# Patient Record
Sex: FEMALE | Race: WHITE | Employment: OTHER | URBAN - METROPOLITAN AREA
[De-identification: names, ages, dates, MRNs, and addresses within clinical notes are randomized per-mention and may not be internally consistent; named-entity substitution may affect disease eponyms.]

---

## 2019-12-31 ENCOUNTER — OFFICE VISIT (OUTPATIENT)
Dept: PRIMARY CARE CLINIC | Age: 64
End: 2019-12-31
Payer: MEDICARE

## 2019-12-31 VITALS
RESPIRATION RATE: 18 BRPM | WEIGHT: 124 LBS | SYSTOLIC BLOOD PRESSURE: 110 MMHG | HEIGHT: 60 IN | OXYGEN SATURATION: 93 % | BODY MASS INDEX: 24.35 KG/M2 | HEART RATE: 106 BPM | DIASTOLIC BLOOD PRESSURE: 72 MMHG

## 2019-12-31 DIAGNOSIS — G89.29 CHRONIC PAIN OF BOTH KNEES: ICD-10-CM

## 2019-12-31 DIAGNOSIS — M25.552 CHRONIC HIP PAIN, BILATERAL: Primary | ICD-10-CM

## 2019-12-31 DIAGNOSIS — M25.561 CHRONIC PAIN OF BOTH KNEES: ICD-10-CM

## 2019-12-31 DIAGNOSIS — G89.29 CHRONIC HIP PAIN, BILATERAL: Primary | ICD-10-CM

## 2019-12-31 DIAGNOSIS — M25.551 CHRONIC HIP PAIN, BILATERAL: Primary | ICD-10-CM

## 2019-12-31 DIAGNOSIS — Z13.29 SCREENING FOR THYROID DISORDER: ICD-10-CM

## 2019-12-31 DIAGNOSIS — M25.562 CHRONIC PAIN OF BOTH KNEES: ICD-10-CM

## 2019-12-31 DIAGNOSIS — R26.2 DIFFICULTY WALKING: ICD-10-CM

## 2019-12-31 DIAGNOSIS — G89.29 CHRONIC PAIN IN RIGHT SHOULDER: ICD-10-CM

## 2019-12-31 DIAGNOSIS — R29.898 WEAKNESS OF RIGHT LEG: ICD-10-CM

## 2019-12-31 DIAGNOSIS — Z13.1 SCREENING FOR DIABETES MELLITUS: ICD-10-CM

## 2019-12-31 DIAGNOSIS — Z13.0 SCREENING FOR IRON DEFICIENCY ANEMIA: ICD-10-CM

## 2019-12-31 DIAGNOSIS — Z13.6 SCREENING FOR CARDIOVASCULAR CONDITION: ICD-10-CM

## 2019-12-31 DIAGNOSIS — M25.511 CHRONIC PAIN IN RIGHT SHOULDER: ICD-10-CM

## 2019-12-31 PROCEDURE — G0444 DEPRESSION SCREEN ANNUAL: HCPCS | Performed by: NURSE PRACTITIONER

## 2019-12-31 PROCEDURE — 4040F PNEUMOC VAC/ADMIN/RCVD: CPT | Performed by: NURSE PRACTITIONER

## 2019-12-31 PROCEDURE — 4004F PT TOBACCO SCREEN RCVD TLK: CPT | Performed by: NURSE PRACTITIONER

## 2019-12-31 PROCEDURE — G8420 CALC BMI NORM PARAMETERS: HCPCS | Performed by: NURSE PRACTITIONER

## 2019-12-31 PROCEDURE — G8400 PT W/DXA NO RESULTS DOC: HCPCS | Performed by: NURSE PRACTITIONER

## 2019-12-31 PROCEDURE — 3017F COLORECTAL CA SCREEN DOC REV: CPT | Performed by: NURSE PRACTITIONER

## 2019-12-31 PROCEDURE — G8484 FLU IMMUNIZE NO ADMIN: HCPCS | Performed by: NURSE PRACTITIONER

## 2019-12-31 PROCEDURE — G8427 DOCREV CUR MEDS BY ELIG CLIN: HCPCS | Performed by: NURSE PRACTITIONER

## 2019-12-31 PROCEDURE — 99204 OFFICE O/P NEW MOD 45 MIN: CPT | Performed by: NURSE PRACTITIONER

## 2019-12-31 PROCEDURE — 96372 THER/PROPH/DIAG INJ SC/IM: CPT | Performed by: NURSE PRACTITIONER

## 2019-12-31 PROCEDURE — 1090F PRES/ABSN URINE INCON ASSESS: CPT | Performed by: NURSE PRACTITIONER

## 2019-12-31 PROCEDURE — 1123F ACP DISCUSS/DSCN MKR DOCD: CPT | Performed by: NURSE PRACTITIONER

## 2019-12-31 RX ORDER — PHENOL 1.4 %
1 AEROSOL, SPRAY (ML) MUCOUS MEMBRANE
COMMUNITY
Start: 2019-05-23 | End: 2020-08-03 | Stop reason: ALTCHOICE

## 2019-12-31 RX ORDER — BUSPIRONE HYDROCHLORIDE 5 MG/1
1 TABLET ORAL
COMMUNITY
Start: 2019-12-05 | End: 2021-10-18 | Stop reason: ALTCHOICE

## 2019-12-31 RX ORDER — MELOXICAM 15 MG/1
15 TABLET ORAL DAILY PRN
Qty: 30 TABLET | Refills: 5 | Status: SHIPPED | OUTPATIENT
Start: 2019-12-31 | End: 2020-01-21 | Stop reason: SINTOL

## 2019-12-31 RX ORDER — PREDNISONE 50 MG/1
50 TABLET ORAL DAILY
Qty: 7 TABLET | Refills: 0 | Status: SHIPPED | OUTPATIENT
Start: 2019-12-31 | End: 2020-01-28 | Stop reason: ALTCHOICE

## 2019-12-31 RX ORDER — METHYLPREDNISOLONE ACETATE 80 MG/ML
80 INJECTION, SUSPENSION INTRA-ARTICULAR; INTRALESIONAL; INTRAMUSCULAR; SOFT TISSUE ONCE
Status: COMPLETED | OUTPATIENT
Start: 2019-12-31 | End: 2019-12-31

## 2019-12-31 RX ADMIN — METHYLPREDNISOLONE ACETATE 80 MG: 80 INJECTION, SUSPENSION INTRA-ARTICULAR; INTRALESIONAL; INTRAMUSCULAR; SOFT TISSUE at 10:45

## 2019-12-31 ASSESSMENT — PATIENT HEALTH QUESTIONNAIRE - PHQ9
4. FEELING TIRED OR HAVING LITTLE ENERGY: 3
2. FEELING DOWN, DEPRESSED OR HOPELESS: 3
7. TROUBLE CONCENTRATING ON THINGS, SUCH AS READING THE NEWSPAPER OR WATCHING TELEVISION: 0
SUM OF ALL RESPONSES TO PHQ QUESTIONS 1-9: 15
SUM OF ALL RESPONSES TO PHQ QUESTIONS 1-9: 15
6. FEELING BAD ABOUT YOURSELF - OR THAT YOU ARE A FAILURE OR HAVE LET YOURSELF OR YOUR FAMILY DOWN: 0
SUM OF ALL RESPONSES TO PHQ9 QUESTIONS 1 & 2: 6
5. POOR APPETITE OR OVEREATING: 3
8. MOVING OR SPEAKING SO SLOWLY THAT OTHER PEOPLE COULD HAVE NOTICED. OR THE OPPOSITE, BEING SO FIGETY OR RESTLESS THAT YOU HAVE BEEN MOVING AROUND A LOT MORE THAN USUAL: 0
1. LITTLE INTEREST OR PLEASURE IN DOING THINGS: 3
10. IF YOU CHECKED OFF ANY PROBLEMS, HOW DIFFICULT HAVE THESE PROBLEMS MADE IT FOR YOU TO DO YOUR WORK, TAKE CARE OF THINGS AT HOME, OR GET ALONG WITH OTHER PEOPLE: 3
9. THOUGHTS THAT YOU WOULD BE BETTER OFF DEAD, OR OF HURTING YOURSELF: 0
3. TROUBLE FALLING OR STAYING ASLEEP: 3

## 2020-01-04 ENCOUNTER — HOSPITAL ENCOUNTER (OUTPATIENT)
Dept: GENERAL RADIOLOGY | Age: 65
Discharge: HOME | End: 2020-01-06
Payer: MEDICARE

## 2020-01-04 ENCOUNTER — HOSPITAL ENCOUNTER (OUTPATIENT)
Age: 65
Discharge: HOME | End: 2020-01-04
Payer: MEDICARE

## 2020-01-04 ENCOUNTER — HOSPITAL ENCOUNTER (OUTPATIENT)
Age: 65
Discharge: HOME | End: 2020-01-06
Payer: MEDICARE

## 2020-01-04 LAB
ABSOLUTE EOS #: 0 K/UL (ref 0–0.4)
ABSOLUTE IMMATURE GRANULOCYTE: 0 K/UL (ref 0–0.3)
ABSOLUTE LYMPH #: 1.66 K/UL (ref 1–4.8)
ABSOLUTE MONO #: 0.19 K/UL (ref 0.1–0.8)
ALBUMIN SERPL-MCNC: 3.8 G/DL (ref 3.5–5.2)
ALBUMIN/GLOBULIN RATIO: 1.2 (ref 1–2.5)
ALP BLD-CCNC: 95 U/L (ref 35–104)
ALT SERPL-CCNC: 15 U/L (ref 5–33)
ANION GAP SERPL CALCULATED.3IONS-SCNC: 15 MMOL/L (ref 9–17)
AST SERPL-CCNC: 12 U/L
BASOPHILS # BLD: 0 % (ref 0–2)
BASOPHILS ABSOLUTE: 0 K/UL (ref 0–0.2)
BILIRUB SERPL-MCNC: 0.41 MG/DL (ref 0.3–1.2)
BUN BLDV-MCNC: 23 MG/DL (ref 8–23)
BUN/CREAT BLD: ABNORMAL (ref 9–20)
CALCIUM SERPL-MCNC: 9.2 MG/DL (ref 8.6–10.4)
CHLORIDE BLD-SCNC: 104 MMOL/L (ref 98–107)
CHOLESTEROL/HDL RATIO: 5.4
CHOLESTEROL: 184 MG/DL
CO2: 20 MMOL/L (ref 20–31)
CREAT SERPL-MCNC: 0.67 MG/DL (ref 0.5–0.9)
DIFFERENTIAL TYPE: ABNORMAL
EOSINOPHILS RELATIVE PERCENT: 0 % (ref 1–4)
GFR AFRICAN AMERICAN: >60 ML/MIN
GFR NON-AFRICAN AMERICAN: >60 ML/MIN
GFR SERPL CREATININE-BSD FRML MDRD: ABNORMAL ML/MIN/{1.73_M2}
GFR SERPL CREATININE-BSD FRML MDRD: ABNORMAL ML/MIN/{1.73_M2}
GLUCOSE BLD-MCNC: 206 MG/DL (ref 70–99)
HCT VFR BLD CALC: 44.6 % (ref 36.3–47.1)
HDLC SERPL-MCNC: 34 MG/DL
HEMOGLOBIN: 15.2 G/DL (ref 11.9–15.1)
IMMATURE GRANULOCYTES: 0 %
LDL CHOLESTEROL: 113 MG/DL (ref 0–130)
LYMPHOCYTES # BLD: 26 % (ref 24–44)
MCH RBC QN AUTO: 31.9 PG (ref 25.2–33.5)
MCHC RBC AUTO-ENTMCNC: 34.1 G/DL (ref 28.4–34.8)
MCV RBC AUTO: 93.5 FL (ref 82.6–102.9)
MONOCYTES # BLD: 3 % (ref 1–7)
MORPHOLOGY: NORMAL
NRBC AUTOMATED: 0 PER 100 WBC
PDW BLD-RTO: 12.9 % (ref 11.8–14.4)
PLATELET # BLD: 199 K/UL (ref 138–453)
PLATELET ESTIMATE: ABNORMAL
PMV BLD AUTO: 11.8 FL (ref 8.1–13.5)
POTASSIUM SERPL-SCNC: 4.3 MMOL/L (ref 3.7–5.3)
RBC # BLD: 4.77 M/UL (ref 3.95–5.11)
RBC # BLD: ABNORMAL 10*6/UL
SEG NEUTROPHILS: 71 % (ref 36–66)
SEGMENTED NEUTROPHILS ABSOLUTE COUNT: 4.55 K/UL (ref 1.8–7.7)
SODIUM BLD-SCNC: 139 MMOL/L (ref 135–144)
TOTAL PROTEIN: 7 G/DL (ref 6.4–8.3)
TRIGL SERPL-MCNC: 183 MG/DL
TSH SERPL DL<=0.05 MIU/L-ACNC: 0.09 MIU/L (ref 0.3–5)
VLDLC SERPL CALC-MCNC: ABNORMAL MG/DL (ref 1–30)
WBC # BLD: 6.4 K/UL (ref 3.5–11.3)
WBC # BLD: ABNORMAL 10*3/UL

## 2020-01-04 PROCEDURE — 84443 ASSAY THYROID STIM HORMONE: CPT

## 2020-01-04 PROCEDURE — 73562 X-RAY EXAM OF KNEE 3: CPT

## 2020-01-04 PROCEDURE — 80061 LIPID PANEL: CPT

## 2020-01-04 PROCEDURE — 85025 COMPLETE CBC W/AUTO DIFF WBC: CPT

## 2020-01-04 PROCEDURE — 36415 COLL VENOUS BLD VENIPUNCTURE: CPT

## 2020-01-04 PROCEDURE — 73030 X-RAY EXAM OF SHOULDER: CPT

## 2020-01-04 PROCEDURE — 73521 X-RAY EXAM HIPS BI 2 VIEWS: CPT

## 2020-01-04 PROCEDURE — 80053 COMPREHEN METABOLIC PANEL: CPT

## 2020-01-09 ENCOUNTER — HOSPITAL ENCOUNTER (OUTPATIENT)
Age: 65
Discharge: HOME | End: 2020-01-09
Payer: MEDICARE

## 2020-01-09 LAB
ESTIMATED AVERAGE GLUCOSE: 166 MG/DL
GLUCOSE FASTING: 105 MG/DL (ref 70–99)
HBA1C MFR BLD: 7.4 % (ref 4–6)
T3 FREE: 2.55 PG/ML (ref 2.02–4.43)
THYROXINE, FREE: 1.3 NG/DL (ref 0.93–1.7)
TSH SERPL DL<=0.05 MIU/L-ACNC: 0.41 MIU/L (ref 0.3–5)

## 2020-01-09 PROCEDURE — 84439 ASSAY OF FREE THYROXINE: CPT

## 2020-01-09 PROCEDURE — 83036 HEMOGLOBIN GLYCOSYLATED A1C: CPT

## 2020-01-09 PROCEDURE — 84443 ASSAY THYROID STIM HORMONE: CPT

## 2020-01-09 PROCEDURE — 82947 ASSAY GLUCOSE BLOOD QUANT: CPT

## 2020-01-09 PROCEDURE — 36415 COLL VENOUS BLD VENIPUNCTURE: CPT

## 2020-01-09 PROCEDURE — 84481 FREE ASSAY (FT-3): CPT

## 2020-01-20 ENCOUNTER — HOSPITAL ENCOUNTER (OUTPATIENT)
Dept: PHYSICAL THERAPY | Facility: CLINIC | Age: 65
Setting detail: THERAPIES SERIES
Discharge: HOME | End: 2020-01-20
Payer: MEDICARE

## 2020-01-21 ENCOUNTER — PATIENT MESSAGE (OUTPATIENT)
Dept: PRIMARY CARE CLINIC | Age: 65
End: 2020-01-21

## 2020-01-21 RX ORDER — ETODOLAC 400 MG/1
400 TABLET, FILM COATED ORAL 2 TIMES DAILY
Qty: 60 TABLET | Refills: 3 | Status: SHIPPED | OUTPATIENT
Start: 2020-01-21 | End: 2020-01-28 | Stop reason: ALTCHOICE

## 2020-01-21 NOTE — TELEPHONE ENCOUNTER
From: Mellisa Negron  To: Ty Rocha APRN - CNP  Sent: 1/21/2020 12:56 PM EST  Subject: Prescription Question    Called last week about taking meoxicam the pill was working but I have to many side effects headache (constantly) upset stomach and diarrhea was wondering if you can perscribe something for the pain.     Thank you,     Jed Harper

## 2020-01-28 ENCOUNTER — OFFICE VISIT (OUTPATIENT)
Dept: PRIMARY CARE CLINIC | Age: 65
End: 2020-01-28
Payer: MEDICARE

## 2020-01-28 VITALS
HEART RATE: 98 BPM | BODY MASS INDEX: 24.2 KG/M2 | OXYGEN SATURATION: 97 % | HEIGHT: 60 IN | WEIGHT: 123.25 LBS | DIASTOLIC BLOOD PRESSURE: 78 MMHG | SYSTOLIC BLOOD PRESSURE: 122 MMHG

## 2020-01-28 PROCEDURE — G8420 CALC BMI NORM PARAMETERS: HCPCS | Performed by: NURSE PRACTITIONER

## 2020-01-28 PROCEDURE — 99214 OFFICE O/P EST MOD 30 MIN: CPT | Performed by: NURSE PRACTITIONER

## 2020-01-28 PROCEDURE — 4040F PNEUMOC VAC/ADMIN/RCVD: CPT | Performed by: NURSE PRACTITIONER

## 2020-01-28 PROCEDURE — 4004F PT TOBACCO SCREEN RCVD TLK: CPT | Performed by: NURSE PRACTITIONER

## 2020-01-28 PROCEDURE — 1123F ACP DISCUSS/DSCN MKR DOCD: CPT | Performed by: NURSE PRACTITIONER

## 2020-01-28 PROCEDURE — 3017F COLORECTAL CA SCREEN DOC REV: CPT | Performed by: NURSE PRACTITIONER

## 2020-01-28 PROCEDURE — 1090F PRES/ABSN URINE INCON ASSESS: CPT | Performed by: NURSE PRACTITIONER

## 2020-01-28 PROCEDURE — G8400 PT W/DXA NO RESULTS DOC: HCPCS | Performed by: NURSE PRACTITIONER

## 2020-01-28 PROCEDURE — G8427 DOCREV CUR MEDS BY ELIG CLIN: HCPCS | Performed by: NURSE PRACTITIONER

## 2020-01-28 PROCEDURE — G8484 FLU IMMUNIZE NO ADMIN: HCPCS | Performed by: NURSE PRACTITIONER

## 2020-01-28 RX ORDER — PREDNISONE 50 MG/1
50 TABLET ORAL DAILY
Qty: 7 TABLET | Refills: 0 | Status: SHIPPED | OUTPATIENT
Start: 2020-01-28 | End: 2020-08-03

## 2020-01-28 RX ORDER — LATANOPROST 50 UG/ML
SOLUTION/ DROPS OPHTHALMIC
Status: ON HOLD | COMMUNITY
Start: 2020-01-27 | End: 2020-09-15 | Stop reason: ALTCHOICE

## 2020-01-28 RX ORDER — CELECOXIB 100 MG/1
100 CAPSULE ORAL DAILY
Qty: 60 CAPSULE | Refills: 3 | Status: SHIPPED | OUTPATIENT
Start: 2020-01-28 | End: 2020-08-03 | Stop reason: ALTCHOICE

## 2020-01-28 ASSESSMENT — PATIENT HEALTH QUESTIONNAIRE - PHQ9
1. LITTLE INTEREST OR PLEASURE IN DOING THINGS: 2
3. TROUBLE FALLING OR STAYING ASLEEP: 2
2. FEELING DOWN, DEPRESSED OR HOPELESS: 2
SUM OF ALL RESPONSES TO PHQ QUESTIONS 1-9: 12
7. TROUBLE CONCENTRATING ON THINGS, SUCH AS READING THE NEWSPAPER OR WATCHING TELEVISION: 2
SUM OF ALL RESPONSES TO PHQ QUESTIONS 1-9: 12
9. THOUGHTS THAT YOU WOULD BE BETTER OFF DEAD, OR OF HURTING YOURSELF: 0
4. FEELING TIRED OR HAVING LITTLE ENERGY: 2
8. MOVING OR SPEAKING SO SLOWLY THAT OTHER PEOPLE COULD HAVE NOTICED. OR THE OPPOSITE, BEING SO FIGETY OR RESTLESS THAT YOU HAVE BEEN MOVING AROUND A LOT MORE THAN USUAL: 0
6. FEELING BAD ABOUT YOURSELF - OR THAT YOU ARE A FAILURE OR HAVE LET YOURSELF OR YOUR FAMILY DOWN: 0
10. IF YOU CHECKED OFF ANY PROBLEMS, HOW DIFFICULT HAVE THESE PROBLEMS MADE IT FOR YOU TO DO YOUR WORK, TAKE CARE OF THINGS AT HOME, OR GET ALONG WITH OTHER PEOPLE: 1
SUM OF ALL RESPONSES TO PHQ9 QUESTIONS 1 & 2: 4
5. POOR APPETITE OR OVEREATING: 2

## 2020-01-28 ASSESSMENT — ENCOUNTER SYMPTOMS
SHORTNESS OF BREATH: 0
WHEEZING: 0
COUGH: 0
DIARRHEA: 0
NAUSEA: 0
SORE THROAT: 0
TROUBLE SWALLOWING: 0
BLOOD IN STOOL: 0
ABDOMINAL PAIN: 0
CONSTIPATION: 0
SINUS PRESSURE: 0
VOMITING: 0

## 2020-01-28 NOTE — PROGRESS NOTES
[facility] PRIMARY CARE  [facility] [address]  [address] [address]  Dept: [phone]  Dept Fax: [phone]    Zoltan Estrada is a 71 y.o. female who presentstoday for her medical conditions/complaints as noted below. Zoltan Estrada is c/o of  Chief Complaint   Patient presents with    Hip Pain     One month follow-up bilateral hip/leg pain        HPI:     Here today for follow up  Reacted adversely to meloxicam with stomach pain, headache, nausea and diarrhea  Since stopping the medication these symptoms have resolved but her pain is uncontrolled  Tried etodolac but not helping  Her main area of pain is in the right knee posteriorly and her right ankle  The pain is constant and it is worse at night  Reports while on prednisone her pain was improved  Her sister passed away yesterday and she is concerned because she will need to be on her feet a lot over the next week with  planning and activities    Has been using her metformin, states that she tolerates this medication without adverse effect as of this time    Ankle Pain    There was no injury mechanism. The pain is present in the right ankle. The quality of the pain is described as aching. The pain is at a severity of 7/10. The pain is moderate. The pain has been worsening since onset. Pertinent negatives include no inability to bear weight, loss of motion, numbness or tingling. The symptoms are aggravated by movement and weight bearing. She has tried NSAIDs and non-weight bearing for the symptoms. The treatment provided mild relief.        Hemoglobin A1C (%)   Date Value   2020 7.4 (H)             ( goal A1C is < 7)   No results found for: LABMICR  LDL Cholesterol (mg/dL)   Date Value   2020 113       (goal LDL is <100)   AST (U/L)   Date Value   2020 12     ALT (U/L)   Date Value   2020 15     BUN (mg/dL)   Date Value   2020 23     BP Readings from Last 3 Encounters: General: Skin is warm and dry. Neurological:      Mental Status: She is alert and oriented to person, place, and time. Psychiatric:         Behavior: Behavior normal.         Thought Content: Thought content normal.         Judgment: Judgment normal.       /78 (Site: Right Upper Arm, Position: Sitting, Cuff Size: Medium Adult)   Pulse 98   Ht 5' (1.524 m)   Wt 123 lb 4 oz (55.9 kg)   LMP  (Exact Date)   SpO2 97%   BMI 24.07 kg/m²     Assessment:       Diagnosis Orders   1. Chronic pain of both knees  celecoxib (CELEBREX) 100 MG capsule    predniSONE (DELTASONE) 50 MG tablet   2. Chronic pain of right ankle  celecoxib (CELEBREX) 100 MG capsule    Deonna Hernandez MD, Orthopedic Surgery, [facility]   3. Chronic hip pain, bilateral  celecoxib (CELEBREX) 100 MG capsule    predniSONE (DELTASONE) 50 MG tablet   4. Chronic pain in right shoulder  predniSONE (DELTASONE) 50 MG tablet             Plan:      Return in about 2 months (around 3/28/2020) for diabetes check, OA. Knee pain, ankle pain, hip pain- reviewed and discussed x-rays which showed minimal arthritic changes, referral to Ortho for evaluation of her ankle as this may likely be contributing to the pain in her right knee and right hip. May consider steroid injections if appropriate for hip bursitis. Stop etodolac, try Celebrex. Repeat course of steroids due to death of sister, would like to provide some improved pain relief during this time for her.   Considered tramadol but she declines as reacted adversely to this in the past  Orders Placed This Encounter   Procedures   Darryl Bernstein MD, Orthopedic Surgery, [facility]     Referral Priority:   Routine     Referral Type:   Eval and Treat     Referral Reason:   Specialty Services Required     Referred to Provider:   Charli Glass MD     Requested Specialty:   Orthopedic Surgery     Number of Visits Requested:   1        Orders Placed This Encounter   Medications

## 2020-01-28 NOTE — PROGRESS NOTES
Visit Information    Have you changed or started any medications since your last visit including any over-the-counter medicines, vitamins, or herbal medicines? yes - see med list    Have you stopped taking any of your medications? Is so, why? -  yes - Stopped meloxicam, started etodalac  Are you having any side effects from any of your medications? - yes - Meloxicam     Have you seen any other physician or provider since your last visit? yes - [facility] Consultant    Have you had any other diagnostic tests since your last visit?  no   Have you been seen in the emergency room and/or had an admission in a hospital since we last saw you?  no   Have you had your routine dental cleaning in the past 6 months?  no     Do you have an active MyChart account? If no, what is the barrier?   Yes    Patient Care Team:  BAKARI Andujar CNP as PCP - General (Family Nurse Practitioner)  BAKARI Andujar CNP as PCP - [facility]    Medical History Review  Past Medical, Family, and Social History reviewed and does contribute to the patient presenting condition    Health Maintenance   Topic Date Due    Hepatitis C screen  1955    Diabetic foot exam  12/24/1964    Diabetic retinal exam  12/24/1964    HIV screen  12/24/1969    Diabetic microalbuminuria test  12/24/1972    Cervical cancer screen  12/24/1975    Breast cancer screen  12/24/2004    Shingles Vaccine (1 of 2) 12/24/2004    Colon cancer screen colonoscopy  12/24/2004    Low dose CT lung screening  12/24/2009    Flu vaccine (1) 09/01/2019    Pneumococcal 65+ years Vaccine (1 of 1 - PPSV23) 12/24/2019    Annual Wellness Visit (AWV)  01/19/2020    DEXA (modify frequency per FRAX score)  12/24/2019    Lipid screen  01/04/2021    A1C test (Diabetic or Prediabetic)  01/09/2021

## 2020-02-17 ENCOUNTER — OFFICE VISIT (OUTPATIENT)
Dept: ORTHOPEDIC SURGERY | Age: 65
End: 2020-02-17
Payer: MEDICARE

## 2020-02-17 ENCOUNTER — TELEPHONE (OUTPATIENT)
Dept: PRIMARY CARE CLINIC | Age: 65
End: 2020-02-17

## 2020-02-17 VITALS — BODY MASS INDEX: 24.19 KG/M2 | WEIGHT: 123.24 LBS | HEIGHT: 60 IN

## 2020-02-17 PROCEDURE — 99203 OFFICE O/P NEW LOW 30 MIN: CPT | Performed by: ORTHOPAEDIC SURGERY

## 2020-02-17 PROCEDURE — G8484 FLU IMMUNIZE NO ADMIN: HCPCS | Performed by: ORTHOPAEDIC SURGERY

## 2020-02-17 PROCEDURE — 4040F PNEUMOC VAC/ADMIN/RCVD: CPT | Performed by: ORTHOPAEDIC SURGERY

## 2020-02-17 PROCEDURE — 1123F ACP DISCUSS/DSCN MKR DOCD: CPT | Performed by: ORTHOPAEDIC SURGERY

## 2020-02-17 PROCEDURE — G8400 PT W/DXA NO RESULTS DOC: HCPCS | Performed by: ORTHOPAEDIC SURGERY

## 2020-02-17 PROCEDURE — 3017F COLORECTAL CA SCREEN DOC REV: CPT | Performed by: ORTHOPAEDIC SURGERY

## 2020-02-17 PROCEDURE — G8420 CALC BMI NORM PARAMETERS: HCPCS | Performed by: ORTHOPAEDIC SURGERY

## 2020-02-17 PROCEDURE — 1090F PRES/ABSN URINE INCON ASSESS: CPT | Performed by: ORTHOPAEDIC SURGERY

## 2020-02-17 PROCEDURE — 4004F PT TOBACCO SCREEN RCVD TLK: CPT | Performed by: ORTHOPAEDIC SURGERY

## 2020-02-17 PROCEDURE — G8427 DOCREV CUR MEDS BY ELIG CLIN: HCPCS | Performed by: ORTHOPAEDIC SURGERY

## 2020-02-17 NOTE — PROGRESS NOTES
Tonsillectomy; and Breast surgery. Current Medications:     Current Outpatient Medications:     latanoprost (XALATAN) 0.005 % ophthalmic solution, , Disp: , Rfl:     celecoxib (CELEBREX) 100 MG capsule, Take 1 capsule by mouth daily, Disp: 60 capsule, Rfl: 3    predniSONE (DELTASONE) 50 MG tablet, Take 1 tablet by mouth daily, Disp: 7 tablet, Rfl: 0    metFORMIN (GLUCOPHAGE) 500 MG tablet, Take 1 tablet by mouth 2 times daily (with meals), Disp: 60 tablet, Rfl: 5    Melatonin 10 MG TABS, Take 1 tablet by mouth, Disp: , Rfl:     VORTIoxetine (TRINTELLIX) 10 MG TABS tablet, Take 1 tablet by mouth, Disp: , Rfl:     busPIRone (BUSPAR) 5 MG tablet, Take 1 tablet by mouth, Disp: , Rfl:      Allergies:    Sulfamethoxazole; Sulfamethoxazole-trimethoprim; Tetanus toxoid; and Trimethoprim    Family History:  family history includes Cancer in her father and sister. Social History:   Social History     Occupational History    Not on file   Tobacco Use    Smoking status: Current Every Day Smoker     Packs/day: 1.50     Years: 35.00     Pack years: 52.50    Smokeless tobacco: Never Used   Substance and Sexual Activity    Alcohol use: Not Currently    Drug use: Never    Sexual activity: Not on file     Occupation: She had previously worked at [employer], but quit secondary to pain    OBJECTIVE:  Ht 5' (1.524 m)   Wt 123 lb 3.8 oz (55.9 kg)   LMP  (Exact Date)   BMI 24.07 kg/m²    Psych: alert and oriented to person, time, and place  Cardio:  well perfused extremities  Resp:  normal respiratory effort  Skin:  no cyanosis  Hem/lymph:  no lymphedema  Neuro:  sensation to light touch grossly intact throughout all nerve distributions in the foot   Musculoskeletal:    RLE:  Vascular: The foot is warm and well-perfused, good capillary refill. Skin: Intact, no erythema/ulcers. No significant swelling of foot.   Musculoskeletal: Able to fire FHL/EHL/TA/GCS  Range of Motion: No pain with ankle or subtalar motion  Stable ankle exam  Tenderness: Sinus Tarsi  -No pain with subtalar/ankle range of motion, no tenderness over the anterior ankle joint      LLE:  Vascular: The foot is warm and well-perfused, good capillary refill. Skin: Intact, no erythema/ulcers. No significant swelling of foot. Musculoskeletal: Able to fire FHL/EHL/TA/GCS  Range of Motion: No pain with ankle or subtalar motion  Stable ankle exam  No tenderness over any bony prominences      RADIOLOGY:   2/17/2020 FINDINGS:  Three weightbearing views (AP, Mortise, and Lateral) of the right ankle and three weightbearing views (AP, Oblique, Lateral) of the right foot were obtained in the office today and reviewed, revealing no acute fracture, dislocation, or radioopaque foreign body/tumor. The ankle mortise is maintained with no widening of the clear spaces. Overall alignment is satisfactory. There is some degenerative changes at the tibiotalar joint, mainly medially, as well as the subtalar joint, with joint narrowing, and sclerosis. IMPRESSION:  No acute fracture/dislocation. Degenerative changes of Tibiotalar and subtalar joint. 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 A diagnosis of Radiculopathy, lumbosacral region was also pertinent to this visit. Body mass index is 24.07 kg/m². She has right lower extremity pain, I believe secondary to a lumbosacral radiculopathy; however she does have some underlying subtalar and tibiotalar degenerative changes. Notably, she has a complex past medical history including major recurrent depressive disorder, prediabetes (on metformin), gout, history of a right lower extremity blood clot around 2015, and reports that she smokes 1.5 packs of cigarettes per day.     I had a long discussion today with the patient about the likely diagnosis and its natural history, physical exam and imaging findings, as well as treatment options in detail. We discussed rest/activity modification, swelling control, NSAIDs/Acetaminophen/topical anesthetics, orthotics/shoewear modification, bracing/immobilization, injections, and physical therapy. The patient wishes to proceed with the recommendations as above. Orders/referrals were placed as below at today's visit. I referred the patient to physical therapy for my radiculopathy protocol. At today's visit, I did provide a referral for Physical Medicine and Rehabilitation to evaluate and treat. Given her clinical history, I believe she may benefit from supervised care provided by PM&R. She will continue to use NSAIDs and topical anesthetics as needed for pain control. All questions were answered and the patient agrees with the above plan. The patient will return to clinic in 3 months PRN without x-rays. At his next visit, depending on how she is doing, I will consider offering her a diagnostic/therapeutic subtalar joint injection. Return in about 3 months (around 5/17/2020), or if symptoms worsen or fail to improve. No orders of the defined types were placed in this encounter.     Orders Placed This Encounter   Procedures    Ambulatory referral to Physical Therapy     Referral Priority:   Routine     Referral Type:   Eval and Treat     Referral Reason:   Specialty Services Required     Requested Specialty:   Physical Therapy     Number of Visits Requested:   Viktor Kumar MD, Physical Medicine and Rehabilitation, [place]     Referral Priority:   Routine     Referral Type:   Eval and Treat     Referral Reason:   Specialty Services Required     Referred to Provider:   Driss Mcguire MD     Requested Specialty:   Physical Medicine and Rehab     Number of Visits Requested:   1         Wendy Marrero MD  Orthopedic Surgery, Foot and Ankle        Please excuse any typos/errors, as this note was created with the assistance of voice recognition software. While intending to generate a document that actually reflects the content of the visit, the document can still have some errors including those of syntax and sound-a-like substitutions which may escape proof reading. In such instances, actual meaning can be extrapolated by context.

## 2020-02-17 NOTE — TELEPHONE ENCOUNTER
As all the nsaids we have tried have not worked there is nothing else to recommend other than extra strength tylenol, 2 tablets jabier 6 hours.

## 2020-02-17 NOTE — TELEPHONE ENCOUNTER
Patient called and said she saw the doctor for her ankle and foot. He told her he thinks it is a pinched nerve and is referring her to someone else. She was wondering if she should continue taking the medication because it is not helping her and it is not doing any good.

## 2020-02-17 NOTE — LETTER
diagnostic/therapeutic subtalar joint injection. Thank you for allowing me to participate in the care of this patient. I will keep you updated on this patient's follow up and I look forward to serving you and your patients again in the future. Please don't hesitate to contact me at my mobile number [phone].         Sammy Graves MD  Orthopedic Surgery, Foot and Ankle

## 2020-02-26 ENCOUNTER — TELEPHONE (OUTPATIENT)
Dept: PRIMARY CARE CLINIC | Age: 65
End: 2020-02-26

## 2020-02-27 ENCOUNTER — TELEPHONE (OUTPATIENT)
Dept: PHYSICAL MEDICINE AND REHAB | Age: 65
End: 2020-02-27

## 2020-02-27 ENCOUNTER — INITIAL CONSULT (OUTPATIENT)
Dept: PHYSICAL MEDICINE AND REHAB | Age: 65
End: 2020-02-27
Payer: MEDICARE

## 2020-02-27 VITALS
BODY MASS INDEX: 24.46 KG/M2 | WEIGHT: 124.6 LBS | TEMPERATURE: 98.4 F | HEIGHT: 60 IN | SYSTOLIC BLOOD PRESSURE: 167 MMHG | HEART RATE: 97 BPM | DIASTOLIC BLOOD PRESSURE: 108 MMHG

## 2020-02-27 PROCEDURE — 4004F PT TOBACCO SCREEN RCVD TLK: CPT | Performed by: PHYSICAL MEDICINE & REHABILITATION

## 2020-02-27 PROCEDURE — G8484 FLU IMMUNIZE NO ADMIN: HCPCS | Performed by: PHYSICAL MEDICINE & REHABILITATION

## 2020-02-27 PROCEDURE — G8427 DOCREV CUR MEDS BY ELIG CLIN: HCPCS | Performed by: PHYSICAL MEDICINE & REHABILITATION

## 2020-02-27 PROCEDURE — 99204 OFFICE O/P NEW MOD 45 MIN: CPT | Performed by: PHYSICAL MEDICINE & REHABILITATION

## 2020-02-27 PROCEDURE — 1123F ACP DISCUSS/DSCN MKR DOCD: CPT | Performed by: PHYSICAL MEDICINE & REHABILITATION

## 2020-02-27 PROCEDURE — G8400 PT W/DXA NO RESULTS DOC: HCPCS | Performed by: PHYSICAL MEDICINE & REHABILITATION

## 2020-02-27 PROCEDURE — 4040F PNEUMOC VAC/ADMIN/RCVD: CPT | Performed by: PHYSICAL MEDICINE & REHABILITATION

## 2020-02-27 PROCEDURE — 3017F COLORECTAL CA SCREEN DOC REV: CPT | Performed by: PHYSICAL MEDICINE & REHABILITATION

## 2020-02-27 PROCEDURE — 1090F PRES/ABSN URINE INCON ASSESS: CPT | Performed by: PHYSICAL MEDICINE & REHABILITATION

## 2020-02-27 PROCEDURE — G8420 CALC BMI NORM PARAMETERS: HCPCS | Performed by: PHYSICAL MEDICINE & REHABILITATION

## 2020-02-27 RX ORDER — DIAZEPAM 2 MG/1
2 TABLET ORAL EVERY 6 HOURS PRN
Qty: 1 TABLET | Refills: 0 | Status: SHIPPED | OUTPATIENT
Start: 2020-02-27 | End: 2020-02-28

## 2020-02-27 RX ORDER — GABAPENTIN 100 MG/1
100 CAPSULE ORAL 3 TIMES DAILY
Qty: 90 CAPSULE | Refills: 5 | Status: SHIPPED | OUTPATIENT
Start: 2020-02-27 | End: 2020-08-03 | Stop reason: ALTCHOICE

## 2020-02-27 NOTE — PROGRESS NOTES
2015    CHOLECYSTECTOMY      TONSILLECTOMY       Family History   Problem Relation Age of Onset    Cancer Father     Cancer Sister      Social History     Socioeconomic History    Marital status:      Spouse name: None    Number of children: None    Years of education: None    Highest education level: None   Occupational History    None   Social Needs    Financial resource strain: None    Food insecurity:     Worry: None     Inability: None    Transportation needs:     Medical: None     Non-medical: None   Tobacco Use    Smoking status: Current Every Day Smoker     Packs/day: 1.50     Years: 35.00     Pack years: 52.50    Smokeless tobacco: Never Used   Substance and Sexual Activity    Alcohol use: Not Currently    Drug use: Never    Sexual activity: None   Lifestyle    Physical activity:     Days per week: None     Minutes per session: None    Stress: None   Relationships    Social connections:     Talks on phone: None     Gets together: None     Attends Christianity service: None     Active member of club or organization: None     Attends meetings of clubs or organizations: None     Relationship status: None    Intimate partner violence:     Fear of current or ex partner: None     Emotionally abused: None     Physically abused: None     Forced sexual activity: None   Other Topics Concern    None   Social History Narrative    None          Current Outpatient Medications   Medication Sig Dispense Refill    gabapentin (NEURONTIN) 100 MG capsule Take 1 capsule by mouth 3 times daily for 180 days. Intended supply: 30 days 90 capsule 5    diazePAM (VALIUM) 2 MG tablet Take 1 tablet by mouth every 6 hours as needed for Anxiety for up to 1 day.  1 tablet 0    latanoprost (XALATAN) 0.005 % ophthalmic solution       celecoxib (CELEBREX) 100 MG capsule Take 1 capsule by mouth daily 60 capsule 3    predniSONE (DELTASONE) 50 MG tablet Take 1 tablet by mouth daily 7 tablet 0    metFORMIN to 1 day. Dispense:  1 tablet     Refill:  0     Once, just prior to MRI     Return in about 3 weeks (around 3/19/2020). Electronically signed by Arie Hemphill MD on 2/27/2020 at 2:15 PM.     Please note that this chart was generated using voicerecognition Dragon dictation software. Although every effort was made to ensurethe accuracy of this automated transcription, some errors in transcription may haveoccurred.

## 2020-02-27 NOTE — TELEPHONE ENCOUNTER
Pharmacy called for clarification. Because the order read for 1 tab  But directions were take every 6 hours for anxiety. I told pharmacy the one tablet was ordered for her to take an hour before her MRI.

## 2020-03-01 ASSESSMENT — ENCOUNTER SYMPTOMS
BACK PAIN: 1
BOWEL INCONTINENCE: 1

## 2020-03-10 ENCOUNTER — HOSPITAL ENCOUNTER (OUTPATIENT)
Dept: MRI IMAGING | Age: 65
Discharge: HOME | End: 2020-03-12
Payer: MEDICARE

## 2020-03-10 PROCEDURE — 72148 MRI LUMBAR SPINE W/O DYE: CPT

## 2020-03-10 PROCEDURE — 72195 MRI PELVIS W/O DYE: CPT

## 2020-03-16 ENCOUNTER — TELEPHONE (OUTPATIENT)
Dept: PHYSICAL MEDICINE AND REHAB | Age: 65
End: 2020-03-16

## 2020-03-16 ENCOUNTER — PATIENT MESSAGE (OUTPATIENT)
Dept: PRIMARY CARE CLINIC | Age: 65
End: 2020-03-16

## 2020-03-16 RX ORDER — ACETAMINOPHEN AND CODEINE PHOSPHATE 300; 30 MG/1; MG/1
1 TABLET ORAL EVERY 6 HOURS PRN
Qty: 28 TABLET | Refills: 0 | Status: SHIPPED | OUTPATIENT
Start: 2020-03-16 | End: 2020-03-24 | Stop reason: SDUPTHER

## 2020-03-16 NOTE — TELEPHONE ENCOUNTER
From: Delvin Galarza  To: BAKARI Smith - CNP  Sent: 3/16/2020 2:42 PM EDT  Subject: Prescription Question    Would like to know if you could prescribe a pain medicine for my shoulder, neck, arm and head. Dr Samy Moreno put me on Gabapentin I am up to 3 times a day taking it and it is working with no side effects but it doesn't help with my other pain. So I was thinking maybe some Tylenol 3 because I have tried pain medicine off the shelves and nothing gives me any relief.     Thank Gavin Staples

## 2020-03-18 ENCOUNTER — TELEPHONE (OUTPATIENT)
Dept: PHYSICAL MEDICINE AND REHAB | Age: 65
End: 2020-03-18

## 2020-03-19 ENCOUNTER — TELEPHONE (OUTPATIENT)
Dept: PHYSICAL MEDICINE AND REHAB | Age: 65
End: 2020-03-19

## 2020-03-19 NOTE — TELEPHONE ENCOUNTER
Pt was returning your call to discuss her MRI. She is asking that you give her a call back. She will keep her phone near her.

## 2020-03-24 RX ORDER — ACETAMINOPHEN AND CODEINE PHOSPHATE 300; 30 MG/1; MG/1
1 TABLET ORAL EVERY 6 HOURS PRN
Qty: 50 TABLET | Refills: 0 | Status: SHIPPED | OUTPATIENT
Start: 2020-03-24 | End: 2020-04-13 | Stop reason: SDUPTHER

## 2020-03-24 NOTE — TELEPHONE ENCOUNTER
PT said she is not feeling good today to come in, and doesn't have access to do video visit, so PT schedule appt for April 2.

## 2020-04-08 ENCOUNTER — TELEMEDICINE (OUTPATIENT)
Dept: PHYSICAL MEDICINE AND REHAB | Age: 65
End: 2020-04-08
Payer: MEDICARE

## 2020-04-08 PROCEDURE — G8400 PT W/DXA NO RESULTS DOC: HCPCS | Performed by: PHYSICAL MEDICINE & REHABILITATION

## 2020-04-08 PROCEDURE — 1123F ACP DISCUSS/DSCN MKR DOCD: CPT | Performed by: PHYSICAL MEDICINE & REHABILITATION

## 2020-04-08 PROCEDURE — 1090F PRES/ABSN URINE INCON ASSESS: CPT | Performed by: PHYSICAL MEDICINE & REHABILITATION

## 2020-04-08 PROCEDURE — 3017F COLORECTAL CA SCREEN DOC REV: CPT | Performed by: PHYSICAL MEDICINE & REHABILITATION

## 2020-04-08 PROCEDURE — 4040F PNEUMOC VAC/ADMIN/RCVD: CPT | Performed by: PHYSICAL MEDICINE & REHABILITATION

## 2020-04-08 PROCEDURE — 99214 OFFICE O/P EST MOD 30 MIN: CPT | Performed by: PHYSICAL MEDICINE & REHABILITATION

## 2020-04-08 PROCEDURE — G8428 CUR MEDS NOT DOCUMENT: HCPCS | Performed by: PHYSICAL MEDICINE & REHABILITATION

## 2020-04-12 ENCOUNTER — PATIENT MESSAGE (OUTPATIENT)
Dept: PRIMARY CARE CLINIC | Age: 65
End: 2020-04-12

## 2020-04-13 RX ORDER — ACETAMINOPHEN AND CODEINE PHOSPHATE 300; 30 MG/1; MG/1
1 TABLET ORAL EVERY 6 HOURS PRN
Qty: 50 TABLET | Refills: 0 | Status: SHIPPED | OUTPATIENT
Start: 2020-04-13 | End: 2020-05-26 | Stop reason: SDUPTHER

## 2020-04-13 NOTE — TELEPHONE ENCOUNTER
LOV 1/28/2020    Health Maintenance   Topic Date Due    Hepatitis C screen  1955    Diabetic foot exam  12/24/1964    Diabetic retinal exam  12/24/1964    HIV screen  12/24/1969    Diabetic microalbuminuria test  12/24/1972    Cervical cancer screen  12/24/1975    Breast cancer screen  12/24/2004    Shingles Vaccine (1 of 2) 12/24/2004    Colon cancer screen colonoscopy  12/24/2004    DEXA (modify frequency per FRAX score)  12/24/2009    Low dose CT lung screening  12/24/2009    Pneumococcal 65+ years Vaccine (1 of 1 - PPSV23) 12/24/2019    Annual Wellness Visit (AWV)  01/19/2020    Flu vaccine (Season Ended) 09/01/2020    Lipid screen  01/04/2021    A1C test (Diabetic or Prediabetic)  01/09/2021    Hepatitis A vaccine  Aged Out    Hepatitis B vaccine  Aged Out    Hib vaccine  Aged Out    Meningococcal (ACWY) vaccine  Aged Out             (applicable per patient's age: Cancer Screenings, Depression Screening, Fall Risk Screening, Immunizations)    Hemoglobin A1C (%)   Date Value   01/09/2020 7.4 (H)     LDL Cholesterol (mg/dL)   Date Value   01/04/2020 113     AST (U/L)   Date Value   01/04/2020 12     ALT (U/L)   Date Value   01/04/2020 15     BUN (mg/dL)   Date Value   01/04/2020 23      (goal A1C is < 7)   (goal LDL is <100) need 30-50% reduction from baseline     BP Readings from Last 3 Encounters:   02/27/20 (!) 167/108   01/28/20 122/78   12/31/19 110/72    (goal /80)      All Future Testing planned in [facility]:      Next Visit Date:  Future Appointments   Date Time Provider Tennille Duran   5/18/2020 10:15 AM Mario Garcia MD [id number]            There is no problem list on file for this patient.

## 2020-04-13 NOTE — TELEPHONE ENCOUNTER
From: Earline Smith  To: BAKARI Arevalo CNP  Sent: 4/12/2020 3:16 AM EDT  Subject: Prescription Question    Could you please send in another prescription for Tylenol #3    Thanks,    Meredith Presley

## 2020-04-29 ENCOUNTER — TELEPHONE (OUTPATIENT)
Dept: PRIMARY CARE CLINIC | Age: 65
End: 2020-04-29

## 2020-05-18 ENCOUNTER — TELEPHONE (OUTPATIENT)
Dept: PHYSICAL MEDICINE AND REHAB | Age: 65
End: 2020-05-18

## 2020-05-18 RX ORDER — TIZANIDINE 4 MG/1
2 TABLET ORAL 3 TIMES DAILY PRN
Qty: 30 TABLET | Refills: 0 | Status: SHIPPED | OUTPATIENT
Start: 2020-05-18 | End: 2020-08-03

## 2020-05-18 NOTE — TELEPHONE ENCOUNTER
I contacted Juan Schneider to schedule f/u with you since Video Visit on 4/8. Scheduled her for next Thursday. She is requesting something for her back as she fell 2x in a day a week ago. She says it feels like \"contractons\". It hurts bad. She is taking the gabapentin 100 mg TID. Would you be willing to give her something until her appointment next week? Or other advice?

## 2020-05-22 ENCOUNTER — OFFICE VISIT (OUTPATIENT)
Dept: PRIMARY CARE CLINIC | Age: 65
End: 2020-05-22
Payer: MEDICARE

## 2020-05-22 VITALS — SYSTOLIC BLOOD PRESSURE: 156 MMHG | BODY MASS INDEX: 22.93 KG/M2 | DIASTOLIC BLOOD PRESSURE: 88 MMHG | WEIGHT: 117.4 LBS

## 2020-05-22 LAB — HBA1C MFR BLD: 6.3 %

## 2020-05-22 PROCEDURE — 4004F PT TOBACCO SCREEN RCVD TLK: CPT | Performed by: NURSE PRACTITIONER

## 2020-05-22 PROCEDURE — G8420 CALC BMI NORM PARAMETERS: HCPCS | Performed by: NURSE PRACTITIONER

## 2020-05-22 PROCEDURE — G8427 DOCREV CUR MEDS BY ELIG CLIN: HCPCS | Performed by: NURSE PRACTITIONER

## 2020-05-22 PROCEDURE — 83036 HEMOGLOBIN GLYCOSYLATED A1C: CPT | Performed by: NURSE PRACTITIONER

## 2020-05-22 PROCEDURE — 1090F PRES/ABSN URINE INCON ASSESS: CPT | Performed by: NURSE PRACTITIONER

## 2020-05-22 PROCEDURE — 4040F PNEUMOC VAC/ADMIN/RCVD: CPT | Performed by: NURSE PRACTITIONER

## 2020-05-22 PROCEDURE — 3044F HG A1C LEVEL LT 7.0%: CPT | Performed by: NURSE PRACTITIONER

## 2020-05-22 PROCEDURE — 1123F ACP DISCUSS/DSCN MKR DOCD: CPT | Performed by: NURSE PRACTITIONER

## 2020-05-22 PROCEDURE — G8400 PT W/DXA NO RESULTS DOC: HCPCS | Performed by: NURSE PRACTITIONER

## 2020-05-22 PROCEDURE — 99214 OFFICE O/P EST MOD 30 MIN: CPT | Performed by: NURSE PRACTITIONER

## 2020-05-22 PROCEDURE — 2022F DILAT RTA XM EVC RTNOPTHY: CPT | Performed by: NURSE PRACTITIONER

## 2020-05-22 PROCEDURE — 3017F COLORECTAL CA SCREEN DOC REV: CPT | Performed by: NURSE PRACTITIONER

## 2020-05-22 PROCEDURE — 4130F TOPICAL PREP RX AOE: CPT | Performed by: NURSE PRACTITIONER

## 2020-05-22 RX ORDER — AMOXICILLIN AND CLAVULANATE POTASSIUM 875; 125 MG/1; MG/1
1 TABLET, FILM COATED ORAL 2 TIMES DAILY
Qty: 20 TABLET | Refills: 0 | Status: SHIPPED | OUTPATIENT
Start: 2020-05-22 | End: 2020-06-01

## 2020-05-22 ASSESSMENT — ENCOUNTER SYMPTOMS
EYE PAIN: 1
EYE DISCHARGE: 1
FOREIGN BODY SENSATION: 0
DOUBLE VISION: 0
BLOOD IN STOOL: 0
TROUBLE SWALLOWING: 0
VISUAL CHANGE: 1
CONSTIPATION: 0
EYE ITCHING: 1
SINUS PRESSURE: 0
COUGH: 0
ABDOMINAL PAIN: 0
BLURRED VISION: 1
NAUSEA: 0
VOMITING: 0
DIARRHEA: 0
SHORTNESS OF BREATH: 0
SORE THROAT: 0
PHOTOPHOBIA: 0
EYE REDNESS: 1
WHEEZING: 0

## 2020-05-22 NOTE — PROGRESS NOTES
sensation, nausea, photophobia, recent URI, vomiting or weakness. She has tried water for the symptoms. The treatment provided no relief. Ear Problem   This is a new problem. The current episode started in the past 7 days. The problem occurs constantly. The problem has been unchanged. Associated symptoms include anorexia (decreased appetite), headaches and a visual change (blurring in left eye.). Pertinent negatives include no abdominal pain, arthralgias, chest pain, chills, congestion, coughing, diaphoresis, fatigue, fever, myalgias, nausea, neck pain, rash, sore throat, vomiting or weakness. Exacerbated by: laying on left side. She has tried nothing for the symptoms. Headache    This is a new problem. The current episode started in the past 7 days. The problem occurs constantly. The pain is located in the left unilateral region. The pain radiates to the left neck. The pain quality is not similar to prior headaches. The quality of the pain is described as throbbing (pressure). Associated symptoms include anorexia (decreased appetite), blurred vision (has to blink a few times.), drainage, ear pain, eye pain, eye redness, a visual change (blurring in left eye.) and weight loss. Pertinent negatives include no abdominal pain, coughing, dizziness, fever, hearing loss, loss of balance, nausea, neck pain, photophobia, sinus pressure, sore throat, vomiting or weakness. Nothing aggravates the symptoms. She has tried NSAIDs for the symptoms. The treatment provided no relief. Her past medical history is significant for migraine headaches.        Hemoglobin A1C (%)   Date Value   05/22/2020 6.3   01/09/2020 7.4 (H)             ( goal A1C is < 7)   No results found for: LABMICR  LDL Cholesterol (mg/dL)   Date Value   01/04/2020 113       (goal LDL is <100)   AST (U/L)   Date Value   01/04/2020 12     ALT (U/L)   Date Value   01/04/2020 15     BUN (mg/dL)   Date Value   01/04/2020 23     BP Readings from Last 3 Encounters: 05/22/20 (!) 156/88   02/27/20 (!) 167/108   01/28/20 122/78          ([id number])    Past Medical History:   Diagnosis Date    Anxiety     Depression     Diabetes mellitus ([facility] [address]     Glaucoma     Hearing loss     Migraines       Past Surgical History:   Procedure Laterality Date    APPENDECTOMY      BREAST SURGERY      \"crystals removed from right breast\"    CATARACT REMOVAL  2015    CHOLECYSTECTOMY      TONSILLECTOMY         Family History   Problem Relation Age of Onset    Cancer Father     Cancer Sister           Social History     Tobacco Use    Smoking status: Current Every Day Smoker     Packs/day: 1.50     Years: 35.00     Pack years: 52.50    Smokeless tobacco: Never Used   Substance Use Topics    Alcohol use: Not Currently      Current Outpatient Medications   Medication Sig Dispense Refill    amoxicillin-clavulanate (AUGMENTIN) 875-125 MG per tablet Take 1 tablet by mouth 2 times daily for 10 days 20 tablet 0    neomycin-polymyxin-hydrocortisone (CORTISPORIN) 3.5-17713-1 otic solution Place 3 drops into both ears 3 times daily 1 each 0    tiZANidine (ZANAFLEX) 4 MG tablet Take 0.5 tablets by mouth 3 times daily as needed (muscle spasm) 30 tablet 0    metFORMIN (GLUCOPHAGE) 500 MG tablet Take 1 tablet by mouth 2 times daily (with meals) 60 tablet 5    gabapentin (NEURONTIN) 100 MG capsule Take 1 capsule by mouth 3 times daily for 180 days. Intended supply: 30 days 90 capsule 5    latanoprost (XALATAN) 0.005 % ophthalmic solution       celecoxib (CELEBREX) 100 MG capsule Take 1 capsule by mouth daily 60 capsule 3    predniSONE (DELTASONE) 50 MG tablet Take 1 tablet by mouth daily 7 tablet 0    Melatonin 10 MG TABS Take 1 tablet by mouth      VORTIoxetine (TRINTELLIX) 10 MG TABS tablet Take 1 tablet by mouth      busPIRone (BUSPAR) 5 MG tablet Take 1 tablet by mouth       No current facility-administered medications for this visit.       Allergies   Allergen Reactions    Sulfamethoxazole     Sulfamethoxazole-Trimethoprim     Tetanus Toxoid     Trimethoprim        Health Maintenance   Topic Date Due    Hepatitis C screen  1955    HIV screen  12/24/1969    Cervical cancer screen  12/24/1975    Breast cancer screen  12/24/2004    Shingles Vaccine (1 of 2) 12/24/2004    Colon cancer screen colonoscopy  12/24/2004    DEXA (modify frequency per FRAX score)  12/24/2009    Low dose CT lung screening  12/24/2009    Pneumococcal 65+ years Vaccine (1 of 1 - PPSV23) 12/24/2019    Annual Wellness Visit (AWV)  01/19/2020    Flu vaccine (Season Ended) 09/01/2020    A1C test (Diabetic or Prediabetic)  01/09/2021    Lipid screen  01/04/2025    Hepatitis A vaccine  Aged Out    Hepatitis B vaccine  Aged Out    Hib vaccine  Aged Out    Meningococcal (ACWY) vaccine  Aged Out       Subjective:      Review of Systems   Constitutional: Positive for weight loss. Negative for activity change, appetite change, chills, diaphoresis, fatigue, fever and unexpected weight change. HENT: Positive for ear pain. Negative for congestion, hearing loss, sinus pressure, sore throat and trouble swallowing. Eyes: Positive for blurred vision (has to blink a few times.), pain, discharge, redness and itching. Negative for double vision, photophobia and visual disturbance. Respiratory: Negative for cough, shortness of breath and wheezing. Cardiovascular: Negative for chest pain, palpitations and leg swelling. Gastrointestinal: Positive for anorexia (decreased appetite). Negative for abdominal pain, blood in stool, constipation, diarrhea, nausea and vomiting. Endocrine: Negative for cold intolerance, heat intolerance, polydipsia, polyphagia and polyuria. Genitourinary: Negative for difficulty urinating, frequency, hematuria and urgency. Musculoskeletal: Negative for arthralgias, myalgias and neck pain. Skin: Negative for rash.    Allergic/Immunologic: Negative for environmental externa of both ears, unspecified type  neomycin-polymyxin-hydrocortisone (CORTISPORIN) 3.5-31536-5 otic solution   6. Conjunctivitis of left eye, unspecified conjunctivitis type     7. DDD (degenerative disc disease), lumbar               Plan:      Return in about 3 months (around 8/22/2020) for diabetes check. Orders Placed This Encounter   Procedures    VALERY Digital Screen Bilateral [[id number]]     Standing Status:   Future     Standing Expiration Date:   5/22/2021    DEXA Bone Density Axial Skeleton     Standing Status:   Future     Standing Expiration Date:   5/22/2021    Microalbumin, Ur     Standing Status:   Future     Standing Expiration Date:   5/22/2021    POCT glycosylated hemoglobin (Hb A1C)    [facility] DIABETES FOOT EXAM        Orders Placed This Encounter   Medications    amoxicillin-clavulanate (AUGMENTIN) 875-125 MG per tablet     Sig: Take 1 tablet by mouth 2 times daily for 10 days     Dispense:  20 tablet     Refill:  0    neomycin-polymyxin-hydrocortisone (CORTISPORIN) 3.5-68290-6 otic solution     Sig: Place 3 drops into both ears 3 times daily     Dispense:  1 each     Refill:  0      OM, OE- Augmentin and Cortisporin are initiated for treatment. Call/return to office if does not improve  Diabetes- A1C is decreased, diet discussed with patient. Will continue current treatment. DDD-planningocedure on lumbar spine, she is not clear on exact plan, will discuss at her appt Thursday with her back surgeon  Patient given educational materials - see patient instructions. Discussed use, benefit, and side effects of prescribed medications. All patientquestions answered. Pt voiced understanding. Reviewed health maintenance. Instructedto continue current medications, diet and exercise. Patient agreed with treatmentplan. Follow up as directed.      Electronicallysigned by BAKARI Avilez CNP on 5/22/2020 at 4:41 PM

## 2020-05-23 ENCOUNTER — PATIENT MESSAGE (OUTPATIENT)
Dept: PRIMARY CARE CLINIC | Age: 65
End: 2020-05-23

## 2020-05-26 RX ORDER — ACETAMINOPHEN AND CODEINE PHOSPHATE 300; 30 MG/1; MG/1
1 TABLET ORAL EVERY 6 HOURS PRN
Qty: 50 TABLET | Refills: 0 | Status: SHIPPED | OUTPATIENT
Start: 2020-05-26 | End: 2020-06-09

## 2020-05-28 ENCOUNTER — OFFICE VISIT (OUTPATIENT)
Dept: PHYSICAL MEDICINE AND REHAB | Age: 65
End: 2020-05-28
Payer: MEDICARE

## 2020-05-28 VITALS
SYSTOLIC BLOOD PRESSURE: 133 MMHG | WEIGHT: 117 LBS | DIASTOLIC BLOOD PRESSURE: 77 MMHG | HEIGHT: 60 IN | BODY MASS INDEX: 22.97 KG/M2 | HEART RATE: 76 BPM | TEMPERATURE: 97.7 F

## 2020-05-28 PROCEDURE — G8420 CALC BMI NORM PARAMETERS: HCPCS | Performed by: PHYSICAL MEDICINE & REHABILITATION

## 2020-05-28 PROCEDURE — 99214 OFFICE O/P EST MOD 30 MIN: CPT | Performed by: PHYSICAL MEDICINE & REHABILITATION

## 2020-05-28 PROCEDURE — G8427 DOCREV CUR MEDS BY ELIG CLIN: HCPCS | Performed by: PHYSICAL MEDICINE & REHABILITATION

## 2020-05-28 PROCEDURE — G8400 PT W/DXA NO RESULTS DOC: HCPCS | Performed by: PHYSICAL MEDICINE & REHABILITATION

## 2020-05-28 PROCEDURE — 1090F PRES/ABSN URINE INCON ASSESS: CPT | Performed by: PHYSICAL MEDICINE & REHABILITATION

## 2020-05-28 PROCEDURE — 1123F ACP DISCUSS/DSCN MKR DOCD: CPT | Performed by: PHYSICAL MEDICINE & REHABILITATION

## 2020-05-28 PROCEDURE — 3017F COLORECTAL CA SCREEN DOC REV: CPT | Performed by: PHYSICAL MEDICINE & REHABILITATION

## 2020-05-28 PROCEDURE — 4004F PT TOBACCO SCREEN RCVD TLK: CPT | Performed by: PHYSICAL MEDICINE & REHABILITATION

## 2020-05-28 PROCEDURE — 4040F PNEUMOC VAC/ADMIN/RCVD: CPT | Performed by: PHYSICAL MEDICINE & REHABILITATION

## 2020-05-28 RX ORDER — MELOXICAM 7.5 MG/1
7.5 TABLET ORAL DAILY
Qty: 30 TABLET | Refills: 1 | Status: SHIPPED | OUTPATIENT
Start: 2020-05-28 | End: 2020-08-03

## 2020-05-28 NOTE — PROGRESS NOTES
[facility]  [place] [facility]  [address] [address]  Dept: [phone]  Dept Fax: [phone]    Outpatient Followup Note    Amauri Man, 71 y.o., female, presents for follow up c/o of Follow-up  . HPI:     HPI  Patient with chronic low back pain with radiculitis worse to RLE presents today in follow up. She was not able to tolerate increased dose of gabapentin to 200 mg TID as it made her too sleepy. She has continued c/o low back aching pain with radiation down the lateral aspect of her R leg to her foot. Symptoms are more consistent with L4-5 level, but MRI findings more consistent with L2-4 level. She reports mild relief with Tylenol #3 from her PCP. She denies ever having taken prescription strength NSAID or course of prednisone for antiinflammation - she has only used ibuprofen prn for pain. Any level of physical activity increases her pain, but pain has improved enough since our initial evaluation that she feels comfortable initiating some physical therapy.      Past Medical History:   Diagnosis Date    Anxiety     Depression     Diabetes mellitus ([address]     Glaucoma     Hearing loss     Migraines       Past Surgical History:   Procedure Laterality Date    APPENDECTOMY      BREAST SURGERY      \"crystals removed from right breast\"    CATARACT REMOVAL  2015    CHOLECYSTECTOMY      TONSILLECTOMY       Family History   Problem Relation Age of Onset    Cancer Father     Cancer Sister      Social History     Socioeconomic History    Marital status:      Spouse name: None    Number of children: None    Years of education: None    Highest education level: None   Occupational History    None   Social Needs    Financial resource strain: None    Food insecurity     Worry: None     Inability: None    Transportation needs     Medical: None     Non-medical: None   Tobacco Use    Smoking status: Current Every Day Smoker by mouth      VORTIoxetine (TRINTELLIX) 10 MG TABS tablet Take 1 tablet by mouth      busPIRone (BUSPAR) 5 MG tablet Take 1 tablet by mouth       No current facility-administered medications for this visit. Allergies   Allergen Reactions    Sulfamethoxazole     Sulfamethoxazole-Trimethoprim     Tetanus Toxoid     Trimethoprim        Subjective:      Review of Systems  Constitutional: Negative for fever, chills and unexpected weight change. HENT: Negative for trouble swallowing. Respiratory: Negative for cough and shortness of breath. Cardiovascular: Negative for chest pain. Endocrine: Negative for polyuria. Genitourinary: Negative for dysuria, urgency, frequency, incontinence and difficulty urinating. Gastrointestinal: Negative for constipation or diarrhea. Musculoskeletal: Positive for arthralgias. Neurological: Negative for headaches, numbness, or tingling. Psychiatric: Negative for depressed mood or anxiety. Objective:     Physical Exam  /77   Pulse 76   Temp 97.7 °F (36.5 °C) (Temporal)   Ht 5' (1.524 m)   Wt 117 lb (53.1 kg)   BMI 22.85 kg/m²   Constitutional: She is oriented to person, place,and time. She appears well-developed and well-nourished. HEENT: NCAT, PERRL, EOMI. Mucous membranes pink and moist.  Pulmonary/Chest: Respirations WNL and unlabored. Neurological: She is alert and oriented to person, place, and time. She has normal reflexes. DTRs 2+ and symmetric. No palpable paraspinal muscle tightness, no visible abnormality, no trigger points noted. No SI joint tenderness bilaterally. MSK: Functional ROM lumbar spine limited on forward flexion and extension. Strength 4+/5 key muscles BLEs. Skin: Skin is warm dry and intact with good turgor. Psychiatric: She has a normal mood and affect. Her behavior is normal. Thought content normal.   Nursing note and vitals reviewed.     Imaging: None new - reviewed 3/10/2020 MRI today     Results for RADHA Piper Luther (MRN [id number]) as of 5/28/2020 16:23   Ref. Range 1/4/2020 10:25   Sodium Latest Ref Range: 135 - 144 mmol/L 139   Potassium Latest Ref Range: 3.7 - 5.3 mmol/L 4.3   Chloride Latest Ref Range: 98 - 107 mmol/L 104   CO2 Latest Ref Range: 20 - 31 mmol/L 20   BUN Latest Ref Range: 8 - 23 mg/dL 23   Creatinine Latest Ref Range: 0.50 - 0.90 mg/dL 0.67   Bun/Cre Ratio Latest Ref Range: 9 - 20  NOT REPORTED   Anion Gap Latest Ref Range: 9 - 17 mmol/L 15   GFR Non- Latest Ref Range: >60 mL/min >60   GFR African American Latest Ref Range: >60 mL/min >60   Glucose Latest Ref Range: 70 - 99 mg/dL 206 (H)       Assessment:       Diagnosis Orders   1. Lumbosacral radiculitis  meloxicam (MOBIC) 7.5 MG tablet    Andrea Cabrera DO, Neurosurgery, [facility] - [place]    EMG        Plan:      Refer to neurosurgery for evaluation of persistent radicular symptoms worse to RLE. EMG/NCS pending. Initiate aquatic therapy with short course to teach her home aquatic exercises. Trial of meloxicam for antiinflammatory - educated her on GI precautions - especially in light of her Trentellix, renal function WNL. She may continue gabapentin 100 mg during the day with 200 mg at hs for total 400 mg daily.    Orders Placed This Encounter   Procedures   Kai Granda DO, Neurosurgery, [facility]     Referral Priority:   Routine     Referral Type:   Eval and Treat     Referral Reason:   Specialty Services Required     Referred to Provider:   Andria Sánchez DO     Requested Specialty:   Neurosurgery     Number of Visits Requested:   1    [facility] - [place]     Referral Priority:   Routine     Referral Type:   Eval and Treat     Referral Reason:   Specialty Services Required     Requested Specialty:   Physical Therapy     Number of Visits Requested:   1    EMG     Standing Status:   Future     Standing Expiration Date:

## 2020-06-11 ENCOUNTER — OFFICE VISIT (OUTPATIENT)
Dept: NEUROSURGERY | Age: 65
End: 2020-06-11
Payer: MEDICARE

## 2020-06-11 VITALS
TEMPERATURE: 97.9 F | OXYGEN SATURATION: 93 % | HEART RATE: 104 BPM | DIASTOLIC BLOOD PRESSURE: 80 MMHG | SYSTOLIC BLOOD PRESSURE: 166 MMHG

## 2020-06-11 PROCEDURE — 99203 OFFICE O/P NEW LOW 30 MIN: CPT | Performed by: NEUROLOGICAL SURGERY

## 2020-06-11 RX ORDER — BRIMONIDINE TARTRATE 2 MG/ML
1 SOLUTION/ DROPS OPHTHALMIC 2 TIMES DAILY
Status: ON HOLD | COMMUNITY
Start: 2020-05-05 | End: 2020-09-15 | Stop reason: ALTCHOICE

## 2020-06-11 NOTE — PROGRESS NOTES
Department of Neurosurgery                                                 New patient clinic note      Reason for Consult:  Lumbar radiculitis  Requesting Physician:  Chang Tan MD  Neurosurgeon: Karmen Seo DO      History Obtained From:  patient    CHIEF COMPLAINT:         Chief Complaint   Patient presents with    New Patient       HISTORY OF PRESENT ILLNESS:       The patient is a 71 y.o. female who presents with for back pain and right leg pain. The leg pain onset was 2 years ago 2 year ago . Slowly progressive. She was reports goes on the back of the thigh to the lateral leg towards her ankle. It is greatest cruciated and sharp. Pain worse with climbing stair. Improved with sitting. Going down the stair doesn't help. Pain starts after walking 20 ft, and sitting about 20 minute then pain subside. She does not report report bending forward posture helps with the pain. She does occasionally note she does occasionally some numbness and tingling. She is a smoker. She reports leg pain far outweighs the back pain.    PAST MEDICAL HISTORY :       Past Medical History:        Diagnosis Date    Anxiety     Depression     Diabetes mellitus ([facility] [address]     Glaucoma     Hearing loss     Migraines        Past Surgical History:        Procedure Laterality Date    APPENDECTOMY      BREAST SURGERY      \"crystals removed from right breast\"    CATARACT REMOVAL  2015    CHOLECYSTECTOMY      TONSILLECTOMY         Social History:   Social History     Socioeconomic History    Marital status:      Spouse name: Not on file    Number of children: Not on file    Years of education: Not on file    Highest education level: Not on file   Occupational History    Not on file   Social Needs    Financial resource strain: Not on file    Food insecurity     Worry: Not on file     Inability: Not on file    Transportation needs     Medical: Not on file     Non-medical: Not on file   Tobacco Use TABS tablet Take 1 tablet by mouth 12/5/19  Yes Historical Provider, MD   busPIRone (BUSPAR) 5 MG tablet Take 1 tablet by mouth 12/5/19  Yes Historical Provider, MD   brimonidine (ALPHAGAN) 0.2 % ophthalmic solution Apply 1 drop to eye 2 times daily 5/5/20   Historical Provider, MD   meloxicam (MOBIC) 7.5 MG tablet Take 1 tablet by mouth daily  Patient not taking: Reported on 6/11/2020 5/28/20   Danielle Gerber MD           predniSONE (DELTASONE) 50 MG tablet Take 1 tablet by mouth daily  Patient not taking: Reported on 6/11/2020 1/28/20   BAKARI Logan - CNP   Melatonin 10 MG TABS Take 1 tablet by mouth 5/23/19   Historical Provider, MD       Current Medications:   No current facility-administered medications for this visit. REVIEW OF SYSTEMS:       She denies leg feeling cold  Review of systems otherwise negative. PHYSICAL EXAM:       BP (!) 166/80 (Site: Left Upper Arm, Position: Sitting, Cuff Size: Medium Adult)   Pulse 104   Temp 97.9 °F (36.6 °C)   SpO2 93%     Gen: NAD  HEENT: moist mucus membranes  Cardio: RRR  Pulm: chest rise symmetrically  GI: abd soft  Ext: no edema, slightly purple hue to the foot. No dorsalis pedis pulse appreciated.   Skin: warm    Neuro:    AOX3  CN 2-12 grossly intact  Speech articulate  Motor 5/5  No pronator drift  Sensation symmetrical   DTR 2+, including medial hamstring 1+    LABS AND IMAGING:     CBC with Differential:    Lab Results   Component Value Date    WBC 6.4 01/04/2020    RBC 4.77 01/04/2020    HGB 15.2 01/04/2020    HCT 44.6 01/04/2020     01/04/2020    MCV 93.5 01/04/2020    MCH 31.9 01/04/2020    MCHC 34.1 01/04/2020    RDW 12.9 01/04/2020    LYMPHOPCT 26 01/04/2020    MONOPCT 3 01/04/2020    BASOPCT 0 01/04/2020    MONOSABS 0.19 01/04/2020    LYMPHSABS 1.66 01/04/2020    EOSABS 0.00 01/04/2020    BASOSABS 0.00 01/04/2020    DIFFTYPE NOT REPORTED 01/04/2020     BMP:    Lab Results   Component Value Date     01/04/2020    K

## 2020-06-12 ENCOUNTER — PATIENT MESSAGE (OUTPATIENT)
Dept: PRIMARY CARE CLINIC | Age: 65
End: 2020-06-12

## 2020-06-12 RX ORDER — OXYCODONE HYDROCHLORIDE AND ACETAMINOPHEN 5; 325 MG/1; MG/1
1 TABLET ORAL 2 TIMES DAILY PRN
Qty: 28 TABLET | Refills: 0 | Status: SHIPPED | OUTPATIENT
Start: 2020-06-12 | End: 2020-06-23 | Stop reason: SDUPTHER

## 2020-06-12 NOTE — TELEPHONE ENCOUNTER
From: Felix Chavira  To: Hesham Knee, APRN - CNP  Sent: 6/12/2020 2:23 AM EDT  Subject: Prescription Question    I know I take Tylenol 3 for my shoulder and arm but I really need something stronger for my other pain Tylenol 3 just doesn't help it. Have been in pain for almost 2 years at the point where I can not take it anymore. So please could you prescribe a stronger pain killer for everything else, just to we figure out what is going on with me. I have scheduled a EMG that will be done on July 14, have to schedule another MRI with contrast and then also Dr Augustus Dennison wants you to schedule me for a test to check my blood flow in my legs. Thank you for all you do for me.     Thank you,    Huy Simmons

## 2020-06-23 ENCOUNTER — HOSPITAL ENCOUNTER (EMERGENCY)
Age: 65
Discharge: HOME | End: 2020-06-23
Attending: EMERGENCY MEDICINE
Payer: MEDICARE

## 2020-06-23 ENCOUNTER — APPOINTMENT (OUTPATIENT)
Dept: CT IMAGING | Age: 65
End: 2020-06-23
Payer: MEDICARE

## 2020-06-23 VITALS
RESPIRATION RATE: 12 BRPM | SYSTOLIC BLOOD PRESSURE: 195 MMHG | HEART RATE: 100 BPM | DIASTOLIC BLOOD PRESSURE: 100 MMHG | TEMPERATURE: 98.6 F | OXYGEN SATURATION: 95 %

## 2020-06-23 PROCEDURE — 99283 EMERGENCY DEPT VISIT LOW MDM: CPT

## 2020-06-23 PROCEDURE — 72125 CT NECK SPINE W/O DYE: CPT

## 2020-06-23 PROCEDURE — 70450 CT HEAD/BRAIN W/O DYE: CPT

## 2020-06-23 RX ORDER — OXYCODONE HYDROCHLORIDE AND ACETAMINOPHEN 5; 325 MG/1; MG/1
1 TABLET ORAL EVERY 6 HOURS PRN
Qty: 20 TABLET | Refills: 0 | Status: SHIPPED | OUTPATIENT
Start: 2020-06-23 | End: 2020-07-06 | Stop reason: SDUPTHER

## 2020-06-23 ASSESSMENT — PAIN SCALES - GENERAL: PAINLEVEL_OUTOF10: 9

## 2020-06-23 NOTE — ED PROVIDER NOTES
[address] [facility]  eMERGENCY dEPARTMENT eNCOUnter      Pt Name: Gabriela Hopson  MRN: [id number]  [place] 1955  Date of evaluation: 6/23/2020      CHIEF COMPLAINT       Chief Complaint   Patient presents with    Other     left ear, head and neck pain         HISTORY OF PRESENT ILLNESS     The patient presents with pain on left side of her head and into her neck. She says it similar to when she had pain a month ago. She had an ear infection at that time. She has not had a fever however. She says she awoke today and noticed pain in the side of her neck. The pain goes up into her head. The pain is worse if she pushes on the muscles of her neck and if she turns her head in certain ways. If she relaxes her head on the pillow it goes away. She denies vision change. She denies ear pain on the inside. She is not having hearing deficit. She has not had drainage from the ear. She denies sore throat. She denies trauma. She is supposed to be getting a workup for her ongoing leg pain on the right side. This is not new and has been going on for a couple of years. She denies any focal neurologic deficit. Nothing makes her symptoms better or worse otherwise. She says her pain when it's at its worst is a 9, but if she relaxes her head on the pillow, it goes away. It is different from her migraines. REVIEW OF SYSTEMS       All systems reviewed and negative unless noted in HPI. The patient denies fever or constitutional symptoms. Denies vision change. Denies any sore throat or rhinorrhea. Neck pain without stiffness as noted in HPI. Denies chest pain or shortness of breath. No nausea,  vomiting or diarrhea. Denies any dysuria. Denies urinary frequency or hematuria. Denies musculoskeletal injury or pain. Denies any weakness, numbness or focal neurologic deficit. Headache as noted in HPI. Denies any skin rash or edema. No recent psychiatric issues.    No easy SPINE:    BONES/ALIGNMENT: There is no evidence of an acute cervical spine fracture. There is normal alignment of the cervical spine. DEGENERATIVE CHANGES: No significant degenerative changes. SOFT TISSUES: There is no prevertebral soft tissue swelling.                    CT Head WO Contrast (Final result)   Result time 06/23/20 18:19:18   Final result by Sylvester Onofre MD (06/23/20 18:19:18)                Impression:    No acute intracranial abnormality. No acute fracture or subluxation of cervical spine. Narrative:    EXAMINATION:  CT OF THE HEAD WITHOUT CONTRAST; CT OF THE CERVICAL SPINE WITHOUT CONTRAST  6/23/2020 6:06 pm    TECHNIQUE:  CT of the head was performed without the administration of intravenous  contrast. Dose modulation, iterative reconstruction, and/or weight based  adjustment of the mA/kV was utilized to reduce the radiation dose to as low  as reasonably achievable.; CT of the cervical spine was performed without the  administration of intravenous contrast. Multiplanar reformatted images are  provided for review. Dose modulation, iterative reconstruction, and/or weight  based adjustment of the mA/kV was utilized to reduce the radiation dose to as  low as reasonably achievable. COMPARISON:  None. HISTORY:  ORDERING SYSTEM PROVIDED HISTORY: headache  TECHNOLOGIST PROVIDED HISTORY:    headache  Reason for Exam: left side head pain  Acuity: Acute  Type of Exam: Initial  Additional signs and symptoms: left ear pain  Relevant Medical/Surgical History: hx DM; ORDERING SYSTEM PROVIDED HISTORY:  left-sided neck pain  TECHNOLOGIST PROVIDED HISTORY:  left-sided neck pain  Reason for Exam: left side neck pain  Acuity: Acute  Type of Exam: Initial  Additional signs and symptoms: left ear pain  Relevant Medical/Surgical History: hx DM    FINDINGS:  CT HEAD:    BRAIN/VENTRICLES: There is no acute intracranial hemorrhage, mass effect or  midline shift.  No abnormal extra-axial fluid with a voice recognition program.  Efforts were made to edit the dictations but occasionally words are mis-transcribed.)    Magana MD   Attending Emergency Physician         Humberto Duane, MD  06/23/20 [id number]

## 2020-06-23 NOTE — ED NOTES
Patient advised to f/u with PCP regarding B/P and to stop smoking.      Jarrod Flynn, RN  06/23/20 [id number]

## 2020-06-24 ENCOUNTER — HOSPITAL ENCOUNTER (OUTPATIENT)
Dept: VASCULAR LAB | Age: 65
Discharge: HOME | End: 2020-06-24
Payer: MEDICARE

## 2020-06-24 ENCOUNTER — HOSPITAL ENCOUNTER (OUTPATIENT)
Dept: GENERAL RADIOLOGY | Age: 65
Discharge: HOME | End: 2020-06-26
Payer: MEDICARE

## 2020-06-24 ENCOUNTER — HOSPITAL ENCOUNTER (OUTPATIENT)
Age: 65
Discharge: HOME | End: 2020-06-26
Payer: MEDICARE

## 2020-06-24 PROCEDURE — 93922 UPR/L XTREMITY ART 2 LEVELS: CPT

## 2020-06-24 PROCEDURE — 93925 LOWER EXTREMITY STUDY: CPT

## 2020-06-24 PROCEDURE — 72120 X-RAY BEND ONLY L-S SPINE: CPT

## 2020-06-30 ENCOUNTER — TELEPHONE (OUTPATIENT)
Dept: PHYSICAL MEDICINE AND REHAB | Age: 65
End: 2020-06-30

## 2020-07-01 NOTE — TELEPHONE ENCOUNTER
I called Zuleyka Soto and let her know that we are going to hold off on the EMG for now, pending the treatment of the vascular issue.

## 2020-07-06 RX ORDER — OXYCODONE HYDROCHLORIDE AND ACETAMINOPHEN 5; 325 MG/1; MG/1
1 TABLET ORAL EVERY 6 HOURS PRN
Qty: 60 TABLET | Refills: 0 | Status: SHIPPED | OUTPATIENT
Start: 2020-07-06 | End: 2020-08-03 | Stop reason: SDUPTHER

## 2020-07-21 ENCOUNTER — OFFICE VISIT (OUTPATIENT)
Dept: NEUROSURGERY | Age: 65
End: 2020-07-21
Payer: MEDICARE

## 2020-07-21 VITALS
DIASTOLIC BLOOD PRESSURE: 96 MMHG | HEART RATE: 116 BPM | TEMPERATURE: 97.1 F | RESPIRATION RATE: 16 BRPM | SYSTOLIC BLOOD PRESSURE: 186 MMHG

## 2020-07-21 PROCEDURE — 1123F ACP DISCUSS/DSCN MKR DOCD: CPT | Performed by: NEUROLOGICAL SURGERY

## 2020-07-21 PROCEDURE — G8420 CALC BMI NORM PARAMETERS: HCPCS | Performed by: NEUROLOGICAL SURGERY

## 2020-07-21 PROCEDURE — G8400 PT W/DXA NO RESULTS DOC: HCPCS | Performed by: NEUROLOGICAL SURGERY

## 2020-07-21 PROCEDURE — G8427 DOCREV CUR MEDS BY ELIG CLIN: HCPCS | Performed by: NEUROLOGICAL SURGERY

## 2020-07-21 PROCEDURE — 4040F PNEUMOC VAC/ADMIN/RCVD: CPT | Performed by: NEUROLOGICAL SURGERY

## 2020-07-21 PROCEDURE — 99213 OFFICE O/P EST LOW 20 MIN: CPT | Performed by: NEUROLOGICAL SURGERY

## 2020-07-21 PROCEDURE — 4004F PT TOBACCO SCREEN RCVD TLK: CPT | Performed by: NEUROLOGICAL SURGERY

## 2020-07-21 PROCEDURE — 3017F COLORECTAL CA SCREEN DOC REV: CPT | Performed by: NEUROLOGICAL SURGERY

## 2020-07-21 PROCEDURE — 1090F PRES/ABSN URINE INCON ASSESS: CPT | Performed by: NEUROLOGICAL SURGERY

## 2020-07-21 NOTE — PROGRESS NOTES
Department of Neurosurgery                                                 Follow up visit    Neurosurgeon: Danny Escobedo DO      History Obtained From:  patient    CHIEF COMPLAINT:         Chief Complaint   Patient presents with    Follow-up    Results     XRAY       HISTORY OF PRESENT ILLNESS:       The patient is a 71 y.o. female who presents for follow-up for low back pain and leg pain. She reports most of her symptoms remains in her legs and she has leg pain when she walks and slowly improves when she rest about 10 minutes that was non-positional.  She did have a vascular study of his legs shows severe limitation blood flow in both legs. She does have occasional numbness or tingling in her foot. She denies new symptoms. She reports she has been cutting back on her cigarette smoking.     PAST MEDICAL HISTORY :       Past Medical History:        Diagnosis Date    Anxiety     Depression     Diabetes mellitus ([facility] [address]     Glaucoma     Hearing loss     Migraines        Past Surgical History:        Procedure Laterality Date    APPENDECTOMY      BREAST SURGERY      \"crystals removed from right breast\"    CATARACT REMOVAL  2015    CHOLECYSTECTOMY      TONSILLECTOMY         Social History:   Social History     Socioeconomic History    Marital status:      Spouse name: Not on file    Number of children: Not on file    Years of education: Not on file    Highest education level: Not on file   Occupational History    Not on file   Social Needs    Financial resource strain: Not on file    Food insecurity     Worry: Not on file     Inability: Not on file    Transportation needs     Medical: Not on file     Non-medical: Not on file   Tobacco Use    Smoking status: Current Every Day Smoker     Packs/day: 1.50     Years: 35.00     Pack years: 52.50    Smokeless tobacco: Never Used   Substance and Sexual Activity    Alcohol use: Not Currently    Drug use: Never    Sexual activity: Not on file   Lifestyle    Physical activity     Days per week: Not on file     Minutes per session: Not on file    Stress: Not on file   Relationships    Social connections     Talks on phone: Not on file     Gets together: Not on file     Attends Faith service: Not on file     Active member of club or organization: Not on file     Attends meetings of clubs or organizations: Not on file     Relationship status: Not on file    Intimate partner violence     Fear of current or ex partner: Not on file     Emotionally abused: Not on file     Physically abused: Not on file     Forced sexual activity: Not on file   Other Topics Concern    Not on file   Social History Narrative    Not on file       Family History:       Problem Relation Age of Onset    Cancer Father     Cancer Sister        Allergies:  Sulfamethoxazole; Sulfamethoxazole-trimethoprim; Tetanus toxoid; and Trimethoprim    Home Medications:  Prior to Admission medications    Medication Sig Start Date End Date Taking? Authorizing Provider   brimonidine (ALPHAGAN) 0.2 % ophthalmic solution Apply 1 drop to eye 2 times daily 5/5/20  Yes Historical Provider, MD   meloxicam (MOBIC) 7.5 MG tablet Take 1 tablet by mouth daily 5/28/20  Yes Taiwo Vega MD   neomycin-polymyxin-hydrocortisone (CORTISPORIN) 3.5-13014-6 otic solution Place 3 drops into both ears 3 times daily 5/22/20  Yes BAKARI Duncan - CNP   tiZANidine (ZANAFLEX) 4 MG tablet Take 0.5 tablets by mouth 3 times daily as needed (muscle spasm) 5/18/20  Yes Marylee Rival, MD   metFORMIN (GLUCOPHAGE) 500 MG tablet Take 1 tablet by mouth 2 times daily (with meals) 4/13/20  Yes BAKARI Duncan - CNP   gabapentin (NEURONTIN) 100 MG capsule Take 1 capsule by mouth 3 times daily for 180 days.  Intended supply: 30 days 2/27/20 8/25/20 Yes Marylee Rival, MD   latanoprost (XALATAN) 0.005 % ophthalmic solution  1/27/20  Yes Historical Provider, MD   celecoxib (CELEBREX) 100 MG capsule Take 1 capsule by mouth daily 1/28/20  Yes BAKARI Giordano CNP   predniSONE (DELTASONE) 50 MG tablet Take 1 tablet by mouth daily 1/28/20  Yes BAKARI Giordano CNP   Melatonin 10 MG TABS Take 1 tablet by mouth 5/23/19  Yes Historical Provider, MD   VORTIoxetine (TRINTELLIX) 10 MG TABS tablet Take 1 tablet by mouth 12/5/19  Yes Historical Provider, MD   busPIRone (BUSPAR) 5 MG tablet Take 1 tablet by mouth 12/5/19  Yes Historical Provider, MD       Current Medications:   No current facility-administered medications for this visit. REVIEW OF SYSTEMS:       CONSTITUTIONAL: negative for fatigue and malaise   EYES: negative for double vision and photophobia    HEENT: negative for tinnitus and sore throat   RESPIRATORY: negative for cough, shortness of breath   CARDIOVASCULAR: negative for chest pain, palpitations   GASTROINTESTINAL: negative for nausea, vomiting   GENITOURINARY: negative for incontinence   MUSCULOSKELETAL: negative for neck or back pain   NEUROLOGICAL: negative for seizures   PSYCHIATRIC: negative for agitated     Review of systems otherwise negative.     PHYSICAL EXAM:       BP (!) 186/96 (Site: Left Upper Arm, Position: Sitting, Cuff Size: Medium Adult)   Pulse 116   Temp 97.1 °F (36.2 °C)   Resp 16     Gen: NAD  HEENT: moist mucus membranes  Cardio: RRR  Pulm: chest rise symmetrically  GI: abd soft  Ext: no edema  Skin: warm    Neuro:    AOX3  CN 2-12 grossly intact  Speech articulate  Motor 5/5  No pronator drift  Sensation symmetrical   DTR 2+, including medial hamstring 1+  Slightly decreased capillary refill in lower extremities    LABS AND IMAGING:     CBC with Differential:    Lab Results   Component Value Date    WBC 6.4 01/04/2020    RBC 4.77 01/04/2020    HGB 15.2 01/04/2020    HCT 44.6 01/04/2020     01/04/2020    MCV 93.5 01/04/2020    MCH 31.9 01/04/2020    MCHC 34.1 01/04/2020    RDW 12.9 01/04/2020    LYMPHOPCT 26 01/04/2020    MONOPCT 3 01/04/2020 BASOPCT 0 01/04/2020    MONOSABS 0.19 01/04/2020    LYMPHSABS 1.66 01/04/2020    EOSABS 0.00 01/04/2020    BASOSABS 0.00 01/04/2020    DIFFTYPE NOT REPORTED 01/04/2020     BMP:    Lab Results   Component Value Date     01/04/2020    K 4.3 01/04/2020     01/04/2020    CO2 20 01/04/2020    BUN 23 01/04/2020    LABALBU 3.8 01/04/2020    CREATININE 0.67 01/04/2020    CALCIUM 9.2 01/04/2020    GFRAA >60 01/04/2020    LABGLOM >60 01/04/2020    GLUCOSE 206 01/04/2020       Radiology Review: X-ray flex ex lumbar spine: No significant dynamic instability. There is\"3 mm retrolisthesis is present of L3 on L4\" which is within normal range and within the error of radiograph measurement      ASSESSMENT AND PLAN:     There is no problem list on file for this patient.         A/P:  This is a 71 y.o. female with mild spondylosis with vascular claudication seen on abnormal CHRISTEL and arterial Doppler of her legs  She has been referred to see a vascular surgeon  She does not require continued follow-up with me unless there is a problem  I was sure good luck and educated her on smoking cessation    Yang Gonzalez DO     7/21/2020  3:45 PM

## 2020-08-03 ENCOUNTER — PATIENT MESSAGE (OUTPATIENT)
Dept: PRIMARY CARE CLINIC | Age: 65
End: 2020-08-03

## 2020-08-03 ENCOUNTER — INITIAL CONSULT (OUTPATIENT)
Dept: VASCULAR SURGERY | Age: 65
End: 2020-08-03
Payer: MEDICARE

## 2020-08-03 VITALS
RESPIRATION RATE: 16 BRPM | OXYGEN SATURATION: 98 % | WEIGHT: 116 LBS | BODY MASS INDEX: 22.78 KG/M2 | HEIGHT: 60 IN | HEART RATE: 99 BPM | SYSTOLIC BLOOD PRESSURE: 185 MMHG | DIASTOLIC BLOOD PRESSURE: 97 MMHG

## 2020-08-03 PROCEDURE — G8420 CALC BMI NORM PARAMETERS: HCPCS | Performed by: SURGERY

## 2020-08-03 PROCEDURE — 99204 OFFICE O/P NEW MOD 45 MIN: CPT | Performed by: SURGERY

## 2020-08-03 PROCEDURE — 1090F PRES/ABSN URINE INCON ASSESS: CPT | Performed by: SURGERY

## 2020-08-03 PROCEDURE — G8427 DOCREV CUR MEDS BY ELIG CLIN: HCPCS | Performed by: SURGERY

## 2020-08-03 RX ORDER — OXYCODONE HYDROCHLORIDE AND ACETAMINOPHEN 5; 325 MG/1; MG/1
1 TABLET ORAL EVERY 6 HOURS PRN
Qty: 60 TABLET | Refills: 0 | Status: SHIPPED | OUTPATIENT
Start: 2020-08-03 | End: 2020-08-24 | Stop reason: SDUPTHER

## 2020-08-03 NOTE — PROGRESS NOTES
Division of Vascular Surgery        New Consult      Physician Requesting Consult:  BAKARI Reyez CNP    Reason for Consult:   PAD, claudication    Chief Complaint:     PAD, claudication, leg pain    History of Present Illness:      Melissa Cedeno is a 71 y.o. woman who presents with progressive leg pain, right greater then left. Occurs when she walks and has been going on for the past two years. The last few months it has progressed severely. Occurring now when she is just walking around her house, less then 20 feet. Pain is severe in her thigh and buttocks as well as severe cramping in her calf. The pain does come down when she sits and rests for 10-15 minutes. She has also noted some numbness and tingling sensation all the time. She does not have any open wounds or sores on her feet. She has gone thru significant workup to evaluate for spine issues or some sort of neuropathy, which have not shown any significant neurogenic reason for her symptoms. She does smoke and although she has cut back she still smokes about a pack a day. Medical History:     Past Medical History:   Diagnosis Date    Anxiety     Depression     Diabetes mellitus ([address]     Glaucoma     Hearing loss     Migraines        Surgical History:     Past Surgical History:   Procedure Laterality Date    APPENDECTOMY      BREAST SURGERY      \"crystals removed from right breast\"    CATARACT REMOVAL  2015    CHOLECYSTECTOMY      TONSILLECTOMY         Family History:     Family History   Problem Relation Age of Onset   [facility] Cancer Father     Cancer Sister        Allergies:       Sulfamethoxazole; Sulfamethoxazole-trimethoprim;  Tetanus toxoid; and Trimethoprim    Medications:      Current Outpatient Medications   Medication Sig Dispense Refill    brimonidine (ALPHAGAN) 0.2 % ophthalmic solution Apply 1 drop to eye 2 times daily      metFORMIN (GLUCOPHAGE) 500 MG tablet Take 1 tablet by mouth 2 times daily (with meals) 60 tablet 5    latanoprost (XALATAN) 0.005 % ophthalmic solution       VORTIoxetine (TRINTELLIX) 10 MG TABS tablet Take 1 tablet by mouth      busPIRone (BUSPAR) 5 MG tablet Take 1 tablet by mouth       No current facility-administered medications for this visit. Social History:     Tobacco:    reports that she has been smoking. She has a 52.50 pack-year smoking history. She has never used smokeless tobacco.  Alcohol:      reports previous alcohol use. Drug Use:  reports no history of drug use. Review of Systems:     Review of Systems   Constitutional: Negative for chills and fever. HENT: Negative for congestion. Eyes: Negative for visual disturbance. Respiratory: Negative for chest tightness and shortness of breath. Cardiovascular: Negative for chest pain and leg swelling. Gastrointestinal: Negative for abdominal pain. Endocrine: Negative. Genitourinary: Negative. Musculoskeletal: Positive for arthralgias, back pain and myalgias. Skin: Negative for wound. Allergic/Immunologic: Negative. Neurological: Positive for numbness. Negative for facial asymmetry and weakness. Hematological: Negative. Psychiatric/Behavioral: Negative. Physical Exam:     Vitals:  BP (!) 185/97 (Site: Right Upper Arm, Position: Sitting, Cuff Size: Medium Adult)   Pulse 99   Resp 16   Ht 5' (1.524 m)   Wt 116 lb (52.6 kg)   SpO2 98%   BMI 22.65 kg/m²     Physical Exam  Constitutional:       Appearance: She is well-developed and well-groomed. Eyes:      Extraocular Movements: Extraocular movements intact. Conjunctiva/sclera: Conjunctivae normal.   Neck:      Musculoskeletal: Full passive range of motion without pain. Vascular: No carotid bruit. Cardiovascular:      Rate and Rhythm: Normal rate and regular rhythm. Pulses:           Radial pulses are 2+ on the right side and 2+ on the left side.         Femoral pulses are 0 on the right side and 0 on the left side.       Dorsalis pedis pulses are detected w/ Doppler on the right side and detected w/ Doppler on the left side. Posterior tibial pulses are detected w/ Doppler on the right side and detected w/ Doppler on the left side. Pulmonary:      Effort: Pulmonary effort is normal. No respiratory distress. Abdominal:      Palpations: Abdomen is soft. Tenderness: There is no abdominal tenderness. Musculoskeletal:      Right lower leg: No edema. Left lower leg: No edema. Right foot: Decreased capillary refill. Normal range of motion. No tenderness or swelling. Left foot: Decreased capillary refill. Normal range of motion. No tenderness or swelling. Feet:      Right foot:      Skin integrity: No ulcer or skin breakdown. Left foot:      Skin integrity: No ulcer or skin breakdown. Skin:     General: Skin is warm. Neurological:      Mental Status: She is alert and oriented to person, place, and time. GCS: GCS eye subscore is 4. GCS verbal subscore is 5. GCS motor subscore is 6. Sensory: Sensation is intact. Motor: Motor function is intact.    Psychiatric:         Mood and Affect: Mood normal.         Speech: Speech normal.         Behavior: Behavior normal.       Imaging/Labs:     Severely abnormal PVRs, severe vascular insufficiency at rest  Arterial duplex reveals patent vessels with monophasic waveforms        Assessment and Plan:     Severe PAD, lifestyle limiting claudication with early signs of ischemic rest pain  · Suspect aortoiliac level of disease based on non-invasive studies and clinical exam  · Will obtain CTA of aorta with runoff for evaluation  · Based on imaging will decide if she is a candidate for endovascular treatment versus open revascularization  · Will have her follow up once imaging gets done, will try to get CT done this week given extensiveness and worsening symptoms    Electronically signed by Nikko Mckee MD on 8/3/20 at 10:09 AM

## 2020-08-03 NOTE — TELEPHONE ENCOUNTER
From: Arvind Edgar  To: BAKARI Santana - CNP  Sent: 8/3/2020 7:54 AM EDT  Subject: Prescription Question    Would like to know if you could call in another order for pain medicine took the last one, last night.     Thanks,    Nick Donohue

## 2020-08-06 ENCOUNTER — HOSPITAL ENCOUNTER (OUTPATIENT)
Dept: CT IMAGING | Age: 65
Discharge: HOME | End: 2020-08-08
Payer: MEDICARE

## 2020-08-06 ENCOUNTER — HOSPITAL ENCOUNTER (OUTPATIENT)
Age: 65
Discharge: HOME | End: 2020-08-06
Payer: MEDICARE

## 2020-08-06 LAB
CREAT SERPL-MCNC: 0.73 MG/DL (ref 0.5–0.9)
GFR AFRICAN AMERICAN: >60 ML/MIN
GFR NON-AFRICAN AMERICAN: >60 ML/MIN
GFR SERPL CREATININE-BSD FRML MDRD: NORMAL ML/MIN/{1.73_M2}
GFR SERPL CREATININE-BSD FRML MDRD: NORMAL ML/MIN/{1.73_M2}

## 2020-08-06 PROCEDURE — 6360000004 HC RX CONTRAST MEDICATION: Performed by: SURGERY

## 2020-08-06 PROCEDURE — 36415 COLL VENOUS BLD VENIPUNCTURE: CPT

## 2020-08-06 PROCEDURE — 75635 CT ANGIO ABDOMINAL ARTERIES: CPT

## 2020-08-06 PROCEDURE — 82565 ASSAY OF CREATININE: CPT

## 2020-08-06 RX ADMIN — IOHEXOL 125 ML: 350 INJECTION, SOLUTION INTRAVENOUS at 11:41

## 2020-08-06 ASSESSMENT — ENCOUNTER SYMPTOMS
SHORTNESS OF BREATH: 0
BACK PAIN: 1
ALLERGIC/IMMUNOLOGIC NEGATIVE: 1
ABDOMINAL PAIN: 0
CHEST TIGHTNESS: 0

## 2020-08-07 ENCOUNTER — OFFICE VISIT (OUTPATIENT)
Dept: VASCULAR SURGERY | Age: 65
End: 2020-08-07
Payer: MEDICARE

## 2020-08-07 VITALS
TEMPERATURE: 98.6 F | DIASTOLIC BLOOD PRESSURE: 72 MMHG | HEART RATE: 92 BPM | RESPIRATION RATE: 18 BRPM | WEIGHT: 117 LBS | OXYGEN SATURATION: 99 % | HEIGHT: 60 IN | SYSTOLIC BLOOD PRESSURE: 161 MMHG | BODY MASS INDEX: 22.97 KG/M2

## 2020-08-07 PROCEDURE — G8427 DOCREV CUR MEDS BY ELIG CLIN: HCPCS | Performed by: SURGERY

## 2020-08-07 PROCEDURE — 4004F PT TOBACCO SCREEN RCVD TLK: CPT | Performed by: SURGERY

## 2020-08-07 PROCEDURE — 99213 OFFICE O/P EST LOW 20 MIN: CPT | Performed by: SURGERY

## 2020-08-07 PROCEDURE — 4040F PNEUMOC VAC/ADMIN/RCVD: CPT | Performed by: SURGERY

## 2020-08-07 PROCEDURE — G8400 PT W/DXA NO RESULTS DOC: HCPCS | Performed by: SURGERY

## 2020-08-07 PROCEDURE — 1090F PRES/ABSN URINE INCON ASSESS: CPT | Performed by: SURGERY

## 2020-08-07 PROCEDURE — 3017F COLORECTAL CA SCREEN DOC REV: CPT | Performed by: SURGERY

## 2020-08-07 PROCEDURE — G8420 CALC BMI NORM PARAMETERS: HCPCS | Performed by: SURGERY

## 2020-08-07 PROCEDURE — 1123F ACP DISCUSS/DSCN MKR DOCD: CPT | Performed by: SURGERY

## 2020-08-10 NOTE — PROGRESS NOTES
Mrs. Terri Landry comes in to discuss the results of her CTA. She has severe vascular insufficiency with lifestyle limiting claudication and early signs of ischemic rest pain. CT reveals aorto iliac occlusion with reconstitution of femoral bifurcation and 3 vessel runoff. 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 She also was found to have an incidental right segmental PE. She denies any chest pain or shortness of breath. I discussed with her open surgical revascularization options with aortobifemoral bypass. Will have her get seen by cardiology for risk stratification. Risks and benefits of aortobifem have been discussed and she would like to proceed as soon as possible given worsening of her symptoms. Prescription for eliquis given and she will need to start taking this given finding of PE. Will arrange timing of surgery based on cardiac testing.     Electronically signed by Justina Orr MD on 8/10/2020 at 1:26 AM

## 2020-08-11 ENCOUNTER — TELEPHONE (OUTPATIENT)
Dept: VASCULAR SURGERY | Age: 65
End: 2020-08-11

## 2020-08-11 NOTE — TELEPHONE ENCOUNTER
Eliquis 360 Support form completed to see where the patient is in her benefits and to see if she qualifies for any assistance. Patient stated it would cost her $617 a month without the assistance. Spoke to Shokan pass at the pharmacy, who said that with the Rx assistance card we sent with the patient, she was able to just get the initial Rx for free. The Rx card only works on the initial fill since she has Medicare.

## 2020-08-13 NOTE — TELEPHONE ENCOUNTER
Received Eliquis Benefits Summary. Called [employer] and spoke to McComb, who stated the the patient has only met $263 of the out of pocket max. She stated a 1 month supply should be between $115 to $120. A 90 day supply should be around $300. I called the [facility] and spoke to Jignesh paz who stated they would need a form completed by us and the patient that they will fax to us. They also need proof of income and household out of pocket expenses for medications. If the patient is still filing taxes, they also want the federal tax return.      Patient Assistance [employer]: [phone]  [email or web address]

## 2020-08-13 NOTE — TELEPHONE ENCOUNTER
Updated patient and advised that once I receive the form from [facility], I will mail it to her with instructions on what else I need. She verbalized understanding.

## 2020-08-24 ENCOUNTER — OFFICE VISIT (OUTPATIENT)
Dept: PRIMARY CARE CLINIC | Age: 65
End: 2020-08-24
Payer: MEDICARE

## 2020-08-24 VITALS
WEIGHT: 118.2 LBS | HEART RATE: 103 BPM | OXYGEN SATURATION: 96 % | SYSTOLIC BLOOD PRESSURE: 134 MMHG | TEMPERATURE: 97.7 F | DIASTOLIC BLOOD PRESSURE: 86 MMHG | BODY MASS INDEX: 23.08 KG/M2

## 2020-08-24 PROBLEM — I73.9 PAD (PERIPHERAL ARTERY DISEASE) (HCC): Status: ACTIVE | Noted: 2020-08-24

## 2020-08-24 PROBLEM — E11.9 NEW ONSET TYPE 2 DIABETES MELLITUS (HCC): Status: ACTIVE | Noted: 2020-08-24

## 2020-08-24 PROBLEM — I73.9 CLAUDICATION (HCC): Status: ACTIVE | Noted: 2020-08-24

## 2020-08-24 PROBLEM — I70.0 OCCLUSION OF AORTA (HCC): Status: ACTIVE | Noted: 2020-08-24

## 2020-08-24 LAB — HBA1C MFR BLD: 6.3 %

## 2020-08-24 PROCEDURE — 83036 HEMOGLOBIN GLYCOSYLATED A1C: CPT | Performed by: NURSE PRACTITIONER

## 2020-08-24 PROCEDURE — 1090F PRES/ABSN URINE INCON ASSESS: CPT | Performed by: NURSE PRACTITIONER

## 2020-08-24 PROCEDURE — 2022F DILAT RTA XM EVC RTNOPTHY: CPT | Performed by: NURSE PRACTITIONER

## 2020-08-24 PROCEDURE — 3044F HG A1C LEVEL LT 7.0%: CPT | Performed by: NURSE PRACTITIONER

## 2020-08-24 PROCEDURE — 99214 OFFICE O/P EST MOD 30 MIN: CPT | Performed by: NURSE PRACTITIONER

## 2020-08-24 PROCEDURE — 3017F COLORECTAL CA SCREEN DOC REV: CPT | Performed by: NURSE PRACTITIONER

## 2020-08-24 PROCEDURE — 4004F PT TOBACCO SCREEN RCVD TLK: CPT | Performed by: NURSE PRACTITIONER

## 2020-08-24 PROCEDURE — 4040F PNEUMOC VAC/ADMIN/RCVD: CPT | Performed by: NURSE PRACTITIONER

## 2020-08-24 PROCEDURE — G8400 PT W/DXA NO RESULTS DOC: HCPCS | Performed by: NURSE PRACTITIONER

## 2020-08-24 PROCEDURE — G8420 CALC BMI NORM PARAMETERS: HCPCS | Performed by: NURSE PRACTITIONER

## 2020-08-24 PROCEDURE — G8427 DOCREV CUR MEDS BY ELIG CLIN: HCPCS | Performed by: NURSE PRACTITIONER

## 2020-08-24 PROCEDURE — 1123F ACP DISCUSS/DSCN MKR DOCD: CPT | Performed by: NURSE PRACTITIONER

## 2020-08-24 RX ORDER — OXYCODONE HYDROCHLORIDE AND ACETAMINOPHEN 5; 325 MG/1; MG/1
1 TABLET ORAL EVERY 6 HOURS PRN
Qty: 60 TABLET | Refills: 0 | Status: SHIPPED | OUTPATIENT
Start: 2020-08-24 | End: 2020-09-08 | Stop reason: SDUPTHER

## 2020-08-24 ASSESSMENT — ENCOUNTER SYMPTOMS
COUGH: 0
NAUSEA: 0
VOMITING: 0
ABDOMINAL PAIN: 0
CONSTIPATION: 0
SINUS PRESSURE: 0
SHORTNESS OF BREATH: 0
SORE THROAT: 0
DIARRHEA: 0
BLOOD IN STOOL: 0
WHEEZING: 0
TROUBLE SWALLOWING: 0

## 2020-08-24 NOTE — PROGRESS NOTES
[facility] PRIMARY CARE  [facility] [address] [facility] [address]  [address] [address]  Dept: [phone]  Dept Fax: [phone]    Charles Murphy is a 71 y.o. female who presentstoday for her medical conditions/complaints as noted below. Charles Murphy is c/o of  Chief Complaint   Patient presents with    3 Month Follow-Up     no new concerns     Diabetes         HPI:     Here today for follow up  Reports is still waiting for surgery date for her aortic and bilateral peripheral arterial blockages  Underwent cardiac clearance, also had dopplers on carotids and she states these tests all \"looked good\"  She is hopeful after the surgery her leg pain will be resolved  Has been using percocet to help with pain control, she states she about 4 tablets left from her last rx    Otherwise has been feeling well  Diabetes   She presents for her follow-up diabetic visit. She has type 2 diabetes mellitus. Her disease course has been improving. There are no hypoglycemic associated symptoms. Pertinent negatives for hypoglycemia include no confusion, dizziness, headaches or nervousness/anxiousness. Pertinent negatives for diabetes include no chest pain, no fatigue, no polydipsia, no polyphagia, no polyuria and no weakness. Current diabetic treatment includes diet and oral agent (monotherapy). She is compliant with treatment most of the time. Her weight is stable. She is following a generally healthy diet. She rarely participates in exercise. There is no change in her home blood glucose trend. An ACE inhibitor/angiotensin II receptor blocker is not being taken.        Hemoglobin A1C (%)   Date Value   2020 6.3   2020 6.3   2020 7.4 (H)             ( goal A1C is < 7)   No results found for: LABMICR  LDL Cholesterol (mg/dL)   Date Value   2020 113       (goal LDL is <100)   AST (U/L)   Date Value   2020 12     ALT (U/L)   Date Value   2020 15     BUN (mg/dL)   Date Value   01/04/2020 23     BP Readings from Last 3 Encounters:   08/24/20 134/86   08/07/20 (!) 161/72   08/03/20 (!) 185/97          ([id number])    Past Medical History:   Diagnosis Date    Anxiety     Depression     Diabetes mellitus ([facility] [address]     Glaucoma     Hearing loss     Migraines       Past Surgical History:   Procedure Laterality Date    APPENDECTOMY      BREAST SURGERY      \"crystals removed from right breast\"    CATARACT REMOVAL  2015    CHOLECYSTECTOMY      TONSILLECTOMY         Family History   Problem Relation Age of Onset    Cancer Father     Cancer Sister           Social History     Tobacco Use    Smoking status: Current Every Day Smoker     Packs/day: 1.50     Years: 35.00     Pack years: 52.50    Smokeless tobacco: Never Used   Substance Use Topics    Alcohol use: Not Currently      Current Outpatient Medications   Medication Sig Dispense Refill    oxyCODONE-acetaminophen (PERCOCET) 5-325 MG per tablet Take 1 tablet by mouth every 6 hours as needed for Pain for up to 14 days. Intended supply: 5 days. Take lowest dose possible to manage pain 60 tablet 0    ASPIRIN LOW DOSE ADULT PO Take 81 mg by mouth      apixaban (ELIQUIS DVT/PE STARTER PACK) 5 MG TABS tablet Take 10 mg (2 tablets) orally twice daily for 7 days, then take 5 mg (1 tablet) orally twice daily thereafter. 74 tablet 0    brimonidine (ALPHAGAN) 0.2 % ophthalmic solution Apply 1 drop to eye 2 times daily      metFORMIN (GLUCOPHAGE) 500 MG tablet Take 1 tablet by mouth 2 times daily (with meals) 60 tablet 5    latanoprost (XALATAN) 0.005 % ophthalmic solution       VORTIoxetine (TRINTELLIX) 10 MG TABS tablet Take 1 tablet by mouth      busPIRone (BUSPAR) 5 MG tablet Take 1 tablet by mouth       No current facility-administered medications for this visit.       Allergies   Allergen Reactions    Sulfamethoxazole     Sulfamethoxazole-Trimethoprim     Tetanus Toxoid     Trimethoprim        Health Maintenance   Topic Date Due    Hepatitis C screen  1955    HIV screen  12/24/1969    Cervical cancer screen  12/24/1975    Breast cancer screen  12/24/2004    Shingles Vaccine (1 of 2) 12/24/2004    Colon cancer screen colonoscopy  12/24/2004    DEXA (modify frequency per FRAX score)  12/24/2009    Low dose CT lung screening  12/24/2009    Pneumococcal 65+ years Vaccine (1 of 1 - PPSV23) 12/24/2019    Annual Wellness Visit (AWV)  01/19/2020    Flu vaccine (1) 09/01/2020    A1C test (Diabetic or Prediabetic)  05/22/2021    Lipid screen  01/04/2025    Hepatitis A vaccine  Aged Out    Hepatitis B vaccine  Aged Out    Hib vaccine  Aged Out    Meningococcal (ACWY) vaccine  Aged Out       Subjective:      Review of Systems   Constitutional: Negative for activity change, appetite change, chills, fatigue, fever and unexpected weight change. HENT: Negative for congestion, ear pain, hearing loss, sinus pressure, sore throat and trouble swallowing. Eyes: Negative for visual disturbance. Respiratory: Negative for cough, shortness of breath and wheezing. Cardiovascular: Negative for chest pain, palpitations and leg swelling. Gastrointestinal: Negative for abdominal pain, blood in stool, constipation, diarrhea, nausea and vomiting. Endocrine: Negative for cold intolerance, heat intolerance, polydipsia, polyphagia and polyuria. Genitourinary: Negative for difficulty urinating, frequency, hematuria and urgency. Musculoskeletal: Positive for gait problem (bilateral leg pain). Negative for arthralgias and myalgias. Skin: Negative for rash. Allergic/Immunologic: Negative for environmental allergies. Neurological: Negative for dizziness, weakness, light-headedness and headaches. Psychiatric/Behavioral: Negative for confusion. The patient is not nervous/anxious. Objective:     Physical Exam  Constitutional:       Appearance: She is well-developed. HENT:      Head: Normocephalic.    Eyes: Conjunctiva/sclera: Conjunctivae normal.      Pupils: Pupils are equal, round, and reactive to light. Neck:      Musculoskeletal: Normal range of motion. Cardiovascular:      Rate and Rhythm: Normal rate and regular rhythm. Heart sounds: Normal heart sounds. No murmur. Pulmonary:      Effort: Pulmonary effort is normal.      Breath sounds: Normal breath sounds. No wheezing. Abdominal:      General: Bowel sounds are normal. There is no distension. Palpations: Abdomen is soft. Musculoskeletal: Normal range of motion. Skin:     General: Skin is warm and dry. Neurological:      Mental Status: She is alert and oriented to person, place, and time. Psychiatric:         Behavior: Behavior normal.         Thought Content: Thought content normal.         Judgment: Judgment normal.       /86   Pulse 103   Temp 97.7 °F (36.5 °C)   Wt 118 lb 3.2 oz (53.6 kg)   SpO2 96%   BMI 23.08 kg/m²     Assessment:       Diagnosis Orders   1. New onset type 2 diabetes mellitus (HCC)  POCT glycosylated hemoglobin (Hb A1C)   2. Cervical pain (neck)  oxyCODONE-acetaminophen (PERCOCET) 5-325 MG per tablet   3. PAD (peripheral artery disease) ([address]     4. Occlusion of aorta (HCC)     5. Claudication ([address]  oxyCODONE-acetaminophen (PERCOCET) 5-325 MG per tablet             Plan:      Return in about 4 months (around 12/24/2020). Orders Placed This Encounter   Procedures    POCT glycosylated hemoglobin (Hb A1C)        Orders Placed This Encounter   Medications    oxyCODONE-acetaminophen (PERCOCET) 5-325 MG per tablet     Sig: Take 1 tablet by mouth every 6 hours as needed for Pain for up to 14 days. Intended supply: 5 days.  Take lowest dose possible to manage pain     Dispense:  60 tablet     Refill:  0     Reduce doses taken as pain becomes manageable      Diabetes-a1c stable on metformin, reviewed diet  Neck pain, claudication-exhibits appropriate use of percocet for pain relief, discussed tapering off medication once vascular surgery is complete  Aortic occlusion, PAD-reviewed recent testing and note per vascular surgeon, will be having surgery in upcoming months     Patient given educational materials - see patient instructions. Discussed use, benefit, and side effects of prescribed medications. All patientquestions answered. Pt voiced understanding. Reviewed health maintenance. Instructedto continue current medications, diet and exercise. Patient agreed with treatmentplan. Follow up as directed.      Electronicallysigned by BAKARI Mak CNP on 8/24/2020 at 2:34 PM

## 2020-08-28 ENCOUNTER — TELEPHONE (OUTPATIENT)
Dept: VASCULAR SURGERY | Age: 65
End: 2020-08-28

## 2020-08-28 NOTE — TELEPHONE ENCOUNTER
Called patient in regards to surgery scheduled for 9/15 at 730 am.     Informed patient of arrival time of 530 and of COVID test time, & location as well. Pt instructed to stop eliquis 2 days prior to surgery. Patient verbalized understanding.

## 2020-08-28 NOTE — TELEPHONE ENCOUNTER
----- Message from Gonzalez Pelaez MD sent at 8/26/2020  4:44 PM EDT -----  Regarding: RE: cardiac testing  Lets do sept 15 at 730 am  See if we can move berto to Thursday the 17th  ----- Message -----  From: Melo Yeager MA  Sent: 8/26/2020   3:51 PM EDT  To: Gonzalez Pelaez MD, #  Subject: RE: cardiac testing                              Did you already give us a time and date for this patient?  ----- Message -----  From: Melo Yeager MA  Sent: 8/25/2020   1:01 PM EDT  To: Gonzalez Pelaez MD, #  Subject: RE: cardiac testing                              Spoke to patient in regards to this. She states she would just like to get it scheduled. ----- Message -----  From: Gonzalez Pelaez MD  Sent: 8/24/2020  12:14 PM EDT  To: BAKARI Henriquez NP, #  Subject: RE: cardiac testing                              Looks like her stress test was negative for any issues with her heart perfusion  Please let her know this and ask if she would like to come in on Friday to discuss details of the surgery or if she would just like me to find a date and get her scheduled in the next couple of weeks  ----- Message -----  From: Alvarez Bautista MA  Sent: 8/24/2020  11:29 AM EDT  To: Gonzalez Pelaez MD, #  Subject: cardiac testing                                  The patient called and would like to know the results of her cardiac testing. Please give her a call back.  Thanks

## 2020-09-08 ENCOUNTER — PATIENT MESSAGE (OUTPATIENT)
Dept: PRIMARY CARE CLINIC | Age: 65
End: 2020-09-08

## 2020-09-08 RX ORDER — OXYCODONE HYDROCHLORIDE AND ACETAMINOPHEN 5; 325 MG/1; MG/1
1 TABLET ORAL EVERY 6 HOURS PRN
Qty: 60 TABLET | Refills: 0 | Status: ON HOLD | OUTPATIENT
Start: 2020-09-08 | End: 2020-09-21 | Stop reason: HOSPADM

## 2020-09-11 ENCOUNTER — HOSPITAL ENCOUNTER (OUTPATIENT)
Dept: PREADMISSION TESTING | Age: 65
Discharge: HOME | End: 2020-09-15
Payer: MEDICARE

## 2020-09-11 PROCEDURE — U0003 INFECTIOUS AGENT DETECTION BY NUCLEIC ACID (DNA OR RNA); SEVERE ACUTE RESPIRATORY SYNDROME CORONAVIRUS 2 (SARS-COV-2) (CORONAVIRUS DISEASE [COVID-19]), AMPLIFIED PROBE TECHNIQUE, MAKING USE OF HIGH THROUGHPUT TECHNOLOGIES AS DESCRIBED BY CMS-2020-01-R: HCPCS

## 2020-09-14 ENCOUNTER — TELEPHONE (OUTPATIENT)
Dept: VASCULAR SURGERY | Age: 65
End: 2020-09-14

## 2020-09-14 ENCOUNTER — ANESTHESIA EVENT (OUTPATIENT)
Dept: OPERATING ROOM | Age: 65
DRG: 272 | End: 2020-09-14
Payer: MEDICARE

## 2020-09-14 LAB — SARS-COV-2, NAA: NOT DETECTED

## 2020-09-14 NOTE — TELEPHONE ENCOUNTER
Patient has surgery tomorrow with Dr. Alexa Monique. She did her COVID screening on Fri and wanted to know the result. I spoke with Braulio Rajput CNP who looked at the results and advised me to tell her that the result is \"not detected\" or negative. I told the patient, who verbalized understanding.

## 2020-09-15 ENCOUNTER — ANESTHESIA (OUTPATIENT)
Dept: OPERATING ROOM | Age: 65
DRG: 272 | End: 2020-09-15
Payer: MEDICARE

## 2020-09-15 ENCOUNTER — APPOINTMENT (OUTPATIENT)
Dept: GENERAL RADIOLOGY | Age: 65
DRG: 272 | End: 2020-09-15
Attending: SURGERY
Payer: MEDICARE

## 2020-09-15 ENCOUNTER — HOSPITAL ENCOUNTER (INPATIENT)
Age: 65
LOS: 6 days | Discharge: HOME | DRG: 272 | End: 2020-09-21
Attending: SURGERY | Admitting: SURGERY
Payer: MEDICARE

## 2020-09-15 VITALS — DIASTOLIC BLOOD PRESSURE: 73 MMHG | SYSTOLIC BLOOD PRESSURE: 132 MMHG | OXYGEN SATURATION: 99 % | TEMPERATURE: 98 F

## 2020-09-15 PROBLEM — I74.09 AORTOILIAC OCCLUSIVE DISEASE (HCC): Status: ACTIVE | Noted: 2020-09-15

## 2020-09-15 LAB
ALLEN TEST: ABNORMAL
ANION GAP: 9 MMOL/L (ref 7–16)
EKG ATRIAL RATE: 89 BPM
EKG P AXIS: 60 DEGREES
EKG P-R INTERVAL: 128 MS
EKG Q-T INTERVAL: 390 MS
EKG QRS DURATION: 84 MS
EKG QTC CALCULATION (BAZETT): 474 MS
EKG R AXIS: 26 DEGREES
EKG T AXIS: 6 DEGREES
EKG VENTRICULAR RATE: 89 BPM
FIO2: ABNORMAL
GFR NON-AFRICAN AMERICAN: >60 ML/MIN
GFR NON-AFRICAN AMERICAN: >60 ML/MIN
GFR SERPL CREATININE-BSD FRML MDRD: >60 ML/MIN
GFR SERPL CREATININE-BSD FRML MDRD: >60 ML/MIN
GFR SERPL CREATININE-BSD FRML MDRD: NORMAL ML/MIN/{1.73_M2}
GFR SERPL CREATININE-BSD FRML MDRD: NORMAL ML/MIN/{1.73_M2}
GLUCOSE BLD-MCNC: 145 MG/DL (ref 74–100)
GLUCOSE BLD-MCNC: 206 MG/DL (ref 74–100)
GLUCOSE BLD-MCNC: 249 MG/DL (ref 65–105)
GLUCOSE BLD-MCNC: 253 MG/DL (ref 65–105)
MODE: ABNORMAL
NEGATIVE BASE EXCESS, ART: 4 (ref 0–2)
O2 DEVICE/FLOW/%: ABNORMAL
PATIENT TEMP: ABNORMAL
POC CHLORIDE: 105 MMOL/L (ref 98–107)
POC CHLORIDE: 109 MMOL/L (ref 98–107)
POC CREATININE: 0.65 MG/DL (ref 0.51–1.19)
POC CREATININE: 0.67 MG/DL (ref 0.51–1.19)
POC HCO3: 22.2 MMOL/L (ref 21–28)
POC HEMATOCRIT: 30 % (ref 36–46)
POC HEMATOCRIT: 42 % (ref 36–46)
POC HEMOGLOBIN: 10.1 G/DL (ref 12–16)
POC HEMOGLOBIN: 14.2 G/DL (ref 12–16)
POC IONIZED CALCIUM: 1.13 MMOL/L (ref 1.15–1.33)
POC LACTIC ACID: 0.99 MMOL/L (ref 0.56–1.39)
POC O2 SATURATION: 100 % (ref 94–98)
POC PCO2 TEMP: ABNORMAL MM HG
POC PCO2: 45 MM HG (ref 35–48)
POC PH TEMP: ABNORMAL
POC PH: 7.3 (ref 7.35–7.45)
POC PO2 TEMP: ABNORMAL MM HG
POC PO2: 221.6 MM HG (ref 83–108)
POC POTASSIUM: 3.5 MMOL/L (ref 3.5–4.5)
POC POTASSIUM: 3.8 MMOL/L (ref 3.5–4.5)
POC SODIUM: 138 MMOL/L (ref 138–146)
POC SODIUM: 140 MMOL/L (ref 138–146)
POSITIVE BASE EXCESS, ART: ABNORMAL (ref 0–3)
SAMPLE SITE: ABNORMAL
TCO2 (CALC), ART: 24 MMOL/L (ref 22–29)

## 2020-09-15 PROCEDURE — 6360000002 HC RX W HCPCS

## 2020-09-15 PROCEDURE — 3700000001 HC ADD 15 MINUTES (ANESTHESIA): Performed by: SURGERY

## 2020-09-15 PROCEDURE — 2709999900 HC NON-CHARGEABLE SUPPLY: Performed by: SURGERY

## 2020-09-15 PROCEDURE — 86901 BLOOD TYPING SEROLOGIC RH(D): CPT

## 2020-09-15 PROCEDURE — 04100JK BYPASS ABDOMINAL AORTA TO BILATERAL FEMORAL ARTERIES WITH SYNTHETIC SUBSTITUTE, OPEN APPROACH: ICD-10-PCS | Performed by: SURGERY

## 2020-09-15 PROCEDURE — C1768 GRAFT, VASCULAR: HCPCS | Performed by: SURGERY

## 2020-09-15 PROCEDURE — 2000000000 HC ICU R&B

## 2020-09-15 PROCEDURE — 86900 BLOOD TYPING SEROLOGIC ABO: CPT

## 2020-09-15 PROCEDURE — 94770 HC ETCO2 MONITOR DAILY: CPT

## 2020-09-15 PROCEDURE — 6360000002 HC RX W HCPCS: Performed by: STUDENT IN AN ORGANIZED HEALTH CARE EDUCATION/TRAINING PROGRAM

## 2020-09-15 PROCEDURE — 3600000005 HC SURGERY LEVEL 5 BASE: Performed by: SURGERY

## 2020-09-15 PROCEDURE — 82330 ASSAY OF CALCIUM: CPT

## 2020-09-15 PROCEDURE — 6360000002 HC RX W HCPCS: Performed by: SURGERY

## 2020-09-15 PROCEDURE — 82565 ASSAY OF CREATININE: CPT

## 2020-09-15 PROCEDURE — 84295 ASSAY OF SERUM SODIUM: CPT

## 2020-09-15 PROCEDURE — 84132 ASSAY OF SERUM POTASSIUM: CPT

## 2020-09-15 PROCEDURE — 6370000000 HC RX 637 (ALT 250 FOR IP): Performed by: STUDENT IN AN ORGANIZED HEALTH CARE EDUCATION/TRAINING PROGRAM

## 2020-09-15 PROCEDURE — 2700000000 HC OXYGEN THERAPY PER DAY

## 2020-09-15 PROCEDURE — 3600000015 HC SURGERY LEVEL 5 ADDTL 15MIN: Performed by: SURGERY

## 2020-09-15 PROCEDURE — 86880 COOMBS TEST DIRECT: CPT

## 2020-09-15 PROCEDURE — 71045 X-RAY EXAM CHEST 1 VIEW: CPT

## 2020-09-15 PROCEDURE — 2580000003 HC RX 258

## 2020-09-15 PROCEDURE — 82947 ASSAY GLUCOSE BLOOD QUANT: CPT

## 2020-09-15 PROCEDURE — P9045 ALBUMIN (HUMAN), 5%, 250 ML: HCPCS

## 2020-09-15 PROCEDURE — 93005 ELECTROCARDIOGRAM TRACING: CPT | Performed by: SURGERY

## 2020-09-15 PROCEDURE — 93010 ELECTROCARDIOGRAM REPORT: CPT | Performed by: INTERNAL MEDICINE

## 2020-09-15 PROCEDURE — 85014 HEMATOCRIT: CPT

## 2020-09-15 PROCEDURE — 7100000000 HC PACU RECOVERY - FIRST 15 MIN

## 2020-09-15 PROCEDURE — 2500000003 HC RX 250 WO HCPCS

## 2020-09-15 PROCEDURE — 82435 ASSAY OF BLOOD CHLORIDE: CPT

## 2020-09-15 PROCEDURE — 6370000000 HC RX 637 (ALT 250 FOR IP)

## 2020-09-15 PROCEDURE — 86870 RBC ANTIBODY IDENTIFICATION: CPT

## 2020-09-15 PROCEDURE — 87086 URINE CULTURE/COLONY COUNT: CPT

## 2020-09-15 PROCEDURE — 82803 BLOOD GASES ANY COMBINATION: CPT

## 2020-09-15 PROCEDURE — 2580000003 HC RX 258: Performed by: STUDENT IN AN ORGANIZED HEALTH CARE EDUCATION/TRAINING PROGRAM

## 2020-09-15 PROCEDURE — 83605 ASSAY OF LACTIC ACID: CPT

## 2020-09-15 PROCEDURE — 35646 BPG AORTOBIFEMORAL: CPT | Performed by: SURGERY

## 2020-09-15 PROCEDURE — 3700000000 HC ANESTHESIA ATTENDED CARE: Performed by: SURGERY

## 2020-09-15 PROCEDURE — 86850 RBC ANTIBODY SCREEN: CPT

## 2020-09-15 PROCEDURE — 2580000003 HC RX 258: Performed by: SURGERY

## 2020-09-15 PROCEDURE — 86920 COMPATIBILITY TEST SPIN: CPT

## 2020-09-15 PROCEDURE — 94761 N-INVAS EAR/PLS OXIMETRY MLT: CPT

## 2020-09-15 PROCEDURE — 6370000000 HC RX 637 (ALT 250 FOR IP): Performed by: SURGERY

## 2020-09-15 PROCEDURE — 7100000001 HC PACU RECOVERY - ADDTL 15 MIN

## 2020-09-15 DEVICE — IMPLANTABLE DEVICE: Type: IMPLANTABLE DEVICE | Site: AORTA | Status: FUNCTIONAL

## 2020-09-15 RX ORDER — LABETALOL HYDROCHLORIDE 5 MG/ML
INJECTION, SOLUTION INTRAVENOUS PRN
Status: DISCONTINUED | OUTPATIENT
Start: 2020-09-15 | End: 2020-09-15 | Stop reason: SDUPTHER

## 2020-09-15 RX ORDER — GLYCOPYRROLATE 1 MG/5 ML
SYRINGE (ML) INTRAVENOUS PRN
Status: DISCONTINUED | OUTPATIENT
Start: 2020-09-15 | End: 2020-09-15 | Stop reason: SDUPTHER

## 2020-09-15 RX ORDER — ROCURONIUM BROMIDE 10 MG/ML
INJECTION, SOLUTION INTRAVENOUS PRN
Status: DISCONTINUED | OUTPATIENT
Start: 2020-09-15 | End: 2020-09-15 | Stop reason: SDUPTHER

## 2020-09-15 RX ORDER — SODIUM CHLORIDE 0.9 % (FLUSH) 0.9 %
10 SYRINGE (ML) INJECTION EVERY 12 HOURS SCHEDULED
Status: CANCELLED | OUTPATIENT
Start: 2020-09-15

## 2020-09-15 RX ORDER — MAGNESIUM HYDROXIDE 1200 MG/15ML
LIQUID ORAL CONTINUOUS PRN
Status: COMPLETED | OUTPATIENT
Start: 2020-09-15 | End: 2020-09-15

## 2020-09-15 RX ORDER — KETOROLAC TROMETHAMINE 30 MG/ML
30 INJECTION, SOLUTION INTRAMUSCULAR; INTRAVENOUS EVERY 6 HOURS
Status: DISCONTINUED | OUTPATIENT
Start: 2020-09-15 | End: 2020-09-15

## 2020-09-15 RX ORDER — HYDRALAZINE HYDROCHLORIDE 20 MG/ML
10 INJECTION INTRAMUSCULAR; INTRAVENOUS
Status: DISCONTINUED | OUTPATIENT
Start: 2020-09-15 | End: 2020-09-15

## 2020-09-15 RX ORDER — PROMETHAZINE HYDROCHLORIDE 25 MG/ML
12.5 INJECTION, SOLUTION INTRAMUSCULAR; INTRAVENOUS EVERY 6 HOURS PRN
Status: DISCONTINUED | OUTPATIENT
Start: 2020-09-15 | End: 2020-09-21 | Stop reason: HOSPADM

## 2020-09-15 RX ORDER — NALOXONE HYDROCHLORIDE 0.4 MG/ML
0.4 INJECTION, SOLUTION INTRAMUSCULAR; INTRAVENOUS; SUBCUTANEOUS PRN
Status: DISCONTINUED | OUTPATIENT
Start: 2020-09-15 | End: 2020-09-16

## 2020-09-15 RX ORDER — KETOROLAC TROMETHAMINE 30 MG/ML
INJECTION, SOLUTION INTRAMUSCULAR; INTRAVENOUS
Status: COMPLETED
Start: 2020-09-15 | End: 2020-09-15

## 2020-09-15 RX ORDER — SODIUM CHLORIDE 0.9 % (FLUSH) 0.9 %
10 SYRINGE (ML) INJECTION PRN
Status: DISCONTINUED | OUTPATIENT
Start: 2020-09-15 | End: 2020-09-21 | Stop reason: HOSPADM

## 2020-09-15 RX ORDER — ACETAMINOPHEN 325 MG/1
650 TABLET ORAL EVERY 4 HOURS PRN
Status: DISCONTINUED | OUTPATIENT
Start: 2020-09-15 | End: 2020-09-15

## 2020-09-15 RX ORDER — SODIUM CHLORIDE 0.9 % (FLUSH) 0.9 %
10 SYRINGE (ML) INJECTION EVERY 12 HOURS SCHEDULED
Status: DISCONTINUED | OUTPATIENT
Start: 2020-09-15 | End: 2020-09-21 | Stop reason: HOSPADM

## 2020-09-15 RX ORDER — LIDOCAINE HYDROCHLORIDE 10 MG/ML
1 INJECTION, SOLUTION EPIDURAL; INFILTRATION; INTRACAUDAL; PERINEURAL
Status: CANCELLED | OUTPATIENT
Start: 2020-09-15 | End: 2020-09-15

## 2020-09-15 RX ORDER — PROPOFOL 10 MG/ML
INJECTION, EMULSION INTRAVENOUS PRN
Status: DISCONTINUED | OUTPATIENT
Start: 2020-09-15 | End: 2020-09-15 | Stop reason: SDUPTHER

## 2020-09-15 RX ORDER — ASPIRIN 81 MG/1
TABLET, CHEWABLE ORAL PRN
Status: DISCONTINUED | OUTPATIENT
Start: 2020-09-15 | End: 2020-09-15 | Stop reason: SDUPTHER

## 2020-09-15 RX ORDER — KETOROLAC TROMETHAMINE 15 MG/ML
15 INJECTION, SOLUTION INTRAMUSCULAR; INTRAVENOUS EVERY 6 HOURS
Status: COMPLETED | OUTPATIENT
Start: 2020-09-15 | End: 2020-09-17

## 2020-09-15 RX ORDER — MIDAZOLAM HYDROCHLORIDE 1 MG/ML
1 INJECTION INTRAMUSCULAR; INTRAVENOUS EVERY 10 MIN PRN
Status: CANCELLED | OUTPATIENT
Start: 2020-09-15

## 2020-09-15 RX ORDER — SODIUM CHLORIDE 9 MG/ML
INJECTION, SOLUTION INTRAVENOUS CONTINUOUS
Status: DISCONTINUED | OUTPATIENT
Start: 2020-09-15 | End: 2020-09-15

## 2020-09-15 RX ORDER — ONDANSETRON 2 MG/ML
4 INJECTION INTRAMUSCULAR; INTRAVENOUS EVERY 6 HOURS PRN
Status: DISCONTINUED | OUTPATIENT
Start: 2020-09-15 | End: 2020-09-21 | Stop reason: HOSPADM

## 2020-09-15 RX ORDER — ASPIRIN 81 MG/1
81 TABLET, CHEWABLE ORAL DAILY
Status: DISCONTINUED | OUTPATIENT
Start: 2020-09-15 | End: 2020-09-21 | Stop reason: HOSPADM

## 2020-09-15 RX ORDER — HEPARIN SODIUM 1000 [USP'U]/ML
INJECTION, SOLUTION INTRAVENOUS; SUBCUTANEOUS PRN
Status: DISCONTINUED | OUTPATIENT
Start: 2020-09-15 | End: 2020-09-15 | Stop reason: SDUPTHER

## 2020-09-15 RX ORDER — LIDOCAINE HYDROCHLORIDE 10 MG/ML
INJECTION, SOLUTION EPIDURAL; INFILTRATION; INTRACAUDAL; PERINEURAL PRN
Status: DISCONTINUED | OUTPATIENT
Start: 2020-09-15 | End: 2020-09-15 | Stop reason: SDUPTHER

## 2020-09-15 RX ORDER — SODIUM CHLORIDE 0.9 % (FLUSH) 0.9 %
10 SYRINGE (ML) INJECTION PRN
Status: CANCELLED | OUTPATIENT
Start: 2020-09-15

## 2020-09-15 RX ORDER — MIDAZOLAM HYDROCHLORIDE 1 MG/ML
INJECTION INTRAMUSCULAR; INTRAVENOUS PRN
Status: DISCONTINUED | OUTPATIENT
Start: 2020-09-15 | End: 2020-09-15 | Stop reason: SDUPTHER

## 2020-09-15 RX ORDER — PROMETHAZINE HYDROCHLORIDE 12.5 MG/1
12.5 TABLET ORAL EVERY 6 HOURS PRN
Status: DISCONTINUED | OUTPATIENT
Start: 2020-09-15 | End: 2020-09-15

## 2020-09-15 RX ORDER — ONDANSETRON 2 MG/ML
INJECTION INTRAMUSCULAR; INTRAVENOUS PRN
Status: DISCONTINUED | OUTPATIENT
Start: 2020-09-15 | End: 2020-09-15 | Stop reason: SDUPTHER

## 2020-09-15 RX ORDER — FENTANYL CITRATE 50 UG/ML
INJECTION, SOLUTION INTRAMUSCULAR; INTRAVENOUS PRN
Status: DISCONTINUED | OUTPATIENT
Start: 2020-09-15 | End: 2020-09-15 | Stop reason: SDUPTHER

## 2020-09-15 RX ORDER — FENTANYL CITRATE 50 UG/ML
25 INJECTION, SOLUTION INTRAMUSCULAR; INTRAVENOUS EVERY 5 MIN PRN
Status: CANCELLED | OUTPATIENT
Start: 2020-09-15

## 2020-09-15 RX ORDER — HEPARIN SODIUM 1000 [USP'U]/ML
INJECTION, SOLUTION INTRAVENOUS; SUBCUTANEOUS PRN
Status: DISCONTINUED | OUTPATIENT
Start: 2020-09-15 | End: 2020-09-15 | Stop reason: ALTCHOICE

## 2020-09-15 RX ORDER — SODIUM CHLORIDE, SODIUM LACTATE, POTASSIUM CHLORIDE, CALCIUM CHLORIDE 600; 310; 30; 20 MG/100ML; MG/100ML; MG/100ML; MG/100ML
INJECTION, SOLUTION INTRAVENOUS CONTINUOUS PRN
Status: DISCONTINUED | OUTPATIENT
Start: 2020-09-15 | End: 2020-09-15 | Stop reason: SDUPTHER

## 2020-09-15 RX ORDER — ALBUMIN, HUMAN INJ 5% 5 %
SOLUTION INTRAVENOUS PRN
Status: DISCONTINUED | OUTPATIENT
Start: 2020-09-15 | End: 2020-09-15 | Stop reason: SDUPTHER

## 2020-09-15 RX ORDER — NEOSTIGMINE METHYLSULFATE 5 MG/5 ML
SYRINGE (ML) INTRAVENOUS PRN
Status: DISCONTINUED | OUTPATIENT
Start: 2020-09-15 | End: 2020-09-15 | Stop reason: SDUPTHER

## 2020-09-15 RX ORDER — PHENYLEPHRINE HYDROCHLORIDE 10 MG/ML
INJECTION INTRAVENOUS PRN
Status: DISCONTINUED | OUTPATIENT
Start: 2020-09-15 | End: 2020-09-15 | Stop reason: SDUPTHER

## 2020-09-15 RX ORDER — SODIUM CHLORIDE, SODIUM LACTATE, POTASSIUM CHLORIDE, CALCIUM CHLORIDE 600; 310; 30; 20 MG/100ML; MG/100ML; MG/100ML; MG/100ML
INJECTION, SOLUTION INTRAVENOUS CONTINUOUS
Status: DISCONTINUED | OUTPATIENT
Start: 2020-09-15 | End: 2020-09-16

## 2020-09-15 RX ORDER — SODIUM CHLORIDE, SODIUM LACTATE, POTASSIUM CHLORIDE, CALCIUM CHLORIDE 600; 310; 30; 20 MG/100ML; MG/100ML; MG/100ML; MG/100ML
INJECTION, SOLUTION INTRAVENOUS CONTINUOUS
Status: CANCELLED | OUTPATIENT
Start: 2020-09-15

## 2020-09-15 RX ADMIN — KETOROLAC TROMETHAMINE 30 MG: 30 INJECTION, SOLUTION INTRAMUSCULAR; INTRAVENOUS at 14:42

## 2020-09-15 RX ADMIN — PHENYLEPHRINE HYDROCHLORIDE 25 MCG: 10 INJECTION INTRAVENOUS at 08:50

## 2020-09-15 RX ADMIN — Medication 2.5 MG: at 10:10

## 2020-09-15 RX ADMIN — PHENYLEPHRINE HYDROCHLORIDE 25 MCG: 10 INJECTION INTRAVENOUS at 12:03

## 2020-09-15 RX ADMIN — SODIUM CHLORIDE, POTASSIUM CHLORIDE, SODIUM LACTATE AND CALCIUM CHLORIDE: 600; 310; 30; 20 INJECTION, SOLUTION INTRAVENOUS at 10:38

## 2020-09-15 RX ADMIN — PHENYLEPHRINE HYDROCHLORIDE 50 MCG: 10 INJECTION INTRAVENOUS at 09:33

## 2020-09-15 RX ADMIN — PHENYLEPHRINE HYDROCHLORIDE 25 MCG: 10 INJECTION INTRAVENOUS at 08:46

## 2020-09-15 RX ADMIN — Medication 5 MG: at 13:17

## 2020-09-15 RX ADMIN — SODIUM CHLORIDE: 9 INJECTION, SOLUTION INTRAVENOUS at 08:05

## 2020-09-15 RX ADMIN — FENTANYL CITRATE 25 MCG: 50 INJECTION, SOLUTION INTRAMUSCULAR; INTRAVENOUS at 13:31

## 2020-09-15 RX ADMIN — ROCURONIUM BROMIDE 20 MG: 10 INJECTION INTRAVENOUS at 09:48

## 2020-09-15 RX ADMIN — SODIUM CHLORIDE, POTASSIUM CHLORIDE, SODIUM LACTATE AND CALCIUM CHLORIDE: 600; 310; 30; 20 INJECTION, SOLUTION INTRAVENOUS at 08:05

## 2020-09-15 RX ADMIN — PHENYLEPHRINE HYDROCHLORIDE 15 MCG/MIN: 10 INJECTION INTRAVENOUS at 08:44

## 2020-09-15 RX ADMIN — PROPOFOL 50 MG: 10 INJECTION, EMULSION INTRAVENOUS at 08:18

## 2020-09-15 RX ADMIN — ROCURONIUM BROMIDE 20 MG: 10 INJECTION INTRAVENOUS at 11:00

## 2020-09-15 RX ADMIN — FENTANYL CITRATE 50 MCG: 50 INJECTION, SOLUTION INTRAMUSCULAR; INTRAVENOUS at 08:20

## 2020-09-15 RX ADMIN — HEPARIN SODIUM 1000 UNITS: 1000 INJECTION INTRAVENOUS; SUBCUTANEOUS at 10:36

## 2020-09-15 RX ADMIN — LIDOCAINE HYDROCHLORIDE 50 MG: 10 INJECTION, SOLUTION EPIDURAL; INFILTRATION; INTRACAUDAL; PERINEURAL at 08:12

## 2020-09-15 RX ADMIN — FENTANYL CITRATE 50 MCG: 50 INJECTION, SOLUTION INTRAMUSCULAR; INTRAVENOUS at 09:17

## 2020-09-15 RX ADMIN — Medication 2 MG: at 13:13

## 2020-09-15 RX ADMIN — PHENYLEPHRINE HYDROCHLORIDE 200 MCG: 10 INJECTION INTRAVENOUS at 08:29

## 2020-09-15 RX ADMIN — CEFAZOLIN 2 G: 10 INJECTION, POWDER, FOR SOLUTION INTRAVENOUS at 12:21

## 2020-09-15 RX ADMIN — KETOROLAC TROMETHAMINE 30 MG: 30 INJECTION, SOLUTION INTRAMUSCULAR at 14:42

## 2020-09-15 RX ADMIN — HEPARIN SODIUM 7000 UNITS: 1000 INJECTION INTRAVENOUS; SUBCUTANEOUS at 10:00

## 2020-09-15 RX ADMIN — INSULIN LISPRO 2 UNITS: 100 INJECTION, SOLUTION INTRAVENOUS; SUBCUTANEOUS at 22:54

## 2020-09-15 RX ADMIN — FENTANYL CITRATE 50 MCG: 50 INJECTION, SOLUTION INTRAMUSCULAR; INTRAVENOUS at 12:44

## 2020-09-15 RX ADMIN — MIDAZOLAM HYDROCHLORIDE 2 MG: 1 INJECTION, SOLUTION INTRAMUSCULAR; INTRAVENOUS at 08:10

## 2020-09-15 RX ADMIN — SODIUM CHLORIDE: 9 INJECTION, SOLUTION INTRAVENOUS at 07:33

## 2020-09-15 RX ADMIN — ALBUMIN (HUMAN) 250 ML: 12.5 INJECTION, SOLUTION INTRAVENOUS at 11:45

## 2020-09-15 RX ADMIN — ONDANSETRON 4 MG: 2 INJECTION, SOLUTION INTRAMUSCULAR; INTRAVENOUS at 13:13

## 2020-09-15 RX ADMIN — PHENYLEPHRINE HYDROCHLORIDE 50 MCG: 10 INJECTION INTRAVENOUS at 12:11

## 2020-09-15 RX ADMIN — Medication 0.2 MG: at 09:22

## 2020-09-15 RX ADMIN — INSULIN LISPRO 3 UNITS: 100 INJECTION, SOLUTION INTRAVENOUS; SUBCUTANEOUS at 17:15

## 2020-09-15 RX ADMIN — HEPARIN SODIUM 1000 UNITS: 1000 INJECTION INTRAVENOUS; SUBCUTANEOUS at 11:21

## 2020-09-15 RX ADMIN — FENTANYL CITRATE 25 MCG: 50 INJECTION, SOLUTION INTRAMUSCULAR; INTRAVENOUS at 09:48

## 2020-09-15 RX ADMIN — PHENYLEPHRINE HYDROCHLORIDE 200 MCG: 10 INJECTION INTRAVENOUS at 11:08

## 2020-09-15 RX ADMIN — Medication 5 MG: at 13:26

## 2020-09-15 RX ADMIN — ROCURONIUM BROMIDE 10 MG: 10 INJECTION INTRAVENOUS at 12:06

## 2020-09-15 RX ADMIN — PHENYLEPHRINE HYDROCHLORIDE 50 MCG: 10 INJECTION INTRAVENOUS at 08:52

## 2020-09-15 RX ADMIN — PHENYLEPHRINE HYDROCHLORIDE 25 MCG: 10 INJECTION INTRAVENOUS at 10:34

## 2020-09-15 RX ADMIN — FENTANYL CITRATE 25 MCG: 50 INJECTION, SOLUTION INTRAMUSCULAR; INTRAVENOUS at 10:09

## 2020-09-15 RX ADMIN — ENOXAPARIN SODIUM 40 MG: 40 INJECTION SUBCUTANEOUS at 15:00

## 2020-09-15 RX ADMIN — PHENYLEPHRINE HYDROCHLORIDE 50 MCG: 10 INJECTION INTRAVENOUS at 09:39

## 2020-09-15 RX ADMIN — CEFAZOLIN 2 G: 10 INJECTION, POWDER, FOR SOLUTION INTRAVENOUS at 08:34

## 2020-09-15 RX ADMIN — PHENYLEPHRINE HYDROCHLORIDE 25 MCG: 10 INJECTION INTRAVENOUS at 11:31

## 2020-09-15 RX ADMIN — FENTANYL CITRATE 25 MCG: 50 INJECTION, SOLUTION INTRAMUSCULAR; INTRAVENOUS at 13:00

## 2020-09-15 RX ADMIN — FENTANYL CITRATE 50 MCG: 50 INJECTION, SOLUTION INTRAMUSCULAR; INTRAVENOUS at 08:44

## 2020-09-15 RX ADMIN — PROPOFOL 150 MG: 10 INJECTION, EMULSION INTRAVENOUS at 08:12

## 2020-09-15 RX ADMIN — ROCURONIUM BROMIDE 50 MG: 10 INJECTION INTRAVENOUS at 08:13

## 2020-09-15 RX ADMIN — PHENYLEPHRINE HYDROCHLORIDE 50 MCG: 10 INJECTION INTRAVENOUS at 08:34

## 2020-09-15 RX ADMIN — Medication: at 14:47

## 2020-09-15 RX ADMIN — PHENYLEPHRINE HYDROCHLORIDE 25 MCG: 10 INJECTION INTRAVENOUS at 11:37

## 2020-09-15 RX ADMIN — SODIUM CHLORIDE, POTASSIUM CHLORIDE, SODIUM LACTATE AND CALCIUM CHLORIDE: 600; 310; 30; 20 INJECTION, SOLUTION INTRAVENOUS at 14:02

## 2020-09-15 RX ADMIN — FENTANYL CITRATE 100 MCG: 50 INJECTION, SOLUTION INTRAMUSCULAR; INTRAVENOUS at 08:12

## 2020-09-15 RX ADMIN — KETOROLAC TROMETHAMINE 15 MG: 15 INJECTION, SOLUTION INTRAMUSCULAR; INTRAVENOUS at 21:47

## 2020-09-15 RX ADMIN — ASPIRIN 81 MG: 81 TABLET, CHEWABLE ORAL at 12:36

## 2020-09-15 RX ADMIN — Medication 5 MG: at 09:17

## 2020-09-15 RX ADMIN — Medication 0.4 MG: at 13:13

## 2020-09-15 RX ADMIN — PHENYLEPHRINE HYDROCHLORIDE 50 MCG: 10 INJECTION INTRAVENOUS at 08:40

## 2020-09-15 ASSESSMENT — PULMONARY FUNCTION TESTS
PIF_VALUE: 18
PIF_VALUE: 17
PIF_VALUE: 16
PIF_VALUE: 14
PIF_VALUE: 15
PIF_VALUE: 17
PIF_VALUE: 14
PIF_VALUE: 19
PIF_VALUE: 19
PIF_VALUE: 15
PIF_VALUE: 19
PIF_VALUE: 17
PIF_VALUE: 15
PIF_VALUE: 17
PIF_VALUE: 18
PIF_VALUE: 17
PIF_VALUE: 17
PIF_VALUE: 18
PIF_VALUE: 17
PIF_VALUE: 19
PIF_VALUE: 17
PIF_VALUE: 16
PIF_VALUE: 20
PIF_VALUE: 16
PIF_VALUE: 17
PIF_VALUE: 18
PIF_VALUE: 19
PIF_VALUE: 14
PIF_VALUE: 17
PIF_VALUE: 19
PIF_VALUE: 18
PIF_VALUE: 18
PIF_VALUE: 16
PIF_VALUE: 17
PIF_VALUE: 1
PIF_VALUE: 15
PIF_VALUE: 14
PIF_VALUE: 1
PIF_VALUE: 17
PIF_VALUE: 18
PIF_VALUE: 18
PIF_VALUE: 14
PIF_VALUE: 2
PIF_VALUE: 17
PIF_VALUE: 15
PIF_VALUE: 17
PIF_VALUE: 15
PIF_VALUE: 17
PIF_VALUE: 21
PIF_VALUE: 18
PIF_VALUE: 16
PIF_VALUE: 19
PIF_VALUE: 16
PIF_VALUE: 18
PIF_VALUE: 18
PIF_VALUE: 19
PIF_VALUE: 14
PIF_VALUE: 17
PIF_VALUE: 18
PIF_VALUE: 14
PIF_VALUE: 15
PIF_VALUE: 15
PIF_VALUE: 16
PIF_VALUE: 18
PIF_VALUE: 19
PIF_VALUE: 18
PIF_VALUE: 3
PIF_VALUE: 17
PIF_VALUE: 14
PIF_VALUE: 14
PIF_VALUE: 19
PIF_VALUE: 16
PIF_VALUE: 19
PIF_VALUE: 15
PIF_VALUE: 17
PIF_VALUE: 19
PIF_VALUE: 15
PIF_VALUE: 17
PIF_VALUE: 14
PIF_VALUE: 18
PIF_VALUE: 14
PIF_VALUE: 15
PIF_VALUE: 15
PIF_VALUE: 19
PIF_VALUE: 18
PIF_VALUE: 0
PIF_VALUE: 16
PIF_VALUE: 16
PIF_VALUE: 18
PIF_VALUE: 19
PIF_VALUE: 18
PIF_VALUE: 19
PIF_VALUE: 15
PIF_VALUE: 16
PIF_VALUE: 15
PIF_VALUE: 16
PIF_VALUE: 19
PIF_VALUE: 17
PIF_VALUE: 14
PIF_VALUE: 16
PIF_VALUE: 18
PIF_VALUE: 18
PIF_VALUE: 14
PIF_VALUE: 17
PIF_VALUE: 16
PIF_VALUE: 19
PIF_VALUE: 19
PIF_VALUE: 17
PIF_VALUE: 17
PIF_VALUE: 14
PIF_VALUE: 15
PIF_VALUE: 18
PIF_VALUE: 14
PIF_VALUE: 17
PIF_VALUE: 19
PIF_VALUE: 17
PIF_VALUE: 17
PIF_VALUE: 18
PIF_VALUE: 17
PIF_VALUE: 14
PIF_VALUE: 18
PIF_VALUE: 15
PIF_VALUE: 19
PIF_VALUE: 19
PIF_VALUE: 17
PIF_VALUE: 16
PIF_VALUE: 14
PIF_VALUE: 17
PIF_VALUE: 19
PIF_VALUE: 17
PIF_VALUE: 17
PIF_VALUE: 16
PIF_VALUE: 17
PIF_VALUE: 19
PIF_VALUE: 18
PIF_VALUE: 14
PIF_VALUE: 16
PIF_VALUE: 18
PIF_VALUE: 6
PIF_VALUE: 14
PIF_VALUE: 17
PIF_VALUE: 16
PIF_VALUE: 2
PIF_VALUE: 17
PIF_VALUE: 17
PIF_VALUE: 19
PIF_VALUE: 17
PIF_VALUE: 18
PIF_VALUE: 17
PIF_VALUE: 16
PIF_VALUE: 14
PIF_VALUE: 17
PIF_VALUE: 14
PIF_VALUE: 14
PIF_VALUE: 17
PIF_VALUE: 18
PIF_VALUE: 16
PIF_VALUE: 19
PIF_VALUE: 19
PIF_VALUE: 14
PIF_VALUE: 17
PIF_VALUE: 19
PIF_VALUE: 17
PIF_VALUE: 14
PIF_VALUE: 14
PIF_VALUE: 18
PIF_VALUE: 17
PIF_VALUE: 26
PIF_VALUE: 18
PIF_VALUE: 18
PIF_VALUE: 19
PIF_VALUE: 17
PIF_VALUE: 17
PIF_VALUE: 16
PIF_VALUE: 17
PIF_VALUE: 16
PIF_VALUE: 17
PIF_VALUE: 18
PIF_VALUE: 16
PIF_VALUE: 14
PIF_VALUE: 19
PIF_VALUE: 15
PIF_VALUE: 16
PIF_VALUE: 1
PIF_VALUE: 16
PIF_VALUE: 14
PIF_VALUE: 15
PIF_VALUE: 8
PIF_VALUE: 17
PIF_VALUE: 17
PIF_VALUE: 2
PIF_VALUE: 16
PIF_VALUE: 13
PIF_VALUE: 19
PIF_VALUE: 17
PIF_VALUE: 16
PIF_VALUE: 14
PIF_VALUE: 19
PIF_VALUE: 19
PIF_VALUE: 17
PIF_VALUE: 0
PIF_VALUE: 19
PIF_VALUE: 16
PIF_VALUE: 18
PIF_VALUE: 15
PIF_VALUE: 18
PIF_VALUE: 17
PIF_VALUE: 19
PIF_VALUE: 6
PIF_VALUE: 16
PIF_VALUE: 18
PIF_VALUE: 22
PIF_VALUE: 18
PIF_VALUE: 16
PIF_VALUE: 17
PIF_VALUE: 17
PIF_VALUE: 3
PIF_VALUE: 15
PIF_VALUE: 14
PIF_VALUE: 16
PIF_VALUE: 14
PIF_VALUE: 18
PIF_VALUE: 19
PIF_VALUE: 19
PIF_VALUE: 18
PIF_VALUE: 17
PIF_VALUE: 19
PIF_VALUE: 16
PIF_VALUE: 16
PIF_VALUE: 0
PIF_VALUE: 9
PIF_VALUE: 18
PIF_VALUE: 17
PIF_VALUE: 17
PIF_VALUE: 16
PIF_VALUE: 14
PIF_VALUE: 18
PIF_VALUE: 19
PIF_VALUE: 14
PIF_VALUE: 18
PIF_VALUE: 15
PIF_VALUE: 17
PIF_VALUE: 17
PIF_VALUE: 16
PIF_VALUE: 18
PIF_VALUE: 18
PIF_VALUE: 19
PIF_VALUE: 18
PIF_VALUE: 17
PIF_VALUE: 18
PIF_VALUE: 19
PIF_VALUE: 17
PIF_VALUE: 18
PIF_VALUE: 19
PIF_VALUE: 14
PIF_VALUE: 16
PIF_VALUE: 16
PIF_VALUE: 17
PIF_VALUE: 16
PIF_VALUE: 14
PIF_VALUE: 16
PIF_VALUE: 19
PIF_VALUE: 19
PIF_VALUE: 0
PIF_VALUE: 17
PIF_VALUE: 17
PIF_VALUE: 18
PIF_VALUE: 18
PIF_VALUE: 17
PIF_VALUE: 19
PIF_VALUE: 14
PIF_VALUE: 19
PIF_VALUE: 14
PIF_VALUE: 17
PIF_VALUE: 15
PIF_VALUE: 19
PIF_VALUE: 19
PIF_VALUE: 14
PIF_VALUE: 18
PIF_VALUE: 15
PIF_VALUE: 14
PIF_VALUE: 17
PIF_VALUE: 18
PIF_VALUE: 15
PIF_VALUE: 16
PIF_VALUE: 15
PIF_VALUE: 1
PIF_VALUE: 16
PIF_VALUE: 17
PIF_VALUE: 14
PIF_VALUE: 17
PIF_VALUE: 14
PIF_VALUE: 14
PIF_VALUE: 18
PIF_VALUE: 19
PIF_VALUE: 14
PIF_VALUE: 17
PIF_VALUE: 16
PIF_VALUE: 19
PIF_VALUE: 16
PIF_VALUE: 17
PIF_VALUE: 15
PIF_VALUE: 15
PIF_VALUE: 19
PIF_VALUE: 17
PIF_VALUE: 18
PIF_VALUE: 17
PIF_VALUE: 17
PIF_VALUE: 15
PIF_VALUE: 16

## 2020-09-15 ASSESSMENT — PAIN DESCRIPTION - PAIN TYPE
TYPE: SURGICAL PAIN
TYPE: SURGICAL PAIN

## 2020-09-15 ASSESSMENT — ENCOUNTER SYMPTOMS
EYE DISCHARGE: 0
COLOR CHANGE: 0
CHEST TIGHTNESS: 0
ABDOMINAL DISTENTION: 0
SHORTNESS OF BREATH: 0

## 2020-09-15 ASSESSMENT — PAIN SCALES - GENERAL
PAINLEVEL_OUTOF10: 10
PAINLEVEL_OUTOF10: 7
PAINLEVEL_OUTOF10: 6

## 2020-09-15 ASSESSMENT — PAIN DESCRIPTION - PROGRESSION: CLINICAL_PROGRESSION: GRADUALLY IMPROVING

## 2020-09-15 ASSESSMENT — PAIN DESCRIPTION - LOCATION
LOCATION: ABDOMEN
LOCATION: ABDOMEN

## 2020-09-15 NOTE — PROGRESS NOTES
Patient admitted, consent signed and questions answered. Patient ready for procedure. Call light to reach with side rails up 2 of 2. Bilateral groin areaas clipped.  at bedside with patient. History and physical needs complete.

## 2020-09-15 NOTE — PROGRESS NOTES
Dr. Nahed Contreras at bedside. Updated on pts status including pain and nausea. Dr. Essence Ellis then notified by Dr. Nahed Contreras and dilauded PCA increased.  Will continue to monitor

## 2020-09-15 NOTE — ANESTHESIA POSTPROCEDURE EVALUATION
Department of Anesthesiology  Postprocedure Note    Patient: Lianne Rasmussen  MRN: [id number]  YOB: 1955  Date of evaluation: 9/15/2020  Time:  1:50 PM     Procedure Summary     Date:  09/15/20 Room / Location:  [address] [place] [address]    Anesthesia Start:  0805 Anesthesia Stop:  0221    Procedure:  AORTAL BIFEMORAL BYPASS  **CELLSAVER** (N/A ) Diagnosis:  (AORTAL ILIAC OCCLUSION)    Surgeon:  Marin Mcduffie MD Responsible Provider:  Vimal Bolden MD    Anesthesia Type:  general ASA Status:  3          Anesthesia Type: general    Evelyn Phase I: Evelyn Score: 10    Evelyn Phase II:      Last vitals: Reviewed and per EMR flowsheets.        Anesthesia Post Evaluation    Patient location during evaluation: ICU  Patient participation: complete - patient participated  Level of consciousness: awake and alert  Pain score: 3  Airway patency: patent  Nausea & Vomiting: no vomiting and no nausea  Complications: no  Cardiovascular status: hemodynamically stable  Respiratory status: acceptable  Hydration status: stable

## 2020-09-15 NOTE — PROGRESS NOTES
Pt to room from OR. Transported by OR team. VSS. Report received at bedside. Groins soft and pulses palpable.  Will continue to monitor

## 2020-09-15 NOTE — PROGRESS NOTES
Dr. Cullen Hutchison notified of pt crying out in pain even though on dilaudid PCA. Orders received to give tylenol and that she will be to bedside soon. Will continue to monitor.

## 2020-09-15 NOTE — PROGRESS NOTES
Paged Dr. Vira Chavez regarding goal SBP. Orders received to keep SBP below 160.  Will continue to monitor

## 2020-09-15 NOTE — ANESTHESIA PROCEDURE NOTES
Central Venous Line:    A central venous line was placed using surface landmarks, in the OR for the following indication(s): central venous access and CVP monitoring. Sterility preparation included the following: hand hygiene performed prior to procedure, maximum sterile barriers used and sterile technique used to drape from head to toe. The patient was placed in Trendelenburg position. The right internal jugular vein was prepped. The site was prepped with Chloraprep.double lumen was placed. During the procedure, the following specific steps were taken: target vein identified, needle advanced into vein and blood aspirated and guidewire advanced into vein. Intravenous verification was obtained by venous blood return. Post insertion care included: all ports aspirated, all ports flushed easily, guidewire removed intact, Biopatch applied, line sutured in place and dressing applied. During the procedure the patient experienced: patient tolerated procedure well with no complications.       Anesthesia type: general    Additional notes:     Staffing  Anesthesiologist: Daniella Gomez MD  Preanesthetic Checklist  Completed: patient identified, timeout performed and patient being monitored

## 2020-09-15 NOTE — OP NOTE
Operative Note      Patient: Klaus Castañeda  YOB: 1955  MRN: [id number]    Date of Procedure: 9/15/2020    Pre-Op Diagnosis: AORTAL ILIAC OCCLUSION, lifestyle limiting claudication with early signs of ischemic rest pain bilateral lower extremities    Post-Op Diagnosis: Same       Procedure: Aorto-Bifemoral bypass    Surgeon(s): Keara Albrecht MD    Assistant: Dr. Wanda Reyes    Anesthesia: General    Estimated Blood Loss (mL): 400 ml    Cell saver 250 ml    Urine output 685 ml    Complications: None    Specimens: Aortic and left femoral plaque          Implants:  Implant Name Type Inv. Item Serial No.  Lot No. LRB No. Used Action   GRAFT VASC AORT ABD BIF [id number] - [id number] Vascular/Graft/Patch/Filter GRAFT VASC AORT ABD BIF [id number] [facility] MEDICAL NU [id number] N/A 1 Implanted         Drains: none    Findings: Palpable DP bilaterally at completion of bypass. Detailed Description of Procedure:     Mrs. Melgar was brought in to the OR today for aortobifemoral bypass for aorto-iliac occlusive disease with early signs of ischemic rest pain. After appropriate general anesthesia was delivered and appropriate lines placed by the anesthesia team, the arms were padded and tucked by her sides. The abdomen and the bilateral lower extremities were prepped and draped in the standard sterile fashion. Timeout performed and agreed upon, antibiotics given during this period. I began by making a transverse incision just below the right inguinal ligament and then used electrocautery to get thru the subcutaneous tissue. The lymphatic fat pad was then mobilized lateral to medial, identifying the inguinal ligament and the femoral sheath. The femoral sheath was opened and the common femoral artery dissected sharply proximally and distally.   Dissection underneath the inguinal ligament was performed bluntly to begin the retroperitoneal tunnel over the external iliac artery. Vessel loops were placed over the common femoral artery to assist with control. Next a similar transverse incision was created just below the left inguinal ligament. Electrocautery used to get thru the subcutaneous tissue and the lymphatic fat pad mobilized lateral to medial.  The femoral sheath identified and opened and the common femoral artery dissected out proximally and distally. Working under the inguinal ligament the retroperitoneal tunnel was started over the external iliac artery. A midline laparotomy incision created and the fascia opened without any issues. Omentum was brought up cephalad and the small bowel wrapped in towel and mobilized medially. Kip Tyrese was set up to assist with retraction down to the retroperitoneum. The aorta was quite calcific and diseased. The duodenum was kocherized and moved out of the way. The retroperitoneum was entered and the infrarenal aorta was dissected out to the level of the left renal artery and vein. Soft clampable area was confirmed with manual palpation. The distal aorta and the iliac bifurcation was was also dissected out. Then using blunt dissection the right and left retroperitoneal tunnel was extended. The retroperitoneal access from each groin incision was then tunneled accordingly and umbilical tape passed into the abdomen. Patient was given 7000 units of heparin and allowed to circulate, she was bolused every 30-45 minutes during the bypass. Soft area in the mid aorta was found and clamp applied, followed by the infrarenal proximal aortic clamp. Aortotomy was then performed with an 11-blade and then extended with nath scissors. Aortic soft and calcific aortic plaque was endarterectomized, loose debris was also removed completely. A 14 x 7 mm bifurcated dacron graft was then cut to length and spatulated. An end to side anastomosis from the graft to the aorta was performed with 3-0 proline suture.   After completion of the anastomosis it was tested with heparinized saline and one repair stitch was performed. The graft was flushed out and the anastomosis was hemostatic. The area was packed with Nunet hemostatic agent. Each limb was then tunneled in the retroperitoneal space according to its side. The right common femoral artery was clamped and an arteriotomy created and extended with nath. The right limb was then cut to length and spatulated. An end to side anastomosis was then performed from the limb to the common femoral artery using 5-0 proline, tacking down the heal and toe of the anastomosis individually. Prior to completion usual flushing maneuvers performed and the anastomosis was hemostatic. There was good multiphasic doppler signals distal to the anastomosis that diminished with compression of the limb. Next the left common femoral artery was clamped and arteriotomy created and extended with nath scissors. AN endarterectomy was performed to remove large plaque, good proximal and distal ends were visualized, there was good back bleeding as well. The left limb of the graft was then cut to length and spatulated. An end to side anastomosis was then performed from the limb to the common femoral artery using 5-0 proline, tacking down the heal and toe of the anastomosis individually. Prior to completion usual flushing maneuvers performed and the anastomosis was hemostatic. There was good multiphasic doppler signals distal to the anastomosis that diminished with compression of the limb. There were also palpable pulses in the feet at this time. The proximal anastomosis was re-evaluated and was hemostatic, old Nunet was removed and a new piece was draped over the anastomosis. The retroperitoneum was re-approximated with 2-0 vicryl. The small bowel and omentum was placed back into the abdomen.   The fascia was closed with running looped PDS suture,  The abdominal incision was closed with interrupted vicryl and running monocryl. Each groin incision was then irrigated and dried, they were both hemostatic. Each was then closed with multiple layers of interrupted vicryl closing the femoral sheath, ty's and deep dermal.  The skin was re-approximated with running monocryl. Steristrips and sterile dressings applied along with an abdominal binder. Patient anesthesia was reversed and she was brought to her room for recovery, hemodynamically stable.         Electronically signed by Sal Palacios MD on 9/15/2020 at 2:17 PM

## 2020-09-15 NOTE — ANESTHESIA PRE PROCEDURE
Department of Anesthesiology  Preprocedure Note       Name:  Lillie Mari   Age:  71 y.o.  :  1955                                          MRN:  [id number]         Date:  9/15/2020      Surgeon: Javed Clements): Radha Lopez MD    Procedure: Procedure(s):  AORTAL BIFEMORAL BYPASS  **CELLSAVER**    Medications prior to admission:   Prior to Admission medications    Medication Sig Start Date End Date Taking? Authorizing Provider   oxyCODONE-acetaminophen (PERCOCET) 5-325 MG per tablet Take 1 tablet by mouth every 6 hours as needed for Pain for up to 14 days. Intended supply: 5 days. Take lowest dose possible to manage pain 20  BAKARI Antonio CNP   ASPIRIN LOW DOSE ADULT PO Take 81 mg by mouth    Historical Provider, MD   apixaban (ELIQUIS DVT/PE STARTER PACK) 5 MG TABS tablet Take 10 mg (2 tablets) orally twice daily for 7 days, then take 5 mg (1 tablet) orally twice daily thereafter. 20   Radha Lopez MD   brimonidine (ALPHAGAN) 0.2 % ophthalmic solution Apply 1 drop to eye 2 times daily 20   Historical Provider, MD   metFORMIN (GLUCOPHAGE) 500 MG tablet Take 1 tablet by mouth 2 times daily (with meals) 20   BAKARI Dalton CNP   latanoprost (XALATAN) 0.005 % ophthalmic solution  20   Historical Provider, MD   VORTIoxetine (TRINTELLIX) 10 MG TABS tablet Take 1 tablet by mouth 19   Historical Provider, MD   busPIRone (BUSPAR) 5 MG tablet Take 1 tablet by mouth 19   Historical Provider, MD       Current medications:    No current facility-administered medications for this encounter. Current Outpatient Medications   Medication Sig Dispense Refill    oxyCODONE-acetaminophen (PERCOCET) 5-325 MG per tablet Take 1 tablet by mouth every 6 hours as needed for Pain for up to 14 days. Intended supply: 5 days.  Take lowest dose possible to manage pain 60 tablet 0    ASPIRIN LOW DOSE ADULT PO Take 81 mg by mouth      apixaban (ELIQUIS DVT/PE STARTER PACK) 5 MG TABS tablet Take 10 mg (2 tablets) orally twice daily for 7 days, then take 5 mg (1 tablet) orally twice daily thereafter. 74 tablet 0    brimonidine (ALPHAGAN) 0.2 % ophthalmic solution Apply 1 drop to eye 2 times daily      metFORMIN (GLUCOPHAGE) 500 MG tablet Take 1 tablet by mouth 2 times daily (with meals) 60 tablet 5    latanoprost (XALATAN) 0.005 % ophthalmic solution       VORTIoxetine (TRINTELLIX) 10 MG TABS tablet Take 1 tablet by mouth      busPIRone (BUSPAR) 5 MG tablet Take 1 tablet by mouth         Allergies: Allergies   Allergen Reactions    Sulfamethoxazole     Sulfamethoxazole-Trimethoprim     Tetanus Toxoid     Trimethoprim        Problem List:    Patient Active Problem List   Diagnosis Code    Occlusion of aorta ([facility]) I74.10    [facility] E11.9    PAD (peripheral artery disease) ([facility]) I73.9    Claudication [facility] I73.9       Past Medical History:        Diagnosis Date    Anxiety     Depression     Diabetes mellitus [facility]     Glaucoma     Hearing loss     Migraines        Past Surgical History:        Procedure Laterality Date    APPENDECTOMY      BREAST SURGERY      \"crystals removed from right breast\"    CATARACT REMOVAL  2015    CHOLECYSTECTOMY      TONSILLECTOMY         Social History:    Social History     Tobacco Use    Smoking status: Current Every Day Smoker     Packs/day: 1.50     Years: 35.00     Pack years: 52.50    Smokeless tobacco: Never Used   Substance Use Topics    Alcohol use: Not Currently                                Ready to quit: Not Answered  Counseling given: Not Answered      Vital Signs (Current): There were no vitals filed for this visit.                                            BP Readings from Last 3 Encounters:   08/24/20 134/86   08/07/20 (!) 161/72   08/03/20 (!) 185/97       NPO Status: Time of last liquid consumption: 2200                        Time of last solid consumption: 2100                                                      BMI:   Wt Readings from Last 3 Encounters:   08/24/20 118 lb 3.2 oz (53.6 kg)   08/07/20 117 lb (53.1 kg)   08/03/20 116 lb (52.6 kg)     There is no height or weight on file to calculate BMI.    CBC:   Lab Results   Component Value Date    WBC 6.4 01/04/2020    RBC 4.77 01/04/2020    HGB 15.2 01/04/2020    HCT 44.6 01/04/2020    MCV 93.5 01/04/2020    RDW 12.9 01/04/2020     01/04/2020       CMP:   Lab Results   Component Value Date     01/04/2020    K 4.3 01/04/2020     01/04/2020    CO2 20 01/04/2020    BUN 23 01/04/2020    CREATININE 0.73 08/06/2020    GFRAA >60 08/06/2020    LABGLOM >60 08/06/2020    GLUCOSE 206 01/04/2020    PROT 7.0 01/04/2020    CALCIUM 9.2 01/04/2020    BILITOT 0.41 01/04/2020    ALKPHOS 95 01/04/2020    AST 12 01/04/2020    ALT 15 01/04/2020       POC Tests: No results for input(s): POCGLU, POCNA, POCK, POCCL, POCBUN, POCHEMO, POCHCT in the last 72 hours.     Coags: No results found for: PROTIME, INR, APTT    HCG (If Applicable): No results found for: PREGTESTUR, PREGSERUM, HCG, HCGQUANT     ABGs: No results found for: PHART, PO2ART, VQF0XJF, WUN8AMG, BEART, B0JTTQMU     Type & Screen (If Applicable):  No results found for: LABABO, LABRH    Drug/Infectious Status (If Applicable):  No results found for: HIV, HEPCAB    COVID-19 Screening (If Applicable):   Lab Results   Component Value Date    COVID19 Not Detected 09/11/2020         Anesthesia Evaluation  Patient summary reviewed no history of anesthetic complications:   Airway: Mallampati: II  TM distance: >3 FB   Neck ROM: full  Mouth opening: > = 3 FB Dental:    (+) edentulous      Pulmonary:Negative Pulmonary ROS and normal exam                               Cardiovascular:Negative CV ROS            Rhythm: regular  Rate: normal                    Neuro/Psych:   (+) headaches: migraine headaches, psychiatric history:depression/anxiety GI/Hepatic/Renal:             Endo/Other:    (+) DiabetesType II DM, no interval change, , .                 Abdominal:           Vascular: negative vascular ROS. Anesthesia Plan      general     ASA 3       Induction: intravenous. arterial line and central line  MIPS: Postoperative opioids intended and Postoperative trial extubation. Anesthetic plan and risks discussed with patient. Use of blood products discussed with patient whom consented to blood products. Plan discussed with CRNA.                   Melissa Krueger MD   9/15/2020

## 2020-09-15 NOTE — H&P
Division of Vascular Surgery        H&P     Severe PAD      Chief Complaint:      none    History of Present Illness:      Roger Martinez is a 71 y.o. woman who presents to the hospital for  Aortobifem bypass surgery. Has a history of smoking and PAD and  was evaluated  in the office for progressive leg pain, right greater then left. Occurs when she walks and has been going on for the past two years. The last few months it has progressed severely. CTA of abdominal aorta was obtained and  showed severe aortoiliac atherosclerotic disease. Denies numbness/tinglng, pain  to her feet at this time , peripheral pulse per doppler present but weak    Medical History:     Past Medical History:   Diagnosis Date    Anxiety     Depression     [facility]     Glaucoma     Hearing loss     Migraines     PVD (peripheral vascular disease) ([facility]        Surgical History:     Past Surgical History:   Procedure Laterality Date    AORTO-FEMORAL BYPASS GRAFT  09/15/2020    APPENDECTOMY      BREAST SURGERY      \"crystals removed from right breast\"    CATARACT REMOVAL  2015    CHOLECYSTECTOMY      TONSILLECTOMY         Family History:     Family History   Problem Relation Age of Onset   Luisitoetta Ni Cancer Father     Cancer Sister        Allergies:       Sulfamethoxazole; Sulfamethoxazole-trimethoprim; Tetanus toxoid; and Trimethoprim    Medications:      Current Facility-Administered Medications   Medication Dose Route Frequency Provider Last Rate Last Dose    0.9 % sodium chloride infusion   Intravenous Continuous Nnamdi Garcia MD           Social History:     Tobacco:    reports that she has been smoking. She has a 52.50 pack-year smoking history. She has never used smokeless tobacco.  Alcohol:      reports previous alcohol use. Drug Use:  reports no history of drug use. Review of Systems:     Review of Systems   Constitutional: Negative for chills, fatigue and fever.    HENT: Negative for postop      Electronically signed by BAKARI Miller NP on 9/15/20 at 7:17 AM EDT      [address] [facility]  Office: [phone]  Cell: [phone]  Email: [email or web address]

## 2020-09-15 NOTE — CARE COORDINATION
Case Management Initial Discharge Plan  Angelita Bonilla,             Met with:spouse/SO to discuss discharge plans. Information verified: address, contacts, phone number, , insurance Yes    Emergency Contact/Next of Kin name & number: Ruba Forte () [phone]    PCP: Johnna Fournier, APRN - CNP  Date of last visit:     Insurance Provider: Medicare, [facility]    Discharge Planning    Living Arrangements:  Spouse/Significant Other   Support Systems:  Spouse/Significant Other    Home has 2 stories  5 stairs to climb to get into front door, 1 flight stairs to climb to reach second floor  Location of bedroom/bathroom in home main    Patient able to perform ADL's:Independent    Current Services (outpatient & in home) none  DME equipment: rollator  DME provider:     Receiving oral anticoagulation therapy? Yes Eliquis    If indicated:   Physician managing anticoagulation treatment:   Where does patient obtain lab work for ATC treatment? Potential Assistance Needed:  N/A    Patient agreeable to home care: No  [place] of choice provided:  no    Prior SNF/Rehab Placement and Facility: no  Agreeable to SNF/Rehab: No  [place] of choice provided: no     Evaluation: no    Expected Discharge date:       Patient expects to be discharged to:  home  Follow Up Appointment: Best Day/ Time:      Transportation provider: family  Transportation arrangements needed for discharge: No    Readmission Risk              Risk of Unplanned Readmission:        6             Does patient have a readmission risk score greater than 14?: No  If yes, follow-up appointment must be made within 7 days of discharge.      Goals of Care: increased comfort level      Discharge Plan: home with spouse          Electronically signed by Fatuma North RN on 9/15/20 at 3:33 PM EDT

## 2020-09-16 LAB
ANION GAP SERPL CALCULATED.3IONS-SCNC: 11 MMOL/L (ref 9–17)
BUN BLDV-MCNC: 16 MG/DL (ref 8–23)
BUN/CREAT BLD: ABNORMAL (ref 9–20)
CALCIUM SERPL-MCNC: 8.6 MG/DL (ref 8.6–10.4)
CHLORIDE BLD-SCNC: 108 MMOL/L (ref 98–107)
CO2: 20 MMOL/L (ref 20–31)
CREAT SERPL-MCNC: 1.05 MG/DL (ref 0.5–0.9)
CULTURE: NO GROWTH
GFR AFRICAN AMERICAN: >60 ML/MIN
GFR NON-AFRICAN AMERICAN: 53 ML/MIN
GFR SERPL CREATININE-BSD FRML MDRD: ABNORMAL ML/MIN/{1.73_M2}
GFR SERPL CREATININE-BSD FRML MDRD: ABNORMAL ML/MIN/{1.73_M2}
GLUCOSE BLD-MCNC: 128 MG/DL (ref 65–105)
GLUCOSE BLD-MCNC: 156 MG/DL (ref 65–105)
GLUCOSE BLD-MCNC: 161 MG/DL (ref 70–99)
HCT VFR BLD CALC: 38 % (ref 36.3–47.1)
HEMOGLOBIN: 12.1 G/DL (ref 11.9–15.1)
Lab: NORMAL
MCH RBC QN AUTO: 32.7 PG (ref 25.2–33.5)
MCHC RBC AUTO-ENTMCNC: 31.8 G/DL (ref 28.4–34.8)
MCV RBC AUTO: 102.7 FL (ref 82.6–102.9)
NRBC AUTOMATED: 0 PER 100 WBC
PDW BLD-RTO: 13.3 % (ref 11.8–14.4)
PLATELET # BLD: 218 K/UL (ref 138–453)
PMV BLD AUTO: 10.2 FL (ref 8.1–13.5)
POTASSIUM SERPL-SCNC: 4 MMOL/L (ref 3.7–5.3)
RBC # BLD: 3.7 M/UL (ref 3.95–5.11)
SODIUM BLD-SCNC: 139 MMOL/L (ref 135–144)
SPECIMEN DESCRIPTION: NORMAL
WBC # BLD: 13.7 K/UL (ref 3.5–11.3)

## 2020-09-16 PROCEDURE — 6360000002 HC RX W HCPCS: Performed by: SURGERY

## 2020-09-16 PROCEDURE — 2060000000 HC ICU INTERMEDIATE R&B

## 2020-09-16 PROCEDURE — 82947 ASSAY GLUCOSE BLOOD QUANT: CPT

## 2020-09-16 PROCEDURE — 80048 BASIC METABOLIC PNL TOTAL CA: CPT

## 2020-09-16 PROCEDURE — 2580000003 HC RX 258: Performed by: STUDENT IN AN ORGANIZED HEALTH CARE EDUCATION/TRAINING PROGRAM

## 2020-09-16 PROCEDURE — 2500000003 HC RX 250 WO HCPCS: Performed by: STUDENT IN AN ORGANIZED HEALTH CARE EDUCATION/TRAINING PROGRAM

## 2020-09-16 PROCEDURE — 85027 COMPLETE CBC AUTOMATED: CPT

## 2020-09-16 PROCEDURE — 2580000003 HC RX 258: Performed by: SURGERY

## 2020-09-16 PROCEDURE — 94770 HC ETCO2 MONITOR DAILY: CPT

## 2020-09-16 PROCEDURE — 6370000000 HC RX 637 (ALT 250 FOR IP): Performed by: STUDENT IN AN ORGANIZED HEALTH CARE EDUCATION/TRAINING PROGRAM

## 2020-09-16 PROCEDURE — 36415 COLL VENOUS BLD VENIPUNCTURE: CPT

## 2020-09-16 PROCEDURE — 6360000002 HC RX W HCPCS: Performed by: STUDENT IN AN ORGANIZED HEALTH CARE EDUCATION/TRAINING PROGRAM

## 2020-09-16 PROCEDURE — 94761 N-INVAS EAR/PLS OXIMETRY MLT: CPT

## 2020-09-16 RX ORDER — OXYCODONE HYDROCHLORIDE 5 MG/1
5 TABLET ORAL EVERY 6 HOURS PRN
Status: DISCONTINUED | OUTPATIENT
Start: 2020-09-16 | End: 2020-09-16

## 2020-09-16 RX ORDER — NALOXONE HYDROCHLORIDE 0.4 MG/ML
0.4 INJECTION, SOLUTION INTRAMUSCULAR; INTRAVENOUS; SUBCUTANEOUS PRN
Status: DISCONTINUED | OUTPATIENT
Start: 2020-09-16 | End: 2020-09-17

## 2020-09-16 RX ORDER — METOPROLOL TARTRATE 5 MG/5ML
5 INJECTION INTRAVENOUS EVERY 6 HOURS
Status: DISCONTINUED | OUTPATIENT
Start: 2020-09-16 | End: 2020-09-16

## 2020-09-16 RX ORDER — METOPROLOL TARTRATE 5 MG/5ML
5 INJECTION INTRAVENOUS ONCE
Status: DISCONTINUED | OUTPATIENT
Start: 2020-09-16 | End: 2020-09-21 | Stop reason: HOSPADM

## 2020-09-16 RX ORDER — GABAPENTIN 100 MG/1
100 CAPSULE ORAL 3 TIMES DAILY
Status: DISCONTINUED | OUTPATIENT
Start: 2020-09-16 | End: 2020-09-16

## 2020-09-16 RX ORDER — METOPROLOL TARTRATE 5 MG/5ML
5 INJECTION INTRAVENOUS EVERY 6 HOURS PRN
Status: DISCONTINUED | OUTPATIENT
Start: 2020-09-16 | End: 2020-09-21 | Stop reason: HOSPADM

## 2020-09-16 RX ORDER — ACETAMINOPHEN 500 MG
1000 TABLET ORAL EVERY 8 HOURS SCHEDULED
Status: DISCONTINUED | OUTPATIENT
Start: 2020-09-16 | End: 2020-09-21 | Stop reason: HOSPADM

## 2020-09-16 RX ORDER — SODIUM CHLORIDE 9 MG/ML
INJECTION, SOLUTION INTRAVENOUS CONTINUOUS
Status: DISCONTINUED | OUTPATIENT
Start: 2020-09-16 | End: 2020-09-18

## 2020-09-16 RX ADMIN — SODIUM CHLORIDE, PRESERVATIVE FREE 10 ML: 5 INJECTION INTRAVENOUS at 19:42

## 2020-09-16 RX ADMIN — Medication 200 MCG: at 12:01

## 2020-09-16 RX ADMIN — KETOROLAC TROMETHAMINE 15 MG: 15 INJECTION, SOLUTION INTRAMUSCULAR; INTRAVENOUS at 21:42

## 2020-09-16 RX ADMIN — KETOROLAC TROMETHAMINE 15 MG: 15 INJECTION, SOLUTION INTRAMUSCULAR; INTRAVENOUS at 17:48

## 2020-09-16 RX ADMIN — CEFAZOLIN 2 G: 10 INJECTION, POWDER, FOR SOLUTION INTRAVENOUS at 08:02

## 2020-09-16 RX ADMIN — KETOROLAC TROMETHAMINE 15 MG: 15 INJECTION, SOLUTION INTRAMUSCULAR; INTRAVENOUS at 08:02

## 2020-09-16 RX ADMIN — ENOXAPARIN SODIUM 40 MG: 40 INJECTION SUBCUTANEOUS at 08:02

## 2020-09-16 RX ADMIN — Medication: at 17:48

## 2020-09-16 RX ADMIN — KETOROLAC TROMETHAMINE 15 MG: 15 INJECTION, SOLUTION INTRAMUSCULAR; INTRAVENOUS at 03:15

## 2020-09-16 RX ADMIN — SODIUM CHLORIDE: 9 INJECTION, SOLUTION INTRAVENOUS at 19:34

## 2020-09-16 RX ADMIN — CEFAZOLIN 2 G: 10 INJECTION, POWDER, FOR SOLUTION INTRAVENOUS at 01:13

## 2020-09-16 RX ADMIN — ACETAMINOPHEN 1000 MG: 500 TABLET ORAL at 14:55

## 2020-09-16 RX ADMIN — SODIUM CHLORIDE: 9 INJECTION, SOLUTION INTRAVENOUS at 17:05

## 2020-09-16 RX ADMIN — ACETAMINOPHEN 1000 MG: 500 TABLET ORAL at 21:47

## 2020-09-16 RX ADMIN — ASPIRIN 81 MG: 81 TABLET, CHEWABLE ORAL at 08:02

## 2020-09-16 RX ADMIN — METOPROLOL TARTRATE 5 MG: 1 INJECTION, SOLUTION INTRAVENOUS at 14:55

## 2020-09-16 ASSESSMENT — PAIN DESCRIPTION - ONSET: ONSET: ON-GOING

## 2020-09-16 ASSESSMENT — PAIN SCALES - GENERAL
PAINLEVEL_OUTOF10: 6
PAINLEVEL_OUTOF10: 4
PAINLEVEL_OUTOF10: 5
PAINLEVEL_OUTOF10: 10
PAINLEVEL_OUTOF10: 10
PAINLEVEL_OUTOF10: 7
PAINLEVEL_OUTOF10: 4
PAINLEVEL_OUTOF10: 6

## 2020-09-16 ASSESSMENT — PAIN DESCRIPTION - DESCRIPTORS: DESCRIPTORS: BURNING

## 2020-09-16 ASSESSMENT — PAIN DESCRIPTION - FREQUENCY: FREQUENCY: CONTINUOUS

## 2020-09-16 ASSESSMENT — PAIN DESCRIPTION - PROGRESSION: CLINICAL_PROGRESSION: NOT CHANGED

## 2020-09-16 ASSESSMENT — PAIN - FUNCTIONAL ASSESSMENT: PAIN_FUNCTIONAL_ASSESSMENT: ACTIVITIES ARE NOT PREVENTED

## 2020-09-16 ASSESSMENT — PAIN DESCRIPTION - LOCATION: LOCATION: ABDOMEN;INCISION

## 2020-09-16 ASSESSMENT — PAIN DESCRIPTION - PAIN TYPE: TYPE: SURGICAL PAIN

## 2020-09-16 NOTE — PROGRESS NOTES
Dr. Ivette No at bedside rounded on patient orders ok for patient to get up into chair. Ok to take out a-line if patient doesn't need it. Keep central line another day.  Will continue to monitor    Johnnie Adams RN

## 2020-09-16 NOTE — PROGRESS NOTES
Post Op Note    SUBJECTIVE  Pt s/p aorto-bifemoral bypass. Pain better controlled with increase in dilaudid PCA. Nausea improving. OBJECTIVE  VITALS:  BP (!) 153/74   Pulse 81   Temp 97.5 °F (36.4 °C) (Axillary)   Resp 23   Ht 5' (1.524 m)   Wt 116 lb (52.6 kg)   SpO2 95%   BMI 22.65 kg/m²         GENERAL:  awake and alert. No acute distress  CARDIOVASCULAR:  regular rate and rhythm   LUNGS:  Equal bilateral chest rise, good air flow  ABDOMEN:   Abdomen soft, appropriately tender, non-distended  INCISION: Dressings clean/dry/intact  Extremities: Palpable DP bilateral, PT doppler signals bilateral    ASSESSMENT  1. POD# 0 s/p aorto-bifemoral bypass    PLAN  1. Toradol, Dilaudid PCA for pain control  2. Clevidipine drip for BP control, SBP <160  3. NPO  4. LR @125  5.  ASA 81mg daily

## 2020-09-16 NOTE — PROGRESS NOTES
Division of Vascular Surgery         Progress Note      Name: Waldo Gallardo  MRN: [id number]         Overnight Events:      No acute events    Hospital Summary      9/15: Admitted to hospital s/p aortobifemoral bypass. Subjective:     Patient seen and chart reviewed.  No acute events overnight.  Patient is n.p.o. in preparation for surgery, and he understands plans for today.  Afebrile overnight, remains in A. fib, rate controlled, normotensive urine output.  Adequate.  Hemoglobin 10.2 this morning.  Remains on heparin drip. Physical Exam:     Vitals:  BP (!) 141/58   Pulse 92   Temp 97.5 °F (36.4 °C) (Axillary)   Resp 23   Ht 5' (1.524 m)   Wt 116 lb (52.6 kg)   SpO2 98%   BMI 22.65 kg/m²     General: No acute distress. A&Ox3. HEENT:  NC/AT. Conjunctiva moist without icterus. Ears are symmetric. Nares are patent. Oral mucus membranes are moist.  Neck:  Supple. No LAD. Cardiovascular:  Regular rate and rhythm. Warm, well perfused. Respiratory:  Equal chest rise bilaterally. No wheezes, stridor, or increased work of breathing. Abdomen:  Soft, mildly tender to palpation near incision. Dressings c/d/i. Abdominal binder in place. Neuro: Motor and sensory grossly intact. Extremities:  Warm, dry, and well perfused. Palpable upper and lower extremities bilaterally. Bilateral palpable pedal pulses. Skin:  No rashes or lesions.     Imaging/Labs:     Lab Results       Component                Value               Date                       WBC                      13.7                09/16/2020                 HGB                      12.1                09/16/2020                 HCT                      38.0                09/16/2020                 PLT                      218                 09/16/2020                 NA                       139                 09/16/2020                 K                        4.0                 09/16/2020                 CL                       108 09/16/2020                 CO2                      20                  09/16/2020                 BUN                      16                  09/16/2020                 LABALBU                  3.8                 01/04/2020                 CREATININE               1.05                09/16/2020                 CALCIUM                  8.6                 09/16/2020                 GFRAA                    >60                 09/16/2020                 LABGLOM                  53                  09/16/2020                Assessment/Plan: Aorto-iliac occlusive disease, s/p aortobifemoral bypass    -Pain and nausea control PRN. -D/C PCA. Tyl, joel, toradol, oxy, and dilaudid pushes available. -Continue ASA  -Start CLD, TKO if tolerating  -Monitor pulse and abdominal exam  -D/C Bolden and arterial line  -Encourage PT/OT, IS, OOB  -Low SSI      [address] [facility],[address]: [phone]  C: [phone]  Email: [email or web address]

## 2020-09-16 NOTE — PROGRESS NOTES
Confirmed with Dr. Nilton Key does he want dialudid pca d/c he said to hold off for now and keep it running.      Mounika Lopes RN

## 2020-09-17 LAB
GLUCOSE BLD-MCNC: 132 MG/DL (ref 65–105)
GLUCOSE BLD-MCNC: 98 MG/DL (ref 65–105)

## 2020-09-17 PROCEDURE — 6360000002 HC RX W HCPCS: Performed by: STUDENT IN AN ORGANIZED HEALTH CARE EDUCATION/TRAINING PROGRAM

## 2020-09-17 PROCEDURE — 2580000003 HC RX 258: Performed by: SURGERY

## 2020-09-17 PROCEDURE — 2060000000 HC ICU INTERMEDIATE R&B

## 2020-09-17 PROCEDURE — 94761 N-INVAS EAR/PLS OXIMETRY MLT: CPT

## 2020-09-17 PROCEDURE — 2700000000 HC OXYGEN THERAPY PER DAY

## 2020-09-17 PROCEDURE — 6360000002 HC RX W HCPCS: Performed by: SURGERY

## 2020-09-17 PROCEDURE — 94770 HC ETCO2 MONITOR DAILY: CPT

## 2020-09-17 PROCEDURE — 2580000003 HC RX 258: Performed by: STUDENT IN AN ORGANIZED HEALTH CARE EDUCATION/TRAINING PROGRAM

## 2020-09-17 PROCEDURE — 2500000003 HC RX 250 WO HCPCS: Performed by: SURGERY

## 2020-09-17 PROCEDURE — 82947 ASSAY GLUCOSE BLOOD QUANT: CPT

## 2020-09-17 PROCEDURE — 6370000000 HC RX 637 (ALT 250 FOR IP): Performed by: STUDENT IN AN ORGANIZED HEALTH CARE EDUCATION/TRAINING PROGRAM

## 2020-09-17 RX ORDER — DEXTROSE MONOHYDRATE 50 MG/ML
100 INJECTION, SOLUTION INTRAVENOUS PRN
Status: DISCONTINUED | OUTPATIENT
Start: 2020-09-17 | End: 2020-09-21 | Stop reason: HOSPADM

## 2020-09-17 RX ORDER — NICOTINE POLACRILEX 4 MG
15 LOZENGE BUCCAL PRN
Status: DISCONTINUED | OUTPATIENT
Start: 2020-09-17 | End: 2020-09-21 | Stop reason: HOSPADM

## 2020-09-17 RX ORDER — DEXTROSE MONOHYDRATE 25 G/50ML
12.5 INJECTION, SOLUTION INTRAVENOUS PRN
Status: DISCONTINUED | OUTPATIENT
Start: 2020-09-17 | End: 2020-09-21 | Stop reason: HOSPADM

## 2020-09-17 RX ADMIN — KETOROLAC TROMETHAMINE 15 MG: 15 INJECTION, SOLUTION INTRAMUSCULAR; INTRAVENOUS at 09:02

## 2020-09-17 RX ADMIN — ACETAMINOPHEN 1000 MG: 500 TABLET ORAL at 22:56

## 2020-09-17 RX ADMIN — ACETAMINOPHEN 1000 MG: 500 TABLET ORAL at 05:33

## 2020-09-17 RX ADMIN — METOPROLOL TARTRATE 5 MG: 1 INJECTION, SOLUTION INTRAVENOUS at 20:35

## 2020-09-17 RX ADMIN — SODIUM CHLORIDE, PRESERVATIVE FREE 10 ML: 5 INJECTION INTRAVENOUS at 22:57

## 2020-09-17 RX ADMIN — SODIUM CHLORIDE: 9 INJECTION, SOLUTION INTRAVENOUS at 05:31

## 2020-09-17 RX ADMIN — SODIUM CHLORIDE, PRESERVATIVE FREE 10 ML: 5 INJECTION INTRAVENOUS at 09:02

## 2020-09-17 RX ADMIN — ASPIRIN 81 MG: 81 TABLET, CHEWABLE ORAL at 09:02

## 2020-09-17 RX ADMIN — HYDROMORPHONE HYDROCHLORIDE 0.5 MG: 1 INJECTION, SOLUTION INTRAMUSCULAR; INTRAVENOUS; SUBCUTANEOUS at 18:40

## 2020-09-17 RX ADMIN — HYDROMORPHONE HYDROCHLORIDE 0.5 MG: 1 INJECTION, SOLUTION INTRAMUSCULAR; INTRAVENOUS; SUBCUTANEOUS at 23:05

## 2020-09-17 RX ADMIN — ACETAMINOPHEN 1000 MG: 500 TABLET ORAL at 14:29

## 2020-09-17 RX ADMIN — KETOROLAC TROMETHAMINE 15 MG: 15 INJECTION, SOLUTION INTRAMUSCULAR; INTRAVENOUS at 04:23

## 2020-09-17 RX ADMIN — METOPROLOL TARTRATE 5 MG: 1 INJECTION, SOLUTION INTRAVENOUS at 04:10

## 2020-09-17 RX ADMIN — ENOXAPARIN SODIUM 40 MG: 40 INJECTION SUBCUTANEOUS at 09:00

## 2020-09-17 ASSESSMENT — PAIN DESCRIPTION - PAIN TYPE
TYPE: SURGICAL PAIN
TYPE: SURGICAL PAIN

## 2020-09-17 ASSESSMENT — PAIN DESCRIPTION - LOCATION
LOCATION: ABDOMEN;INCISION
LOCATION: ABDOMEN;INCISION

## 2020-09-17 ASSESSMENT — PAIN SCALES - GENERAL
PAINLEVEL_OUTOF10: 8
PAINLEVEL_OUTOF10: 6
PAINLEVEL_OUTOF10: 8
PAINLEVEL_OUTOF10: 8
PAINLEVEL_OUTOF10: 9
PAINLEVEL_OUTOF10: 9
PAINLEVEL_OUTOF10: 8
PAINLEVEL_OUTOF10: 8

## 2020-09-17 NOTE — PROGRESS NOTES
Urinary yin catheter removed at this time. Patient up to chair with assist and notified due to void 6 hours from now. Call light within reach.

## 2020-09-17 NOTE — PROGRESS NOTES
Division of Vascular Surgery         Progress Note      Name: Becca Billy  MRN: [id number]         Overnight Events:      No acute events    Hospital Summary      9/15: Admitted to hospital s/p aortobifemoral bypass. Subjective:     Patient seen and chart reviewed.  No acute events overnight. Tolerating ice chips. Bolden and A line out. Denies leg pain. Complains of some abdominal pain but denies nausea or vomiting. Physical Exam:     Vitals:  BP (!) 155/70   Pulse 79   Temp 99 °F (37.2 °C) (Oral)   Resp 14   Ht 5' (1.524 m)   Wt 125 lb (56.7 kg)   SpO2 97%   BMI 24.41 kg/m²     General: No acute distress. A&Ox3. HEENT:  NC/AT. Conjunctiva moist without icterus. Ears are symmetric. Nares are patent. Oral mucus membranes are moist.  Neck:  Supple. No LAD. Cardiovascular:  Regular rate and rhythm. Warm, well perfused. Respiratory:  Equal chest rise bilaterally. No wheezes, stridor, or increased work of breathing. Abdomen:  Soft, mildly tender to palpation near incision. Dressings c/d/i. Abdominal binder in place. Neuro: Motor and sensory grossly intact. Extremities:  Warm, dry, and well perfused. Palpable upper and lower extremities bilaterally. Bilateral palpable pedal pulses. Skin:  No rashes or lesions.     Imaging/Labs:     Lab Results       Component                Value               Date                       WBC                      13.7                09/16/2020                 HGB                      12.1                09/16/2020                 HCT                      38.0                09/16/2020                 PLT                      218                 09/16/2020                 NA                       139                 09/16/2020                 K                        4.0                 09/16/2020                 CL                       108                 09/16/2020                 CO2                      20                  09/16/2020                 BUN 16                  09/16/2020                 LABALBU                  3.8                 01/04/2020                 CREATININE               1.05                09/16/2020                 CALCIUM                  8.6                 09/16/2020                 GFRAA                    >60                 09/16/2020                 LABGLOM                  53                  09/16/2020                Assessment/Plan: Aorto-iliac occlusive disease, s/p aortobifemoral bypass    -Pain and nausea control PRN. -D/C PCA and transition to oral and IV push meds. -Continue ASA  -Start CLD, TKO if tolerating  -Monitor pulse and abdominal exam  -Encourage PT/OT, IS, OOB  -Low SSI      [address] [place] [address],[address] [facility]: [phone]  C: [phone]  Email: [email or web address]

## 2020-09-17 NOTE — PLAN OF CARE
Problem: Falls - Risk of:  Goal: Will remain free from falls  Description: Will remain free from falls  Outcome: Ongoing  Goal: Absence of physical injury  Description: Absence of physical injury  Outcome: Ongoing  Note: Bed lock and in lowest position, side rails up x 2, room free from clutter, call light within reach  and bed alarm on. Patient remains free from falls and physical injury. Problem: Skin Integrity:  Goal: Will show no infection signs and symptoms  Description: Will show no infection signs and symptoms  Outcome: Ongoing  Note: Patient afebrile. Goal: Absence of new skin breakdown  Description: Absence of new skin breakdown  Outcome: Ongoing  Note: Patient able to turn per self, skin assessment complete and no new signs of skin breakdown. Problem: Pain:  Goal: Pain level will decrease  Description: Pain level will decrease  Outcome: Ongoing  Note: Patient does complain of surgical site pain and on schedule pain medication and dilaudid PCA. Writer review PCA and dose ordered. Call light within reach. Goal: Control of acute pain  Description: Control of acute pain  Outcome: Ongoing  Note: Patient resting with eye close and no signs of distress.

## 2020-09-17 NOTE — PLAN OF CARE
Problem: Falls - Risk of:  Goal: Will remain free from falls  Description: Will remain free from falls  9/17/2020 1719 by Ana Laura Salas RN  Outcome: Ongoing  9/17/2020 0442 by Stephany Dixon RN  Outcome: Ongoing  Goal: Absence of physical injury  Description: Absence of physical injury  9/17/2020 1719 by Ana Laura Salas RN  Outcome: Ongoing  9/17/2020 0442 by Stephany Dixon RN  Outcome: Ongoing  Note: Bed lock and in lowest position, side rails up x 2, room free from clutter, call light within reach  and bed alarm on. Patient remains free from falls and physical injury. Problem: Skin Integrity:  Goal: Will show no infection signs and symptoms  Description: Will show no infection signs and symptoms  9/17/2020 1719 by Ana Laura Salas RN  Outcome: Ongoing  9/17/2020 0442 by Stephany Dixon RN  Outcome: Ongoing  Note: Patient afebrile. Goal: Absence of new skin breakdown  Description: Absence of new skin breakdown  9/17/2020 1719 by Ana Laura Salas RN  Outcome: Ongoing  9/17/2020 0442 by Stephany Dixon RN  Outcome: Ongoing  Note: Patient able to turn per self, skin assessment complete and no new signs of skin breakdown. Problem: Pain:  Goal: Pain level will decrease  Description: Pain level will decrease  9/17/2020 1719 by Ana Laura Salas RN  Outcome: Ongoing  9/17/2020 0442 by Stephany Dixon RN  Outcome: Ongoing  Note: Patient does complain of surgical site pain and on schedule pain medication and dilaudid PCA. Writer review PCA and dose ordered. Call light within reach. Goal: Control of acute pain  Description: Control of acute pain  9/17/2020 1719 by Ana Laura Salas RN  Outcome: Ongoing  9/17/2020 0442 by Stephany Dixon RN  Outcome: Ongoing  Note: Patient resting with eye close and no signs of distress.    Goal: Control of chronic pain  Description: Control of chronic pain  Outcome: Ongoing

## 2020-09-18 LAB — GLUCOSE BLD-MCNC: 110 MG/DL (ref 65–105)

## 2020-09-18 PROCEDURE — 6370000000 HC RX 637 (ALT 250 FOR IP): Performed by: STUDENT IN AN ORGANIZED HEALTH CARE EDUCATION/TRAINING PROGRAM

## 2020-09-18 PROCEDURE — 6370000000 HC RX 637 (ALT 250 FOR IP): Performed by: SURGERY

## 2020-09-18 PROCEDURE — 2580000003 HC RX 258: Performed by: STUDENT IN AN ORGANIZED HEALTH CARE EDUCATION/TRAINING PROGRAM

## 2020-09-18 PROCEDURE — 1200000000 HC SEMI PRIVATE

## 2020-09-18 PROCEDURE — 6360000002 HC RX W HCPCS: Performed by: SURGERY

## 2020-09-18 PROCEDURE — 82947 ASSAY GLUCOSE BLOOD QUANT: CPT

## 2020-09-18 PROCEDURE — 6360000002 HC RX W HCPCS: Performed by: STUDENT IN AN ORGANIZED HEALTH CARE EDUCATION/TRAINING PROGRAM

## 2020-09-18 PROCEDURE — 2580000003 HC RX 258: Performed by: SURGERY

## 2020-09-18 PROCEDURE — 2500000003 HC RX 250 WO HCPCS: Performed by: SURGERY

## 2020-09-18 PROCEDURE — 97530 THERAPEUTIC ACTIVITIES: CPT

## 2020-09-18 PROCEDURE — 97162 PT EVAL MOD COMPLEX 30 MIN: CPT

## 2020-09-18 RX ORDER — LISINOPRIL 2.5 MG/1
2.5 TABLET ORAL DAILY
Status: DISCONTINUED | OUTPATIENT
Start: 2020-09-18 | End: 2020-09-21 | Stop reason: HOSPADM

## 2020-09-18 RX ORDER — GABAPENTIN 300 MG/1
300 CAPSULE ORAL 3 TIMES DAILY
Status: DISCONTINUED | OUTPATIENT
Start: 2020-09-18 | End: 2020-09-21 | Stop reason: HOSPADM

## 2020-09-18 RX ORDER — GABAPENTIN 300 MG/1
300 CAPSULE ORAL 3 TIMES DAILY
Status: DISCONTINUED | OUTPATIENT
Start: 2020-09-18 | End: 2020-09-18

## 2020-09-18 RX ADMIN — HYDROMORPHONE HYDROCHLORIDE 0.5 MG: 1 INJECTION, SOLUTION INTRAMUSCULAR; INTRAVENOUS; SUBCUTANEOUS at 02:11

## 2020-09-18 RX ADMIN — GABAPENTIN 300 MG: 300 CAPSULE ORAL at 14:08

## 2020-09-18 RX ADMIN — HYDROMORPHONE HYDROCHLORIDE 0.5 MG: 1 INJECTION, SOLUTION INTRAMUSCULAR; INTRAVENOUS; SUBCUTANEOUS at 05:39

## 2020-09-18 RX ADMIN — SODIUM CHLORIDE, PRESERVATIVE FREE 10 ML: 5 INJECTION INTRAVENOUS at 22:51

## 2020-09-18 RX ADMIN — ASPIRIN 81 MG: 81 TABLET, CHEWABLE ORAL at 08:57

## 2020-09-18 RX ADMIN — ENOXAPARIN SODIUM 40 MG: 40 INJECTION SUBCUTANEOUS at 08:57

## 2020-09-18 RX ADMIN — LISINOPRIL 2.5 MG: 2.5 TABLET ORAL at 12:38

## 2020-09-18 RX ADMIN — GABAPENTIN 300 MG: 300 CAPSULE ORAL at 06:02

## 2020-09-18 RX ADMIN — HYDROMORPHONE HYDROCHLORIDE 0.5 MG: 1 INJECTION, SOLUTION INTRAMUSCULAR; INTRAVENOUS; SUBCUTANEOUS at 09:01

## 2020-09-18 RX ADMIN — ACETAMINOPHEN 1000 MG: 500 TABLET ORAL at 05:38

## 2020-09-18 RX ADMIN — GABAPENTIN 300 MG: 300 CAPSULE ORAL at 08:57

## 2020-09-18 RX ADMIN — METOPROLOL TARTRATE 5 MG: 1 INJECTION, SOLUTION INTRAVENOUS at 06:57

## 2020-09-18 RX ADMIN — GABAPENTIN 300 MG: 300 CAPSULE ORAL at 20:16

## 2020-09-18 RX ADMIN — SODIUM CHLORIDE, PRESERVATIVE FREE 10 ML: 5 INJECTION INTRAVENOUS at 08:57

## 2020-09-18 RX ADMIN — SODIUM CHLORIDE: 9 INJECTION, SOLUTION INTRAVENOUS at 02:28

## 2020-09-18 RX ADMIN — ACETAMINOPHEN 1000 MG: 500 TABLET ORAL at 14:08

## 2020-09-18 ASSESSMENT — PAIN - FUNCTIONAL ASSESSMENT
PAIN_FUNCTIONAL_ASSESSMENT: ACTIVITIES ARE NOT PREVENTED
PAIN_FUNCTIONAL_ASSESSMENT: ACTIVITIES ARE NOT PREVENTED

## 2020-09-18 ASSESSMENT — PAIN DESCRIPTION - PROGRESSION
CLINICAL_PROGRESSION: NOT CHANGED
CLINICAL_PROGRESSION: GRADUALLY IMPROVING

## 2020-09-18 ASSESSMENT — PAIN DESCRIPTION - DESCRIPTORS
DESCRIPTORS: ACHING
DESCRIPTORS: ACHING;BURNING

## 2020-09-18 ASSESSMENT — PAIN DESCRIPTION - ORIENTATION
ORIENTATION: RIGHT;LOWER
ORIENTATION: RIGHT;LOWER

## 2020-09-18 ASSESSMENT — PAIN DESCRIPTION - FREQUENCY
FREQUENCY: CONTINUOUS
FREQUENCY: CONTINUOUS

## 2020-09-18 ASSESSMENT — PAIN DESCRIPTION - LOCATION
LOCATION: ABDOMEN;INCISION
LOCATION: ABDOMEN

## 2020-09-18 ASSESSMENT — PAIN DESCRIPTION - PAIN TYPE
TYPE: SURGICAL PAIN
TYPE: ACUTE PAIN;SURGICAL PAIN

## 2020-09-18 ASSESSMENT — PAIN SCALES - GENERAL
PAINLEVEL_OUTOF10: 4
PAINLEVEL_OUTOF10: 10
PAINLEVEL_OUTOF10: 3
PAINLEVEL_OUTOF10: 5
PAINLEVEL_OUTOF10: 10

## 2020-09-18 ASSESSMENT — PAIN DESCRIPTION - ONSET
ONSET: ON-GOING
ONSET: ON-GOING

## 2020-09-18 NOTE — CARE COORDINATION
TRANSITIONAL CARE PLANNING/ 2 [facility] Day:  3    Reason for Admission: Aortoiliac occlusive disease ([facility] [address] [I74.09]     Treatment Plan of Care:     Tests/Procedures still needed:     Barriers to Discharge: still with pain issues,  Iv dilaudid prn    Readmission Risk              Risk of Unplanned Readmission:        10            Patient goals/Treatment Preferences/Transitional Plan:     Referrals Made:     Follow Up needed:  Goal is home with .   Await PT/OT eval

## 2020-09-18 NOTE — PLAN OF CARE
Problem: Falls - Risk of:  Goal: Will remain free from falls  Description: Will remain free from falls  Outcome: Ongoing  Goal: Absence of physical injury  Description: Absence of physical injury  Outcome: Ongoing     Problem: Skin Integrity:  Goal: Will show no infection signs and symptoms  Description: Will show no infection signs and symptoms  Outcome: Ongoing  Goal: Absence of new skin breakdown  Description: Absence of new skin breakdown  Outcome: Ongoing     Problem: Pain:  Goal: Pain level will decrease  Description: Pain level will decrease  Outcome: Ongoing  Goal: Control of acute pain  Description: Control of acute pain  Outcome: Ongoing  Goal: Control of chronic pain  Description: Control of chronic pain  Outcome: Ongoing     Problem: Musculor/Skeletal Functional Status  Goal: Highest potential functional level  Outcome: Ongoing  Goal: Absence of falls  Outcome: Ongoing

## 2020-09-18 NOTE — PROGRESS NOTES
Division of Vascular Surgery         Progress Note      Name: Alondra Santos  MRN: [id number]         Overnight Events:      No acute events    Hospital Summary      9/15: Admitted to hospital s/p aortobifemoral bypass. Subjective:     Patient seen and chart reviewed. Hypertensive overnight. Added gabapentin due to uncontrolled pain with Dilaudid pushes. Tolerating liquids yesterday, denies nausea or vomiting. Hypertensive this evening. Physical Exam:     Vitals:  BP (!) 179/77   Pulse 88   Temp 98.8 °F (37.1 °C) (Oral)   Resp 19   Ht 5' (1.524 m)   Wt 125 lb (56.7 kg)   SpO2 94%   BMI 24.41 kg/m²     General: No acute distress. A&Ox3. HEENT:  NC/AT. Conjunctiva moist without icterus. Ears are symmetric. Nares are patent. Oral mucus membranes are moist.  Neck:  Supple. No LAD. Cardiovascular:  Regular rate and rhythm. Warm, well perfused. Respiratory:  Equal chest rise bilaterally. No wheezes, stridor, or increased work of breathing. Abdomen:  Soft, mildly tender to palpation near incision. Dressings c/d/i. Abdominal binder in place. Neuro: Motor and sensory grossly intact. Extremities:  Warm, dry, and well perfused. Palpable upper and lower extremities bilaterally. Bilateral palpable pedal pulses. Skin:  No rashes or lesions.     Imaging/Labs:     Lab Results       Component                Value               Date                       WBC                      13.7                09/16/2020                 HGB                      12.1                09/16/2020                 HCT                      38.0                09/16/2020                 PLT                      218                 09/16/2020                 NA                       139                 09/16/2020                 K                        4.0                 09/16/2020                 CL                       108                 09/16/2020                 CO2                      20 09/16/2020                 BUN                      16                  09/16/2020                 LABALBU                  3.8                 01/04/2020                 CREATININE               1.05                09/16/2020                 CALCIUM                  8.6                 09/16/2020                 GFRAA                    >60                 09/16/2020                 LABGLOM                  53                  09/16/2020                Assessment/Plan: Aorto-iliac occlusive disease, s/p aortobifemoral bypass    -Pain and nausea control PRN. -Continue ASA  -Start CLD, TKO if tolerating  -Monitor pulse and abdominal exam  -Encourage PT/OT, IS, OOB  -Low SSI      [address]: [phone]  C: [phone]  Email: [email or web address]

## 2020-09-18 NOTE — PROGRESS NOTES
Physical Therapy    Facility/Department: [facility]  Initial Assessment    NAME: Cornell Mckeon  : 1955  MRN: [id number]    Date of Service: 2020  Pt admitted for aorto-bifemoral bypass, done 9/15/20  Discharge Recommendations:  Home with assist PRN   PT Equipment Recommendations  Equipment Needed: No    Assessment    Pt cooperative, motivated  Prognosis: Good  Decision Making: Medium Complexity  PT Education: PT Role;Plan of Care  Barriers to Learning: none  No Skilled PT: Independent with functional mobility   REQUIRES PT FOLLOW UP: No  Activity Tolerance  Activity Tolerance: Patient Tolerated treatment well       Patient Diagnosis(es): There were no encounter diagnoses. has a past medical history of Anxiety, Depression, Diabetes mellitus ([facility] [address] Glaucoma, Hearing loss, Migraines, and PVD (peripheral vascular disease) ([facility] [address] has a past surgical history that includes Appendectomy; Cholecystectomy; Tonsillectomy; Breast surgery; Cataract removal (); Aorto-femoral Bypass Graft (09/15/2020); and Abdominal aortic aneurysm repair (N/A, 9/15/2020). Restrictions  Restrictions/Precautions  Restrictions/Precautions: General Precautions, Surgical Protocols, Fall Risk, Up as Tolerated(hypertensive over night)  Required Braces or Orthoses?: Yes  Required Braces or Orthoses  Other: Abdominal Binder  Vision/Hearing  Vision: Within Functional Limits  Hearing: Within functional limits     Subjective  General  Patient assessed for rehabilitation services?: Yes  Response To Previous Treatment: Not applicable  Family / Caregiver Present: No  Follows Commands: Within Functional Limits  Pain Screening  Patient Currently in Pain: Yes  Pain Assessment  Pain Assessment: 0-10  Pain Level: (3 at rest; 8 with mobility)  Patient's Stated Pain Goal: No pain  Pain Type: Surgical pain  Pain Location: Abdomen; Incision  Pain Orientation: Right; Lower  Pain Descriptors: Aching;Burning  Pain Frequency: Continuous  Pain Onset: On-going  Clinical Progression: Not changed  Functional Pain Assessment: Activities are not prevented  Vital Signs  BP upon PT arrival, pt reclined in chair: 157/83; upon sitting up: 174/91; after chair ex: 162/80; after ambulating in hallway and doing stairs: 168/78; RN notified  Pre Treatment Pain Screening  Intervention List: Patient able to continue with treatment    Orientation  Orientation  Overall Orientation Status: Within Normal Limits  Social/Functional History  Social/Functional History  Lives With: Spouse  Type of Home: House  Home Layout: Two level, Able to Live on Main level with bedroom/bathroom(full bath is on the second floor but she doesn't go up there; sponge bathes on the first floor)  Home Access: Stairs to enter without rails  Entrance Stairs - Number of Steps: 5  Home Equipment: 4 wheeled walker(uses mostly when outside, occasionally in the house)  Receives Help From: Family  ADL Assistance: Independent  Homemaking Assistance: Independent  Homemaking Responsibilities: Yes  Ambulation Assistance: Independent(uses rollator as needed)  Transfer Assistance: Independent  Active : Yes  Mode of Transportation: Family       Objective     Observation/Palpation  Posture: Good    AROM RLE (degrees)  RLE AROM: WFL  AROM LLE (degrees)  LLE AROM : WFL  AROM RUE (degrees)  RUE AROM : WFL  AROM LUE (degrees)  LUE AROM : WFL  Strength RLE  Strength RLE: WFL  Strength LLE  Strength LLE: WFL  Strength RUE  Strength RUE: WFL  Strength LUE  Strength LUE: WFL  Tone RLE  RLE Tone: Normotonic  Tone LLE  LLE Tone: Normotonic  Motor Control  Gross Motor?: WFL  Sensation  Overall Sensation Status: WFL  Bed mobility  Comment: pt up in chair upon PT arrival and retired to the chair after PT session  Transfers  Sit to Stand: Independent  Stand to sit:  Independent  Stand Pivot Transfers: Independent  Ambulation  Ambulation?: Yes  Ambulation 1  Surface: level tile  Device: No Device  Assistance: Independent  Gait Deviations: None  Distance: 200'x1  Stairs/Curb  Stairs?: Yes  Stairs  # Steps : 9  Rails: Left ascending  Device: No Device  Assistance: Supervision     Balance  Posture: Good  Sitting - Static: Good  Sitting - Dynamic: Good  Standing - Static: Good  Standing - Dynamic: Good      Exercises:  AROM x 4, 10 reps all planes    Plan   Plan  Times per week: DC PT--pt is independent  Safety Devices  Type of devices: Call light within reach, Gait belt, Patient at risk for falls, Left in chair, Nurse notified  Restraints  Initially in place: No           AM-PAC Score  AM-PAC Inpatient Mobility Raw Score : 21 (09/18/20 [facility])  AM-PAC Inpatient T-Scale Score : 50.25 (09/18/20 [facility])  Mobility Inpatient CMS 0-100% Score: 28.97 (09/18/20 [facility])  Mobility Inpatient CMS G-Code Modifier : Srinivasa Edwar (09/18/20 [facility])          Goals  Short term goals  Time Frame for Short term goals: 1 visit  Short term goal 1: evaluate pt and give recommendations: pt is independent; continued PT not necessary.   Patient Goals   Patient goals : return home with spouse       Therapy Time   Individual Concurrent Group Co-treatment   Time In 1115         Time Out 1145         Minutes 30                 Rukhsana Chu, [place]

## 2020-09-18 NOTE — FLOWSHEET NOTE
Assessment:  Patient was very sleepy not interested in Spiritual care services at this time. Intervention:  nurtured hope and informed the patient that Chaplains are available 24/7 if needed.  also left a card with spiritual care information on her table.      Outcome: Patient fell back asleep.        09/18/20 1134   Encounter Summary   Services provided to: Patient   Referral/Consult From: Rounding   Continue Visiting   (09/18/20)   Complexity of Encounter Low   Length of Encounter 15 minutes   Routine   Type Initial   Assessment Sleeping   Intervention Sustaining presence/ Ministry of presence   Outcome Did not respond

## 2020-09-19 LAB
A1 LECTIN: POSITIVE
ABO/RH: NORMAL
ANTIBODY IDENTIFICATION: NORMAL
ANTIBODY SCREEN: POSITIVE
ARM BAND NUMBER: NORMAL
BLD PROD TYP BPU: NORMAL
CROSSMATCH RESULT: NORMAL
DAT IGG: NEGATIVE
DISPENSE STATUS BLOOD BANK: NORMAL
EXPIRATION DATE: NORMAL
GLUCOSE BLD-MCNC: 114 MG/DL (ref 65–105)
GLUCOSE BLD-MCNC: 122 MG/DL (ref 65–105)
GLUCOSE BLD-MCNC: 145 MG/DL (ref 65–105)
GLUCOSE BLD-MCNC: 217 MG/DL (ref 65–105)
GLUCOSE BLD-MCNC: 88 MG/DL (ref 65–105)
GLUCOSE BLD-MCNC: 97 MG/DL (ref 65–105)
TRANSFUSION STATUS: NORMAL
UNIT DIVISION: 0
UNIT NUMBER: NORMAL

## 2020-09-19 PROCEDURE — 6370000000 HC RX 637 (ALT 250 FOR IP): Performed by: STUDENT IN AN ORGANIZED HEALTH CARE EDUCATION/TRAINING PROGRAM

## 2020-09-19 PROCEDURE — 1200000000 HC SEMI PRIVATE

## 2020-09-19 PROCEDURE — 6370000000 HC RX 637 (ALT 250 FOR IP): Performed by: SURGERY

## 2020-09-19 PROCEDURE — 2580000003 HC RX 258: Performed by: STUDENT IN AN ORGANIZED HEALTH CARE EDUCATION/TRAINING PROGRAM

## 2020-09-19 PROCEDURE — 6360000002 HC RX W HCPCS: Performed by: STUDENT IN AN ORGANIZED HEALTH CARE EDUCATION/TRAINING PROGRAM

## 2020-09-19 PROCEDURE — 6360000002 HC RX W HCPCS: Performed by: SURGERY

## 2020-09-19 RX ADMIN — GABAPENTIN 300 MG: 300 CAPSULE ORAL at 20:04

## 2020-09-19 RX ADMIN — INSULIN LISPRO 2 UNITS: 100 INJECTION, SOLUTION INTRAVENOUS; SUBCUTANEOUS at 18:00

## 2020-09-19 RX ADMIN — HYDROMORPHONE HYDROCHLORIDE 0.5 MG: 1 INJECTION, SOLUTION INTRAMUSCULAR; INTRAVENOUS; SUBCUTANEOUS at 22:19

## 2020-09-19 RX ADMIN — HYDROMORPHONE HYDROCHLORIDE 0.5 MG: 1 INJECTION, SOLUTION INTRAMUSCULAR; INTRAVENOUS; SUBCUTANEOUS at 18:06

## 2020-09-19 RX ADMIN — SODIUM CHLORIDE, PRESERVATIVE FREE 10 ML: 5 INJECTION INTRAVENOUS at 08:51

## 2020-09-19 RX ADMIN — ACETAMINOPHEN 1000 MG: 500 TABLET ORAL at 15:44

## 2020-09-19 RX ADMIN — LISINOPRIL 2.5 MG: 2.5 TABLET ORAL at 08:50

## 2020-09-19 RX ADMIN — SODIUM CHLORIDE, PRESERVATIVE FREE 10 ML: 5 INJECTION INTRAVENOUS at 21:05

## 2020-09-19 RX ADMIN — GABAPENTIN 300 MG: 300 CAPSULE ORAL at 08:50

## 2020-09-19 RX ADMIN — HYDROMORPHONE HYDROCHLORIDE 0.5 MG: 1 INJECTION, SOLUTION INTRAMUSCULAR; INTRAVENOUS; SUBCUTANEOUS at 08:50

## 2020-09-19 RX ADMIN — GABAPENTIN 300 MG: 300 CAPSULE ORAL at 15:44

## 2020-09-19 RX ADMIN — ACETAMINOPHEN 1000 MG: 500 TABLET ORAL at 22:19

## 2020-09-19 RX ADMIN — APIXABAN 5 MG: 5 TABLET, FILM COATED ORAL at 20:04

## 2020-09-19 RX ADMIN — ACETAMINOPHEN 1000 MG: 500 TABLET ORAL at 06:02

## 2020-09-19 RX ADMIN — ASPIRIN 81 MG: 81 TABLET, CHEWABLE ORAL at 08:50

## 2020-09-19 RX ADMIN — ENOXAPARIN SODIUM 40 MG: 40 INJECTION SUBCUTANEOUS at 08:50

## 2020-09-19 RX ADMIN — APIXABAN 5 MG: 5 TABLET, FILM COATED ORAL at 11:47

## 2020-09-19 ASSESSMENT — PAIN DESCRIPTION - PROGRESSION
CLINICAL_PROGRESSION: GRADUALLY IMPROVING

## 2020-09-19 ASSESSMENT — PAIN SCALES - GENERAL
PAINLEVEL_OUTOF10: 3
PAINLEVEL_OUTOF10: 9
PAINLEVEL_OUTOF10: 8
PAINLEVEL_OUTOF10: 4

## 2020-09-19 NOTE — PROGRESS NOTES
Advancing pt diet to solids. . Stop taking ASA. Possible discharge tomorrow. Pt will need a Hibiclens bath and a new IV today. Neuro checks and VS Q4h. Receiving Dilaudid for Abd pain      Pt ate 25% of lunch and zero dinner. She said she did not have an appetite. No nausea or vomiting during day shift.

## 2020-09-20 LAB
GLUCOSE BLD-MCNC: 109 MG/DL (ref 65–105)
GLUCOSE BLD-MCNC: 113 MG/DL (ref 65–105)
GLUCOSE BLD-MCNC: 116 MG/DL (ref 65–105)
GLUCOSE BLD-MCNC: 147 MG/DL (ref 65–105)

## 2020-09-20 PROCEDURE — 1200000000 HC SEMI PRIVATE

## 2020-09-20 PROCEDURE — 6370000000 HC RX 637 (ALT 250 FOR IP): Performed by: STUDENT IN AN ORGANIZED HEALTH CARE EDUCATION/TRAINING PROGRAM

## 2020-09-20 PROCEDURE — 6360000002 HC RX W HCPCS: Performed by: STUDENT IN AN ORGANIZED HEALTH CARE EDUCATION/TRAINING PROGRAM

## 2020-09-20 PROCEDURE — 2580000003 HC RX 258: Performed by: STUDENT IN AN ORGANIZED HEALTH CARE EDUCATION/TRAINING PROGRAM

## 2020-09-20 PROCEDURE — 6360000002 HC RX W HCPCS: Performed by: SURGERY

## 2020-09-20 PROCEDURE — 6370000000 HC RX 637 (ALT 250 FOR IP): Performed by: SURGERY

## 2020-09-20 PROCEDURE — 82947 ASSAY GLUCOSE BLOOD QUANT: CPT

## 2020-09-20 RX ORDER — DOCUSATE SODIUM 100 MG/1
100 CAPSULE, LIQUID FILLED ORAL DAILY
Status: DISCONTINUED | OUTPATIENT
Start: 2020-09-20 | End: 2020-09-21 | Stop reason: HOSPADM

## 2020-09-20 RX ADMIN — APIXABAN 5 MG: 5 TABLET, FILM COATED ORAL at 08:29

## 2020-09-20 RX ADMIN — ACETAMINOPHEN 1000 MG: 500 TABLET ORAL at 04:37

## 2020-09-20 RX ADMIN — HYDROMORPHONE HYDROCHLORIDE 0.5 MG: 1 INJECTION, SOLUTION INTRAMUSCULAR; INTRAVENOUS; SUBCUTANEOUS at 23:45

## 2020-09-20 RX ADMIN — GABAPENTIN 300 MG: 300 CAPSULE ORAL at 13:36

## 2020-09-20 RX ADMIN — GABAPENTIN 300 MG: 300 CAPSULE ORAL at 19:39

## 2020-09-20 RX ADMIN — SODIUM CHLORIDE, PRESERVATIVE FREE 10 ML: 5 INJECTION INTRAVENOUS at 08:22

## 2020-09-20 RX ADMIN — ACETAMINOPHEN 1000 MG: 500 TABLET ORAL at 22:38

## 2020-09-20 RX ADMIN — ENOXAPARIN SODIUM 40 MG: 40 INJECTION SUBCUTANEOUS at 08:29

## 2020-09-20 RX ADMIN — DOCUSATE SODIUM 100 MG: 100 CAPSULE, LIQUID FILLED ORAL at 11:09

## 2020-09-20 RX ADMIN — SODIUM CHLORIDE, PRESERVATIVE FREE 10 ML: 5 INJECTION INTRAVENOUS at 22:38

## 2020-09-20 RX ADMIN — LISINOPRIL 2.5 MG: 2.5 TABLET ORAL at 08:29

## 2020-09-20 RX ADMIN — GABAPENTIN 300 MG: 300 CAPSULE ORAL at 08:29

## 2020-09-20 RX ADMIN — ACETAMINOPHEN 1000 MG: 500 TABLET ORAL at 13:36

## 2020-09-20 RX ADMIN — INSULIN LISPRO 1 UNITS: 100 INJECTION, SOLUTION INTRAVENOUS; SUBCUTANEOUS at 08:29

## 2020-09-20 RX ADMIN — ONDANSETRON 4 MG: 2 INJECTION INTRAMUSCULAR; INTRAVENOUS at 22:50

## 2020-09-20 RX ADMIN — HYDROMORPHONE HYDROCHLORIDE 0.5 MG: 1 INJECTION, SOLUTION INTRAMUSCULAR; INTRAVENOUS; SUBCUTANEOUS at 04:37

## 2020-09-20 RX ADMIN — APIXABAN 5 MG: 5 TABLET, FILM COATED ORAL at 19:39

## 2020-09-20 ASSESSMENT — PAIN SCALES - GENERAL
PAINLEVEL_OUTOF10: 4
PAINLEVEL_OUTOF10: 9
PAINLEVEL_OUTOF10: 8
PAINLEVEL_OUTOF10: 7
PAINLEVEL_OUTOF10: 8

## 2020-09-20 NOTE — PROGRESS NOTES
Upon assesment pt complaining of bloating and pain in abdominal area, inspections observation abdomen soft non distended, upon palpation of abd in right lower quadrant large hard mass felt. Paged vascular surgery resident 4x with no call back at this time, will continue to try and get ahold of vascular resident.

## 2020-09-21 VITALS
BODY MASS INDEX: 24.5 KG/M2 | WEIGHT: 124.8 LBS | OXYGEN SATURATION: 95 % | RESPIRATION RATE: 16 BRPM | SYSTOLIC BLOOD PRESSURE: 122 MMHG | HEART RATE: 74 BPM | TEMPERATURE: 98.2 F | HEIGHT: 60 IN | DIASTOLIC BLOOD PRESSURE: 68 MMHG

## 2020-09-21 LAB
GLUCOSE BLD-MCNC: 117 MG/DL (ref 65–105)
GLUCOSE BLD-MCNC: 150 MG/DL (ref 65–105)

## 2020-09-21 PROCEDURE — 6370000000 HC RX 637 (ALT 250 FOR IP): Performed by: STUDENT IN AN ORGANIZED HEALTH CARE EDUCATION/TRAINING PROGRAM

## 2020-09-21 PROCEDURE — 6360000002 HC RX W HCPCS: Performed by: STUDENT IN AN ORGANIZED HEALTH CARE EDUCATION/TRAINING PROGRAM

## 2020-09-21 PROCEDURE — 6370000000 HC RX 637 (ALT 250 FOR IP): Performed by: SURGERY

## 2020-09-21 PROCEDURE — 2580000003 HC RX 258: Performed by: STUDENT IN AN ORGANIZED HEALTH CARE EDUCATION/TRAINING PROGRAM

## 2020-09-21 RX ORDER — ASPIRIN 81 MG/1
81 TABLET, CHEWABLE ORAL DAILY
Qty: 30 TABLET | Refills: 3 | Status: SHIPPED | OUTPATIENT
Start: 2020-09-22

## 2020-09-21 RX ORDER — LISINOPRIL 2.5 MG/1
2.5 TABLET ORAL DAILY
Qty: 30 TABLET | Refills: 0 | Status: SHIPPED | OUTPATIENT
Start: 2020-09-22 | End: 2020-12-14 | Stop reason: SDUPTHER

## 2020-09-21 RX ORDER — POLYETHYLENE GLYCOL 3350 17 G/17G
17 POWDER, FOR SOLUTION ORAL DAILY PRN
Qty: 1530 G | Refills: 1 | Status: SHIPPED | OUTPATIENT
Start: 2020-09-21 | End: 2020-10-21

## 2020-09-21 RX ORDER — OXYCODONE HYDROCHLORIDE AND ACETAMINOPHEN 5; 325 MG/1; MG/1
1 TABLET ORAL EVERY 6 HOURS PRN
Qty: 28 TABLET | Refills: 0 | Status: SHIPPED | OUTPATIENT
Start: 2020-09-21 | End: 2020-09-28

## 2020-09-21 RX ORDER — PSEUDOEPHEDRINE HCL 30 MG
100 TABLET ORAL 2 TIMES DAILY
Qty: 14 CAPSULE | Refills: 0 | Status: SHIPPED | OUTPATIENT
Start: 2020-09-21 | End: 2021-01-29

## 2020-09-21 RX ADMIN — GABAPENTIN 300 MG: 300 CAPSULE ORAL at 09:00

## 2020-09-21 RX ADMIN — LISINOPRIL 2.5 MG: 2.5 TABLET ORAL at 11:41

## 2020-09-21 RX ADMIN — SODIUM CHLORIDE, PRESERVATIVE FREE 10 ML: 5 INJECTION INTRAVENOUS at 11:51

## 2020-09-21 RX ADMIN — ACETAMINOPHEN 1000 MG: 500 TABLET ORAL at 06:17

## 2020-09-21 RX ADMIN — ASPIRIN 81 MG: 81 TABLET, CHEWABLE ORAL at 11:42

## 2020-09-21 RX ADMIN — ENOXAPARIN SODIUM 40 MG: 40 INJECTION SUBCUTANEOUS at 11:41

## 2020-09-21 RX ADMIN — APIXABAN 5 MG: 5 TABLET, FILM COATED ORAL at 11:42

## 2020-09-21 ASSESSMENT — PAIN SCALES - GENERAL
PAINLEVEL_OUTOF10: 7
PAINLEVEL_OUTOF10: 0

## 2020-09-21 ASSESSMENT — PAIN DESCRIPTION - PROGRESSION
CLINICAL_PROGRESSION: GRADUALLY IMPROVING
CLINICAL_PROGRESSION: GRADUALLY IMPROVING

## 2020-09-21 NOTE — PROGRESS NOTES
Division of Vascular Surgery         Progress Note      Name: Wyatt Amador  MRN: [id number]         Overnight Events:      No acute events    Hospital Summary      9/15: Admitted to hospital s/p aortobifemoral bypass. Subjective:     Patient seen and chart reviewed. Complaining of some loose stools and nausea overnight. Otherwise vitally stable. Ambulating. Voiding with good uop. Marginal PO intake. Complaining of nausea with meals. States that legs feel good and complains of mild pain in bilateral lower quadrants. Physical Exam:     Vitals:  /73   Pulse 73   Temp 98.1 °F (36.7 °C) (Oral)   Resp 18   Ht 5' (1.524 m)   Wt 124 lb 12.8 oz (56.6 kg)   SpO2 95%   BMI 24.37 kg/m²     General: No acute distress. A&Ox3. HEENT:  NC/AT. Conjunctiva moist without icterus. Ears are symmetric. Nares are patent. Oral mucus membranes are moist.  Neck:  Supple. No LAD. Cardiovascular:  Regular rate and rhythm. Warm, well perfused. Respiratory:  Equal chest rise bilaterally. No wheezes, stridor, or increased work of breathing. Abdomen:  Soft, mildly tender to palpation near incision. Dressings c/d/i. Abdominal binder in place. Neuro: Motor and sensory grossly intact. Extremities:  Warm, dry, and well perfused. Palpable upper and lower extremities bilaterally. Bilateral palpable pedal pulses. Skin:  No rashes or lesions. Imaging/Labs:     No results found. Assessment/Plan: Aorto-iliac occlusive disease    s/p aortobifemoral bypass (9/15)    -Pain and nausea control PRN. -Continue ASA  -Continue home eliquis  -Gen diet  -Monitor pulse and abdominal exam  -Encourage PT/OT, IS, OOB  -SSI  -Continue lisinopril    Dispo: Will follow up on progress later today and discharge if meeting criteria. Otherwise plan to keep another night.     Brooklyn Lei MD  General Surgery PGY3  Available via [employer]  Attending: Miguelina Queen MD      [address] [place] [address] [facility]  O: [phone]  C: [phone]  Email: [email or web address]

## 2020-09-21 NOTE — PROGRESS NOTES
CLINICAL PHARMACY NOTE: MEDS TO [address] Select Patient?: No  Total # of Prescriptions Filled: 2   The following medications were delivered to the patient:  · Lisinopril  · percocet  Total # of Interventions Completed: 0  Time Spent (min): 0    Additional Documentation: meds delivered to the pt room on 09.21.20 at 4:20pm

## 2020-09-21 NOTE — DISCHARGE SUMMARY
VASCULAR SURGERY DISCHARGE SUMMARY:    Disposition: DISCHARGE TO home    PATIENT NAME: Viky Rose    YOB: 1955  MEDICAL RECORD NO. [id number]  DATE: 9/21/2020  ADMITTING PHYSICIAN: Dr. Garner Mac[place]wa: BAKARI Pierce - CNP  DISCHARGE DATE:  9/21/2020  DISPOSITION: to home  ADMITTING DIAGNOSIS: AORTAL ILIAC OCCLUSION  1. Pain at surgical site      DISCHARGE DIAGNOSIS:   Patient Active Problem List   Diagnosis Code    Occlusion of aorta ([facility]) I74.10    New onset type 2 diabetes mellitus ([facility] E11.9    PAD (peripheral artery disease) ([facility]) I73.9    Claudication ([facility] I73.9    [facility]clusive [facility] ([facility] I74.09     CONSULTANTS:  none    PROCEDURES: Aorto-Bifemoral bypass      HOSPITAL COURSE:   Viky Rose is a 71 y.o. female who was admitted on 9/15/2020 with AORTAL ILIAC OCCLUSION, lifestyle limiting claudication with early signs of ischemic rest pain bilateral lower extremities . Procedure completed by Dr. Berny Maguire. Postop recovery went as planned . Patient discharge home in stable condition. Follow up in the office in one month , sooner if any issues. Labs and imaging were followed daily. At time of discharge, Viky Rose was tolerating a regular diet, having bowel movements, ambulating on her own accord, had adequate analgesia on oral pain medications, and had no signs of symptoms of complications. She was deemed medically stable and discharged to home on 9/15/2020 with instructions on wound care and follow up. Pt expressed understanding of and agreement with DC plans. LABS:     No results for input(s): WBC, HGB, HCT, PLT, NA, K, CL, CO2, BUN, CREATININE in the last 72 hours. DIAGNOSTIC TESTS:     No results found.     DISCHARGE INSTRUCTIONS:      Discharge Medications:        Medication List      START taking these medications    docusate 100 MG Caps  Commonly known as:  COLACE, DULCOLAX  Take 100 mg by mouth 2 times daily lisinopril 2.5 MG tablet  Commonly known as:  PRINIVIL;ZESTRIL  Take 1 tablet by mouth daily  Start taking on:  September 22, 2020     polyethylene glycol 17 GM/SCOOP powder  Commonly known as:  GLYCOLAX  Take 17 g by mouth daily as needed (constipation)        CHANGE how you take these medications    * aspirin 81 MG chewable tablet  Commonly known as:  Aspirin Low Dose Adult  Take 1 tablet by mouth daily  Start taking on:  September 22, 2020  What changed: You were already taking a medication with the same name, and this prescription was added. Make sure you understand how and when to take each. * ASPIRIN LOW DOSE ADULT PO  What changed:  Another medication with the same name was added. Make sure you understand how and when to take each. oxyCODONE-acetaminophen 5-325 MG per tablet  Commonly known as:  Percocet  Take 1 tablet by mouth every 6 hours as needed for Pain for up to 7 days. Intended supply: 7 days. Take lowest dose possible to manage pain  What changed:  additional instructions         * This list has 2 medication(s) that are the same as other medications prescribed for you. Read the directions carefully, and ask your doctor or other care provider to review them with you. CONTINUE taking these medications    apixaban 5 MG Tabs tablet  Commonly known as:  Eliquis DVT/PE Starter Pack  Take 10 mg (2 tablets) orally twice daily for 7 days, then take 5 mg (1 tablet) orally twice daily thereafter.      busPIRone 5 MG tablet  Commonly known as:  BUSPAR     metFORMIN 500 MG tablet  Commonly known as:  GLUCOPHAGE  Take 1 tablet by mouth 2 times daily (with meals)     Trintellix 10 MG Tabs tablet  Generic drug:  VORTIoxetine        STOP taking these medications    brimonidine 0.2 % ophthalmic solution  Commonly known as:  ALPHAGAN     latanoprost 0.005 % ophthalmic solution  Commonly known as:  XALATAN           Where to Get Your Medications      These medications were sent to [facility] [address] [facility], [address] [place] [address]  [address], [address] [address]    Phone:  [phone]   · aspirin 81 MG chewable tablet  · docusate 100 MG Caps  · lisinopril 2.5 MG tablet  · polyethylene glycol 17 GM/SCOOP powder     You can get these medications from any pharmacy    Bring a paper prescription for each of these medications  · oxyCODONE-acetaminophen 5-325 MG per tablet       Diet: DIET GENERAL; diet as tolerated  Activity: - Avoid strenuous activity or exercise until cleared during follow-up appointment  - No driving or operating heavy machinery while taking narcotics   Wound Care: Daily and as needed  Follow-up:   1. Call [facility] at [phone] for follow up appointment with Dr. Yumiko Anderson in:  1month  2.  Follow up in the next few weeks with PCP: BAKARI Holloway CNP  9/21/2020, 1:47 PM

## 2020-09-21 NOTE — CARE COORDINATION
Discharge [facility] Case Management Department  Written by: Bryn Cesar RN    Patient Name: Wyatt Amador  Attending Provider: No att. providers found  Admit Date: 9/15/2020  6:36 AM  MRN: [id number]  Account: [de-identified]                     : 1955  Discharge Date: 2020      Disposition: home    Bryn Cesar RN

## 2020-09-21 NOTE — PROGRESS NOTES
Occupational Therapy Not Seen Note    DATE: 2020  Name: Erica Kate  : 1955  MRN: [id number]    Patient not available for Occupational Therapy due to:    Pt reports being independent with functional mobility and functional tasks.  Pt reports having no OT acute care needs at this time, will defer OT eval.    Next Scheduled Treatment: n/a      Electronically signed by Meenakshi Rinaldi S/OT on 2020 at 10:20 AM

## 2020-09-23 NOTE — TELEPHONE ENCOUNTER
Patient is concerned about cost of Eliquis. She stated it will cost her $600 a month. I called and spoke to a pharm tech at the patient's pharmacy, who stated that is the cash pay cost of the starter pack, not the ongoing Rx. For the ongoing Rx, it would be closer to $115 to $125 a month. I called Karina Amador, our office's Eliquis Rep and was able to coordinate samples being delivered to the patient's PCP office by Tuesday. Allie Brennan CNP signed off on the samples as well. Since she is out of medication, could we write a 5 or 6 day supply?  I have samples being delivered to her PCP that should arrive by Eli Tran also wanted to know if we could write a 30 or 90 day supply with refills that I can send to the [employer] to try to get it mailed to her for free for a year.     I also need you to sign the form I'm sending to the foundation. Please advise on the prescriptions. Thank you!

## 2020-09-23 NOTE — TELEPHONE ENCOUNTER
Spoke with Dr. Jaden Owens who stated that it is okay for the patient to go a few days without taking Eliquis. She can go until Tues without it.

## 2020-09-24 ENCOUNTER — TELEPHONE (OUTPATIENT)
Dept: VASCULAR SURGERY | Age: 65
End: 2020-09-24

## 2020-09-24 NOTE — TELEPHONE ENCOUNTER
Patient's daughter, Martínez Goode, called concerned about her mom. She says they cannot motivate her to do anything. She is barely eating, won't get up and move around and only wants to lay in her chair or sleep. She feels like her mom is declining instead of improving since surgery. Please advise. Martínez Goode: [phone]  _________________________________  I spoke to Dr. Berny Maguire who said if they are that concerned, to bring in the patient tomorrow to the office.   _______________________________  Dorothea Tanos back and scheduled an appt for her mom for tomorrow.

## 2020-09-25 ENCOUNTER — OFFICE VISIT (OUTPATIENT)
Dept: VASCULAR SURGERY | Age: 65
End: 2020-09-25

## 2020-09-25 VITALS
HEART RATE: 111 BPM | DIASTOLIC BLOOD PRESSURE: 89 MMHG | OXYGEN SATURATION: 97 % | BODY MASS INDEX: 22.97 KG/M2 | SYSTOLIC BLOOD PRESSURE: 139 MMHG | TEMPERATURE: 97.5 F | HEIGHT: 60 IN | RESPIRATION RATE: 16 BRPM | WEIGHT: 117 LBS

## 2020-09-25 PROCEDURE — 99024 POSTOP FOLLOW-UP VISIT: CPT | Performed by: SURGERY

## 2020-09-25 NOTE — TELEPHONE ENCOUNTER
Chief Complaint     Physical           History of present illness:  Patient is a 53 year old male here today for   Chief Complaint   Patient presents with   • Physical     wellness visit, will have lab work done at [facility]   He continues exercise and regular basis. He's had no recent upper respiratory infections no fevers chills shortness of breath or chest pain  Past Medical History:   Diagnosis Date   • Palpitations        I have reviewed the patient's medications and allergies, past medical, surgical, social and family history, updating these as appropriate.  See Histories section of the EMR for a display of this information.    ROS:  All other ROS negative except as documented in the HPI    Visit Vitals  /70 (BP Location: [facility], Patient Position: Sitting, Cuff Size: Regular)   Pulse 68   Ht 6' 1\" (1.854 m)   Wt 77.7 kg   BMI 22.61 kg/m²       Physical Exam:  The patient is alert oriented carries on normal conversation gives a good history. Extraocular movements are intact. Lungs remain clear, there are no rhonchi rales or wheeze. Respiratory effort is normal. Heart has a regular rhythm normal S1 and S2 heart rate is controlled.      Assessment and Plan:  1.  Wellness. Patient is up-to-date regards ongoing health maintenance  2.  He will continue with his current exercise regimen      No notes on file  No orders of the defined types were placed in this encounter.     Patient brought in proof of income and receipts from Jan 2020 but not the rest of the year. I called the pharmacy and spoke to Elvin Thomas, the manager and pharmacist and she said she will fax over the patient's and 's YTD OOP expenses. Patient mentioned that her  now uses [facility] on [address]. I told her I would try to see if I could get the information but may need him to get it himself and bring it to us. She verbalized understanding. [employer] and spoke to Gayla Lora who stated that they cannot send it, and the patient's  would have to go pick it up and bring it to us. Called patient and explained that and she said he will get it this weekend and they will fax it to us next week.

## 2020-09-27 NOTE — PROGRESS NOTES
Mrs. Edda Nice comes in due to concerns of some lower abdominal discomfort and generalized weakness. Her incisions are healing well and she continues to have palpable pulses in her feet, her abdomen is soft and not distended. She is tolerating liquids, difficult keeping solids down due to discomfort with swallowing. Continues to have bowel function, denies nausea or emesis. Her family has been on her regarding her smoking, she is back to smoking about 1 pack a day, previously 2 packs a day. I advised her to also work on cutting even more back, encouraged her to get some nutritional shakes so she can get some calories in and get her energy back. Ok to walk as much as she can tolerate, hold off on heavy lifting for at least another 6-8 weeks. She will follow up at her scheduled postop appointment in a few weeks.     Electronically signed by Josie Carter MD on 9/27/2020 at 2:04 PM

## 2020-09-29 ENCOUNTER — TELEPHONE (OUTPATIENT)
Dept: PRIMARY CARE CLINIC | Age: 65
End: 2020-09-29

## 2020-09-29 NOTE — TELEPHONE ENCOUNTER
Carline from [address] [facility] called to ask if we could give samples for Eliquis 5 MG to Pt in the meantime while they wait for pt assistance to get back with them to have the med given to her for free for a year. We jsut received samples so I informed pt she can come  a month supply. Rodrigo Yang approved.

## 2020-09-29 NOTE — TELEPHONE ENCOUNTER
Received everything for the patient and faxed the completed paperwork and documentation to Garrett Garcia.

## 2020-10-02 NOTE — TELEPHONE ENCOUNTER
We received an approval from [id number] for the patient to get Eliquis for free from 10/2/20 to 12/31/20. LM for patient requesting she call back and requested she ask for me.

## 2020-10-02 NOTE — TELEPHONE ENCOUNTER
Spoke to Kaylah at [address] who stated that the patient's application is being processed now and that they will fax me a determination but I can also call back later today to see if they made the decision.

## 2020-10-09 RX ORDER — OXYCODONE HYDROCHLORIDE AND ACETAMINOPHEN 5; 325 MG/1; MG/1
1 TABLET ORAL EVERY 6 HOURS PRN
Qty: 28 TABLET | Refills: 0 | Status: SHIPPED | OUTPATIENT
Start: 2020-10-09 | End: 2020-10-16

## 2020-10-13 ENCOUNTER — OFFICE VISIT (OUTPATIENT)
Dept: PRIMARY CARE CLINIC | Age: 65
End: 2020-10-13
Payer: MEDICARE

## 2020-10-13 VITALS
OXYGEN SATURATION: 98 % | SYSTOLIC BLOOD PRESSURE: 118 MMHG | DIASTOLIC BLOOD PRESSURE: 74 MMHG | WEIGHT: 111 LBS | BODY MASS INDEX: 21.68 KG/M2 | HEART RATE: 95 BPM

## 2020-10-13 PROCEDURE — 4040F PNEUMOC VAC/ADMIN/RCVD: CPT | Performed by: NURSE PRACTITIONER

## 2020-10-13 PROCEDURE — 99214 OFFICE O/P EST MOD 30 MIN: CPT | Performed by: NURSE PRACTITIONER

## 2020-10-13 PROCEDURE — G8420 CALC BMI NORM PARAMETERS: HCPCS | Performed by: NURSE PRACTITIONER

## 2020-10-13 PROCEDURE — 3017F COLORECTAL CA SCREEN DOC REV: CPT | Performed by: NURSE PRACTITIONER

## 2020-10-13 PROCEDURE — 1090F PRES/ABSN URINE INCON ASSESS: CPT | Performed by: NURSE PRACTITIONER

## 2020-10-13 PROCEDURE — G8484 FLU IMMUNIZE NO ADMIN: HCPCS | Performed by: NURSE PRACTITIONER

## 2020-10-13 PROCEDURE — 1111F DSCHRG MED/CURRENT MED MERGE: CPT | Performed by: NURSE PRACTITIONER

## 2020-10-13 PROCEDURE — 1123F ACP DISCUSS/DSCN MKR DOCD: CPT | Performed by: NURSE PRACTITIONER

## 2020-10-13 PROCEDURE — G8400 PT W/DXA NO RESULTS DOC: HCPCS | Performed by: NURSE PRACTITIONER

## 2020-10-13 PROCEDURE — G8427 DOCREV CUR MEDS BY ELIG CLIN: HCPCS | Performed by: NURSE PRACTITIONER

## 2020-10-13 PROCEDURE — 4004F PT TOBACCO SCREEN RCVD TLK: CPT | Performed by: NURSE PRACTITIONER

## 2020-10-13 RX ORDER — OMEPRAZOLE 40 MG/1
40 CAPSULE, DELAYED RELEASE ORAL
Qty: 30 CAPSULE | Refills: 5 | Status: SHIPPED | OUTPATIENT
Start: 2020-10-13 | End: 2022-03-01 | Stop reason: ALTCHOICE

## 2020-10-13 RX ORDER — NICOTINE 21 MG/24HR
1 PATCH, TRANSDERMAL 24 HOURS TRANSDERMAL DAILY
Qty: 42 PATCH | Refills: 0 | Status: SHIPPED | OUTPATIENT
Start: 2020-10-13 | End: 2020-10-13 | Stop reason: SDUPTHER

## 2020-10-13 RX ORDER — NICOTINE 21 MG/24HR
1 PATCH, TRANSDERMAL 24 HOURS TRANSDERMAL DAILY
Qty: 42 PATCH | Refills: 1 | Status: SHIPPED | OUTPATIENT
Start: 2020-10-13 | End: 2021-07-12 | Stop reason: ALTCHOICE

## 2020-10-13 ASSESSMENT — ENCOUNTER SYMPTOMS
BACK PAIN: 1
BLOOD IN STOOL: 0
NAUSEA: 0
SINUS PRESSURE: 0
DIARRHEA: 0
CONSTIPATION: 0
TROUBLE SWALLOWING: 0
COUGH: 0
VOMITING: 0
ABDOMINAL PAIN: 1
WHEEZING: 0
SORE THROAT: 0
SHORTNESS OF BREATH: 0

## 2020-10-13 NOTE — PROGRESS NOTES
[address] PRIMARY CARE  [address] [facility] [address]  [address] [address]  Dept: [phone]  Dept Fax: [phone]    Madina Daley is a 71 y.o. female who presentstoday for her medical conditions/complaints as noted below.   Madina Daley is c/o of  Chief Complaint   Patient presents with   Alan Mathur Post-Op Check     09/15 - abd surgery - still having an issue with eating     Back Pain     lower back     Abdominal Pain     middle of stomach        HPI:     Here today for follow up after vascular surgery  Has extensive incision midline from sternum to below umbilicus  She has acid reflux pain and belching after eating since the procedure  Also lower abdominal discomfort and lower back pain since her surgery  Surgery was 4 weeks ago  She states the lower back discomfort and lower abdominal pain are some better than they were initially but her primary concern is the belching discomfort after meals  She has not been able to eat or drink as much as she normally would  She has not tried anything for her symptoms  She does report has had good results from her surgery with relief of her leg pain bilaterally    Asking about assistance with quitting smoking  Reacted adversely to chantix with nightmares  Reports smokes 1 1/2 packs/day      Hemoglobin A1C (%)   Date Value   2020 6.3   2020 6.3   2020 7.4 (H)             ( goal A1C is < 7)   No results found for: LABMICR  LDL Cholesterol (mg/dL)   Date Value   2020 113       (goal LDL is <100)   AST (U/L)   Date Value   2020 12     ALT (U/L)   Date Value   2020 15     BUN (mg/dL)   Date Value   2020 16     BP Readings from Last 3 Encounters:   10/13/20 118/74   20 139/89   20 122/68          ([id number])    Past Medical History:   Diagnosis Date    Anxiety     Depression     Diabetes mellitus ([facility]     Glaucoma     Hearing loss     Migraines     PVD (peripheral vascular busPIRone (BUSPAR) 5 MG tablet Take 1 tablet by mouth       No current facility-administered medications for this visit. Allergies   Allergen Reactions    Sulfamethoxazole     Sulfamethoxazole-Trimethoprim     Tetanus Toxoid     Trimethoprim        Health Maintenance   Topic Date Due    Hepatitis C screen  1955    Diabetic retinal exam  12/24/1964    HIV screen  12/24/1969    Diabetic microalbuminuria test  12/24/1972    Cervical cancer screen  12/24/1975    Breast cancer screen  12/24/2004    Shingles Vaccine (1 of 2) 12/24/2004    Colon cancer screen colonoscopy  12/24/2004    DEXA (modify frequency per FRAX score)  12/24/2009    Low dose CT lung screening  12/24/2009    Pneumococcal 65+ years Vaccine (1 of 1 - PPSV23) 12/24/2019    Annual Wellness Visit (AWV)  01/19/2020    Flu vaccine (1) 09/01/2020    Lipid screen  01/04/2021    Diabetic foot exam  05/22/2021    A1C test (Diabetic or Prediabetic)  08/24/2021    Potassium monitoring  09/16/2021    Creatinine monitoring  09/16/2021    Hepatitis A vaccine  Aged Out    Hib vaccine  Aged Out    Meningococcal (ACWY) vaccine  Aged Out       Subjective:      Review of Systems   Constitutional: Negative for activity change, appetite change, chills, fatigue, fever and unexpected weight change. HENT: Negative for congestion, ear pain, hearing loss, sinus pressure, sore throat and trouble swallowing. Eyes: Negative for visual disturbance. Respiratory: Negative for cough, shortness of breath and wheezing. Cardiovascular: Negative for chest pain, palpitations and leg swelling. Gastrointestinal: Positive for abdominal pain (Intermittent after meals). Negative for blood in stool, constipation, diarrhea, nausea and vomiting. Frequent belching   Endocrine: Negative for cold intolerance, heat intolerance, polydipsia, polyphagia and polyuria. Genitourinary: Negative for difficulty urinating, frequency, hematuria and urgency. Musculoskeletal: Positive for back pain (Intermittent). Negative for arthralgias and myalgias. Skin: Negative for rash. Allergic/Immunologic: Negative for environmental allergies. Neurological: Negative for dizziness, weakness, light-headedness and headaches. Psychiatric/Behavioral: Negative for confusion. The patient is not nervous/anxious. Objective:     Physical Exam  Constitutional:       Appearance: She is well-developed. HENT:      Head: Normocephalic. Eyes:      Conjunctiva/sclera: Conjunctivae normal.      Pupils: Pupils are equal, round, and reactive to light. Neck:      Musculoskeletal: Normal range of motion. Cardiovascular:      Rate and Rhythm: Normal rate and regular rhythm. Heart sounds: Normal heart sounds. No murmur. Pulmonary:      Effort: Pulmonary effort is normal.      Breath sounds: Normal breath sounds. No wheezing. Abdominal:      General: Bowel sounds are normal. There is no distension. Palpations: Abdomen is soft. Musculoskeletal: Normal range of motion. Skin:     General: Skin is warm and dry. Neurological:      Mental Status: She is alert and oriented to person, place, and time. Psychiatric:         Behavior: Behavior normal.         Thought Content: Thought content normal.         Judgment: Judgment normal.       /74   Pulse 95   Wt 111 lb (50.3 kg)   SpO2 98%   BMI 21.68 kg/m²     Assessment:       Diagnosis Orders   1. Aortoiliac occlusive disease ([address]     2. PAD (peripheral artery disease) ([address]     3. Occlusion of aorta (HCC)     4. Status post vascular surgery     5. Gastroesophageal reflux disease, unspecified whether esophagitis present  omeprazole (PRILOSEC) 40 MG delayed release capsule   6. Smokes and motivated to quit  nicotine (NICODERM CQ) 21 MG/24HR    DISCONTINUED: nicotine (NICODERM CQ) 21 MG/24HR             Plan:      Return in about 2 months (around 12/14/2020) for as scheduled.     Aortoiliac occlusive disease, PAD, occlusion of aorta, status post vascular surgery- leg pain has significantly improved, she continues to recover postoperatively with some complications of abdominal and low back discomfort. She will follow-up as planned with vascular surgeon end of this month  GERD- suspect gastritis/inflammation postoperatively, start omeprazole daily, identify and avoid trigger foods and beverages. If her symptoms have not improved by the time she sees her vascular surgeon strongly urged to discuss her persistent symptoms with him as may benefit from further evaluation of intra-abdominal circulation of mesenteric vessels  Smokes and motivated to quit- has reacted adversely to Chantix in the past, potential for drug interactions with Wellbutrin. Will try nicotine patches with gradual decrease in dosage. Advised on correct use. Also discussed alternative treatment options like acupuncture     Orders Placed This Encounter   Medications    omeprazole (PRILOSEC) 40 MG delayed release capsule     Sig: Take 1 capsule by mouth every morning (before breakfast)     Dispense:  30 capsule     Refill:  5    DISCONTD: nicotine (NICODERM CQ) 21 MG/24HR     Sig: Place 1 patch onto the skin daily     Dispense:  42 patch     Refill:  0    nicotine (NICODERM CQ) 21 MG/24HR     Sig: Place 1 patch onto the skin daily     Dispense:  42 patch     Refill:  1       Patient given educational materials - see patient instructions. Discussed use, benefit, and side effects of prescribed medications. All patientquestions answered. Pt voiced understanding. Reviewed health maintenance. Instructedto continue current medications, diet and exercise. Patient agreed with treatmentplan. Follow up as directed.      Electronicallysigned by BAKARI Clarke CNP on 10/13/2020 at 5:09 PM

## 2020-10-19 ENCOUNTER — TELEPHONE (OUTPATIENT)
Dept: VASCULAR SURGERY | Age: 65
End: 2020-10-19

## 2020-10-19 ENCOUNTER — HOSPITAL ENCOUNTER (OUTPATIENT)
Age: 65
Discharge: HOME | End: 2020-10-21
Payer: MEDICARE

## 2020-10-19 ENCOUNTER — HOSPITAL ENCOUNTER (OUTPATIENT)
Dept: GENERAL RADIOLOGY | Age: 65
Discharge: HOME | End: 2020-10-21
Payer: MEDICARE

## 2020-10-19 PROCEDURE — 74018 RADEX ABDOMEN 1 VIEW: CPT

## 2020-10-19 NOTE — TELEPHONE ENCOUNTER
Per Zhang Esparza CNP, patient should get a Abd Xray to r/o bowel obstruction. She said to hold off on the laxative until we see the Xray. She also wanted me to remind the patient not to do any heavy lifting. Patient said BM are a little less than normal but not by much. I went over the other recommendations with her and advised that this may end up being something for her PCP to follow up on. She will get the Xray done today or first thing tomorrow. She verbalized understanding.

## 2020-10-19 NOTE — TELEPHONE ENCOUNTER
Patient called saying she is having trouble eating and has been taking omeprazole 40mg 1 time a day in the AM for about a week. If she gets up and walks around, her stomach \"starts killing\" her. Pain 8/10. If she eats something small, she keeps it down but burps a lot. She said once the food hits her stomach, it hurts. Anything she eats makes it worse, nothing specific. She feels sick to her stomach but thinks it could be because she's hungry. She hasn't vomited. Her PCP recommended the omeprazole. She wants to know what else she can do.

## 2020-10-19 NOTE — TELEPHONE ENCOUNTER
Patient is not taking a laxative and is having at least 1 bowel movement per day. She drinks water a lot throughout the day.

## 2020-10-20 NOTE — TELEPHONE ENCOUNTER
Samantha aCrlson, APRN - NP  [facility]                 Please call the patient and tell her that the xray is negative for bowel obstruction and she needs to follow up with PCP for medical management , may benefit from starting her on something like Reglan and to keep the upcoming appointment    __________________________________________________________    Patient states there is swelling on the left side by the incision (right above the incision) that is really hard. She noticed it yesterday while getting ready for her Xray. She wanted to make sure that is ok. She said the rad tech told her it should show on the xray but she wasn't sure. I went over the results and recommendations with the patient, who verbalized understanding and stated she will call her PCP.

## 2020-10-23 ENCOUNTER — OFFICE VISIT (OUTPATIENT)
Dept: PRIMARY CARE CLINIC | Age: 65
End: 2020-10-23
Payer: MEDICARE

## 2020-10-23 ENCOUNTER — HOSPITAL ENCOUNTER (OUTPATIENT)
Dept: ULTRASOUND IMAGING | Age: 65
Discharge: HOME | End: 2020-10-25
Payer: MEDICARE

## 2020-10-23 VITALS
WEIGHT: 115.6 LBS | SYSTOLIC BLOOD PRESSURE: 122 MMHG | BODY MASS INDEX: 22.7 KG/M2 | RESPIRATION RATE: 12 BRPM | HEIGHT: 60 IN | HEART RATE: 85 BPM | DIASTOLIC BLOOD PRESSURE: 71 MMHG | OXYGEN SATURATION: 97 % | TEMPERATURE: 97.7 F

## 2020-10-23 PROCEDURE — G8427 DOCREV CUR MEDS BY ELIG CLIN: HCPCS | Performed by: NURSE PRACTITIONER

## 2020-10-23 PROCEDURE — 1123F ACP DISCUSS/DSCN MKR DOCD: CPT | Performed by: NURSE PRACTITIONER

## 2020-10-23 PROCEDURE — G8400 PT W/DXA NO RESULTS DOC: HCPCS | Performed by: NURSE PRACTITIONER

## 2020-10-23 PROCEDURE — 99214 OFFICE O/P EST MOD 30 MIN: CPT | Performed by: NURSE PRACTITIONER

## 2020-10-23 PROCEDURE — 4004F PT TOBACCO SCREEN RCVD TLK: CPT | Performed by: NURSE PRACTITIONER

## 2020-10-23 PROCEDURE — 1090F PRES/ABSN URINE INCON ASSESS: CPT | Performed by: NURSE PRACTITIONER

## 2020-10-23 PROCEDURE — 76857 US EXAM PELVIC LIMITED: CPT

## 2020-10-23 PROCEDURE — G8420 CALC BMI NORM PARAMETERS: HCPCS | Performed by: NURSE PRACTITIONER

## 2020-10-23 PROCEDURE — 3017F COLORECTAL CA SCREEN DOC REV: CPT | Performed by: NURSE PRACTITIONER

## 2020-10-23 PROCEDURE — G8484 FLU IMMUNIZE NO ADMIN: HCPCS | Performed by: NURSE PRACTITIONER

## 2020-10-23 PROCEDURE — 4040F PNEUMOC VAC/ADMIN/RCVD: CPT | Performed by: NURSE PRACTITIONER

## 2020-10-23 ASSESSMENT — ENCOUNTER SYMPTOMS
DIARRHEA: 0
BLOOD IN STOOL: 0
BACK PAIN: 1
TROUBLE SWALLOWING: 0
SINUS PRESSURE: 0
SHORTNESS OF BREATH: 0
CONSTIPATION: 0
VOMITING: 0
NAUSEA: 0
COUGH: 0
SORE THROAT: 0
WHEEZING: 0
ABDOMINAL PAIN: 1

## 2020-10-23 NOTE — PROGRESS NOTES
[address] PRIMARY CARE  [address] [facility] [address]  [address] [address]  Dept: [phone]  Dept Fax: [phone]    Asael Schneider is a 71 y.o. female who presentstoday for her medical conditions/complaints as noted below.   Asael Schneider is c/o of  Chief Complaint   Patient presents with    Abdominal Pain     Post Op Pain- Surgeon unavailale- records in [facility]. surgery 9/15/20    Health Maintenance     Not due- Same Day Sick call        HPI:     Here today for persistent left groin incisional pain, umbilical pain and sensation of electrical shots around the umbilicus  She attempted to get in with her surgeon but he is on vacation  She states has had some discomfort in the area ever since the surgery but seems to be worsening and feels a lump in the area  Reports the pain increases with position changes, walking and prolonged standing  Will also have pain in the lower back with prolonged standing      Hemoglobin A1C (%)   Date Value   2020 6.3   2020 6.3   2020 7.4 (H)             ( goal A1C is < 7)   No results found for: LABMICR  LDL Cholesterol (mg/dL)   Date Value   2020 113       (goal LDL is <100)   AST (U/L)   Date Value   2020 12     ALT (U/L)   Date Value   2020 15     BUN (mg/dL)   Date Value   2020 16     BP Readings from Last 3 Encounters:   10/23/20 122/71   10/13/20 118/74   20 139/89          ([id number])    Past Medical History:   Diagnosis Date    Anxiety     Depression     Diabetes mellitus ([address]     Glaucoma     Hearing loss     Migraines     [facility]peripheral vascular disease) [facility]       Past Surgical History:   Procedure Laterality Date    ABDOMINAL AORTIC ANEURYSM REPAIR N/A 9/15/2020    AORTAL BIFEMORAL BYPASS  **CELLSAVER** performed by José Leach MD at [address] [place] [address] AORTO-FEMORAL BYPASS GRAFT  09/15/2020    APPENDECTOMY      BREAST SURGERY      \"crystals removed from right breast\"    CATARACT REMOVAL  2015    CHOLECYSTECTOMY      TONSILLECTOMY         Family History   Problem Relation Age of Onset    Cancer Father     Cancer Sister           Social History     Tobacco Use    Smoking status: Current Every Day Smoker     Packs/day: 1.50     Years: 35.00     Pack years: 52.50    Smokeless tobacco: Never Used   Substance Use Topics    Alcohol use: Not Currently      Current Outpatient Medications   Medication Sig Dispense Refill    omeprazole (PRILOSEC) 40 MG delayed release capsule Take 1 capsule by mouth every morning (before breakfast) 30 capsule 5    nicotine (NICODERM CQ) 21 MG/24HR Place 1 patch onto the skin daily 42 patch 1    apixaban (ELIQUIS) 5 MG TABS tablet Take 1 tablet by mouth 2 times daily 180 tablet 0    aspirin (ASPIRIN LOW DOSE ADULT) 81 MG chewable tablet Take 1 tablet by mouth daily 30 tablet 3    lisinopril (PRINIVIL;ZESTRIL) 2.5 MG tablet Take 1 tablet by mouth daily 30 tablet 0    docusate sodium (COLACE, DULCOLAX) 100 MG CAPS Take 100 mg by mouth 2 times daily 14 capsule 0    ASPIRIN LOW DOSE ADULT PO Take 81 mg by mouth      metFORMIN (GLUCOPHAGE) 500 MG tablet Take 1 tablet by mouth 2 times daily (with meals) 60 tablet 5    VORTIoxetine (TRINTELLIX) 10 MG TABS tablet Take 1 tablet by mouth      busPIRone (BUSPAR) 5 MG tablet Take 1 tablet by mouth       No current facility-administered medications for this visit.       Allergies   Allergen Reactions    Sulfamethoxazole     Sulfamethoxazole-Trimethoprim     Tetanus Toxoid     Trimethoprim        Health Maintenance   Topic Date Due    Diabetic microalbuminuria test  12/24/1972    Breast cancer screen  12/24/2004    Colon cancer screen colonoscopy  12/24/2004    DEXA (modify frequency per FRAX score)  12/24/2009    Low dose CT lung screening  12/24/2009    Pneumococcal 65+ years Vaccine (1 of 1 - PPSV23) 12/24/2019    Annual Wellness Visit (AWV)  01/19/2020    Flu vaccine (1) 09/01/2020    Diabetic retinal exam  10/23/2021 (Originally 12/24/1964)    Cervical cancer screen  10/23/2021 (Originally 12/24/1975)    Shingles Vaccine (1 of 2) 10/23/2021 (Originally 12/24/2004)    HIV screen  10/23/2021 (Originally 12/24/1969)    Lipid screen  01/04/2021    Diabetic foot exam  05/22/2021    A1C test (Diabetic or Prediabetic)  08/24/2021    Potassium monitoring  09/16/2021    Creatinine monitoring  09/16/2021    Hepatitis A vaccine  Aged Out    Hib vaccine  Aged Out    Meningococcal (ACWY) vaccine  Aged Out    Hepatitis C screen  Discontinued       Subjective:      Review of Systems   Constitutional: Negative for activity change, appetite change, chills, fatigue, fever and unexpected weight change. HENT: Negative for congestion, ear pain, hearing loss, sinus pressure, sore throat and trouble swallowing. Eyes: Negative for visual disturbance. Respiratory: Negative for cough, shortness of breath and wheezing. Cardiovascular: Negative for chest pain, palpitations and leg swelling. Gastrointestinal: Positive for abdominal pain. Negative for blood in stool, constipation, diarrhea, nausea and vomiting. Endocrine: Negative for cold intolerance, heat intolerance, polydipsia, polyphagia and polyuria. Genitourinary: Negative for difficulty urinating, frequency, hematuria and urgency. Musculoskeletal: Positive for back pain. Negative for arthralgias and myalgias. Skin: Negative for rash. Allergic/Immunologic: Negative for environmental allergies. Neurological: Negative for dizziness, weakness, light-headedness and headaches. Psychiatric/Behavioral: Negative for confusion. The patient is not nervous/anxious. Objective:     Physical Exam  Constitutional:       Appearance: She is well-developed. HENT:      Head: Normocephalic. Eyes:      Conjunctiva/sclera: Conjunctivae normal.      Pupils: Pupils are equal, round, and reactive to light.    Neck: Musculoskeletal: Normal range of motion. Cardiovascular:      Rate and Rhythm: Normal rate and regular rhythm. Heart sounds: Normal heart sounds. No murmur. Pulmonary:      Effort: Pulmonary effort is normal.      Breath sounds: Normal breath sounds. No wheezing. Abdominal:      General: Bowel sounds are normal. There is no distension. Palpations: Abdomen is soft. Musculoskeletal: Normal range of motion. Skin:     General: Skin is warm and dry. Comments: Left groin incision well approximated, no redness, large palpable mass that is painful with palpation  Midline abdominal incision well approx, no swelling  Right groin incision well approximated with no palpable mass/hematoma noted   Neurological:      Mental Status: She is alert and oriented to person, place, and time. Psychiatric:         Behavior: Behavior normal.         Thought Content: Thought content normal.         Judgment: Judgment normal.       /71   Pulse 85   Temp 97.7 °F (36.5 °C) (Temporal)   Resp 12   Ht 5' (1.524 m)   Wt 115 lb 9.6 oz (52.4 kg)   SpO2 97%   Breastfeeding No   BMI 22.58 kg/m²     Assessment:       Diagnosis Orders   1. Hematoma of left groin incision, initial encounter  US HEMATOMA/SEROMA/FLUID DRAINAGE   2. H/O aorta-iliac-femoral bypass  US HEMATOMA/SEROMA/FLUID DRAINAGE             Plan:      No follow-ups on file. Orders Placed This Encounter   Procedures    US HEMATOMA/SEROMA/FLUID DRAINAGE     Standing Status:   Future     Standing Expiration Date:   10/23/2021     Hematoma, s/p bypass-concern for hematoma, need to rule out active bleeding or other underlying condition. STAT ultrasound to area, called [facility] [facility] and they will accommodate her now. Patient directed to facility. Advised will contact her once results received for next steps    Patient given educational materials - see patient instructions. Discussed use, benefit, and side effects of prescribed medications. All patientquestions answered. Pt voiced understanding. Reviewed health maintenance. Instructedto continue current medications, diet and exercise. Patient agreed with treatmentplan. Follow up as directed.      Electronicallysigned by BAKARI Bonilla CNP on 10/23/2020 at 3:37 PM

## 2020-10-26 NOTE — TELEPHONE ENCOUNTER
Yes, post op with testing on this Fri 10/30.    ===View-only below this line===  ----- Message -----  From: Felix Peter MD  Sent: 10/26/2020  11:14 AM EDT  To: BAKARI Sandoval - YAZMIN, Nell Huynh MA, *    She has a small seroma, she is due for her postop soon right?

## 2020-10-30 ENCOUNTER — HOSPITAL ENCOUNTER (OUTPATIENT)
Dept: VASCULAR LAB | Age: 65
Discharge: HOME | End: 2020-10-30
Payer: MEDICARE

## 2020-10-30 ENCOUNTER — OFFICE VISIT (OUTPATIENT)
Dept: VASCULAR SURGERY | Age: 65
End: 2020-10-30

## 2020-10-30 VITALS
HEIGHT: 60 IN | WEIGHT: 116 LBS | OXYGEN SATURATION: 99 % | TEMPERATURE: 98.1 F | RESPIRATION RATE: 16 BRPM | BODY MASS INDEX: 22.78 KG/M2 | SYSTOLIC BLOOD PRESSURE: 160 MMHG | DIASTOLIC BLOOD PRESSURE: 86 MMHG | HEART RATE: 54 BPM

## 2020-10-30 PROCEDURE — 93922 UPR/L XTREMITY ART 2 LEVELS: CPT

## 2020-10-30 PROCEDURE — 99024 POSTOP FOLLOW-UP VISIT: CPT | Performed by: SURGERY

## 2020-10-30 RX ORDER — HYDROCODONE BITARTRATE AND ACETAMINOPHEN 5; 325 MG/1; MG/1
1 TABLET ORAL EVERY 4 HOURS PRN
Qty: 18 TABLET | Refills: 0 | Status: SHIPPED | OUTPATIENT
Start: 2020-10-30 | End: 2020-11-02

## 2020-11-01 ASSESSMENT — ENCOUNTER SYMPTOMS
CHEST TIGHTNESS: 0
EYE DISCHARGE: 0
SHORTNESS OF BREATH: 0

## 2020-11-01 NOTE — PROGRESS NOTES
Division of Vascular Surgery        Postoperative Follow Up  Aorto-Bifemoral bypass (9/15/2020)                                           History of Present Illness:      Giuseppe Mcdaniels is a 71 y.o. woman who  presents for postoperative follow up after she had Aorto-Bifemoral bypass surgery. Denies symptoms suggestive of claudication or ischemic rest pain. States that she is doing overall better, tolerating activity ok,  stating that yesterday she had felt \"something popped in her right groin and it is painful \" with movement, denies heavy lifting. She states that she had  issues with indigestion post surgery that is better after her pcp prescribed her PPI. Patient continues to smoke. Review of Systems:     Review of Systems   Constitutional: Positive for activity change and appetite change. Negative for chills and fever. HENT: Negative for congestion. Eyes: Negative for discharge. Respiratory: Negative for chest tightness and shortness of breath. Cardiovascular: Negative for chest pain, palpitations and leg swelling. Gastrointestinal:        Indigestion    Musculoskeletal: Negative for gait problem. Generalized weakness    Skin: Positive for wound (surgical incision ). Physical Exam:     Vitals:  BP (!) 160/86 (Site: Left Upper Arm, Position: Sitting, Cuff Size: Medium Adult)   Pulse 54   Temp 98.1 °F (36.7 °C) (Temporal)   Resp 16   Ht 5' (1.524 m)   Wt 116 lb (52.6 kg)   SpO2 99%   BMI 22.65 kg/m²     Physical Exam  Constitutional:       Appearance: Normal appearance. She is not ill-appearing. HENT:      Head: Normocephalic. Nose: No congestion. Cardiovascular:      Pulses:           Dorsalis pedis pulses are 2+ on the right side and 2+ on the left side. Posterior tibial pulses are 1+ on the right side and 1+ on the left side. Pulmonary:      Effort: Pulmonary effort is normal.   Abdominal:      General: There is no distension.       Palpations: Abdomen is soft. Tenderness: There is abdominal tenderness in the right lower quadrant. Musculoskeletal:      Right lower leg: No edema. Left lower leg: No edema. Skin:         Neurological:      Mental Status: She is alert. Right groin is soft, non-pulsatile. Imaging/Labs:     Office ultrasound reveals old hematoma over right groin, no active bleeding or pseudoaneurysm        Previous CHRISTEL 0.46/0.43    Assessment and Plan:     S/P Aorto-Bifemoral bypass surgery  · Continue antiplatelet and statin therapy  · Smoking cessation was discussed again  · Continue to walk and exercise to improve cardiovascular health  · Mitul pack to right groin for comfort, should resolve on its own. · Follow up in 6 weeks for re-evaluation of her right groin. Electronically signed by BAKARI Foster NP on 10/31/20 at 9:42 PM EDT      Electronically signed by Dianne Brunner MD       [address] [place] [address] North: [phone]  C: [phone]  Email: [email or web address]

## 2020-11-03 PROBLEM — I73.9 PAD (PERIPHERAL ARTERY DISEASE) (HCC): Status: RESOLVED | Noted: 2020-08-24 | Resolved: 2020-11-03

## 2020-11-06 RX ORDER — OXYCODONE HYDROCHLORIDE AND ACETAMINOPHEN 5; 325 MG/1; MG/1
1 TABLET ORAL EVERY 6 HOURS PRN
Qty: 20 TABLET | Refills: 0 | Status: SHIPPED | OUTPATIENT
Start: 2020-11-06 | End: 2020-11-06

## 2020-11-06 RX ORDER — OXYCODONE HYDROCHLORIDE AND ACETAMINOPHEN 5; 325 MG/1; MG/1
1 TABLET ORAL EVERY 6 HOURS PRN
Qty: 20 TABLET | Refills: 0 | Status: SHIPPED | OUTPATIENT
Start: 2020-11-06 | End: 2020-11-11

## 2020-12-11 ENCOUNTER — OFFICE VISIT (OUTPATIENT)
Dept: VASCULAR SURGERY | Age: 65
End: 2020-12-11

## 2020-12-11 VITALS
HEART RATE: 93 BPM | BODY MASS INDEX: 21.99 KG/M2 | TEMPERATURE: 96.8 F | WEIGHT: 112 LBS | DIASTOLIC BLOOD PRESSURE: 95 MMHG | HEIGHT: 60 IN | OXYGEN SATURATION: 98 % | RESPIRATION RATE: 16 BRPM | SYSTOLIC BLOOD PRESSURE: 166 MMHG

## 2020-12-11 PROCEDURE — G8400 PT W/DXA NO RESULTS DOC: HCPCS | Performed by: SURGERY

## 2020-12-11 PROCEDURE — G8484 FLU IMMUNIZE NO ADMIN: HCPCS | Performed by: SURGERY

## 2020-12-11 PROCEDURE — 1090F PRES/ABSN URINE INCON ASSESS: CPT | Performed by: SURGERY

## 2020-12-11 PROCEDURE — G8420 CALC BMI NORM PARAMETERS: HCPCS | Performed by: SURGERY

## 2020-12-11 PROCEDURE — 99024 POSTOP FOLLOW-UP VISIT: CPT | Performed by: SURGERY

## 2020-12-11 PROCEDURE — 1123F ACP DISCUSS/DSCN MKR DOCD: CPT | Performed by: SURGERY

## 2020-12-11 PROCEDURE — 4004F PT TOBACCO SCREEN RCVD TLK: CPT | Performed by: SURGERY

## 2020-12-11 PROCEDURE — 4040F PNEUMOC VAC/ADMIN/RCVD: CPT | Performed by: SURGERY

## 2020-12-11 PROCEDURE — G8427 DOCREV CUR MEDS BY ELIG CLIN: HCPCS | Performed by: SURGERY

## 2020-12-11 PROCEDURE — 3017F COLORECTAL CA SCREEN DOC REV: CPT | Performed by: SURGERY

## 2020-12-11 RX ORDER — OXYCODONE HYDROCHLORIDE AND ACETAMINOPHEN 5; 325 MG/1; MG/1
1 TABLET ORAL EVERY 6 HOURS PRN
Qty: 20 TABLET | Refills: 0 | Status: SHIPPED | OUTPATIENT
Start: 2020-12-11 | End: 2021-02-02 | Stop reason: SDUPTHER

## 2020-12-11 RX ORDER — LATANOPROST 50 UG/ML
SOLUTION/ DROPS OPHTHALMIC
COMMUNITY
Start: 2020-11-05

## 2020-12-11 NOTE — PROGRESS NOTES
Division of Vascular Surgery        Follow Up       Aortobifemoral Bypass (9/15/2020)    Chief Complaint:     PAD, poor appetite    History of Present Illness:      Elsi Olmstead is a 71 y.o. woman who presents for follow up after undergoing aortobifemoral bypass for aortoiliac occlusive disease. She continues to have some mid abdominal discomfort by her belly button. She also has had issues with eating, continues to have discomfort and feeling bloated afterwards. Continues to have good bowel movements. She avoids eating big meals and more or less grazes throughout the day. She feels like the food gets stuck going down at times as well. She does not have history of ulcer disease and she has not had her colonoscopy screening. From vascular standpoint her legs feel much better, she denies symptoms suggestive of claudication and ischemic rest pain, for which she was having before the surgery. She has cut back smoking to about 1/2 pack a day, prior was up to 2 packs a day. She tries to stay active and otherwise has been compliant with her medications. Medical History:     Past Medical History:   Diagnosis Date    Anxiety     Depression     Diabetes mellitus ([facility]     Glaucoma     Hearing loss     Migraines     PVD (peripheral vascular disease) ([facility]        Surgical History:     Past Surgical History:   Procedure Laterality Date    ABDOMINAL AORTIC ANEURYSM REPAIR N/A 9/15/2020    AORTAL BIFEMORAL BYPASS  **CELLSAVER** performed by Jessica Olivares MD at [address] [place] [address] AORTO-FEMORAL BYPASS GRAFT  09/15/2020    APPENDECTOMY      BREAST SURGERY      \"crystals removed from right breast\"    CATARACT REMOVAL  2015    CHOLECYSTECTOMY      TONSILLECTOMY         Family History:     Family History   Problem Relation Age of Onset   Elizabeth Muller Cancer Father     Cancer Sister        Allergies:       Sulfamethoxazole; Sulfamethoxazole-trimethoprim;  Tetanus toxoid; and Trimethoprim    Medications: Current Outpatient Medications   Medication Sig Dispense Refill    apixaban (ELIQUIS) 5 MG TABS tablet Take 1 tablet by mouth 2 times daily 180 tablet 0    omeprazole (PRILOSEC) 40 MG delayed release capsule Take 1 capsule by mouth every morning (before breakfast) 30 capsule 5    nicotine (NICODERM CQ) 21 MG/24HR Place 1 patch onto the skin daily 42 patch 1    aspirin (ASPIRIN LOW DOSE ADULT) 81 MG chewable tablet Take 1 tablet by mouth daily 30 tablet 3    lisinopril (PRINIVIL;ZESTRIL) 2.5 MG tablet Take 1 tablet by mouth daily 30 tablet 0    docusate sodium (COLACE, DULCOLAX) 100 MG CAPS Take 100 mg by mouth 2 times daily 14 capsule 0    ASPIRIN LOW DOSE ADULT PO Take 81 mg by mouth      metFORMIN (GLUCOPHAGE) 500 MG tablet Take 1 tablet by mouth 2 times daily (with meals) 60 tablet 5    busPIRone (BUSPAR) 5 MG tablet Take 1 tablet by mouth      latanoprost (XALATAN) 0.005 % ophthalmic solution INSTILL 1 DROP INTO EACH EYE AT BEDTIME      VORTIoxetine (TRINTELLIX) 10 MG TABS tablet Take 1 tablet by mouth       No current facility-administered medications for this visit. Social History:     Tobacco:    reports that she has been smoking. She has a 52.50 pack-year smoking history. She has never used smokeless tobacco.  Alcohol:      reports previous alcohol use. Drug Use:  reports no history of drug use. Review of Systems:     Review of Systems   Constitutional: Negative for chills and fever. HENT: Negative for congestion. Eyes: Negative for visual disturbance. Respiratory: Negative for chest tightness and shortness of breath. Cardiovascular: Negative for chest pain and leg swelling. Gastrointestinal: Positive for abdominal pain. Negative for abdominal distention, blood in stool, constipation, diarrhea and vomiting. Endocrine: Negative. Genitourinary: Negative. Musculoskeletal: Positive for arthralgias. Skin: Negative for color change and wound.    Allergic/Immunologic: verbal subscore is 5. GCS motor subscore is 6. Sensory: Sensation is intact. Motor: Motor function is intact. Psychiatric:         Mood and Affect: Mood normal.         Speech: Speech normal.         Behavior: Behavior normal.       Imaging/Labs:         Assessment and Plan:     PAD, aortoiliac occlusive disease, s/p Aortobifemoral bypass  · Continue optimal medical therapy with aspirin and statins  · Daily exercise to improve cardiovascular health  · Smoking cessation is key to prevent recurrence of PAD  · Will make referral to GI for possible EGD given her symptoms. Her CTA shows widely patent celiac and superior mesenteric artery, do not suspect chronic mesenteric ischemia. Likely GERD or ulcer pathology  · She also needs to get screening colonoscopy since she has never had hers and she is 71  · She will follow up in 6 months to see how she is doing    Electronically signed by Mima Ahmadi MD on 12/11/20 at 9:56 AM [address] [place] [address] North: [phone]  C: [phone]  Email: [email or web address]

## 2020-12-13 ASSESSMENT — ENCOUNTER SYMPTOMS
ABDOMINAL PAIN: 1
VOMITING: 0
BLOOD IN STOOL: 0
ALLERGIC/IMMUNOLOGIC NEGATIVE: 1
COLOR CHANGE: 0
SHORTNESS OF BREATH: 0
DIARRHEA: 0
CHEST TIGHTNESS: 0
ABDOMINAL DISTENTION: 0
CONSTIPATION: 0

## 2020-12-14 ENCOUNTER — OFFICE VISIT (OUTPATIENT)
Dept: PRIMARY CARE CLINIC | Age: 65
End: 2020-12-14
Payer: MEDICARE

## 2020-12-14 VITALS
OXYGEN SATURATION: 97 % | DIASTOLIC BLOOD PRESSURE: 90 MMHG | HEART RATE: 79 BPM | WEIGHT: 118 LBS | SYSTOLIC BLOOD PRESSURE: 162 MMHG | BODY MASS INDEX: 23.05 KG/M2

## 2020-12-14 PROBLEM — K21.9 GASTROESOPHAGEAL REFLUX DISEASE: Status: ACTIVE | Noted: 2020-12-14

## 2020-12-14 LAB — HBA1C MFR BLD: 5.7 %

## 2020-12-14 PROCEDURE — 4040F PNEUMOC VAC/ADMIN/RCVD: CPT | Performed by: NURSE PRACTITIONER

## 2020-12-14 PROCEDURE — G8427 DOCREV CUR MEDS BY ELIG CLIN: HCPCS | Performed by: NURSE PRACTITIONER

## 2020-12-14 PROCEDURE — 1090F PRES/ABSN URINE INCON ASSESS: CPT | Performed by: NURSE PRACTITIONER

## 2020-12-14 PROCEDURE — G8400 PT W/DXA NO RESULTS DOC: HCPCS | Performed by: NURSE PRACTITIONER

## 2020-12-14 PROCEDURE — 1123F ACP DISCUSS/DSCN MKR DOCD: CPT | Performed by: NURSE PRACTITIONER

## 2020-12-14 PROCEDURE — 83036 HEMOGLOBIN GLYCOSYLATED A1C: CPT | Performed by: NURSE PRACTITIONER

## 2020-12-14 PROCEDURE — G8484 FLU IMMUNIZE NO ADMIN: HCPCS | Performed by: NURSE PRACTITIONER

## 2020-12-14 PROCEDURE — G8420 CALC BMI NORM PARAMETERS: HCPCS | Performed by: NURSE PRACTITIONER

## 2020-12-14 PROCEDURE — 99214 OFFICE O/P EST MOD 30 MIN: CPT | Performed by: NURSE PRACTITIONER

## 2020-12-14 PROCEDURE — 4004F PT TOBACCO SCREEN RCVD TLK: CPT | Performed by: NURSE PRACTITIONER

## 2020-12-14 PROCEDURE — 3044F HG A1C LEVEL LT 7.0%: CPT | Performed by: NURSE PRACTITIONER

## 2020-12-14 PROCEDURE — 3017F COLORECTAL CA SCREEN DOC REV: CPT | Performed by: NURSE PRACTITIONER

## 2020-12-14 PROCEDURE — 2022F DILAT RTA XM EVC RTNOPTHY: CPT | Performed by: NURSE PRACTITIONER

## 2020-12-14 RX ORDER — LISINOPRIL 10 MG/1
10 TABLET ORAL DAILY
Qty: 30 TABLET | Refills: 5 | Status: SHIPPED | OUTPATIENT
Start: 2020-12-14 | End: 2021-04-15 | Stop reason: SDUPTHER

## 2020-12-14 ASSESSMENT — ENCOUNTER SYMPTOMS
COUGH: 0
DIARRHEA: 0
TROUBLE SWALLOWING: 0
SORE THROAT: 0
ABDOMINAL PAIN: 1
SINUS PRESSURE: 0
BLOOD IN STOOL: 0
NAUSEA: 0
SHORTNESS OF BREATH: 0
WHEEZING: 0
CONSTIPATION: 0
VOMITING: 0

## 2020-12-14 NOTE — PROGRESS NOTES
[facility] PRIMARY CARE  [facility] [address]  [address] [address]  Dept: [phone]  Dept Fax: [phone]    Kathie Sal is a 71 y.o. female who presentstoday for her medical conditions/complaints as noted below. Kathie Sal is c/o of  Chief Complaint   Patient presents with    Diabetes         HPI:     Here today for follow up  Reports saw vascular surgeon last Friday and was told would be referred to GI for her ongoing abdominal pain after eating  Feels her food gets stuck and will then have epigastric pain  She has fully recovered from her bypass surgery, reports her leg pain is significantly improved   Has been compliant with anticoagulation therapy, has been advised total of 6 months for eliquis per her vascular surgeon        Diabetes  She presents for her follow-up diabetic visit. She has type 2 diabetes mellitus. Her disease course has been improving. Pertinent negatives for hypoglycemia include no confusion, dizziness, headaches or nervousness/anxiousness. Pertinent negatives for diabetes include no chest pain, no fatigue, no polydipsia, no polyphagia, no polyuria and no weakness. Current diabetic treatment includes diet and oral agent (monotherapy). She is compliant with treatment most of the time. Her weight is stable. She is following a generally healthy diet. She rarely participates in exercise. Her home blood glucose trend is decreasing steadily. An ACE inhibitor/angiotensin II receptor blocker is being taken.        Hemoglobin A1C (%)   Date Value   12/14/2020 5.7   08/24/2020 6.3   05/22/2020 6.3             ( goal A1C is < 7)   No results found for: LABMICR  LDL Cholesterol (mg/dL)   Date Value   01/04/2020 113       (goal LDL is <100)   AST (U/L)   Date Value   01/04/2020 12     ALT (U/L)   Date Value   01/04/2020 15     BUN (mg/dL)   Date Value   09/16/2020 16     BP Readings from Last 3 Encounters:   12/14/20 (!) 162/90   12/11/20 (!) 166/95   10/30/20 (!) 160/86          ([id number])    Past Medical History:   Diagnosis Date    Anxiety     Depression     Diabetes mellitus ([address]     Glaucoma     Hearing loss     Migraines     [facility]VD [facility]peripheral vascular disease) [facility]       Past Surgical History:   Procedure Laterality Date    ABDOMINAL AORTIC ANEURYSM REPAIR N/A 9/15/2020    AORTAL BIFEMORAL BYPASS  **CELLSAVER** performed by Roxy Mg MD at [address] [place] [address] AORTO-FEMORAL BYPASS GRAFT  09/15/2020    APPENDECTOMY      BREAST SURGERY      \"crystals removed from right breast\"    CATARACT REMOVAL  2015    CHOLECYSTECTOMY      TONSILLECTOMY         Family History   Problem Relation Age of Onset    Cancer Father     Cancer Sister           Social History     Tobacco Use    Smoking status: Current Every Day Smoker     Packs/day: 1.50     Years: 35.00     Pack years: 52.50    Smokeless tobacco: Never Used   Substance Use Topics    Alcohol use: Not Currently      Current Outpatient Medications   Medication Sig Dispense Refill    latanoprost (XALATAN) 0.005 % ophthalmic solution INSTILL 1 DROP INTO EACH EYE AT BEDTIME      oxyCODONE-acetaminophen (PERCOCET) 5-325 MG per tablet Take 1 tablet by mouth every 6 hours as needed for Pain for up to 5 days. Intended supply: 5 days.  Take lowest dose possible to manage pain 20 tablet 0    apixaban (ELIQUIS) 5 MG TABS tablet Take 1 tablet by mouth 2 times daily 180 tablet 0    omeprazole (PRILOSEC) 40 MG delayed release capsule Take 1 capsule by mouth every morning (before breakfast) 30 capsule 5    nicotine (NICODERM CQ) 21 MG/24HR Place 1 patch onto the skin daily 42 patch 1    aspirin (ASPIRIN LOW DOSE ADULT) 81 MG chewable tablet Take 1 tablet by mouth daily 30 tablet 3    lisinopril (PRINIVIL;ZESTRIL) 2.5 MG tablet Take 1 tablet by mouth daily 30 tablet 0    docusate sodium (COLACE, DULCOLAX) 100 MG CAPS Take 100 mg by mouth 2 times daily 14 capsule 0    ASPIRIN LOW DOSE Negative for blood in stool, constipation, diarrhea, nausea and vomiting. Endocrine: Negative for cold intolerance, heat intolerance, polydipsia, polyphagia and polyuria. Genitourinary: Negative for difficulty urinating, frequency, hematuria and urgency. Musculoskeletal: Positive for arthralgias. Negative for myalgias. Skin: Negative for rash. Allergic/Immunologic: Negative for environmental allergies. Neurological: Negative for dizziness, weakness, light-headedness and headaches. Psychiatric/Behavioral: Negative for confusion. The patient is not nervous/anxious. Objective:     Physical Exam  Constitutional:       Appearance: She is well-developed. HENT:      Head: Normocephalic. Eyes:      Conjunctiva/sclera: Conjunctivae normal.      Pupils: Pupils are equal, round, and reactive to light. Neck:      Musculoskeletal: Normal range of motion. Cardiovascular:      Rate and Rhythm: Normal rate and regular rhythm. Heart sounds: Normal heart sounds. No murmur. Pulmonary:      Effort: Pulmonary effort is normal.      Breath sounds: Normal breath sounds. No wheezing. Abdominal:      General: Bowel sounds are normal. There is no distension. Palpations: Abdomen is soft. Musculoskeletal: Normal range of motion. Skin:     General: Skin is warm and dry. Neurological:      Mental Status: She is alert and oriented to person, place, and time. Psychiatric:         Behavior: Behavior normal.         Thought Content: Thought content normal.         Judgment: Judgment normal.       BP (!) 162/90   Pulse 79   Wt 118 lb (53.5 kg)   SpO2 97%   BMI 23.05 kg/m²     Assessment:       Diagnosis Orders   1. New onset type 2 diabetes mellitus (HCC)  POCT glycosylated hemoglobin (Hb A1C)   2. Aortoiliac occlusive disease ([address]     3. Claudication ([address]     4. Gastroesophageal reflux disease, unspecified whether esophagitis present     5.  Essential hypertension               Plan: Return in about 4 months (around 4/14/2021). Orders Placed This Encounter   Procedures    POCT glycosylated hemoglobin (Hb A1C)     Diabetes-A1c with significant improvement, continue metformin, reviewed diet  Aortic disease, claudication-significantly improved, she will follow-up as planned with vascular surgeon in 6 months  GERD-has been referred for EGD and colonoscopy per vascular, also discussed to identify and avoid trigger foods and beverages while waiting for her appointment. She is compliant with daily PPI  Hypertension-poorly controlled, on very low-dose lisinopril, will increase dosage     Patient given educational materials - see patient instructions. Discussed use, benefit, and side effects of prescribed medications. All patientquestions answered. Pt voiced understanding. Reviewed health maintenance. Instructedto continue current medications, diet and exercise. Patient agreed with treatmentplan. Follow up as directed.      Electronicallysigned by BAKARI Thomas CNP on 12/14/2020 at 5:22 PM

## 2020-12-26 RX ORDER — APIXABAN 5 MG/1
TABLET, FILM COATED ORAL
Qty: 180 TABLET | Refills: 3 | Status: SHIPPED | OUTPATIENT
Start: 2020-12-26 | End: 2021-10-18 | Stop reason: ALTCHOICE

## 2020-12-29 ENCOUNTER — TELEPHONE (OUTPATIENT)
Dept: VASCULAR SURGERY | Age: 65
End: 2020-12-29

## 2020-12-29 NOTE — TELEPHONE ENCOUNTER
Received a denial from [facility] for 2021. I called the patient to see when she would run out. Patient stated that she just got 3 bottles today. She is going to call and let me know if it is not a 90 day supply.

## 2021-01-29 ENCOUNTER — TELEPHONE (OUTPATIENT)
Dept: VASCULAR SURGERY | Age: 66
End: 2021-01-29

## 2021-01-29 ENCOUNTER — OFFICE VISIT (OUTPATIENT)
Dept: VASCULAR SURGERY | Age: 66
End: 2021-01-29

## 2021-01-29 VITALS
SYSTOLIC BLOOD PRESSURE: 161 MMHG | OXYGEN SATURATION: 97 % | WEIGHT: 111 LBS | RESPIRATION RATE: 18 BRPM | DIASTOLIC BLOOD PRESSURE: 100 MMHG | HEIGHT: 60 IN | BODY MASS INDEX: 21.79 KG/M2 | HEART RATE: 107 BPM | TEMPERATURE: 98.1 F

## 2021-01-29 DIAGNOSIS — M25.472 BILATERAL SWELLING OF FEET AND ANKLES: Primary | ICD-10-CM

## 2021-01-29 DIAGNOSIS — M25.474 BILATERAL SWELLING OF FEET AND ANKLES: Primary | ICD-10-CM

## 2021-01-29 DIAGNOSIS — M25.471 BILATERAL SWELLING OF FEET AND ANKLES: Primary | ICD-10-CM

## 2021-01-29 DIAGNOSIS — M25.475 BILATERAL SWELLING OF FEET AND ANKLES: Primary | ICD-10-CM

## 2021-01-29 PROCEDURE — 99024 POSTOP FOLLOW-UP VISIT: CPT | Performed by: SURGERY

## 2021-01-29 NOTE — TELEPHONE ENCOUNTER
Patient called saying that on Wednesday, both of her feet got really swollen and the pain is a burning feeling. She said she couldn't feel her feet. Now, she said that the veins in her RLE are popping out and behind her knee is swollen and painful. Spoke to Kojo Son, MEGAN who said to bring her in today to be evaluated. Advised patient she can be seen but needs to be here before noon. Patient verbalized understanding.

## 2021-02-01 NOTE — PROGRESS NOTES
Mrs. Yany Peralta comes in for evaluation of acute swelling of both feet and ankles a few days ago. Denies any change in her activities or being on her feet for long periods of time. Denies shortness of breath or chest pain. She kept her legs elevated and the swelling resolved in 1 day. She denies any symptoms of claudication or ischemic rest pain. She has been trying to get follow up with GI for endoscopy due to the continued discomfort when eating and having GERD symptoms. She has palpable DP pulses in both feet. I suspect likely swelling secondary to being n them for long periods. Recommend compression stockings to help since she is on her feet for long periods. Will help coordinate follow up with GI for endoscopy. She will follow up at her scheduled surveillance from aortobifemoral bypass.     Electronically signed by Tia Israel MD on 2/1/2021 at 8:35 AM

## 2021-02-02 ENCOUNTER — PATIENT MESSAGE (OUTPATIENT)
Dept: PRIMARY CARE CLINIC | Age: 66
End: 2021-02-02

## 2021-02-02 DIAGNOSIS — G89.28 PAIN, POSTOPERATIVE, CHRONIC: ICD-10-CM

## 2021-02-02 RX ORDER — OXYCODONE HYDROCHLORIDE AND ACETAMINOPHEN 5; 325 MG/1; MG/1
1 TABLET ORAL EVERY 8 HOURS PRN
Qty: 21 TABLET | Refills: 0 | Status: SHIPPED | OUTPATIENT
Start: 2021-02-02 | End: 2021-04-26 | Stop reason: SDUPTHER

## 2021-02-02 NOTE — TELEPHONE ENCOUNTER
From: Rubina Mijares  To: BAKARI Barnes - CNP  Sent: 2/2/2021 11:37 AM EST  Subject: Prescription Question    Could you please call in a prescription for percocet for me still have not had my colonoscopy or the other test done but I am in a lot of pain and could use some relief.       Thanks,    Pillo Briceño

## 2021-03-30 ENCOUNTER — TELEPHONE (OUTPATIENT)
Dept: VASCULAR SURGERY | Age: 66
End: 2021-03-30

## 2021-03-30 NOTE — TELEPHONE ENCOUNTER
Patient contacted office stating that \"we need to clear her for her surgery with GI. \" According to the consult note from 1/8/21 from GI that was faxed to us, there is nothing indicating a need from Vascular Surgery in order to proceed with the EGD and Colonoscopy that are mentioned to be scheduled per the GI provider. Patient states that we are to \"take her off of Eliquis or she can't have nothing done, and she's suffering in pain. \" I left voicemail for Kalyani at [facility] to please fax over any requests needed for \"clearance\" for the patient, and to call out office back to confirm what is going on with patient as far as plan of care. Of note, patient requested pain medications from our office after she received her last refill from her PCP on 2/2/21.

## 2021-04-15 ENCOUNTER — OFFICE VISIT (OUTPATIENT)
Dept: PRIMARY CARE CLINIC | Age: 66
End: 2021-04-15
Payer: MEDICARE

## 2021-04-15 VITALS
HEART RATE: 74 BPM | SYSTOLIC BLOOD PRESSURE: 148 MMHG | RESPIRATION RATE: 18 BRPM | OXYGEN SATURATION: 98 % | BODY MASS INDEX: 24.18 KG/M2 | DIASTOLIC BLOOD PRESSURE: 92 MMHG | WEIGHT: 123.8 LBS

## 2021-04-15 DIAGNOSIS — I10 ESSENTIAL HYPERTENSION: ICD-10-CM

## 2021-04-15 DIAGNOSIS — E11.9 NEW ONSET TYPE 2 DIABETES MELLITUS (HCC): Primary | ICD-10-CM

## 2021-04-15 DIAGNOSIS — R10.84 GENERALIZED POSTPRANDIAL ABDOMINAL PAIN: ICD-10-CM

## 2021-04-15 DIAGNOSIS — I74.09 AORTOILIAC OCCLUSIVE DISEASE (HCC): ICD-10-CM

## 2021-04-15 DIAGNOSIS — Z13.29 SCREENING FOR THYROID DISORDER: ICD-10-CM

## 2021-04-15 DIAGNOSIS — M79.89 LEG SWELLING: ICD-10-CM

## 2021-04-15 DIAGNOSIS — F17.200 SMOKES AND MOTIVATED TO QUIT: ICD-10-CM

## 2021-04-15 LAB — HBA1C MFR BLD: 6.4 %

## 2021-04-15 PROCEDURE — 1090F PRES/ABSN URINE INCON ASSESS: CPT | Performed by: NURSE PRACTITIONER

## 2021-04-15 PROCEDURE — 99214 OFFICE O/P EST MOD 30 MIN: CPT | Performed by: NURSE PRACTITIONER

## 2021-04-15 PROCEDURE — G8420 CALC BMI NORM PARAMETERS: HCPCS | Performed by: NURSE PRACTITIONER

## 2021-04-15 PROCEDURE — 2022F DILAT RTA XM EVC RTNOPTHY: CPT | Performed by: NURSE PRACTITIONER

## 2021-04-15 PROCEDURE — 1123F ACP DISCUSS/DSCN MKR DOCD: CPT | Performed by: NURSE PRACTITIONER

## 2021-04-15 PROCEDURE — G8427 DOCREV CUR MEDS BY ELIG CLIN: HCPCS | Performed by: NURSE PRACTITIONER

## 2021-04-15 PROCEDURE — 3017F COLORECTAL CA SCREEN DOC REV: CPT | Performed by: NURSE PRACTITIONER

## 2021-04-15 PROCEDURE — 3044F HG A1C LEVEL LT 7.0%: CPT | Performed by: NURSE PRACTITIONER

## 2021-04-15 PROCEDURE — 4040F PNEUMOC VAC/ADMIN/RCVD: CPT | Performed by: NURSE PRACTITIONER

## 2021-04-15 PROCEDURE — 83036 HEMOGLOBIN GLYCOSYLATED A1C: CPT | Performed by: NURSE PRACTITIONER

## 2021-04-15 PROCEDURE — 4004F PT TOBACCO SCREEN RCVD TLK: CPT | Performed by: NURSE PRACTITIONER

## 2021-04-15 PROCEDURE — G8400 PT W/DXA NO RESULTS DOC: HCPCS | Performed by: NURSE PRACTITIONER

## 2021-04-15 RX ORDER — LISINOPRIL 20 MG/1
20 TABLET ORAL DAILY
Qty: 90 TABLET | Refills: 3 | Status: SHIPPED | OUTPATIENT
Start: 2021-04-15 | End: 2022-03-01 | Stop reason: SDUPTHER

## 2021-04-15 ASSESSMENT — ENCOUNTER SYMPTOMS
NAUSEA: 0
CONSTIPATION: 0
TROUBLE SWALLOWING: 0
SINUS PRESSURE: 0
SHORTNESS OF BREATH: 0
VOMITING: 0
ABDOMINAL PAIN: 1
BLOOD IN STOOL: 0
COUGH: 0
DIARRHEA: 0
SORE THROAT: 0
WHEEZING: 0

## 2021-04-15 ASSESSMENT — PATIENT HEALTH QUESTIONNAIRE - PHQ9: SUM OF ALL RESPONSES TO PHQ QUESTIONS 1-9: 0

## 2021-04-19 ENCOUNTER — HOSPITAL ENCOUNTER (OUTPATIENT)
Age: 66
Discharge: HOME | End: 2021-04-19
Payer: MEDICARE

## 2021-04-19 DIAGNOSIS — I10 ESSENTIAL HYPERTENSION: ICD-10-CM

## 2021-04-19 DIAGNOSIS — I74.09 AORTOILIAC OCCLUSIVE DISEASE (HCC): ICD-10-CM

## 2021-04-19 DIAGNOSIS — M79.89 LEG SWELLING: ICD-10-CM

## 2021-04-19 DIAGNOSIS — Z13.29 SCREENING FOR THYROID DISORDER: ICD-10-CM

## 2021-04-19 LAB
ALBUMIN SERPL-MCNC: 4 G/DL (ref 3.5–5.2)
ALBUMIN/GLOBULIN RATIO: 1.5 (ref 1–2.5)
ALP BLD-CCNC: 89 U/L (ref 35–104)
ALT SERPL-CCNC: 14 U/L (ref 5–33)
ANION GAP SERPL CALCULATED.3IONS-SCNC: 9 MMOL/L (ref 9–17)
AST SERPL-CCNC: 17 U/L
BILIRUB SERPL-MCNC: 0.36 MG/DL (ref 0.3–1.2)
BUN BLDV-MCNC: 14 MG/DL (ref 8–23)
BUN/CREAT BLD: ABNORMAL (ref 9–20)
CALCIUM SERPL-MCNC: 9.1 MG/DL (ref 8.6–10.4)
CHLORIDE BLD-SCNC: 107 MMOL/L (ref 98–107)
CHOLESTEROL/HDL RATIO: 4
CHOLESTEROL: 191 MG/DL
CO2: 25 MMOL/L (ref 20–31)
CREAT SERPL-MCNC: 0.75 MG/DL (ref 0.5–0.9)
GFR AFRICAN AMERICAN: >60 ML/MIN
GFR NON-AFRICAN AMERICAN: >60 ML/MIN
GFR SERPL CREATININE-BSD FRML MDRD: ABNORMAL ML/MIN/{1.73_M2}
GFR SERPL CREATININE-BSD FRML MDRD: ABNORMAL ML/MIN/{1.73_M2}
GLUCOSE BLD-MCNC: 125 MG/DL (ref 70–99)
HDLC SERPL-MCNC: 48 MG/DL
LDL CHOLESTEROL: 127 MG/DL (ref 0–130)
POTASSIUM SERPL-SCNC: 4.6 MMOL/L (ref 3.7–5.3)
SODIUM BLD-SCNC: 141 MMOL/L (ref 135–144)
TOTAL PROTEIN: 6.7 G/DL (ref 6.4–8.3)
TRIGL SERPL-MCNC: 81 MG/DL
TSH SERPL DL<=0.05 MIU/L-ACNC: 0.42 MIU/L (ref 0.3–5)
VLDLC SERPL CALC-MCNC: NORMAL MG/DL (ref 1–30)

## 2021-04-19 PROCEDURE — 80061 LIPID PANEL: CPT

## 2021-04-19 PROCEDURE — 84443 ASSAY THYROID STIM HORMONE: CPT

## 2021-04-19 PROCEDURE — 36415 COLL VENOUS BLD VENIPUNCTURE: CPT

## 2021-04-19 PROCEDURE — 80053 COMPREHEN METABOLIC PANEL: CPT

## 2021-04-24 ENCOUNTER — PATIENT MESSAGE (OUTPATIENT)
Dept: PRIMARY CARE CLINIC | Age: 66
End: 2021-04-24

## 2021-04-24 DIAGNOSIS — G89.28 PAIN, POSTOPERATIVE, CHRONIC: ICD-10-CM

## 2021-04-26 RX ORDER — OXYCODONE HYDROCHLORIDE AND ACETAMINOPHEN 5; 325 MG/1; MG/1
1 TABLET ORAL EVERY 8 HOURS PRN
Qty: 21 TABLET | Refills: 0 | Status: SHIPPED | OUTPATIENT
Start: 2021-04-26 | End: 2021-05-19 | Stop reason: SDUPTHER

## 2021-04-26 NOTE — TELEPHONE ENCOUNTER
Please call [facility] and inquire about getting her scheduled. She was referred to them by vascular and has been in pain for months.   Also let her know I have sent in a few percocet for her while she is waiting  Than ks

## 2021-04-26 NOTE — TELEPHONE ENCOUNTER
From: Eber Ready  To: BAKARI Jean - CNP  Sent: 4/24/2021 2:26 AM EDT  Subject: Prescription Question    Well the doctors office never called to schedule my tests, I really don't know what to do? My pain is not easing off at all could you please send in a prescription for some percocets, this not being able to sleep at night is really taking a toll on me.     Thanks,  Monisha Sullivan

## 2021-05-18 ENCOUNTER — PATIENT MESSAGE (OUTPATIENT)
Dept: PRIMARY CARE CLINIC | Age: 66
End: 2021-05-18

## 2021-05-18 DIAGNOSIS — G89.28 PAIN, POSTOPERATIVE, CHRONIC: ICD-10-CM

## 2021-05-19 RX ORDER — OXYCODONE HYDROCHLORIDE AND ACETAMINOPHEN 5; 325 MG/1; MG/1
1 TABLET ORAL EVERY 8 HOURS PRN
Qty: 21 TABLET | Refills: 0 | Status: SHIPPED | OUTPATIENT
Start: 2021-05-19 | End: 2021-06-14 | Stop reason: SDUPTHER

## 2021-05-19 NOTE — TELEPHONE ENCOUNTER
From: Eber Layne  To: BAKARI Jean CNP  Sent: 5/18/2021 11:28 PM EDT  Subject: Prescription Question    Well went for my tests hope they sent you a report. My esophagus is barley open they are going to schedule me for a procedure to open it up, put me on a soft diet, but I still have a lot of pain so could you please call me in another refill for Percocet?     Thanks,    Monisha Sullivan

## 2021-06-04 ENCOUNTER — HOSPITAL ENCOUNTER (OUTPATIENT)
Dept: LAB | Age: 66
Setting detail: SPECIMEN
Discharge: HOME | End: 2021-06-04
Payer: MEDICARE

## 2021-06-04 DIAGNOSIS — Z20.822 COVID-19 RULED OUT BY LABORATORY TESTING: Primary | ICD-10-CM

## 2021-06-04 PROCEDURE — U0003 INFECTIOUS AGENT DETECTION BY NUCLEIC ACID (DNA OR RNA); SEVERE ACUTE RESPIRATORY SYNDROME CORONAVIRUS 2 (SARS-COV-2) (CORONAVIRUS DISEASE [COVID-19]), AMPLIFIED PROBE TECHNIQUE, MAKING USE OF HIGH THROUGHPUT TECHNOLOGIES AS DESCRIBED BY CMS-2020-01-R: HCPCS

## 2021-06-04 PROCEDURE — U0005 INFEC AGEN DETEC AMPLI PROBE: HCPCS

## 2021-06-05 LAB
SARS-COV-2: NORMAL
SARS-COV-2: NOT DETECTED
SOURCE: NORMAL

## 2021-06-08 ENCOUNTER — HOSPITAL ENCOUNTER (OUTPATIENT)
Dept: ULTRASOUND IMAGING | Age: 66
Setting detail: OUTPATIENT SURGERY
Discharge: HOME | End: 2021-06-10
Attending: INTERNAL MEDICINE
Payer: MEDICARE

## 2021-06-08 ENCOUNTER — ANESTHESIA EVENT (OUTPATIENT)
Dept: ENDOSCOPY | Age: 66
End: 2021-06-08
Payer: MEDICARE

## 2021-06-08 ENCOUNTER — ANESTHESIA (OUTPATIENT)
Dept: ENDOSCOPY | Age: 66
End: 2021-06-08
Payer: MEDICARE

## 2021-06-08 ENCOUNTER — HOSPITAL ENCOUNTER (OUTPATIENT)
Age: 66
Setting detail: OUTPATIENT SURGERY
Discharge: HOME | End: 2021-06-08
Attending: INTERNAL MEDICINE | Admitting: INTERNAL MEDICINE
Payer: MEDICARE

## 2021-06-08 VITALS
RESPIRATION RATE: 27 BRPM | SYSTOLIC BLOOD PRESSURE: 168 MMHG | DIASTOLIC BLOOD PRESSURE: 93 MMHG | OXYGEN SATURATION: 98 %

## 2021-06-08 VITALS
TEMPERATURE: 97.9 F | DIASTOLIC BLOOD PRESSURE: 78 MMHG | BODY MASS INDEX: 19.63 KG/M2 | RESPIRATION RATE: 20 BRPM | WEIGHT: 115 LBS | HEIGHT: 64 IN | OXYGEN SATURATION: 96 % | SYSTOLIC BLOOD PRESSURE: 118 MMHG | HEART RATE: 72 BPM

## 2021-06-08 VITALS — DIASTOLIC BLOOD PRESSURE: 88 MMHG | SYSTOLIC BLOOD PRESSURE: 159 MMHG | OXYGEN SATURATION: 100 %

## 2021-06-08 DIAGNOSIS — R10.9 ABDOMINAL PAIN, UNSPECIFIED ABDOMINAL LOCATION: ICD-10-CM

## 2021-06-08 LAB — GLUCOSE BLD-MCNC: 116 MG/DL (ref 65–105)

## 2021-06-08 PROCEDURE — 3609012400 HC EGD TRANSORAL BIOPSY SINGLE/MULTIPLE: Performed by: INTERNAL MEDICINE

## 2021-06-08 PROCEDURE — 88305 TISSUE EXAM BY PATHOLOGIST: CPT

## 2021-06-08 PROCEDURE — 7100000001 HC PACU RECOVERY - ADDTL 15 MIN: Performed by: INTERNAL MEDICINE

## 2021-06-08 PROCEDURE — 76975 GI ENDOSCOPIC ULTRASOUND: CPT

## 2021-06-08 PROCEDURE — 3700000001 HC ADD 15 MINUTES (ANESTHESIA): Performed by: INTERNAL MEDICINE

## 2021-06-08 PROCEDURE — 2500000003 HC RX 250 WO HCPCS: Performed by: NURSE ANESTHETIST, CERTIFIED REGISTERED

## 2021-06-08 PROCEDURE — 88172 CYTP DX EVAL FNA 1ST EA SITE: CPT

## 2021-06-08 PROCEDURE — 2580000003 HC RX 258: Performed by: INTERNAL MEDICINE

## 2021-06-08 PROCEDURE — 2709999900 HC NON-CHARGEABLE SUPPLY: Performed by: INTERNAL MEDICINE

## 2021-06-08 PROCEDURE — 3609017700 HC EGD DILATION GASTRIC/DUODENAL STRICTURE: Performed by: INTERNAL MEDICINE

## 2021-06-08 PROCEDURE — 3609020800 HC EGD W/EUS FNA: Performed by: INTERNAL MEDICINE

## 2021-06-08 PROCEDURE — 2580000003 HC RX 258: Performed by: NURSE ANESTHETIST, CERTIFIED REGISTERED

## 2021-06-08 PROCEDURE — 2720000010 HC SURG SUPPLY STERILE: Performed by: INTERNAL MEDICINE

## 2021-06-08 PROCEDURE — 88342 IMHCHEM/IMCYTCHM 1ST ANTB: CPT

## 2021-06-08 PROCEDURE — 3700000000 HC ANESTHESIA ATTENDED CARE: Performed by: INTERNAL MEDICINE

## 2021-06-08 PROCEDURE — 88173 CYTOPATH EVAL FNA REPORT: CPT

## 2021-06-08 PROCEDURE — C1726 CATH, BAL DIL, NON-VASCULAR: HCPCS | Performed by: INTERNAL MEDICINE

## 2021-06-08 PROCEDURE — 7100000011 HC PHASE II RECOVERY - ADDTL 15 MIN: Performed by: INTERNAL MEDICINE

## 2021-06-08 PROCEDURE — 6360000002 HC RX W HCPCS: Performed by: NURSE ANESTHETIST, CERTIFIED REGISTERED

## 2021-06-08 PROCEDURE — 88177 CYTP FNA EVAL EA ADDL: CPT

## 2021-06-08 PROCEDURE — 7100000000 HC PACU RECOVERY - FIRST 15 MIN: Performed by: INTERNAL MEDICINE

## 2021-06-08 PROCEDURE — 88341 IMHCHEM/IMCYTCHM EA ADD ANTB: CPT

## 2021-06-08 PROCEDURE — 7100000010 HC PHASE II RECOVERY - FIRST 15 MIN: Performed by: INTERNAL MEDICINE

## 2021-06-08 PROCEDURE — 82947 ASSAY GLUCOSE BLOOD QUANT: CPT

## 2021-06-08 RX ORDER — ONDANSETRON 2 MG/ML
INJECTION INTRAMUSCULAR; INTRAVENOUS PRN
Status: DISCONTINUED | OUTPATIENT
Start: 2021-06-08 | End: 2021-06-08 | Stop reason: SDUPTHER

## 2021-06-08 RX ORDER — GLYCOPYRROLATE 1 MG/5 ML
SYRINGE (ML) INTRAVENOUS PRN
Status: DISCONTINUED | OUTPATIENT
Start: 2021-06-08 | End: 2021-06-08 | Stop reason: SDUPTHER

## 2021-06-08 RX ORDER — PROPOFOL 10 MG/ML
INJECTION, EMULSION INTRAVENOUS PRN
Status: DISCONTINUED | OUTPATIENT
Start: 2021-06-08 | End: 2021-06-08 | Stop reason: SDUPTHER

## 2021-06-08 RX ORDER — ROCURONIUM BROMIDE 10 MG/ML
INJECTION, SOLUTION INTRAVENOUS PRN
Status: DISCONTINUED | OUTPATIENT
Start: 2021-06-08 | End: 2021-06-08 | Stop reason: SDUPTHER

## 2021-06-08 RX ORDER — SODIUM CHLORIDE 9 MG/ML
INJECTION INTRAVENOUS PRN
Status: DISCONTINUED | OUTPATIENT
Start: 2021-06-08 | End: 2021-06-08 | Stop reason: SDUPTHER

## 2021-06-08 RX ORDER — SODIUM CHLORIDE 9 MG/ML
INJECTION, SOLUTION INTRAVENOUS CONTINUOUS PRN
Status: DISCONTINUED | OUTPATIENT
Start: 2021-06-08 | End: 2021-06-08 | Stop reason: SDUPTHER

## 2021-06-08 RX ORDER — LABETALOL HYDROCHLORIDE 5 MG/ML
INJECTION, SOLUTION INTRAVENOUS PRN
Status: DISCONTINUED | OUTPATIENT
Start: 2021-06-08 | End: 2021-06-08 | Stop reason: SDUPTHER

## 2021-06-08 RX ORDER — FENTANYL CITRATE 50 UG/ML
50 INJECTION, SOLUTION INTRAMUSCULAR; INTRAVENOUS EVERY 5 MIN PRN
Status: DISCONTINUED | OUTPATIENT
Start: 2021-06-08 | End: 2021-06-08 | Stop reason: HOSPADM

## 2021-06-08 RX ORDER — FENTANYL CITRATE 50 UG/ML
25 INJECTION, SOLUTION INTRAMUSCULAR; INTRAVENOUS EVERY 5 MIN PRN
Status: DISCONTINUED | OUTPATIENT
Start: 2021-06-08 | End: 2021-06-08 | Stop reason: HOSPADM

## 2021-06-08 RX ORDER — DEXAMETHASONE SODIUM PHOSPHATE 10 MG/ML
INJECTION INTRAMUSCULAR; INTRAVENOUS PRN
Status: DISCONTINUED | OUTPATIENT
Start: 2021-06-08 | End: 2021-06-08 | Stop reason: SDUPTHER

## 2021-06-08 RX ORDER — SODIUM CHLORIDE 9 MG/ML
INJECTION, SOLUTION INTRAVENOUS CONTINUOUS
Status: DISCONTINUED | OUTPATIENT
Start: 2021-06-08 | End: 2021-06-08 | Stop reason: HOSPADM

## 2021-06-08 RX ORDER — KETAMINE HYDROCHLORIDE 10 MG/ML
INJECTION, SOLUTION INTRAMUSCULAR; INTRAVENOUS PRN
Status: DISCONTINUED | OUTPATIENT
Start: 2021-06-08 | End: 2021-06-08 | Stop reason: SDUPTHER

## 2021-06-08 RX ORDER — MEPERIDINE HYDROCHLORIDE 50 MG/ML
12.5 INJECTION INTRAMUSCULAR; INTRAVENOUS; SUBCUTANEOUS EVERY 5 MIN PRN
Status: DISCONTINUED | OUTPATIENT
Start: 2021-06-08 | End: 2021-06-08 | Stop reason: HOSPADM

## 2021-06-08 RX ORDER — ONDANSETRON 2 MG/ML
4 INJECTION INTRAMUSCULAR; INTRAVENOUS
Status: DISCONTINUED | OUTPATIENT
Start: 2021-06-08 | End: 2021-06-08 | Stop reason: HOSPADM

## 2021-06-08 RX ORDER — LIDOCAINE HYDROCHLORIDE 10 MG/ML
INJECTION, SOLUTION EPIDURAL; INFILTRATION; INTRACAUDAL; PERINEURAL PRN
Status: DISCONTINUED | OUTPATIENT
Start: 2021-06-08 | End: 2021-06-08 | Stop reason: SDUPTHER

## 2021-06-08 RX ORDER — NEOSTIGMINE METHYLSULFATE 5 MG/5 ML
SYRINGE (ML) INTRAVENOUS PRN
Status: DISCONTINUED | OUTPATIENT
Start: 2021-06-08 | End: 2021-06-08 | Stop reason: SDUPTHER

## 2021-06-08 RX ORDER — FENTANYL CITRATE 50 UG/ML
INJECTION, SOLUTION INTRAMUSCULAR; INTRAVENOUS PRN
Status: DISCONTINUED | OUTPATIENT
Start: 2021-06-08 | End: 2021-06-08 | Stop reason: SDUPTHER

## 2021-06-08 RX ORDER — PHENYLEPHRINE HYDROCHLORIDE 10 MG/ML
INJECTION INTRAVENOUS PRN
Status: DISCONTINUED | OUTPATIENT
Start: 2021-06-08 | End: 2021-06-08 | Stop reason: SDUPTHER

## 2021-06-08 RX ADMIN — PHENYLEPHRINE HYDROCHLORIDE 150 MCG: 10 INJECTION INTRAVENOUS at 16:18

## 2021-06-08 RX ADMIN — DEXAMETHASONE SODIUM PHOSPHATE 10 MG: 10 INJECTION INTRAMUSCULAR; INTRAVENOUS at 15:39

## 2021-06-08 RX ADMIN — Medication 0.2 MG: at 15:41

## 2021-06-08 RX ADMIN — PROPOFOL 160 MG: 10 INJECTION, EMULSION INTRAVENOUS at 15:24

## 2021-06-08 RX ADMIN — PHENYLEPHRINE HYDROCHLORIDE 150 MCG: 10 INJECTION INTRAVENOUS at 16:01

## 2021-06-08 RX ADMIN — LIDOCAINE HYDROCHLORIDE 50 MG: 10 INJECTION, SOLUTION EPIDURAL; INFILTRATION; INTRACAUDAL; PERINEURAL at 15:24

## 2021-06-08 RX ADMIN — Medication 0.4 MG: at 16:06

## 2021-06-08 RX ADMIN — KETAMINE HYDROCHLORIDE 25 MG: 10 INJECTION INTRAMUSCULAR; INTRAVENOUS at 13:10

## 2021-06-08 RX ADMIN — Medication 5 MG: at 13:07

## 2021-06-08 RX ADMIN — PROPOFOL 30 MG: 10 INJECTION, EMULSION INTRAVENOUS at 16:11

## 2021-06-08 RX ADMIN — FENTANYL CITRATE 25 MCG: 50 INJECTION, SOLUTION INTRAMUSCULAR; INTRAVENOUS at 16:11

## 2021-06-08 RX ADMIN — SODIUM CHLORIDE: 9 INJECTION, SOLUTION INTRAVENOUS at 12:14

## 2021-06-08 RX ADMIN — SODIUM CHLORIDE 10 ML: 9 INJECTION INTRAMUSCULAR; INTRAVENOUS; SUBCUTANEOUS at 16:01

## 2021-06-08 RX ADMIN — Medication 0.2 MG: at 13:13

## 2021-06-08 RX ADMIN — PHENYLEPHRINE HYDROCHLORIDE 150 MCG: 10 INJECTION INTRAVENOUS at 16:17

## 2021-06-08 RX ADMIN — ONDANSETRON 4 MG: 2 INJECTION INTRAMUSCULAR; INTRAVENOUS at 15:39

## 2021-06-08 RX ADMIN — Medication 5 MG: at 13:14

## 2021-06-08 RX ADMIN — ROCURONIUM BROMIDE 30 MG: 10 INJECTION INTRAVENOUS at 15:24

## 2021-06-08 RX ADMIN — PHENYLEPHRINE HYDROCHLORIDE 150 MCG: 10 INJECTION INTRAVENOUS at 16:04

## 2021-06-08 RX ADMIN — SODIUM CHLORIDE: 9 INJECTION, SOLUTION INTRAVENOUS at 15:46

## 2021-06-08 RX ADMIN — SODIUM CHLORIDE: 9 INJECTION, SOLUTION INTRAVENOUS at 12:40

## 2021-06-08 RX ADMIN — PROPOFOL 30 MG: 10 INJECTION, EMULSION INTRAVENOUS at 13:03

## 2021-06-08 RX ADMIN — Medication 3 MG: at 16:06

## 2021-06-08 RX ADMIN — LIDOCAINE HYDROCHLORIDE 50 MG: 10 INJECTION, SOLUTION EPIDURAL; INFILTRATION; INTRACAUDAL; PERINEURAL at 13:03

## 2021-06-08 RX ADMIN — PROPOFOL 75 MCG/KG/MIN: 10 INJECTION, EMULSION INTRAVENOUS at 13:04

## 2021-06-08 ASSESSMENT — PULMONARY FUNCTION TESTS
PIF_VALUE: 16
PIF_VALUE: 18
PIF_VALUE: 18
PIF_VALUE: 24
PIF_VALUE: 16
PIF_VALUE: 22
PIF_VALUE: 18
PIF_VALUE: 17
PIF_VALUE: 15
PIF_VALUE: 1
PIF_VALUE: 18
PIF_VALUE: 16
PIF_VALUE: 18
PIF_VALUE: 17
PIF_VALUE: 0
PIF_VALUE: 18
PIF_VALUE: 16
PIF_VALUE: 16
PIF_VALUE: 17
PIF_VALUE: 0
PIF_VALUE: 17
PIF_VALUE: 15
PIF_VALUE: 18
PIF_VALUE: 18
PIF_VALUE: 22
PIF_VALUE: 20
PIF_VALUE: 17
PIF_VALUE: 18
PIF_VALUE: 17
PIF_VALUE: 26
PIF_VALUE: 17
PIF_VALUE: 15
PIF_VALUE: 16
PIF_VALUE: 18
PIF_VALUE: 18
PIF_VALUE: 17
PIF_VALUE: 17
PIF_VALUE: 18
PIF_VALUE: 17
PIF_VALUE: 18
PIF_VALUE: 18
PIF_VALUE: 15
PIF_VALUE: 18
PIF_VALUE: 17
PIF_VALUE: 18
PIF_VALUE: 3
PIF_VALUE: 18
PIF_VALUE: 18

## 2021-06-08 ASSESSMENT — LIFESTYLE VARIABLES
SMOKING_STATUS: 1
SMOKING_STATUS: 1

## 2021-06-08 ASSESSMENT — PAIN SCALES - GENERAL
PAINLEVEL_OUTOF10: 0
PAINLEVEL_OUTOF10: 0

## 2021-06-08 ASSESSMENT — PAIN DESCRIPTION - DESCRIPTORS: DESCRIPTORS: CONSTANT;PRESSURE

## 2021-06-08 ASSESSMENT — PAIN - FUNCTIONAL ASSESSMENT: PAIN_FUNCTIONAL_ASSESSMENT: 0-10

## 2021-06-08 NOTE — ANESTHESIA PRE PROCEDURE
Department of Anesthesiology  Preprocedure Note       Name:  Eber Layne   Age:  76 y.o.  :  1955                                          MRN:  [id number]         Date:  2021      Surgeon: Maikol Loyd):  Maylon Goldberg, MD    Procedure: Procedure(s):  ENDOSCOPIC ULTRASOUND, PATHOLOGY REQUESTED- GRAYSON NOTIFIED  EGD ESOPHAGOGASTRODUODENOSCOPY    Medications prior to admission:   Prior to Admission medications    Medication Sig Start Date End Date Taking?  Authorizing Provider   lisinopril (PRINIVIL;ZESTRIL) 20 MG tablet Take 1 tablet by mouth daily 4/15/21  Yes BAKARI Arshad CNP   ELIQUIS 5 MG TABS tablet TAKE 1 TABLET BY MOUTH TWICE DAILY 20  Yes Raymond Soto MD   latanoprost (XALATAN) 0.005 % ophthalmic solution INSTILL 1 DROP INTO EACH EYE AT BEDTIME 20  Yes Historical Provider, MD   omeprazole (PRILOSEC) 40 MG delayed release capsule Take 1 capsule by mouth every morning (before breakfast) 10/13/20  Yes BAKARI Branham CNP   aspirin (ASPIRIN LOW DOSE ADULT) 81 MG chewable tablet Take 1 tablet by mouth daily 20  Yes BAKARI Lau NP   metFORMIN (GLUCOPHAGE) 500 MG tablet Take 1 tablet by mouth 2 times daily (with meals) 20  Yes BAKARI Branham CNP   VORTIoxetine (TRINTELLIX) 10 MG TABS tablet Take 1 tablet by mouth 19  Yes Historical Provider, MD   busPIRone (BUSPAR) 5 MG tablet Take 1 tablet by mouth 19  Yes Historical Provider, MD   nicotine (NICODERM CQ) 21 MG/24HR Place 1 patch onto the skin daily  Patient not taking: Reported on 4/15/2021 10/13/20   BAKARI Jean CNP       Current medications:    Current Facility-Administered Medications   Medication Dose Route Frequency Provider Last Rate Last Admin    0.9 % sodium chloride infusion   Intravenous Continuous Maylon Goldberg, MD         Facility-Administered Medications Ordered in Other Encounters   Medication Dose Route Frequency Provider Last Rate Last Admin    0.9 % sodium chloride infusion   Intravenous Continuous PRN Kalpana Huerta, BAKARI - CRNA   New Bag at 06/08/21 1214       Allergies: Allergies   Allergen Reactions    Dilaudid [Hydromorphone Hcl] Swelling    Sulfamethoxazole Swelling    Sulfamethoxazole-Trimethoprim Swelling    Trimethoprim Swelling    Tetanus Toxoid Swelling     Arm swelling.        Problem List:    Patient Active Problem List   Diagnosis Code    Occlusion of aorta (HCC) I70.0    New onset type 2 diabetes mellitus ([facility] [address] E11.9    Claudication ([facility] [facility] I73.9    Aortoiliac occlusive disease ([facility] [facility] I74.09    Gastroesophageal reflux disease K21.9       Past Medical History:        Diagnosis Date    Anxiety     Depression     Diabetes mellitus ([facility]     Glaucoma     Hearing loss     Migraines     PVD (peripheral vascular disease) ([facility]        Past Surgical History:        Procedure Laterality Date    ABDOMINAL AORTIC ANEURYSM REPAIR N/A 9/15/2020    AORTAL BIFEMORAL BYPASS  **CELLSAVER** performed by Anna Marrero MD at [address] [place] [address] AORTO-FEMORAL BYPASS GRAFT  09/15/2020    APPENDECTOMY      BREAST SURGERY      \"crystals removed from right breast\"    CATARACT REMOVAL  2015    CHOLECYSTECTOMY      TONSILLECTOMY         Social History:    Social History     Tobacco Use    Smoking status: Current Every Day Smoker     Packs/day: 1.50     Years: 35.00     Pack years: 52.50    Smokeless tobacco: Never Used   Substance Use Topics    Alcohol use: Not Currently                                Ready to quit: Not Answered  Counseling given: Not Answered      Vital Signs (Current):   Vitals:    06/08/21 1214   BP: (!) 187/85   Pulse: 74   Resp: 17   Temp: 35.9 °C (96.7 °F)   TempSrc: Temporal   SpO2: 97%   Weight: 115 lb (52.2 kg)   Height: 5' 4\" (1.626 m)                                              BP Readings from Last 3 Encounters:   06/08/21 (!) 187/85   04/15/21 (!) 148/92   01/29/21 (!) 161/100       NPO Status:                                                                                 BMI:   Wt Readings from Last 3 Encounters:   06/08/21 115 lb (52.2 kg)   04/15/21 123 lb 12.8 oz (56.2 kg)   01/29/21 111 lb (50.3 kg)     Body mass index is 19.74 kg/m². CBC:   Lab Results   Component Value Date    WBC 13.7 09/16/2020    RBC 3.70 09/16/2020    HGB 12.1 09/16/2020    HCT 38.0 09/16/2020    .7 09/16/2020    RDW 13.3 09/16/2020     09/16/2020       CMP:   Lab Results   Component Value Date     04/19/2021    K 4.6 04/19/2021     04/19/2021    CO2 25 04/19/2021    BUN 14 04/19/2021    CREATININE 0.75 04/19/2021    GFRAA >60 04/19/2021    LABGLOM >60 04/19/2021    GLUCOSE 125 04/19/2021    PROT 6.7 04/19/2021    CALCIUM 9.1 04/19/2021    BILITOT 0.36 04/19/2021    ALKPHOS 89 04/19/2021    AST 17 04/19/2021    ALT 14 04/19/2021       POC Tests: No results for input(s): POCGLU, POCNA, POCK, POCCL, POCBUN, POCHEMO, POCHCT in the last 72 hours. Coags: No results found for: PROTIME, INR, APTT    HCG (If Applicable): No results found for: PREGTESTUR, PREGSERUM, HCG, HCGQUANT     ABGs: No results found for: PHART, PO2ART, JYM6YNR, WCW4EQB, BEART, G4LYTVIT     Type & Screen (If Applicable):  No results found for: LABABO, LABRH    Drug/Infectious Status (If Applicable):  No results found for: HIV, HEPCAB    COVID-19 Screening (If Applicable):   Lab Results   Component Value Date    COVID19 Not Detected 06/04/2021    COVID19 Not Detected 09/11/2020           Anesthesia Evaluation    Airway: Mallampati: I  TM distance: >3 FB   Neck ROM: full  Mouth opening: > = 3 FB Dental:    (+) upper dentures and lower dentures      Pulmonary:   (+) current smoker          Patient smoked on day of surgery.                  Cardiovascular:  Exercise tolerance: good (>4 METS),   (+) hypertension:,         Rhythm: regular  Rate: normal                    Neuro/Psych:   (+) headaches:, psychiatric history: GI/Hepatic/Renal:   (+) GERD:,           Endo/Other:    (+) Diabeteswell controlled, , .                 Abdominal:       Abdomen: soft. Vascular:   + PVD, aortic or cerebral, . Anesthesia Plan      MAC     ASA 3       Induction: intravenous. Anesthetic plan and risks discussed with patient. Plan discussed with attending.                   Yazan Goode, APRN - CRNA   6/8/2021

## 2021-06-08 NOTE — H&P
History and Physical    Pt Name: Alfredo Pires  MRN: [id number]  YOB: 1955  Date of evaluation: 6/8/2021  Primary Care Physician: BAKARI Lowe CNP    SUBJECTIVE:   History of Chief Complaint:    Alfredo Pires is a 76 y.o. female who is scheduled today for EUS/EGD. Patient states following aortal bifemoral bypass in 9/2020 she has been experiencing dysphagia and upper abdominal pain. She states that an EGD was performed in the recent past which showed narrowing of the esophagus. Allergies  is allergic to dilaudid [hydromorphone hcl], sulfamethoxazole, sulfamethoxazole-trimethoprim, trimethoprim, and tetanus toxoid. Medications  Prior to Admission medications    Medication Sig Start Date End Date Taking?  Authorizing Provider   lisinopril (PRINIVIL;ZESTRIL) 20 MG tablet Take 1 tablet by mouth daily 4/15/21  Yes BAKARI Arshad CNP   ELIQUIS 5 MG TABS tablet TAKE 1 TABLET BY MOUTH TWICE DAILY 12/26/20  Yes Vipul Torres MD   latanoprost (XALATAN) 0.005 % ophthalmic solution INSTILL 1 DROP INTO EACH EYE AT BEDTIME 11/5/20  Yes Historical Provider, MD   omeprazole (PRILOSEC) 40 MG delayed release capsule Take 1 capsule by mouth every morning (before breakfast) 10/13/20  Yes BAKARI Farrar CNP   aspirin (ASPIRIN LOW DOSE ADULT) 81 MG chewable tablet Take 1 tablet by mouth daily 9/22/20  Yes BAKARI Lau NP   metFORMIN (GLUCOPHAGE) 500 MG tablet Take 1 tablet by mouth 2 times daily (with meals) 4/13/20  Yes BAKARI Farrar CNP   VORTIoxetine (TRINTELLIX) 10 MG TABS tablet Take 1 tablet by mouth 12/5/19  Yes Historical Provider, MD   busPIRone (BUSPAR) 5 MG tablet Take 1 tablet by mouth 12/5/19  Yes Historical Provider, MD   nicotine (NICODERM CQ) 21 MG/24HR Place 1 patch onto the skin daily  Patient not taking: Reported on 4/15/2021 10/13/20   BAKARI Lowe CNP     Past Medical History    has a past medical history of EXTREMITIES: No edema bilateral lower extremities. No varicosities bilateral lower extremities. NEUROLOGIC: CN II-XII are grossly intact. Gait not assessed.    IMPRESSIONS:   SEVERE STENOSIS, SUBMUCOSAL NODULE  PLANS:   ENDOSCOPIC ULTRASOUND, PATHOLOGY REQUESTED  EGD    BAKARI Carrillo CNP   Electronically signed 6/8/2021 at 12:48 PM

## 2021-06-08 NOTE — ANESTHESIA PRE PROCEDURE
Department of Anesthesiology  Preprocedure Note       Name:  Gay Whelan   Age:  76 y.o.  :  1955                                          MRN:  [id number]         Date:  2021      Surgeon: Alexis Ku):  Tri Moreau MD    Procedure: Procedure(s):  ENDOSCOPIC ULTRASOUND, PATHOLOGY REQUESTED- GRAYSON NOTIFIED  EGD ESOPHAGOGASTRODUODENOSCOPY    Medications prior to admission:   Prior to Admission medications    Medication Sig Start Date End Date Taking?  Authorizing Provider   lisinopril (PRINIVIL;ZESTRIL) 20 MG tablet Take 1 tablet by mouth daily 4/15/21  Yes BAKARI Arshad CNP   ELIQUIS 5 MG TABS tablet TAKE 1 TABLET BY MOUTH TWICE DAILY 20  Yes Jazmin Baker MD   latanoprost (XALATAN) 0.005 % ophthalmic solution INSTILL 1 DROP INTO EACH EYE AT BEDTIME 20  Yes Historical Provider, MD   omeprazole (PRILOSEC) 40 MG delayed release capsule Take 1 capsule by mouth every morning (before breakfast) 10/13/20  Yes BAKARI Allen CNP   aspirin (ASPIRIN LOW DOSE ADULT) 81 MG chewable tablet Take 1 tablet by mouth daily 20  Yes BAKARI Lau NP   metFORMIN (GLUCOPHAGE) 500 MG tablet Take 1 tablet by mouth 2 times daily (with meals) 20  Yes BAKARI Allen CNP   VORTIoxetine (TRINTELLIX) 10 MG TABS tablet Take 1 tablet by mouth 19  Yes Historical Provider, MD   busPIRone (BUSPAR) 5 MG tablet Take 1 tablet by mouth 19  Yes Historical Provider, MD   nicotine (NICODERM CQ) 21 MG/24HR Place 1 patch onto the skin daily  Patient not taking: Reported on 4/15/2021 10/13/20   BAKARI Ramirez CNP       Current medications:    Current Facility-Administered Medications   Medication Dose Route Frequency Provider Last Rate Last Admin    0.9 % sodium chloride infusion   Intravenous Continuous Tri Moreau MD         Facility-Administered Medications Ordered in Other Encounters   Medication Dose Route Frequency Provider Last Rate Last Admin    0.9 % sodium chloride infusion   Intravenous Continuous PRN Kalpana Colby, APRN - CRNA   New Bag at 06/08/21 1214       Allergies: Allergies   Allergen Reactions    Dilaudid [Hydromorphone Hcl] Swelling    Sulfamethoxazole Swelling    Sulfamethoxazole-Trimethoprim Swelling    Trimethoprim Swelling    Tetanus Toxoid Swelling     Arm swelling.        Problem List:    Patient Active Problem List   Diagnosis Code    Occlusion of aorta (HCC) I70.0    New onset type 2 diabetes mellitus ([facility] E11.9    Claudication ([facility] I73.9    Aortoiliac occlusive disease ([facility] I74.09    Gastroesophageal reflux disease K21.9       Past Medical History:        Diagnosis Date    Anxiety     Depression     Diabetes mellitus ([facility] [address] [facility]     Glaucoma     Hearing loss     Migraines     PVD (peripheral vascular disease) ([facility] [address] [facility]        Past Surgical History:        Procedure Laterality Date    ABDOMINAL AORTIC ANEURYSM REPAIR N/A 9/15/2020    AORTAL BIFEMORAL BYPASS  **CELLSAVER** performed by Rick Guzman MD at [address] [place] [address] AORTO-FEMORAL BYPASS GRAFT  09/15/2020    APPENDECTOMY      BREAST SURGERY      \"crystals removed from right breast\"    CATARACT REMOVAL  2015    CHOLECYSTECTOMY      TONSILLECTOMY         Social History:    Social History     Tobacco Use    Smoking status: Current Every Day Smoker     Packs/day: 1.50     Years: 35.00     Pack years: 52.50    Smokeless tobacco: Never Used   Substance Use Topics    Alcohol use: Not Currently                                Ready to quit: Not Answered  Counseling given: Not Answered      Vital Signs (Current):   Vitals:    06/08/21 1214   BP: (!) 187/85   Pulse: 74   Resp: 17   Temp: 96.7 °F (35.9 °C)   TempSrc: Temporal   SpO2: 97%   Weight: 115 lb (52.2 kg)   Height: 5' 4\" (1.626 m)                                              BP Readings from Last 3 Encounters:   06/08/21 (!) 187/85   04/15/21 (!) 148/92   01/29/21 (!) 161/100       NPO Status:                                                                                 BMI:   Wt Readings from Last 3 Encounters:   06/08/21 115 lb (52.2 kg)   04/15/21 123 lb 12.8 oz (56.2 kg)   01/29/21 111 lb (50.3 kg)     Body mass index is 19.74 kg/m². CBC:   Lab Results   Component Value Date    WBC 13.7 09/16/2020    RBC 3.70 09/16/2020    HGB 12.1 09/16/2020    HCT 38.0 09/16/2020    .7 09/16/2020    RDW 13.3 09/16/2020     09/16/2020       CMP:   Lab Results   Component Value Date     04/19/2021    K 4.6 04/19/2021     04/19/2021    CO2 25 04/19/2021    BUN 14 04/19/2021    CREATININE 0.75 04/19/2021    GFRAA >60 04/19/2021    LABGLOM >60 04/19/2021    GLUCOSE 125 04/19/2021    PROT 6.7 04/19/2021    CALCIUM 9.1 04/19/2021    BILITOT 0.36 04/19/2021    ALKPHOS 89 04/19/2021    AST 17 04/19/2021    ALT 14 04/19/2021       POC Tests: No results for input(s): POCGLU, POCNA, POCK, POCCL, POCBUN, POCHEMO, POCHCT in the last 72 hours.     Coags: No results found for: PROTIME, INR, APTT    HCG (If Applicable): No results found for: PREGTESTUR, PREGSERUM, HCG, HCGQUANT     ABGs: No results found for: PHART, PO2ART, ICA4LNK, DNE0DUR, BEART, I7GBVRUK     Type & Screen (If Applicable):  No results found for: LABABO, LABRH    Drug/Infectious Status (If Applicable):  No results found for: HIV, HEPCAB    COVID-19 Screening (If Applicable):   Lab Results   Component Value Date    COVID19 Not Detected 06/04/2021    COVID19 Not Detected 09/11/2020         Anesthesia Evaluation  Patient summary reviewed no history of anesthetic complications:   Airway: Mallampati: II  TM distance: >3 FB   Neck ROM: full  Mouth opening: > = 3 FB Dental:    (+) edentulous      Pulmonary:Negative Pulmonary ROS and normal exam                               Cardiovascular:Negative CV ROS            Rhythm: regular  Rate: normal                    Neuro/Psych:   (+) headaches: migraine headaches, psychiatric history:depression/anxiety             GI/Hepatic/Renal:   (+) GERD:,           Endo/Other:    (+) DiabetesType II DM, no interval change, , .                 Abdominal:           Vascular: negative vascular ROS. Anesthesia Plan      MAC     ASA 3       Induction: intravenous. Anesthetic plan and risks discussed with patient. Plan discussed with CRNA.                   Chito Gama MD   6/8/2021

## 2021-06-08 NOTE — ANESTHESIA POSTPROCEDURE EVALUATION
Department of Anesthesiology  Postprocedure Note    Patient: Alfredo Pires  MRN: [id number]  YOB: 1955  Date of evaluation: 6/8/2021  Time:  3:31 PM     Procedure Summary     Date: 06/08/21 Room / Location: [address] [place] [address]    Anesthesia Start: 1300 Anesthesia Stop: 5301    Procedure: EGD BIOPSY (N/A ) Diagnosis: (SEVERE STENOSIS, SUBMUCOSAL NODULE)    Surgeons: Raza Frost MD Responsible Provider: Melissa Brown MD    Anesthesia Type: MAC ASA Status: 3          Anesthesia Type: MAC    Evelyn Phase I: Evelyn Score: 10    Evelyn Phase II: Evelyn Score: 10    Last vitals: Reviewed and per EMR flowsheets. POST-OP ANESTHESIA NOTE       BP (!) 150/80   Pulse 75   Temp 97 °F (36.1 °C) (Temporal)   Resp 18   Ht 5' 4\" (1.626 m)   Wt 115 lb (52.2 kg)   SpO2 95%   BMI 19.74 kg/m²    Pain Assessment: 0-10  Pain Level: 0           Anesthesia Post Evaluation    Patient location during evaluation: PACU  Patient participation: complete - patient participated  Level of consciousness: awake  Pain score: 0  Airway patency: patent  Nausea & Vomiting: no vomiting and no nausea  Complications: no  Cardiovascular status: hemodynamically stable  Respiratory status: acceptable  Hydration status: stable    Did not tolerate procedure under MAC. Will bring to OR for GETA.

## 2021-06-08 NOTE — ANESTHESIA PRE PROCEDURE
Department of Anesthesiology  Preprocedure Note       Name:  Jannie Fuentes   Age:  76 y.o.  :  1955                                          MRN:  [id number]         Date:  2021      Surgeon: Anusha Foreman):  Jatinder Stephens MD    Procedure: Procedure(s):  **CASE IN O.R. WITH G.I. STAFF**  ENDOSCOPIC ULTRASOUND, EGD    Medications prior to admission:   Prior to Admission medications    Medication Sig Start Date End Date Taking? Authorizing Provider   lisinopril (PRINIVIL;ZESTRIL) 20 MG tablet Take 1 tablet by mouth daily 4/15/21  Yes BAKARI Arshad CNP   ELIQUIS 5 MG TABS tablet TAKE 1 TABLET BY MOUTH TWICE DAILY 20  Yes Enoc Gaona MD   latanoprost (XALATAN) 0.005 % ophthalmic solution INSTILL 1 DROP INTO EACH EYE AT BEDTIME 20  Yes Historical Provider, MD   omeprazole (PRILOSEC) 40 MG delayed release capsule Take 1 capsule by mouth every morning (before breakfast) 10/13/20  Yes BAKARI Stewart CNP   aspirin (ASPIRIN LOW DOSE ADULT) 81 MG chewable tablet Take 1 tablet by mouth daily 20  Yes BAKARI Lau NP   metFORMIN (GLUCOPHAGE) 500 MG tablet Take 1 tablet by mouth 2 times daily (with meals) 20  Yes BAKARI Stewart CNP   VORTIoxetine (TRINTELLIX) 10 MG TABS tablet Take 1 tablet by mouth 19  Yes Historical Provider, MD   busPIRone (BUSPAR) 5 MG tablet Take 1 tablet by mouth 19  Yes Historical Provider, MD   nicotine (NICODERM CQ) 21 MG/24HR Place 1 patch onto the skin daily  Patient not taking: Reported on 4/15/2021 10/13/20   BAKARI Garrido CNP       Current medications:    Current Facility-Administered Medications   Medication Dose Route Frequency Provider Last Rate Last Admin    0.9 % sodium chloride infusion   Intravenous Continuous Jatinder Stephens  mL/hr at 21 1240 New Bag at 21 1240       Allergies:     Allergies   Allergen Reactions    Dilaudid [Hydromorphone Hcl] Swelling Time of last liquid consumption: 2200                        Time of last solid consumption: 1330                        Date of last liquid consumption: 06/07/21                        Date of last solid food consumption: 06/07/21    BMI:   Wt Readings from Last 3 Encounters:   06/08/21 115 lb (52.2 kg)   04/15/21 123 lb 12.8 oz (56.2 kg)   01/29/21 111 lb (50.3 kg)     Body mass index is 19.74 kg/m². CBC:   Lab Results   Component Value Date    WBC 13.7 09/16/2020    RBC 3.70 09/16/2020    HGB 12.1 09/16/2020    HCT 38.0 09/16/2020    .7 09/16/2020    RDW 13.3 09/16/2020     09/16/2020       CMP:   Lab Results   Component Value Date     04/19/2021    K 4.6 04/19/2021     04/19/2021    CO2 25 04/19/2021    BUN 14 04/19/2021    CREATININE 0.75 04/19/2021    GFRAA >60 04/19/2021    LABGLOM >60 04/19/2021    GLUCOSE 125 04/19/2021    PROT 6.7 04/19/2021    CALCIUM 9.1 04/19/2021    BILITOT 0.36 04/19/2021    ALKPHOS 89 04/19/2021    AST 17 04/19/2021    ALT 14 04/19/2021       POC Tests: No results for input(s): POCGLU, POCNA, POCK, POCCL, POCBUN, POCHEMO, POCHCT in the last 72 hours. Coags: No results found for: PROTIME, INR, APTT    HCG (If Applicable): No results found for: PREGTESTUR, PREGSERUM, HCG, HCGQUANT     ABGs: No results found for: PHART, PO2ART, FZI1XZU, KNH8UKZ, BEART, Z1CPZVLA     Type & Screen (If Applicable):  No results found for: LABABO, LABRH    Drug/Infectious Status (If Applicable):  No results found for: HIV, HEPCAB    COVID-19 Screening (If Applicable):   Lab Results   Component Value Date    COVID19 Not Detected 06/04/2021    COVID19 Not Detected 09/11/2020           Anesthesia Evaluation    Airway: Mallampati: I  TM distance: >3 FB   Neck ROM: full  Mouth opening: > = 3 FB Dental:    (+) upper dentures and lower dentures      Pulmonary:   (+) current smoker          Patient smoked on day of surgery.                  Cardiovascular:  Exercise tolerance: good (>4 METS),   (+) hypertension:,         Rhythm: regular  Rate: normal                    Neuro/Psych:   (+) headaches:, psychiatric history:            GI/Hepatic/Renal:   (+) GERD:,           Endo/Other:    (+) Diabeteswell controlled, , .                 Abdominal:       Abdomen: soft. Vascular:   + PVD, aortic or cerebral, . Anesthesia Plan      general     ASA 3       Induction: intravenous. MIPS: Postoperative opioids intended. Anesthetic plan and risks discussed with patient. Plan discussed with CRNA.                   Ezequiel Lema MD   6/8/2021

## 2021-06-08 NOTE — OP NOTE
EGD/EUS      Patient:   Mohini Rooney    :    1955    Facility:   [facility]   Referring/PCP: BAKARI Baxter CNP    Procedure:   Esophagogastroduodenoscopy --diagnostic, with biopsy, TTS balloon dilation  and Endoscopic ultrasound with FNB of gastric mass. Date:     2021   Endoscopist:  Feliberto Canas MD     Indication:  Epigastric abdominal pain , weight loss and Pyloric stenosis     Anesthesia:  Intubated     Complications: None    Estimated blood loss: Minimal    Specimen collected: Yes    Description of Procedure:  Informed consent was obtained from the patient after explanation of the procedure including indications, description of the procedure,  benefits and possible risks and complications of the procedure, and alternatives. Questions were answered. The patient's history was reviewed and a directed physical examination was performed prior to the procedure. Patient was monitored throughout the procedure with pulse oximetry and periodic assessment of vital signs. Patient was sedated as noted above. With the patient in the left lateral decubitus position, the Olympus videoendoscope followed by endoechoendoscope was placed in the patient's mouth and under direct visualization passed into the esophagus. The scope was passed to the 2nd portion of the duodenum. The patient tolerated the procedure well and was taken to the recovery area in good condition. EGD Findings:   Esophagus: normal.   Stomach: Severe Pyloric stenosis with erythema , ulcerations and edema around the pyloric channel. Upper scope was not able to traverse through the stenosis . Pediatric scope was able to pass across the stenosis . Biopsies done. TTS balloon dilation of the stenosis done with mild resistance at 6 mm and and moderate resistance at 7.5 mm Hg.    Duodenum: Normal.     EUS findings: (Limited exam as echoendoscope was not able to traverse the pyloric stenosis)  Pancreas:  A round cyst was identified in the pancreatic body . This was anechoic, without septa in it. Hyperechoic round shadowing debris was seen in the cyst . The cyst measured 1.5 mm by 2.8 mm in maximal cross-sectional diameter. This was not communicating with PD. The endosonographic borders were well-defined. A round cyst was identified in the pancreatic body near the neck of the pancreas. This was anechoic without septation. The cyst measured 2 x 1.5 mm in cross sectional direction. This was not communicating with PD. Rest of the pancreas in the neck , body and tail appeared within normal limits. PD with hyperechoic ductal walls. PD in the body 1.5 mm in the tail 1 mm in maximum cross sectional diameter. Left lobe of liver: Hyperechoic consistent with fatty infiltration. Lymphadenopathy: No pathologic lymphadenopathy seen in upper abdomen. Stomach: An oval hypoechoic , homogenous , solid mass like lesion was seen at the pyloric stenosis. Mass was arising from the muscularis propria of the stomach. Mass measured 6 x 20 mm in maximum cross sectional diameter. Fine needle biopsy was performed. Color Doppler imaging was utilized prior to needle puncture to confirm a lack of significant vascular structures within the needle path. Six  passes were made with the 25 gauge ultrasound biopsy needle using a transgastric  approach. A stylet was used. A cytologist was present and performed a preliminary cytologic examination. Preliminary cytology is NOT suspicious for malignancy (final results are pending). Estimated blood loss was minimal. Verification of patient identification for the specimen was done by the physician and nurse using the patient's name and medical record number. COMMENT: Mass lesion seen around the Pyloric stenosis . Differentials includes inflammatory mass/ GIST/Leiomyoma /Malignancy . FNB done. Recommendations:  Follow with the biopsy results Protonix 40 mg BID for 2 months                                    CT abdomen and Pelvis for further evaluation . Repeat EGD in 1 month for re-dilation                                    STOP SMOKING                                    Follow up in the GI office.     Electronically signed by Celestino Jerez MD  on 6/8/2021 at 4:17 PM

## 2021-06-09 LAB
CASE NUMBER:: NORMAL
SPECIMEN DESCRIPTION: NORMAL
SURGICAL PATHOLOGY REPORT: NORMAL

## 2021-06-09 NOTE — ANESTHESIA POSTPROCEDURE EVALUATION
Department of Anesthesiology  Postprocedure Note    Patient: Kriss Mc  MRN: [id number]  YOB: 1955  Date of evaluation: 6/9/2021  Time:  11:58 AM     Procedure Summary     Date: 06/08/21 Room / Location: [address] [place] [address]    Anesthesia Start: 6798 Anesthesia Stop: 1631    Procedures:       EGD W/EUS FNA (N/A )      EGD DILATION BALLOON 6-7.5MM Diagnosis: (ADD-ON)    Surgeons: Tan Banegas MD Responsible Provider: Larissa Bell MD    Anesthesia Type: general ASA Status: 3          Anesthesia Type: general    Evelyn Phase I: Evelyn Score: 10    Evelyn Phase II: Evelyn Score: 10    Last vitals: Reviewed and per EMR flowsheets.    POST-OP ANESTHESIA NOTE       /78   Pulse 72   Temp 97.9 °F (36.6 °C)   Resp 20   Ht 5' 4\" (1.626 m)   Wt 115 lb (52.2 kg)   SpO2 96%   BMI 19.74 kg/m²    Pain Assessment: 0-10  Pain Level: 0         Anesthesia Post Evaluation    Patient location during evaluation: PACU  Patient participation: complete - patient participated  Level of consciousness: awake  Pain score: 0  Airway patency: patent  Nausea & Vomiting: no vomiting  Complications: no  Cardiovascular status: hemodynamically stable  Respiratory status: acceptable  Hydration status: stable

## 2021-06-11 LAB — SURGICAL PATHOLOGY REPORT: NORMAL

## 2021-06-14 ENCOUNTER — PATIENT MESSAGE (OUTPATIENT)
Dept: PRIMARY CARE CLINIC | Age: 66
End: 2021-06-14

## 2021-06-14 ENCOUNTER — HOSPITAL ENCOUNTER (OUTPATIENT)
Dept: CT IMAGING | Age: 66
Discharge: HOME | End: 2021-06-16
Payer: MEDICARE

## 2021-06-14 DIAGNOSIS — G89.28 PAIN, POSTOPERATIVE, CHRONIC: ICD-10-CM

## 2021-06-14 LAB
CREAT SERPL-MCNC: 0.75 MG/DL (ref 0.5–0.9)
GFR AFRICAN AMERICAN: >60 ML/MIN
GFR NON-AFRICAN AMERICAN: >60 ML/MIN
GFR SERPL CREATININE-BSD FRML MDRD: NORMAL ML/MIN/{1.73_M2}
GFR SERPL CREATININE-BSD FRML MDRD: NORMAL ML/MIN/{1.73_M2}

## 2021-06-14 PROCEDURE — 6360000004 HC RX CONTRAST MEDICATION: Performed by: NURSE PRACTITIONER

## 2021-06-14 PROCEDURE — 82565 ASSAY OF CREATININE: CPT

## 2021-06-14 PROCEDURE — 2580000003 HC RX 258: Performed by: NURSE PRACTITIONER

## 2021-06-14 PROCEDURE — 74177 CT ABD & PELVIS W/CONTRAST: CPT

## 2021-06-14 PROCEDURE — 36415 COLL VENOUS BLD VENIPUNCTURE: CPT

## 2021-06-14 RX ORDER — 0.9 % SODIUM CHLORIDE 0.9 %
80 INTRAVENOUS SOLUTION INTRAVENOUS ONCE
Status: COMPLETED | OUTPATIENT
Start: 2021-06-14 | End: 2021-06-14

## 2021-06-14 RX ORDER — SODIUM CHLORIDE 0.9 % (FLUSH) 0.9 %
10 SYRINGE (ML) INJECTION PRN
Status: DISCONTINUED | OUTPATIENT
Start: 2021-06-14 | End: 2021-06-17 | Stop reason: HOSPADM

## 2021-06-14 RX ORDER — OXYCODONE HYDROCHLORIDE AND ACETAMINOPHEN 5; 325 MG/1; MG/1
1 TABLET ORAL EVERY 8 HOURS PRN
Qty: 21 TABLET | Refills: 0 | Status: SHIPPED | OUTPATIENT
Start: 2021-06-14 | End: 2021-06-30 | Stop reason: SDUPTHER

## 2021-06-14 RX ADMIN — SODIUM CHLORIDE 80 ML: 9 INJECTION, SOLUTION INTRAVENOUS at 13:46

## 2021-06-14 RX ADMIN — IOHEXOL 50 ML: 240 INJECTION, SOLUTION INTRATHECAL; INTRAVASCULAR; INTRAVENOUS; ORAL at 13:48

## 2021-06-14 RX ADMIN — SODIUM CHLORIDE, PRESERVATIVE FREE 10 ML: 5 INJECTION INTRAVENOUS at 13:49

## 2021-06-14 RX ADMIN — IOPAMIDOL 75 ML: 755 INJECTION, SOLUTION INTRAVENOUS at 13:48

## 2021-06-14 NOTE — TELEPHONE ENCOUNTER
From: Waldo Gallardo  To: Victorino Cortez, APRN - CNP  Sent: 6/14/2021 1:40 AM EDT  Subject: Prescription Question    Well we still have not fixed my problem, going today for a CT scan to see if there is something going on, then I go again next month for them to try to stretch open my esophagus again. So could you please call in another order for Percocet?     Thanks,    Bettina Franklin

## 2021-06-22 ENCOUNTER — HOSPITAL ENCOUNTER (OUTPATIENT)
Dept: VASCULAR LAB | Age: 66
Discharge: HOME | End: 2021-06-22
Payer: MEDICARE

## 2021-06-22 PROCEDURE — 93923 UPR/LXTR ART STDY 3+ LVLS: CPT

## 2021-06-30 ENCOUNTER — PATIENT MESSAGE (OUTPATIENT)
Dept: PRIMARY CARE CLINIC | Age: 66
End: 2021-06-30

## 2021-06-30 DIAGNOSIS — G89.28 PAIN, POSTOPERATIVE, CHRONIC: ICD-10-CM

## 2021-06-30 RX ORDER — OXYCODONE HYDROCHLORIDE AND ACETAMINOPHEN 5; 325 MG/1; MG/1
1 TABLET ORAL EVERY 8 HOURS PRN
Qty: 21 TABLET | Refills: 0 | Status: SHIPPED | OUTPATIENT
Start: 2021-06-30 | End: 2021-07-15 | Stop reason: SDUPTHER

## 2021-06-30 NOTE — TELEPHONE ENCOUNTER
From: Matthew Brennan  To: Kyra Monday, APRN - CNP  Sent: 6/30/2021 7:34 AM EDT  Subject: Prescription Question    Could you please call in another refill for Percocet, as you can see I have a lot of procedures coming up but not soon enough, I really just wish they could fix it now.     Thanks,    Manuel Walton

## 2021-07-09 ENCOUNTER — OFFICE VISIT (OUTPATIENT)
Dept: VASCULAR SURGERY | Age: 66
End: 2021-07-09
Payer: MEDICARE

## 2021-07-09 VITALS
TEMPERATURE: 99 F | SYSTOLIC BLOOD PRESSURE: 167 MMHG | HEIGHT: 60 IN | WEIGHT: 120 LBS | DIASTOLIC BLOOD PRESSURE: 80 MMHG | HEART RATE: 96 BPM | BODY MASS INDEX: 23.56 KG/M2 | OXYGEN SATURATION: 96 % | RESPIRATION RATE: 19 BRPM

## 2021-07-09 DIAGNOSIS — I73.9 PAD (PERIPHERAL ARTERY DISEASE) (HCC): Primary | ICD-10-CM

## 2021-07-09 DIAGNOSIS — I74.09 AORTOILIAC OCCLUSIVE DISEASE (HCC): ICD-10-CM

## 2021-07-09 DIAGNOSIS — Z95.828 STATUS POST AORTOBIFEMORAL BYPASS SURGERY: ICD-10-CM

## 2021-07-09 PROCEDURE — 4040F PNEUMOC VAC/ADMIN/RCVD: CPT | Performed by: SURGERY

## 2021-07-09 PROCEDURE — 99214 OFFICE O/P EST MOD 30 MIN: CPT | Performed by: SURGERY

## 2021-07-09 PROCEDURE — 1123F ACP DISCUSS/DSCN MKR DOCD: CPT | Performed by: SURGERY

## 2021-07-09 PROCEDURE — G8420 CALC BMI NORM PARAMETERS: HCPCS | Performed by: SURGERY

## 2021-07-09 PROCEDURE — G8427 DOCREV CUR MEDS BY ELIG CLIN: HCPCS | Performed by: SURGERY

## 2021-07-09 PROCEDURE — 4004F PT TOBACCO SCREEN RCVD TLK: CPT | Performed by: SURGERY

## 2021-07-09 PROCEDURE — 1090F PRES/ABSN URINE INCON ASSESS: CPT | Performed by: SURGERY

## 2021-07-09 PROCEDURE — G8400 PT W/DXA NO RESULTS DOC: HCPCS | Performed by: SURGERY

## 2021-07-09 PROCEDURE — 3017F COLORECTAL CA SCREEN DOC REV: CPT | Performed by: SURGERY

## 2021-07-12 PROBLEM — Z95.828 STATUS POST AORTOBIFEMORAL BYPASS SURGERY: Status: ACTIVE | Noted: 2021-07-12

## 2021-07-12 ASSESSMENT — ENCOUNTER SYMPTOMS
COLOR CHANGE: 0
ALLERGIC/IMMUNOLOGIC NEGATIVE: 1
ABDOMINAL PAIN: 1
SHORTNESS OF BREATH: 0
CHEST TIGHTNESS: 0

## 2021-07-15 ENCOUNTER — OFFICE VISIT (OUTPATIENT)
Dept: PRIMARY CARE CLINIC | Age: 66
End: 2021-07-15
Payer: MEDICARE

## 2021-07-15 ENCOUNTER — HOSPITAL ENCOUNTER (OUTPATIENT)
Dept: MRI IMAGING | Age: 66
Discharge: HOME | End: 2021-07-17
Payer: MEDICARE

## 2021-07-15 VITALS
RESPIRATION RATE: 16 BRPM | HEIGHT: 60 IN | WEIGHT: 120 LBS | SYSTOLIC BLOOD PRESSURE: 140 MMHG | OXYGEN SATURATION: 97 % | HEART RATE: 79 BPM | BODY MASS INDEX: 23.56 KG/M2 | DIASTOLIC BLOOD PRESSURE: 84 MMHG

## 2021-07-15 DIAGNOSIS — E11.9 NEW ONSET TYPE 2 DIABETES MELLITUS (HCC): Primary | ICD-10-CM

## 2021-07-15 DIAGNOSIS — R10.84 GENERALIZED POSTPRANDIAL ABDOMINAL PAIN: ICD-10-CM

## 2021-07-15 DIAGNOSIS — M25.511 CHRONIC RIGHT SHOULDER PAIN: ICD-10-CM

## 2021-07-15 DIAGNOSIS — F41.8 DEPRESSION WITH ANXIETY: ICD-10-CM

## 2021-07-15 DIAGNOSIS — R10.13 EPIGASTRIC ABDOMINAL PAIN: ICD-10-CM

## 2021-07-15 DIAGNOSIS — G89.29 CHRONIC RIGHT SHOULDER PAIN: ICD-10-CM

## 2021-07-15 LAB
CREAT SERPL-MCNC: 0.71 MG/DL (ref 0.5–0.9)
GFR AFRICAN AMERICAN: >60 ML/MIN
GFR NON-AFRICAN AMERICAN: >60 ML/MIN
GFR SERPL CREATININE-BSD FRML MDRD: NORMAL ML/MIN/{1.73_M2}
GFR SERPL CREATININE-BSD FRML MDRD: NORMAL ML/MIN/{1.73_M2}
HBA1C MFR BLD: 5.9 %

## 2021-07-15 PROCEDURE — 99214 OFFICE O/P EST MOD 30 MIN: CPT | Performed by: NURSE PRACTITIONER

## 2021-07-15 PROCEDURE — A9579 GAD-BASE MR CONTRAST NOS,1ML: HCPCS | Performed by: NURSE PRACTITIONER

## 2021-07-15 PROCEDURE — G8420 CALC BMI NORM PARAMETERS: HCPCS | Performed by: NURSE PRACTITIONER

## 2021-07-15 PROCEDURE — 82565 ASSAY OF CREATININE: CPT

## 2021-07-15 PROCEDURE — 4004F PT TOBACCO SCREEN RCVD TLK: CPT | Performed by: NURSE PRACTITIONER

## 2021-07-15 PROCEDURE — 6360000004 HC RX CONTRAST MEDICATION: Performed by: NURSE PRACTITIONER

## 2021-07-15 PROCEDURE — 3017F COLORECTAL CA SCREEN DOC REV: CPT | Performed by: NURSE PRACTITIONER

## 2021-07-15 PROCEDURE — 3044F HG A1C LEVEL LT 7.0%: CPT | Performed by: NURSE PRACTITIONER

## 2021-07-15 PROCEDURE — 1123F ACP DISCUSS/DSCN MKR DOCD: CPT | Performed by: NURSE PRACTITIONER

## 2021-07-15 PROCEDURE — 4040F PNEUMOC VAC/ADMIN/RCVD: CPT | Performed by: NURSE PRACTITIONER

## 2021-07-15 PROCEDURE — 74183 MRI ABD W/O CNTR FLWD CNTR: CPT

## 2021-07-15 PROCEDURE — 2022F DILAT RTA XM EVC RTNOPTHY: CPT | Performed by: NURSE PRACTITIONER

## 2021-07-15 PROCEDURE — 36415 COLL VENOUS BLD VENIPUNCTURE: CPT

## 2021-07-15 PROCEDURE — G8427 DOCREV CUR MEDS BY ELIG CLIN: HCPCS | Performed by: NURSE PRACTITIONER

## 2021-07-15 PROCEDURE — G8400 PT W/DXA NO RESULTS DOC: HCPCS | Performed by: NURSE PRACTITIONER

## 2021-07-15 PROCEDURE — 83036 HEMOGLOBIN GLYCOSYLATED A1C: CPT | Performed by: NURSE PRACTITIONER

## 2021-07-15 PROCEDURE — 2580000003 HC RX 258: Performed by: NURSE PRACTITIONER

## 2021-07-15 PROCEDURE — 1090F PRES/ABSN URINE INCON ASSESS: CPT | Performed by: NURSE PRACTITIONER

## 2021-07-15 RX ORDER — SODIUM CHLORIDE 0.9 % (FLUSH) 0.9 %
10 SYRINGE (ML) INJECTION PRN
Status: DISCONTINUED | OUTPATIENT
Start: 2021-07-15 | End: 2021-07-18 | Stop reason: HOSPADM

## 2021-07-15 RX ORDER — 0.9 % SODIUM CHLORIDE 0.9 %
60 INTRAVENOUS SOLUTION INTRAVENOUS ONCE
Status: COMPLETED | OUTPATIENT
Start: 2021-07-15 | End: 2021-07-15

## 2021-07-15 RX ORDER — OXYCODONE HYDROCHLORIDE AND ACETAMINOPHEN 5; 325 MG/1; MG/1
1 TABLET ORAL EVERY 8 HOURS PRN
Qty: 90 TABLET | Refills: 0 | Status: SHIPPED | OUTPATIENT
Start: 2021-07-15 | End: 2021-08-19 | Stop reason: SDUPTHER

## 2021-07-15 RX ADMIN — SODIUM CHLORIDE, PRESERVATIVE FREE 10 ML: 5 INJECTION INTRAVENOUS at 10:25

## 2021-07-15 RX ADMIN — GADOTERIDOL 10 ML: 279.3 INJECTION, SOLUTION INTRAVENOUS at 10:25

## 2021-07-15 RX ADMIN — SODIUM CHLORIDE 60 ML: 9 INJECTION, SOLUTION INTRAVENOUS at 10:25

## 2021-07-15 SDOH — ECONOMIC STABILITY: TRANSPORTATION INSECURITY
IN THE PAST 12 MONTHS, HAS LACK OF TRANSPORTATION KEPT YOU FROM MEETINGS, WORK, OR FROM GETTING THINGS NEEDED FOR DAILY LIVING?: NO

## 2021-07-15 SDOH — ECONOMIC STABILITY: FOOD INSECURITY: WITHIN THE PAST 12 MONTHS, YOU WORRIED THAT YOUR FOOD WOULD RUN OUT BEFORE YOU GOT MONEY TO BUY MORE.: NEVER TRUE

## 2021-07-15 SDOH — ECONOMIC STABILITY: TRANSPORTATION INSECURITY
IN THE PAST 12 MONTHS, HAS THE LACK OF TRANSPORTATION KEPT YOU FROM MEDICAL APPOINTMENTS OR FROM GETTING MEDICATIONS?: NO

## 2021-07-15 SDOH — ECONOMIC STABILITY: FOOD INSECURITY: WITHIN THE PAST 12 MONTHS, THE FOOD YOU BOUGHT JUST DIDN'T LAST AND YOU DIDN'T HAVE MONEY TO GET MORE.: NEVER TRUE

## 2021-07-15 ASSESSMENT — ENCOUNTER SYMPTOMS
DIARRHEA: 0
WHEEZING: 0
CONSTIPATION: 0
ABDOMINAL PAIN: 1
SORE THROAT: 0
COUGH: 0
SINUS PRESSURE: 0
TROUBLE SWALLOWING: 0
SHORTNESS OF BREATH: 0
BLOOD IN STOOL: 0
NAUSEA: 0
VOMITING: 0

## 2021-07-15 ASSESSMENT — SOCIAL DETERMINANTS OF HEALTH (SDOH): HOW HARD IS IT FOR YOU TO PAY FOR THE VERY BASICS LIKE FOOD, HOUSING, MEDICAL CARE, AND HEATING?: NOT HARD AT ALL

## 2021-07-15 NOTE — PATIENT INSTRUCTIONS
Schedule a Vaccine  When you qualify to receive the vaccine, call the [facility]) COVID-19 Vaccination Hotline to schedule your appointment or to get additional information about the [facility]) locations which are offering the COVID-19 vaccine. To be 94% effective, it's important that you receive two doses of one of the COVID-19 vaccines. -If you are receiving the Tucker Peter vaccine, your second shot will be scheduled as close to 21 days after the first shot as possible. -If you are receiving the Moderna vaccine, your second shot will be scheduled as close to 28 days after the first shot as possible. [facility]) COVID-19 Vaccination Hotline: [phone]    Links to [facility]) website and [facility] website:    [employer]-monitoring-coronavirus-covid-19/covid-19-vaccine/[place]/[email or web address]    [email or web address]

## 2021-07-15 NOTE — PROGRESS NOTES
[facility] PRIMARY CARE  [address]   [address]  [address] [address]  Dept: [phone]  Dept Fax: [phone]    Jose David Jaime is a 76 y.o. female who presentstoday for her medical conditions/complaints as noted below. Jose David Jaime is c/o of  Chief Complaint   Patient presents with    Diabetes    Discuss Labs     discuss MRI     Other     discuss rt arm and shoulder pain        HPI:     Here today for follow up  Reports had MRI abdomen and found a large stone that requires removal  Has had abdominal pain for many months  EGD found severe stenosis of her esophagitis  Has been eating mainly soft foods    Her chronic leg pain remains significantly improved since her surgery    Reports worsening right shoulder pain  States has had off and on for several years, was particularly problematic when she was a   Has recently noticed seems to be getting worse, often radiates up into her neck  The Percocet does help relieve the discomfort  She is unable to take NSAIDs due to anticoagulation  Diabetes  She presents for her follow-up diabetic visit. She has type 2 diabetes mellitus. Her disease course has been stable. There are no hypoglycemic associated symptoms. Pertinent negatives for hypoglycemia include no confusion, dizziness, headaches or nervousness/anxiousness. Pertinent negatives for diabetes include no chest pain, no fatigue, no polydipsia, no polyphagia, no polyuria and no weakness. Current diabetic treatment includes diet and oral agent (monotherapy). She is compliant with treatment most of the time. Her weight is stable. She rarely participates in exercise. Her home blood glucose trend is decreasing rapidly. An ACE inhibitor/angiotensin II receptor blocker is being taken.        Hemoglobin A1C (%)   Date Value   07/15/2021 5.9   04/15/2021 6.4 (A)   12/14/2020 5.7             ( goal A1C is < 7)   No results found for: LABMICR  LDL Cholesterol (mg/dL) 10/23/2021 (Originally 12/24/2004)    Flu vaccine (1) 09/01/2021    Lipid screen  04/19/2022    Potassium monitoring  04/19/2022    A1C test (Diabetic or Prediabetic)  07/15/2022    Creatinine monitoring  07/15/2022    Hepatitis A vaccine  Aged Out    Hib vaccine  Aged Out    Meningococcal (ACWY) vaccine  Aged Out    Hepatitis C screen  Discontinued       Subjective:      Review of Systems   Constitutional: Negative for activity change, appetite change, chills, fatigue, fever and unexpected weight change. HENT: Negative for congestion, ear pain, hearing loss, sinus pressure, sore throat and trouble swallowing. Eyes: Negative for visual disturbance. Respiratory: Negative for cough, shortness of breath and wheezing. Cardiovascular: Negative for chest pain, palpitations and leg swelling. Gastrointestinal: Positive for abdominal pain. Negative for blood in stool, constipation, diarrhea, nausea and vomiting. Endocrine: Negative for cold intolerance, heat intolerance, polydipsia, polyphagia and polyuria. Genitourinary: Negative for difficulty urinating, frequency, hematuria and urgency. Musculoskeletal: Positive for arthralgias. Negative for myalgias. Skin: Negative for rash. Allergic/Immunologic: Negative for environmental allergies. Neurological: Negative for dizziness, weakness, light-headedness and headaches. Psychiatric/Behavioral: Negative for confusion. The patient is not nervous/anxious. Objective:     Physical Exam  Constitutional:       Appearance: She is well-developed. HENT:      Head: Normocephalic. Eyes:      Conjunctiva/sclera: Conjunctivae normal.      Pupils: Pupils are equal, round, and reactive to light. Cardiovascular:      Rate and Rhythm: Normal rate and regular rhythm. Heart sounds: Normal heart sounds. No murmur heard. Pulmonary:      Effort: Pulmonary effort is normal.      Breath sounds: Normal breath sounds. No wheezing.    Abdominal: General: Bowel sounds are normal. There is no distension. Palpations: Abdomen is soft. Musculoskeletal:         General: Normal range of motion. Cervical back: Normal range of motion. Skin:     General: Skin is warm and dry. Neurological:      Mental Status: She is alert and oriented to person, place, and time. Psychiatric:         Behavior: Behavior normal.         Thought Content: Thought content normal.         Judgment: Judgment normal.       BP (!) 140/84 (Site: Left Upper Arm, Position: Sitting, Cuff Size: Medium Adult)   Pulse 79   Resp 16   Ht 5' (1.524 m)   Wt 120 lb (54.4 kg)   SpO2 97%   Breastfeeding No   BMI 23.44 kg/m²     Assessment:       Diagnosis Orders   1. New onset type 2 diabetes mellitus (HCC)  POCT glycosylated hemoglobin (Hb A1C)   2. Generalized postprandial abdominal pain  oxyCODONE-acetaminophen (PERCOCET) 5-325 MG per tablet   3. Chronic right shoulder pain  oxyCODONE-acetaminophen (PERCOCET) 5-325 MG per tablet    [facility] -  [place]   4. Depression with anxiety               Plan:      Return in about 3 months (around 10/15/2021) for depression/anxiety, hypertension check. DiabetesA1c has decreased, continue Metformin, reviewed diet  Postprandial painreviewed MRI from earlier today, she has already discussed results with GI.   Will be having ERCP for possible removal of stone and further evaluation  Right shoulder pain-discussed treatment options, offered imaging but she declines for now as feels her abdominal takes priority for now, is willing to do PT  Depression/anxiety remains stable and well-controlled on current medications  Orders Placed This Encounter   Procedures    [facility]  [place]     Referral Priority:   Routine     Referral Type:   Eval and Treat     Referral Reason:   Specialty Services Required     Requested Specialty:   Physical Therapy     Number of Visits Requested:   1    POCT glycosylated hemoglobin (Hb A1C)        Orders Placed This Encounter   Medications    oxyCODONE-acetaminophen (PERCOCET) 5-325 MG per tablet     Sig: Take 1 tablet by mouth every 8 hours as needed for Pain for up to 30 days. Dispense:  90 tablet     Refill:  0     Reduce doses taken as pain becomes manageable       Patient given educational materials - see patient instructions. Discussed use, benefit, and side effects of prescribed medications. All patientquestions answered. Pt voiced understanding. Reviewed health maintenance. Instructedto continue current medications, diet and exercise. Patient agreed with treatmentplan. Follow up as directed.      Electronicallysigned by BAKARI rAmenta CNP on 7/15/2021 at 5:05 PM

## 2021-07-18 ENCOUNTER — HOSPITAL ENCOUNTER (OUTPATIENT)
Dept: LAB | Age: 66
Setting detail: SPECIMEN
Discharge: HOME | End: 2021-07-18
Payer: MEDICARE

## 2021-07-18 DIAGNOSIS — Z01.818 PREOP TESTING: Primary | ICD-10-CM

## 2021-07-18 DIAGNOSIS — Z20.822 COVID-19 RULED OUT BY LABORATORY TESTING: ICD-10-CM

## 2021-07-18 PROCEDURE — U0003 INFECTIOUS AGENT DETECTION BY NUCLEIC ACID (DNA OR RNA); SEVERE ACUTE RESPIRATORY SYNDROME CORONAVIRUS 2 (SARS-COV-2) (CORONAVIRUS DISEASE [COVID-19]), AMPLIFIED PROBE TECHNIQUE, MAKING USE OF HIGH THROUGHPUT TECHNOLOGIES AS DESCRIBED BY CMS-2020-01-R: HCPCS

## 2021-07-18 PROCEDURE — U0005 INFEC AGEN DETEC AMPLI PROBE: HCPCS

## 2021-07-19 LAB
SARS-COV-2: NORMAL
SARS-COV-2: NOT DETECTED
SOURCE: NORMAL

## 2021-07-22 ENCOUNTER — ANESTHESIA (OUTPATIENT)
Dept: ENDOSCOPY | Age: 66
End: 2021-07-22
Payer: MEDICARE

## 2021-07-22 ENCOUNTER — ANESTHESIA EVENT (OUTPATIENT)
Dept: ENDOSCOPY | Age: 66
End: 2021-07-22
Payer: MEDICARE

## 2021-07-22 ENCOUNTER — HOSPITAL ENCOUNTER (OUTPATIENT)
Age: 66
Setting detail: OUTPATIENT SURGERY
Discharge: HOME | End: 2021-07-22
Attending: INTERNAL MEDICINE | Admitting: INTERNAL MEDICINE
Payer: MEDICARE

## 2021-07-22 VITALS
RESPIRATION RATE: 22 BRPM | SYSTOLIC BLOOD PRESSURE: 158 MMHG | DIASTOLIC BLOOD PRESSURE: 75 MMHG | OXYGEN SATURATION: 94 %

## 2021-07-22 VITALS
TEMPERATURE: 96.8 F | HEART RATE: 66 BPM | DIASTOLIC BLOOD PRESSURE: 82 MMHG | RESPIRATION RATE: 21 BRPM | OXYGEN SATURATION: 96 % | SYSTOLIC BLOOD PRESSURE: 162 MMHG

## 2021-07-22 PROCEDURE — C1726 CATH, BAL DIL, NON-VASCULAR: HCPCS | Performed by: INTERNAL MEDICINE

## 2021-07-22 PROCEDURE — C1769 GUIDE WIRE: HCPCS | Performed by: INTERNAL MEDICINE

## 2021-07-22 PROCEDURE — 3609012700 HC EGD DILATION SAVORY: Performed by: INTERNAL MEDICINE

## 2021-07-22 PROCEDURE — 2709999900 HC NON-CHARGEABLE SUPPLY: Performed by: INTERNAL MEDICINE

## 2021-07-22 PROCEDURE — 3609017700 HC EGD DILATION GASTRIC/DUODENAL STRICTURE: Performed by: INTERNAL MEDICINE

## 2021-07-22 PROCEDURE — 2500000003 HC RX 250 WO HCPCS: Performed by: NURSE ANESTHETIST, CERTIFIED REGISTERED

## 2021-07-22 PROCEDURE — 7100000011 HC PHASE II RECOVERY - ADDTL 15 MIN: Performed by: INTERNAL MEDICINE

## 2021-07-22 PROCEDURE — 3609012400 HC EGD TRANSORAL BIOPSY SINGLE/MULTIPLE: Performed by: INTERNAL MEDICINE

## 2021-07-22 PROCEDURE — 3700000001 HC ADD 15 MINUTES (ANESTHESIA): Performed by: INTERNAL MEDICINE

## 2021-07-22 PROCEDURE — 7100000010 HC PHASE II RECOVERY - FIRST 15 MIN: Performed by: INTERNAL MEDICINE

## 2021-07-22 PROCEDURE — 6360000002 HC RX W HCPCS: Performed by: NURSE ANESTHETIST, CERTIFIED REGISTERED

## 2021-07-22 PROCEDURE — 3700000000 HC ANESTHESIA ATTENDED CARE: Performed by: INTERNAL MEDICINE

## 2021-07-22 PROCEDURE — 88305 TISSUE EXAM BY PATHOLOGIST: CPT

## 2021-07-22 PROCEDURE — 2580000003 HC RX 258: Performed by: INTERNAL MEDICINE

## 2021-07-22 RX ORDER — LIDOCAINE HYDROCHLORIDE 10 MG/ML
INJECTION, SOLUTION EPIDURAL; INFILTRATION; INTRACAUDAL; PERINEURAL PRN
Status: DISCONTINUED | OUTPATIENT
Start: 2021-07-22 | End: 2021-07-22 | Stop reason: SDUPTHER

## 2021-07-22 RX ORDER — SODIUM CHLORIDE 9 MG/ML
INJECTION, SOLUTION INTRAVENOUS CONTINUOUS
Status: DISCONTINUED | OUTPATIENT
Start: 2021-07-22 | End: 2021-07-22 | Stop reason: HOSPADM

## 2021-07-22 RX ORDER — PROPOFOL 10 MG/ML
INJECTION, EMULSION INTRAVENOUS PRN
Status: DISCONTINUED | OUTPATIENT
Start: 2021-07-22 | End: 2021-07-22 | Stop reason: SDUPTHER

## 2021-07-22 RX ADMIN — SODIUM CHLORIDE: 9 INJECTION, SOLUTION INTRAVENOUS at 10:21

## 2021-07-22 RX ADMIN — PROPOFOL INJECTABLE EMULSION 70 MG: 10 INJECTION, EMULSION INTRAVENOUS at 11:07

## 2021-07-22 RX ADMIN — PROPOFOL INJECTABLE EMULSION 130 MG: 10 INJECTION, EMULSION INTRAVENOUS at 11:10

## 2021-07-22 RX ADMIN — PROPOFOL INJECTABLE EMULSION 100 MG: 10 INJECTION, EMULSION INTRAVENOUS at 11:21

## 2021-07-22 RX ADMIN — LIDOCAINE HYDROCHLORIDE 50 MG: 10 INJECTION, SOLUTION EPIDURAL; INFILTRATION; INTRACAUDAL; PERINEURAL at 11:07

## 2021-07-22 ASSESSMENT — LIFESTYLE VARIABLES: SMOKING_STATUS: 1

## 2021-07-22 ASSESSMENT — PAIN SCALES - GENERAL
PAINLEVEL_OUTOF10: 0
PAINLEVEL_OUTOF10: 0

## 2021-07-22 NOTE — ANESTHESIA PRE PROCEDURE
Department of Anesthesiology  Preprocedure Note       Name:  Lane Scruggs   Age:  76 y.o.  :  1955                                          MRN:  [id number]         Date:  2021      Surgeon: Amrik Lopez):  Gerardine Bumpers, MD    Procedure: Procedure(s):  EGD ESOPHAGOGASTRODUODENOSCOPY    Department of Anesthesiology  Pre-Anesthesia Evaluation/Consultation         Name:  Lane Scruggs                                         Age:  76 y.o. MRN:  [id number]             Medications  Current Facility-Administered Medications   Medication Dose Route Frequency Provider Last Rate Last Admin    0.9 % sodium chloride infusion   Intravenous Continuous Gerardine Bumpers,  mL/hr at 21 1021 New Bag at 21 1021       Allergies   Allergen Reactions    Dilaudid [Hydromorphone Hcl] Swelling    Sulfamethoxazole Swelling    Sulfamethoxazole-Trimethoprim Swelling    Trimethoprim Swelling    Tetanus Toxoid Swelling     Arm swelling.      Patient Active Problem List   Diagnosis    Occlusion of aorta (HCC)    New onset type 2 diabetes mellitus ([address]    PAD (peripheral artery disease) ([address]    Claudication ([address]    Aortoiliac occlusive disease ([address]    Gastroesophageal reflux disease    Status post aortobifemoral bypass surgery    Chronic right shoulder pain    Depression with anxiety     Past Medical History:   Diagnosis Date    Anxiety     Depression     Diabetes mellitus ([address]     oral meds    Glaucoma     Hearing loss     Hypertension     Migraines     PVD (peripheral vascular disease) ([address]      Past Surgical History:   Procedure Laterality Date    ABDOMINAL AORTIC ANEURYSM REPAIR N/A 9/15/2020    AORTAL BIFEMORAL BYPASS  **CELLSAVER** performed by Jess Pride MD at [address] [place] [address] AORTO-FEMORAL BYPASS GRAFT  09/15/2020    APPENDECTOMY      BREAST SURGERY      \"crystals removed from right breast\"    CATARACT REMOVAL      CHOLECYSTECTOMY      COLONOSCOPY      ENDOSCOPY, COLON, DIAGNOSTIC      TONSILLECTOMY      UPPER GASTROINTESTINAL ENDOSCOPY  2021    w/EUS FNA    UPPER GASTROINTESTINAL ENDOSCOPY N/A 2021    EGD W/EUS FNA performed by Tess [facility]lett MD at [facility]    UPPER GASTROINTESTINAL ENDOSCOPY  2021    EGD DILATION BALLOON 6-7.5MM performed by Tess [facility]lett MD at [facility]    UPPER GASTROINTESTINAL ENDOSCOPY N/A 2021    EGD BIOPSY performed by Tess Willett MD at [facility]     Social History     Tobacco Use    Smoking status: Current Every Day Smoker     Packs/day: 1.00     Years: 41.00     Pack years: 41.00     Types: Cigarettes     Start date: 1975    Smokeless tobacco: Never Used    Tobacco comment: Cut back   Vaping Use    Vaping Use: Never used   Substance Use Topics    Alcohol use: Never    Drug use: Never         Vital Signs (Current)   Vitals:    21 1001   BP: (!) 170/81   Pulse: 82   Resp: 24   Temp: 98.8 °F (37.1 °C)   SpO2: 96%     Vital Signs Statistics (for past 48 hrs)     Temp  Av.8 °F (37.1 °C)  Min: 98.8 °F (37.1 °C)   Min taken time: 21 1001  Max: 98.8 °F (37.1 °C)   Max taken time: 21 1001  Pulse  Av  Min: 82   Min taken time: 21 1001  Max: 80   Max taken time: 21 1001  Resp  Av  Min: 24   Min taken time: 21 1001  Max: 24   Max taken time: 21 1001  BP  Min: 170/81   Min taken time: 21 1001  Max: 170/81   Max taken time: 21 1001  SpO2  Av %  Min: 96 %   Min taken time: 21 1001  Max: 96 %   Max taken time: 21 1001  BP Readings from Last 3 Encounters:   21 (!) 170/81   07/15/21 (!) 140/84   21 (!) 167/80       BMI  There is no height or weight on file to calculate BMI.     CBC   Lab Results   Component Value Date    WBC 13.7 2020    RBC 3.70 2020    HGB 12.1 2020    HCT 38.0 2020    .7 2020    RDW 13.3 2020     2020       CMP Lab Results   Component Value Date     04/19/2021    K 4.6 04/19/2021     04/19/2021    CO2 25 04/19/2021    BUN 14 04/19/2021    CREATININE 0.71 07/15/2021    GFRAA >60 07/15/2021    LABGLOM >60 07/15/2021    GLUCOSE 125 04/19/2021    PROT 6.7 04/19/2021    CALCIUM 9.1 04/19/2021    BILITOT 0.36 04/19/2021    ALKPHOS 89 04/19/2021    AST 17 04/19/2021    ALT 14 04/19/2021       BMP    Lab Results   Component Value Date     04/19/2021    K 4.6 04/19/2021     04/19/2021    CO2 25 04/19/2021    BUN 14 04/19/2021    CREATININE 0.71 07/15/2021    CALCIUM 9.1 04/19/2021    GFRAA >60 07/15/2021    LABGLOM >60 07/15/2021    GLUCOSE 125 04/19/2021       POC Testing  No results for input(s): POCGLU, POCNA, POCK, POCCL, POCBUN, POCHEMO, POCHCT in the last 72 hours. Coags  No results found for: PROTIME, INR, APTT    HCG (If Applicable) No results found for: PREGTESTUR, PREGSERUM, HCG, HCGQUANT     ABGs No results found for: PHART, PO2ART, WGX2JVP, YZY0CKT, BEART, A2FDLWSU     Type & Screen (If Applicable)  No results found for: [facility]    Radiology (If Applicable)    Cardiac Testing (If Applicable) EF 90%    EKG (If Applicable) nl           Medications prior to admission:   Prior to Admission medications    Medication Sig Start Date End Date Taking? Authorizing Provider   oxyCODONE-acetaminophen (PERCOCET) 5-325 MG per tablet Take 1 tablet by mouth every 8 hours as needed for Pain for up to 30 days.  7/15/21 8/14/21  BAKARI Casillas CNP   lisinopril (PRINIVIL;ZESTRIL) 20 MG tablet Take 1 tablet by mouth daily 4/15/21   BAKARI Casillas CNP   ELIQUIS 5 MG TABS tablet TAKE 1 TABLET BY MOUTH TWICE DAILY 12/26/20   Mohammed Conchetta Nyhan, MD   latanoprost (XALATAN) 0.005 % ophthalmic solution INSTILL 1 DROP INTO EACH EYE AT BEDTIME 11/5/20   Historical Provider, MD   omeprazole (PRILOSEC) 40 MG delayed release capsule Take 1 capsule by mouth every morning (before breakfast) 04/19/2021    K 4.6 04/19/2021     04/19/2021    CO2 25 04/19/2021    BUN 14 04/19/2021    CREATININE 0.71 07/15/2021    GFRAA >60 07/15/2021    LABGLOM >60 07/15/2021    GLUCOSE 125 04/19/2021    PROT 6.7 04/19/2021    CALCIUM 9.1 04/19/2021    BILITOT 0.36 04/19/2021    ALKPHOS 89 04/19/2021    AST 17 04/19/2021    ALT 14 04/19/2021       POC Tests: No results for input(s): POCGLU, POCNA, POCK, POCCL, POCBUN, POCHEMO, POCHCT in the last 72 hours. Coags: No results found for: PROTIME, INR, APTT    HCG (If Applicable): No results found for: PREGTESTUR, PREGSERUM, HCG, HCGQUANT     ABGs: No results found for: PHART, PO2ART, DUF7ONP, AGM2UJL, BEART, B2UDTVFW     Type & Screen (If Applicable):  No results found for: LABABO, LABRH    Drug/Infectious Status (If Applicable):  No results found for: HIV, HEPCAB    COVID-19 Screening (If Applicable):   Lab Results   Component Value Date    COVID19 Not Detected 07/18/2021    COVID19 Not Detected 09/11/2020           Anesthesia Evaluation   no history of anesthetic complications:   Airway: Mallampati: I  TM distance: >3 FB   Neck ROM: full  Mouth opening: > = 3 FB Dental:    (+) upper dentures and lower dentures      Pulmonary:   (+) current smoker          Patient smoked on day of surgery. ROS comment: 41 pk yrs   Cardiovascular:  Exercise tolerance: good (>4 METS),   (+) hypertension:,         Rhythm: regular  Rate: normal                    Neuro/Psych:   (+) headaches:, psychiatric history:depression/anxiety             GI/Hepatic/Renal:   (+) GERD:,           Endo/Other:    (+) DiabetesType II DM, well controlled, , .                 Abdominal:       Abdomen: soft. Vascular:   + PVD, aortic or cerebral, . Other Findings:               Anesthesia Plan      MAC     ASA 4       Induction: intravenous.                           Yuliya Walton MD   7/22/2021

## 2021-07-22 NOTE — OP NOTE
[address] Endoscopy  EGD    Patient: Brigitte Alexandre            Date:   2021  : 1955       Med Rec#: [id number]       PROCEDURE:  EGD with biopsy, pyloric balloon dilatation, and esophageal dilatation with Savary. INDICATION: Pyloric stenosis    FINDINGS  Schatzki's ring. Dilated with 16mm Savary. Mild resistance, wire-guided. A small hiatal hernia was noted on retroflexion. Antral gastritis with focus of adenomatous appearing tissue. Biopsied. Pyloric stenosis. Traversed with  scope but not, initially, adult scope. Serial balloon dilatations (6 mm, 7 mm, 8 mm) performed without resistance  Additional balloon dilatations performed (8 mm, 9 mm, 10 mm) performed with mild/moderate resistance. After dilatation we were able to pass the adult scope through the pylorus. 3    PLAN  Await pathology results  PPI   Tobacco abuse cessation    MEDICATIONS:  MAC   ESTIMATED BLOOD LOSS:  Minimal at pyloris  Specimen(s) Removed: Antrum  COMPLICATIONS: No immediate complications  Prior to the procedure, the patient's history was reviewed and a directed physical examination was performed. Informed consent was obtained. After adequate sedation was achieved, the scope was inserted into the mouth and advanced to the second portion of the duodenum. As the scope was withdrawn, the anatomy and the appearance of the mucosa was noted.        Kalyani Wilkins M.D.

## 2021-07-22 NOTE — ANESTHESIA POSTPROCEDURE EVALUATION
Department of Anesthesiology  Postprocedure Note    Patient: Brigitte Alexandre  MRN: [id number]  YOB: 1955  Date of evaluation: 7/22/2021  Time:  7:57 PM     Procedure Summary     Date: 07/22/21 Room / Location: [address] [place] [address]    Anesthesia Start: 1104 Anesthesia Stop: 1138    Procedures:       EGD DILATION BALLOON, pyloric (N/A )      EGD BIOPSY (N/A )      EGD DILATION SAVORY Diagnosis: (EPIGASTRIC ABDOMINAL PAIN)    Surgeons: Yahir Guaman MD Responsible Provider: Santiago Charlton MD    Anesthesia Type: MAC ASA Status: 4          Anesthesia Type: MAC    Evelyn Phase I: Evelyn Score: 10    Evelyn Phase II: Evelyn Score: 10    Last vitals: Reviewed and per EMR flowsheets.    POST-OP ANESTHESIA NOTE       BP (!) 162/82   Pulse 66   Temp 96.8 °F (36 °C) (Temporal)   Resp 21   SpO2 96%    Pain Assessment: 0-10  Pain Level: 0      Anesthesia Post Evaluation    Patient location during evaluation: PACU  Patient participation: complete - patient participated  Level of consciousness: awake  Pain score: 0  Airway patency: patent  Nausea & Vomiting: no vomiting and no nausea  Complications: no  Cardiovascular status: hemodynamically stable  Respiratory status: acceptable  Hydration status: stable

## 2021-07-22 NOTE — H&P
Medical History    has a past medical history of Anxiety, Depression, Diabetes mellitus ([address] Glaucoma, Hearing loss, Hypertension, Migraines, and PVD (peripheral vascular disease) ([facility] [address] Past Surgical History   has a past surgical history that includes Appendectomy; Cholecystectomy; Tonsillectomy; Breast surgery; Cataract removal (); Aorto-femoral Bypass Graft (09/15/2020); Abdominal aortic aneurysm repair (N/A, 9/15/2020); Colonoscopy; Endoscopy, colon, diagnostic; Upper gastrointestinal endoscopy (2021); Upper gastrointestinal endoscopy (N/A, 2021); Upper gastrointestinal endoscopy (2021); and Upper gastrointestinal endoscopy (N/A, 2021). Social History   reports that she has been smoking cigarettes. She started smoking about 46 years ago. She has a 41.00 pack-year smoking history. She has never used smokeless tobacco.   reports no history of alcohol use. reports no history of drug use. Marital Status   Children 3  Occupation retired  Family History  Family Status   Relation Name Status    Mother     Aetdandre Father     Mauricetna Sister  (Not Specified)     family history includes Colon Cancer in her mother; Prostate Cancer in her father; Thyroid Cancer in her sister. OBJECTIVE:   VITALS:  temporal temperature is 98.8 °F (37.1 °C). Her blood pressure is 170/81 (abnormal) and her pulse is 82. Her respiration is 24 and oxygen saturation is 96%. CONSTITUTIONAL:alert & oriented x 3, no acute distress. Calm and pleasant. SKIN:  Warm and dry, no rashes to exposed areas of skin   HEAD:  Normocephalic, atraumatic   EYES: PERRL. EOMs intact. EARS:  Intact and equal bilaterally. No edema or thickening, without lumps, lesions, or discharge. Hearing grossly WNL. NOSE:  Nares patent. No rhinorrhea   MOUTH/THROAT:  Mucous membranes pink and moist, uvula midline, edentulous. NECK: Supple, no lymphadenopathy  LUNGS: Respirations even and non-labored.  Mildly diminished bases, no wheezes, rales, or rhonchi. CARDIOVASCULAR: Regular rate and rhythm, no murmurs/rubs/gallops. ABDOMEN: soft, non-tender and non-distended, bowel sounds active x 4. EXTREMITIES: No edema to bilateral lower extremities. No varicosities to bilateral lower extremities. NEUROLOGIC: CN II-XII are grossly intact. Gait not assessed. IMPRESSIONS:   EPIGASTRIC ABDOMINAL PAIN  PLANS:   EGD.     BAKARI Maier CNP   Electronically signed 7/22/2021 at 10:16 AM

## 2021-07-23 LAB — SURGICAL PATHOLOGY REPORT: NORMAL

## 2021-08-04 ENCOUNTER — HOSPITAL ENCOUNTER (OUTPATIENT)
Dept: PHYSICAL THERAPY | Facility: CLINIC | Age: 66
Setting detail: THERAPIES SERIES
Discharge: HOME | End: 2021-08-04
Payer: MEDICARE

## 2021-08-04 PROCEDURE — 97110 THERAPEUTIC EXERCISES: CPT

## 2021-08-04 PROCEDURE — 97140 MANUAL THERAPY 1/> REGIONS: CPT

## 2021-08-04 PROCEDURE — 97161 PT EVAL LOW COMPLEX 20 MIN: CPT

## 2021-08-04 NOTE — PRE-CERTIFICATION NOTE
Medicare Cap   [] Physical Therapy  [] Speech Therapy  [] Occupational therapy  *PT and Speech caps combine      $2100 Limit for PT and Speech combined  $2100 Limit for OT individually  At the beginning of the month where you expect to go over $2100, please add the [address] [place] [address] modifier      Patient Name: Henry Proud: 1955    Note:  This is an estimate of charges billed.      Date of [address] Name # units/ charge $$$ charge Daily Total Charge Ongoing Total $$$   8/4 PT azeem Mccormick  Manual 1+1+1 98.01+22.84+21.34 142.15 142.15

## 2021-08-04 NOTE — CONSULTS
[] [address]  [address]  [phone]  F: [phone] [] [address]  [facility]   [address]  P: [phone]  F: [phone] [] [address]  [address] [facility]  P: [phone]  F: [phone] [] [address]  [facility]   [address]   [place]: [phone]  F: [phone]  [x] [address]  P: [phone]  F: [phone]      Physical Therapy Upper Extremity Evaluation    Date:  2021  Patient: Juan Means  :  1955  MRN: [id number]  Physician: Juve Kuhn CNP   Insurance: Medicare  Medical Diagnosis: Chronic R shoulder pain  Rehab Codes: M25.511, G89.29  Onset date: ~2015  Next Dr's appt.: TBA    Subjective:   CC: Pt states pain began 6 years from a fall at work, when pt fell directly onto shoulder. Pain has remained constant since injry, however was having other medical issues so needed to hold off on dealing with shoulder. 3-4 months ago notices R hand pain that occurs in the morning, described as \"burning\" in palmar surface. Does return with use of hand. Shoulder pain is localized to R UT, states radiates into head and causes headaches.           PMHx: [] Unremarkable [] Diabetes [] HTN  [] Pacemaker   [] MI/Heart Problems [] Cancer [] Arthritis [] Other:              [x] Refer to full medical chart  In EPIC       Tests: [] X-Ray: [] MRI:  [] Other:    Medications: [x] Refer to full medical record [] None [] Other:  Allergies:      [x] Refer to full medical record [] None [] Other:          Marital Status    Home type    Stairs from outside            Hand rail    Stairs inside            Hand rail    Employement Retired    Job status [employer] Work Activities/duties     Recreational Activities Working in the yard, painting       Pain present?  Yes    Location R UT   Pain Rating currently 7/10   Pain at worse 10/10   Pain at best 4/10   Description of pain Constant ache    Altered Sensation Numbness velez surface of R hand   What makes it worse Movement, lifting    What makes it better Rest    Symptom progression Worsening    Sleep Difficulty sleeping            ADL/IADL Previous level of function Current level of function Who currently assists the patient with task   Bathing  [x] Independent  [] Assist [x] Independent  [] Assist    Dress/grooming [x] Independent  [] Assist [x] Independent  [] Assist    Transfer/mobility [x] Independent  [] Assist [x] Independent  [] Assist    Feeding [x] Independent  [] Assist [x] Independent  [] Assist    Toileting [x] Independent  [] Assist [x] Independent  [] Assist    Driving [x] Independent  [] Assist [x] Independent  [] Assist    Housekeeping [x] Independent  [] Assist [x] Independent  [] Assist Can complete just requires rest   Grocery shop/meal prep [x] Independent  [] Assist [x] Independent  [] Assist            Objective:     ROM  °A/P STRENGTH TESTS (+/-) L R    Left Right Left Right Drop Arm (infraspinatus) - -   Shld Flex 145/170 115/150 5/5 4-/5 Empty Can (Supraspinatus) - -   Shld Abd 125 100/135 5/5 4+/5 Fabián's (Labrum)     Shld IR   5/5 4/5 Yergasons (bicep tendonitis/ labrum) - -   Shld ER   5/5 4/5 Speeds (Bicep tendonitis/ labrum) - -   Shld Ext     Carter (Labrum)          Neer (subacrominal impingement) - -        Painful Arc (RTC) - -        Tinel (Ulnar Nerve) - -   Supraspinatus   5/5 4/5 Sulcus Sign         OBSERVATION No Deficit Deficit Not Tested Comments   Forward Head [] [x] []    Rounded Shoulders [] [x] [] RUE rounded compared to LUE   Kyphosis [] [] []    Scap Height/Position [] [] []    Winging [] [] []    SH Rhythm [] [] []    INSPECTION/PALPATION    TTP RUT, R cervical paraspinals, R thoracic paraspinals   SC/AC Joint [] [] []    Supraspinatus [] [] []    Biceps tendon/groove [] [] []    Posterior shld [] [x] [] TTP   Subscapularis [] [] []    NEUROLOGICAL       Cervical ROM/Quadrant [] [] [] Limited cervical extension ROM, limited approx 50% d/t pain. Limited L cervical side bending approx 25% secondary to tightness   Reflexes [] [] []    Compression/Distraction [] [] []    Sensation [] [x] [] None currently, does have numbness to R palm at time                      Functional Test: UEFS Score: 50% functionally impaired       Assessment:  Patient would benefit from skilled physical therapy services in order to: Pt presents with chronic R shoulder pain, currently expierincing R shoulder weakness and UT tigthness secondary to UT compensation during R shoulder lifting activities. Pt will benefit from physical therapy to address RUT tightness prior to returning strength and AROM to R shoulder. Goals:        STG: (to be met in 10 treatments)  1. ? Pain: Decrease pain levels to 5/10 at worst in RUT  2. ? ROM: Increase flexibility and AROM limitations throughout to equal bilat to reduce difficulty with ADLs, increase R shoulder AROM flex to 140deg and abd to 120deg  3. ? Strength: Increase R shoulder strength flex and abd to 4/5  4. ? Function: Pt to report carrying groceries in RUE without an increase in pain   5. Independent with Home Exercise Programs    LTG: (to be met in 20 treatments)  1. Improve score on assessment tool from 50% impaired to 25% impaired, demonstrating an improvement in ADLs  2. Reduce pain levels to 0/10  3. Pt to have increased R shoulder flex and abd strength to 4+/5                   Patient goals:  To have decreased R shoulder pain and R hand numbness    Rehab Potential:  [x] Good  [] Fair  [] Poor   Suggested Professional Referral:  [x] No  [] Yes:  Barriers to Goal Achievement[de-identified]  [x] No  [] Yes:  Domestic Concerns:  [x] No  [] Yes:    Pt. Education:  [x] Ther Exercise 15 1   [x]  Manual Therapy 15 1   []  Ther Activities     []  Aquatics     []  Vasocompression     []  Other       TOTAL TREATMENT TIME: 45 minutes    Time in: 9823   Time Out: 1350    Electronically signed by: Marimar Venegas PT        Physician Signature:________________________________Date:__________________  By signing above or cosigning this note, I have reviewed this plan of care and certify a need for medically necessary rehabilitation services.      *PLEASE SIGN ABOVE AND FAX BACK ALL PAGES*

## 2021-08-04 NOTE — PRE-CERTIFICATION NOTE
Michael Fall Risk Assessment    Patient Name:  Princess Ring  : 1955        Risk Factor Scale  Score   History of Falls [] Yes  [x] No 25  0    Secondary Diagnosis [] Yes  [x] No 15  0    Ambulatory Aid [] Furniture  [] Crutches/cane/walker  [x] None/bedrest/wheelchair/nurse 30  15  0    IV/Heparin Lock [] Yes  [x] No 20  0    Gait/Transferring [] Impaired  [] Weak  [x] Normal/bedrest/immobile 20  10  0    Mental Status [] Forgets limitations  [x] Oriented to own ability 15  0       Total: 0     Based on the Assessment score: check the appropriate box.     [x]  No intervention needed   Low =   Score of 0-24    []  Use standard prevention interventions Moderate =  Score of 24-44   [] Give patient handout and discuss fall prevention strategies   [] Establish goal of education for patient/family RE: fall prevention strategies    []  Use high risk prevention interventions High = Score of 45 and higher   [] Give patient handout and discuss fall prevention strategies   [] Establish goal of education for patient/family Re: fall prevention strategies   [] Discuss lifeline / other resources    Electronically signed by:   Amina Morejon PT  Date: 2021

## 2021-08-06 ENCOUNTER — HOSPITAL ENCOUNTER (OUTPATIENT)
Dept: PHYSICAL THERAPY | Facility: CLINIC | Age: 66
Setting detail: THERAPIES SERIES
End: 2021-08-06
Payer: MEDICARE

## 2021-08-06 ENCOUNTER — HOSPITAL ENCOUNTER (OUTPATIENT)
Dept: PHYSICAL THERAPY | Facility: CLINIC | Age: 66
Setting detail: THERAPIES SERIES
Discharge: HOME | End: 2021-08-06
Payer: MEDICARE

## 2021-08-06 NOTE — FLOWSHEET NOTE
[] [facility]) - [facility] &  Therapy  [address]    [phone]  F: [phone]   [] [address]  [facility]   [address]  P: [phone]  F: [phone]  [] [address] &  Therapy  [address] [facility] [address]  P: [phone]  F: [phone] [] [address]  P: [phone]  F: [phone]  [] [address]  [facility]   [address]   [place]: [phone]  F: [phone]   [] [address]  [place]: [phone]   F: [phone]     Physical Therapy Cancel/No Show note    Date: 2021  Patient: Asael Schneider  : 1955  MRN: [id number]    Cancels/No Shows to date:     For today's appointment patient:    [x]  Cancelled    [] Rescheduled appointment    [] No-show     Reason given by patient:    []  Patient ill    []  Conflicting appointment    [] No transportation      [] Conflict with work    [] No reason given    [] Weather related    [] [id number]    [x] Other:      Comments:  Other medical issues      [x] Next appointment was confirmed    Electronically signed by: Magali Dean

## 2021-08-10 ENCOUNTER — HOSPITAL ENCOUNTER (OUTPATIENT)
Dept: PHYSICAL THERAPY | Facility: CLINIC | Age: 66
Setting detail: THERAPIES SERIES
Discharge: HOME | End: 2021-08-10
Payer: MEDICARE

## 2021-08-10 PROCEDURE — 97140 MANUAL THERAPY 1/> REGIONS: CPT

## 2021-08-10 PROCEDURE — 97110 THERAPEUTIC EXERCISES: CPT

## 2021-08-10 NOTE — FLOWSHEET NOTE
[] [address]  [address]  [phone]  F: [phone] [] [address]  [facility] [address]   [address]  P: [phone]  F: [phone] [] [address]  Therapy  [address] [facility] [address] [facility] [address]  P: [phone]  F: [phone] [x] [address]  P: [phone]  F: [phone] [] [address]  [facility]   [address]   [place]: [phone]  F: [phone]      Physical Therapy Daily Treatment Note    Date:  8/10/2021  Patient Name:  Clive José    :  1955  MRN: [id number]  Physician: Lucy Burt CNP                            Insurance: Medicare  Medical Diagnosis: Chronic R shoulder pain                       Rehab Codes: T84.165, G89.29  Onset date: ~2015                       Next 's appt.: TBA  Visit# / total visits: ; Progress note for Medicare patient due at visit 10     Cancels/No Shows: 1/0    Subjective:    Pain:  [x] Yes  [] No Location: R shoulder Pain Rating: (0-10 scale) 4/10  Pain altered Tx:  [] No  [] Yes  Action:  Comments: Pt reported soreness in the R shoulder, stated she has a lot of other medical stuff going on and is going in for surgery on .     Objective:  Modalities:   Precautions:  Exercises:  Exercise  Chronic R shoulder pain    Reps/ Time Weight/ Level Comments   UBE  2/2 Level 1               *Shoulder flex and abd isometric 10x5\" hold       *Supine wand flex 2x10 1#     Supine wand abd  10x       *Pec doorway stretch 3x30\"       *Seated scapular retraction 2x10       Prone scap retraction/depression          PROM  5 min   R shoulder, all planes                                                               Other: Manual: MFR to RUT in supine, passive UT stretch, SOR, *Distributed as HEP      Specific Instructions for next treatment: Continue with manual to RUT, porgress shoulder AAROM and scapular strength as tolerated      Treatment Charges: Mins Units   []  Modalities     []  Ther Exercise 28 2   []  Manual Therapy 10 1   []  Ther Activities     []  Aquatics     []  Vasocompression     []  Other     Total Treatment time 38 3       Assessment: [x] Progressing toward goals. Pt with good tolerance to most activities, Pt noted increased shoulder soreness after UBE and AAROM abduction. Pt was able to complete all reps. Pt received manual as listed above including SOR and MFR. Pt declined vasocompression and HP this date stating that neither would help. [] No change. [] Other:  [x] Patient would continue to benefit from skilled physical therapy services in order to: address RUT tightness prior to returning strength and AROM to R shoulder. STG/LTG  STG: (to be met in 10 treatments)  1. ? Pain: Decrease pain levels to 5/10 at worst in RUT  2. ? ROM: Increase flexibility and AROM limitations throughout to equal bilat to reduce difficulty with ADLs, increase R shoulder AROM flex to 140deg and abd to 120deg  3. ? Strength: Increase R shoulder strength flex and abd to 4/5  4. ? Function: Pt to report carrying groceries in RUE without an increase in pain   5. Independent with Home Exercise Programs     LTG: (to be met in 20 treatments)  1. Improve score on assessment tool from 50% impaired to 25% impaired, demonstrating an improvement in ADLs  2. Reduce pain levels to 0/10  3. Pt to have increased R shoulder flex and abd strength to 4+/5                    Patient goals: To have decreased R shoulder pain and R hand numbness       Pt. Education:  [x] Yes  [] No  [] Reviewed Prior HEP/Ed  Method of Education: [x] Verbal  [x] Demo  [] Written  Comprehension of Education:  [x] Verbalizes understanding. [x] Demonstrates understanding. [] Needs review.   [] Demonstrates/verbalizes HEP/Ed previously given. Plan: [x] Continue current frequency toward long and short term goals.     [x] Specific Instructions for subsequent treatments: above      Time In: 1179            Time Out: 7580    Electronically signed by:  Telma Keys PTA

## 2021-08-10 NOTE — PRE-CERTIFICATION NOTE
Medicare Cap   [] Physical Therapy  [] Speech Therapy  [] Occupational therapy  *PT and Speech caps combine      $2100 Limit for PT and Speech combined  $2100 Limit for OT individually  At the beginning of the month where you expect to go over $2100, please add the [address] [place] modifier      Patient Name: Linda Expose: 1955    Note:  This is an estimate of charges billed.      Date of [address] Name # units/ charge $$$ charge Daily Total Charge Ongoing Total $$$   8/4 PT eval  Therex  Manual 1+1+1 98.01+22.84+21.34 142.15 142.15   8/10 TE + MT 2+1 29.21+22.84+21.34 73.39 215.54

## 2021-08-12 ENCOUNTER — HOSPITAL ENCOUNTER (OUTPATIENT)
Dept: PHYSICAL THERAPY | Facility: CLINIC | Age: 66
Setting detail: THERAPIES SERIES
Discharge: HOME | End: 2021-08-12
Payer: MEDICARE

## 2021-08-12 NOTE — FLOWSHEET NOTE
[] [facility]) - [facility] &  Therapy  [address]    [phone]  F: [phone]   [] [address]  [facility] 36   [address]  P: [phone]  F: [phone]  [] [address] &  Therapy  [address] [facility]  P: [phone]  F: [phone] [] [address]  P: [phone]  F: [phone]  [] [address]  [facility]   [address]   [place]: [phone]  F: [phone]   [] [address]  [place]: [phone]   F: [phone]     Physical Therapy Cancel/No Show note    Date: 2021  Patient: Emi Hylton  : 1955  MRN: [id number]    Cancels/No Shows to date:     For today's appointment patient:    [x] Cancelled    [] Rescheduled appointment    [] No-show     Reason given by patient:    [x]  Patient ill    []  Conflicting appointment    [] No transportation      [] Conflict with work    [] No reason given    [] Weather related    [] [id number]    [] Other:      Comments:        [x] Next appointment was confirmed    Electronically signed by: Romina Skinner PTA

## 2021-08-16 ENCOUNTER — HOSPITAL ENCOUNTER (OUTPATIENT)
Dept: PHYSICAL THERAPY | Facility: CLINIC | Age: 66
Setting detail: THERAPIES SERIES
Discharge: HOME | End: 2021-08-16
Payer: MEDICARE

## 2021-08-16 PROCEDURE — 97110 THERAPEUTIC EXERCISES: CPT

## 2021-08-16 NOTE — FLOWSHEET NOTE
[] [facility]) - [facility] &  Therapy  [address]  [phone]  F: [phone] [] [address]  [facility]   [address]  P: [phone]  F: [phone] [] [address] &  Therapy  [address]  P: [phone]  F: [phone] [x] [address]  P: [phone]  F: [phone] [] [address]  [facility]   [address]   [place]: [phone]  F: [phone]      Physical Therapy Daily Treatment Note    Date:  2021  Patient Name:  Jose David Jaime    :  1955  MRN: [id number]  Physician: Ryan Francois CNP                            Insurance: Medicare  Medical Diagnosis: Chronic R shoulder pain                       Rehab Codes: V47.809, G89.29  Onset date: ~2015                       Next 's appt.: TBA  Visit# / total visits: 3/20; Progress note for Medicare patient due at visit 10     Cancels/No Shows: 1/0    Subjective:    Pain:  [x] Yes  [] No Location: R shoulder Pain Rating: (0-10 scale) 2/10  Pain altered Tx:  [] No  [] Yes  Action:  Comments: Pt arrives with R shoulder soreness, states isnt too sore today. Is having more issues with stomach, and has stomach surgery later this week.      Objective:  Modalities:   Precautions:  Exercises:  Exercise  Chronic R shoulder pain    Reps/ Time Weight/ Level Comments   UBE  2/2 Level 1      Pulleys 2'/2'       *Shoulder flex and abd isometric 10x5\" hold       *Standing wand flex 2x10 1#     Standing wand abd 2x10  1#     *Pec doorway stretch 3x30\"       *Seated scapular retraction 2x10    HEP   Prone scap retraction/depression  2x10    on table           Shoulder tband  ER/IR  2x10        PROM x                                               Other: Manual: MFR to RUT in supine, passive UT stretch, SOR,- held today, Gentle DI to R pec   *Distributed as HEP      Specific Instructions for next treatment: Continue with manual to RUT, porgress shoulder AAROM and scapular strength as tolerated      Treatment Charges: Mins Units   []  Modalities     [x]  Ther Exercise 35 2   [x]  Manual Therapy 5 0   []  Ther Activities     []  Aquatics     []  Vasocompression     []  Other     Total Treatment time 40 2       Assessment: [x] Progressing toward goals. Pt with improvement in ROM this date, therefore progressed to pulleys followed by shoulder tband strenghtening; held isometrics d/t able to complete tband strengthening. Also progressed wand exercises from supine to standing with good tolerance. Pt again declined ice after treatment, states only makes pt \"feel cold\"    [] No change. [] Other:  [x] Patient would continue to benefit from skilled physical therapy services in order to: address RUT tightness prior to returning strength and AROM to R shoulder. STG/LTG  STG: (to be met in 10 treatments)  1. ? Pain: Decrease pain levels to 5/10 at worst in RUT  2. ? ROM: Increase flexibility and AROM limitations throughout to equal bilat to reduce difficulty with ADLs, increase R shoulder AROM flex to 140deg and abd to 120deg  3. ? Strength: Increase R shoulder strength flex and abd to 4/5  4. ? Function: Pt to report carrying groceries in RUE without an increase in pain   5. Independent with Home Exercise Programs     LTG: (to be met in 20 treatments)  1. Improve score on assessment tool from 50% impaired to 25% impaired, demonstrating an improvement in ADLs  2. Reduce pain levels to 0/10  3. Pt to have increased R shoulder flex and abd strength to 4+/5                    Patient goals: To have decreased R shoulder pain and R hand numbness       Pt.  Education:  [x] Yes  [] No  [] Reviewed Prior HEP/Ed  Method of Education: [x] Verbal  [x] Demo  [] Written  Comprehension of Education:  [x] Verbalizes understanding. [x] Demonstrates understanding. [] Needs review. [] Demonstrates/verbalizes HEP/Ed previously given. Plan: [x] Continue current frequency toward long and short term goals.     [x] Specific Instructions for subsequent treatments: above      Time In: 1300            Time Out: 1340    Electronically signed by:  Maria Eugenia Uriostegui PT

## 2021-08-16 NOTE — PRE-CERTIFICATION NOTE
Medicare Cap   [] Physical Therapy  [] Speech Therapy  [] Occupational therapy  *PT and Speech caps combine      $2100 Limit for PT and Speech combined  $2100 Limit for OT individually  At the beginning of the month where you expect to go over $2100, please add the [address] [place] modifier      Patient Name: Ching Gonsalez: 1955    Note:  This is an estimate of charges billed.      Date of [address] Name # units/ charge $$$ charge Daily Total Charge Ongoing Total $$$   8/4 PT eval  Therex  Manual 1+1+1 98.01+22.84+21.34 142.15 142.15   8/10 TE + MT 2+1 29.21+22.84+21.34 73.39 215.54   8/16 Therex 2 29.21+22.84 52.05 267.59

## 2021-08-19 ENCOUNTER — PATIENT MESSAGE (OUTPATIENT)
Dept: PRIMARY CARE CLINIC | Age: 66
End: 2021-08-19

## 2021-08-19 DIAGNOSIS — M25.511 CHRONIC RIGHT SHOULDER PAIN: ICD-10-CM

## 2021-08-19 DIAGNOSIS — G89.29 CHRONIC RIGHT SHOULDER PAIN: ICD-10-CM

## 2021-08-19 DIAGNOSIS — R10.84 GENERALIZED POSTPRANDIAL ABDOMINAL PAIN: ICD-10-CM

## 2021-08-19 RX ORDER — OXYCODONE HYDROCHLORIDE AND ACETAMINOPHEN 5; 325 MG/1; MG/1
1 TABLET ORAL EVERY 8 HOURS PRN
Qty: 90 TABLET | Refills: 0 | Status: SHIPPED | OUTPATIENT
Start: 2021-08-19 | End: 2021-09-20 | Stop reason: SDUPTHER

## 2021-08-19 NOTE — TELEPHONE ENCOUNTER
From: Juan Means  To: BAKARI Matos - CNP  Sent: 8/19/2021 1:37 AM EDT  Subject: Prescription Question    Well went for my ERCP they were able to dialate my small intestines a little more but not enough to get the stone, have to call another doctor [facility]) who specializes this kind of surgery, don't know when. Therapy is going so so not much change yet. So I am still in pain both stomach and shoulder so could I please have another refill for Percocet?     Thanks,    Edwin Bahena

## 2021-08-20 ENCOUNTER — HOSPITAL ENCOUNTER (OUTPATIENT)
Dept: PHYSICAL THERAPY | Facility: CLINIC | Age: 66
Setting detail: THERAPIES SERIES
Discharge: HOME | End: 2021-08-20
Payer: MEDICARE

## 2021-08-20 NOTE — FLOWSHEET NOTE
[] [address]  [address]    [phone]  F: [phone]   [] [address]  [address] [facility]   [address]  P: [phone]  F: [phone]  [] [place]  [address] [facility]  P: [phone]  F: [phone] [x] [address]  P: [phone]  F: [phone]  [] [address]  [address] [address] [facility]   [address]   [place]: [phone]  F: [phone]   [] [address]  [place]: [phone]   F: [phone]     Physical Therapy Cancel/No Show note    Date: 2021  Patient: Elsi Poplar  : 1955  MRN: [id number]    Cancels/No Shows to date: 0    For today's appointment patient:    [x]  Cancelled    [] Rescheduled appointment    [] No-show     Reason given by patient:    []  Patient ill    []  Conflicting appointment    [] No transportation      [] Conflict with work    [] No reason given    [] Weather related    [] COVID-19    [x] Other:      Comments:  Patient recovering from medical procedure. Rescheduled appointment.        [x] Next appointment was confirmed    Electronically signed by: Tito Cody

## 2021-08-24 ENCOUNTER — HOSPITAL ENCOUNTER (OUTPATIENT)
Dept: PHYSICAL THERAPY | Facility: CLINIC | Age: 66
Setting detail: THERAPIES SERIES
Discharge: HOME | End: 2021-08-24
Payer: MEDICARE

## 2021-08-24 NOTE — FLOWSHEET NOTE
[] [address]  [address]    [phone]  F: [phone]   [] [address]  formerly [facility]   [address]  P: [phone]  F: [phone]  [] [address]  [address] [facility]  P: [phone]  F: [phone] [x] [address]  P: [phone]  F: [phone]  [] [address]  [address] [facility] [address]   [address]   [place]: [phone]  F: [phone]   [] [address]  [place]: [phone]   F: [phone]     Physical Therapy Cancel/No Show note    Date: 2021  Patient: Ninfa Alvarez  : 1955  MRN: [id number]    Cancels/No Shows to date: 3/0    For today's appointment patient:    [x]  Cancelled    [] Rescheduled appointment    [] No-show     Reason given by patient:    []  Patient ill    []  Conflicting appointment    [] No transportation      [] Conflict with work    [] No reason given    [] Weather related    [] COVID-19    [x] Other:      Comments:  Patient left voice mail stating she still has to have 2 other surgeries and not sure when she will be able to continued therapy. No other appts scheduled.       [] Next appointment was confirmed    Electronically signed by: Almita Goode

## 2021-08-25 ENCOUNTER — IMMUNIZATION (OUTPATIENT)
Dept: FAMILY MEDICINE CLINIC | Age: 66
End: 2021-08-25
Payer: MEDICARE

## 2021-08-25 PROCEDURE — 91300 COVID-19, PFIZER VACCINE 30MCG/0.3ML DOSE: CPT | Performed by: INTERNAL MEDICINE

## 2021-08-25 PROCEDURE — 0001A COVID-19, PFIZER VACCINE 30MCG/0.3ML DOSE: CPT | Performed by: INTERNAL MEDICINE

## 2021-09-20 ENCOUNTER — PATIENT MESSAGE (OUTPATIENT)
Dept: PRIMARY CARE CLINIC | Age: 66
End: 2021-09-20

## 2021-09-20 DIAGNOSIS — R10.84 GENERALIZED POSTPRANDIAL ABDOMINAL PAIN: ICD-10-CM

## 2021-09-20 DIAGNOSIS — M25.511 CHRONIC RIGHT SHOULDER PAIN: ICD-10-CM

## 2021-09-20 DIAGNOSIS — G89.29 CHRONIC RIGHT SHOULDER PAIN: ICD-10-CM

## 2021-09-20 RX ORDER — OXYCODONE HYDROCHLORIDE AND ACETAMINOPHEN 5; 325 MG/1; MG/1
1 TABLET ORAL EVERY 8 HOURS PRN
Qty: 90 TABLET | Refills: 0 | Status: SHIPPED | OUTPATIENT
Start: 2021-09-20 | End: 2021-10-19 | Stop reason: SDUPTHER

## 2021-09-20 NOTE — TELEPHONE ENCOUNTER
From: Priya Maria  To: BAKARI Diaz - CNP  Sent: 9/20/2021 9:42 AM EDT  Subject: Prescription Question    Need another refill for Percocet please.     Thanks,    Rivas Huang

## 2021-10-18 ENCOUNTER — OFFICE VISIT (OUTPATIENT)
Dept: PRIMARY CARE CLINIC | Age: 66
End: 2021-10-18
Payer: MEDICARE

## 2021-10-18 VITALS
SYSTOLIC BLOOD PRESSURE: 150 MMHG | DIASTOLIC BLOOD PRESSURE: 90 MMHG | RESPIRATION RATE: 15 BRPM | BODY MASS INDEX: 22.85 KG/M2 | WEIGHT: 117 LBS | OXYGEN SATURATION: 98 % | HEART RATE: 75 BPM

## 2021-10-18 DIAGNOSIS — I74.09 AORTOILIAC OCCLUSIVE DISEASE (HCC): ICD-10-CM

## 2021-10-18 DIAGNOSIS — G89.29 CHRONIC RIGHT SHOULDER PAIN: ICD-10-CM

## 2021-10-18 DIAGNOSIS — M25.511 CHRONIC RIGHT SHOULDER PAIN: ICD-10-CM

## 2021-10-18 DIAGNOSIS — R10.84 GENERALIZED POSTPRANDIAL ABDOMINAL PAIN: ICD-10-CM

## 2021-10-18 DIAGNOSIS — K21.9 GASTROESOPHAGEAL REFLUX DISEASE, UNSPECIFIED WHETHER ESOPHAGITIS PRESENT: ICD-10-CM

## 2021-10-18 DIAGNOSIS — E11.9 NEW ONSET TYPE 2 DIABETES MELLITUS (HCC): Primary | ICD-10-CM

## 2021-10-18 PROBLEM — F41.8 DEPRESSION WITH ANXIETY: Status: RESOLVED | Noted: 2021-07-15 | Resolved: 2021-10-18

## 2021-10-18 LAB — HBA1C MFR BLD: 6.1 %

## 2021-10-18 PROCEDURE — G8420 CALC BMI NORM PARAMETERS: HCPCS | Performed by: NURSE PRACTITIONER

## 2021-10-18 PROCEDURE — 1123F ACP DISCUSS/DSCN MKR DOCD: CPT | Performed by: NURSE PRACTITIONER

## 2021-10-18 PROCEDURE — G8484 FLU IMMUNIZE NO ADMIN: HCPCS | Performed by: NURSE PRACTITIONER

## 2021-10-18 PROCEDURE — 4004F PT TOBACCO SCREEN RCVD TLK: CPT | Performed by: NURSE PRACTITIONER

## 2021-10-18 PROCEDURE — G8427 DOCREV CUR MEDS BY ELIG CLIN: HCPCS | Performed by: NURSE PRACTITIONER

## 2021-10-18 PROCEDURE — 1090F PRES/ABSN URINE INCON ASSESS: CPT | Performed by: NURSE PRACTITIONER

## 2021-10-18 PROCEDURE — 4040F PNEUMOC VAC/ADMIN/RCVD: CPT | Performed by: NURSE PRACTITIONER

## 2021-10-18 PROCEDURE — G8400 PT W/DXA NO RESULTS DOC: HCPCS | Performed by: NURSE PRACTITIONER

## 2021-10-18 PROCEDURE — 2022F DILAT RTA XM EVC RTNOPTHY: CPT | Performed by: NURSE PRACTITIONER

## 2021-10-18 PROCEDURE — 83036 HEMOGLOBIN GLYCOSYLATED A1C: CPT | Performed by: NURSE PRACTITIONER

## 2021-10-18 PROCEDURE — 3044F HG A1C LEVEL LT 7.0%: CPT | Performed by: NURSE PRACTITIONER

## 2021-10-18 PROCEDURE — 99214 OFFICE O/P EST MOD 30 MIN: CPT | Performed by: NURSE PRACTITIONER

## 2021-10-18 PROCEDURE — 3017F COLORECTAL CA SCREEN DOC REV: CPT | Performed by: NURSE PRACTITIONER

## 2021-10-18 RX ORDER — CHLORAL HYDRATE 500 MG
CAPSULE ORAL
COMMUNITY
End: 2022-03-01 | Stop reason: ALTCHOICE

## 2021-10-18 ASSESSMENT — PATIENT HEALTH QUESTIONNAIRE - PHQ9
2. FEELING DOWN, DEPRESSED OR HOPELESS: 0
SUM OF ALL RESPONSES TO PHQ QUESTIONS 1-9: 0
SUM OF ALL RESPONSES TO PHQ QUESTIONS 1-9: 0
1. LITTLE INTEREST OR PLEASURE IN DOING THINGS: 0
SUM OF ALL RESPONSES TO PHQ9 QUESTIONS 1 & 2: 0
SUM OF ALL RESPONSES TO PHQ QUESTIONS 1-9: 0

## 2021-10-18 ASSESSMENT — ENCOUNTER SYMPTOMS
WHEEZING: 0
TROUBLE SWALLOWING: 0
BLOOD IN STOOL: 0
SINUS PRESSURE: 0
DIARRHEA: 0
COUGH: 0
VOMITING: 0
ABDOMINAL PAIN: 1
CONSTIPATION: 0
SORE THROAT: 0
NAUSEA: 0
SHORTNESS OF BREATH: 0

## 2021-10-18 NOTE — PROGRESS NOTES
704 \[facility]\"" PRIMARY CARE  . [address]  [address] [address]  Dept: [phone]  Dept Fax: [phone]    Carine Lyle is a 76 y.o. female who presentstoday for her medical conditions/complaints as noted below. Carine Lyle is c/o of  Chief Complaint   Patient presents with    3 Month Follow-Up     DM    Health Maintenance     declied colonoscopy/mamm       HPI:     Here today for follow up  Reports she is discouraged about her continued GI issues, has been eating manly liquids and soft puddings for the past 3 weeks  Continues to have abdominal pain that worsens after meals  She denies diarrhea, nausea or vomiting  Has follow up with GI tomorrow    Also reports stopped taking her metformin and lisinopril about 4 months ago, was wondering if the meds were contributing but admits no changes  Her BP is elevated in office, she states has not been checking at home  Hypertension  This is a chronic problem. The current episode started more than 1 year ago. The problem is uncontrolled. Pertinent negatives include no chest pain, headaches, palpitations, peripheral edema or shortness of breath. Risk factors for coronary artery disease include post-menopausal state and sedentary lifestyle. Past treatments include ACE inhibitors. The current treatment provides moderate improvement. Compliance problems: stopped taking med past few months. Hypertensive end-organ damage includes PVD. There is no history of CAD/MI or CVA.        Hemoglobin A1C (%)   Date Value   10/18/2021 6.1   07/15/2021 5.9   04/15/2021 6.4 (A)             ( goal A1C is < 7)   No results found for: LABMICR  LDL Cholesterol (mg/dL)   Date Value   04/19/2021 127   01/04/2020 113       (goal LDL is <100)   AST (U/L)   Date Value   04/19/2021 17     ALT (U/L)   Date Value   04/19/2021 14     BUN (mg/dL)   Date Value   04/19/2021 14     BP Readings from Last 3 Encounters:   10/18/21 (!) 150/90 07/22/21 (!) 162/82   07/22/21 (!) 158/75          ([id number])    Past Medical History:   Diagnosis Date    Anxiety     Depression     Diabetes mellitus (HCC)     oral meds    Glaucoma     Hearing loss     Hypertension     Migraines     PVD (peripheral vascular disease) ([facility] [address]       Past Surgical History:   Procedure Laterality Date    ABDOMINAL AORTIC ANEURYSM REPAIR N/A 9/15/2020    AORTAL BIFEMORAL BYPASS  **CELLSAVER** performed by Jackolyn Angelucci, MD at [address] [place] [address] AORTO-FEMORAL BYPASS GRAFT  09/15/2020    APPENDECTOMY      BREAST SURGERY      \"crystals removed from right breast\"    CATARACT REMOVAL  2015    CHOLECYSTECTOMY      COLONOSCOPY      ENDOSCOPY, COLON, DIAGNOSTIC      TONSILLECTOMY      UPPER GASTROINTESTINAL ENDOSCOPY  06/08/2021    w/EUS FNA    UPPER GASTROINTESTINAL ENDOSCOPY N/A 6/8/2021    EGD W/EUS FNA performed by [facility]mie Reyes MD at [facility]    UPPER GASTROINTESTINAL ENDOSCOPY  6/8/2021    EGD DILATION BALLOON 6-7.5MM performed by Jaymie Reyes MD at [facility]    UPPER GASTROINTESTINAL ENDOSCOPY N/A 6/8/2021    EGD BIOPSY performed by Jaymie Reyes MD at [facility]    UPPER GASTROINTESTINAL ENDOSCOPY N/A 7/22/2021    EGD DILATION BALLOON, pyloric performed by Guanaco Dickey MD at [address] [place] [address] N/A 7/22/2021    EGD BIOPSY performed by Guanaco Dickey MD at [address] [place] [address]  7/22/2021    EGD DILATION SAVORY performed by Guanaco Dickey MD at [facility]       Family History   Problem Relation Age of Onset    Colon Cancer Mother     Prostate Cancer Father     Thyroid Cancer Sister           Social History     Tobacco Use    Smoking status: Current Every Day Smoker     Packs/day: 1.00     Years: 41.00     Pack years: 41.00     Types: Cigarettes     Start date: 7/22/1975    Smokeless tobacco: Never Used    Tobacco comment: Cut back Creatinine monitoring  07/15/2022    A1C test (Diabetic or Prediabetic)  10/18/2022    Hepatitis A vaccine  Aged Out    Hib vaccine  Aged Out    Meningococcal (ACWY) vaccine  Aged Out    Hepatitis C screen  Discontinued       Subjective:      Review of Systems   Constitutional: Negative for activity change, appetite change, chills, fatigue, fever and unexpected weight change. HENT: Negative for congestion, ear pain, hearing loss, sinus pressure, sore throat and trouble swallowing. Eyes: Negative for visual disturbance. Respiratory: Negative for cough, shortness of breath and wheezing. Cardiovascular: Negative for chest pain, palpitations and leg swelling. Gastrointestinal: Positive for abdominal pain (chronic, worsens with certain foods). Negative for blood in stool, constipation, diarrhea, nausea and vomiting. Endocrine: Negative for cold intolerance, heat intolerance, polydipsia, polyphagia and polyuria. Genitourinary: Negative for difficulty urinating, frequency, hematuria and urgency. Musculoskeletal: Positive for arthralgias (right shoulder). Negative for myalgias. Skin: Negative for rash. Allergic/Immunologic: Negative for environmental allergies. Neurological: Negative for dizziness, weakness, light-headedness and headaches. Psychiatric/Behavioral: Negative for confusion. The patient is not nervous/anxious. Objective:     Physical Exam  Constitutional:       Appearance: She is well-developed. HENT:      Head: Normocephalic. Eyes:      Conjunctiva/sclera: Conjunctivae normal.      Pupils: Pupils are equal, round, and reactive to light. Cardiovascular:      Rate and Rhythm: Normal rate and regular rhythm. Heart sounds: Normal heart sounds. No murmur heard. Pulmonary:      Effort: Pulmonary effort is normal.      Breath sounds: Normal breath sounds. No wheezing. Abdominal:      General: Bowel sounds are normal. There is no distension.       Palpations:

## 2021-10-19 DIAGNOSIS — R10.84 GENERALIZED POSTPRANDIAL ABDOMINAL PAIN: ICD-10-CM

## 2021-10-19 DIAGNOSIS — G89.29 CHRONIC RIGHT SHOULDER PAIN: ICD-10-CM

## 2021-10-19 DIAGNOSIS — M25.511 CHRONIC RIGHT SHOULDER PAIN: ICD-10-CM

## 2021-10-20 RX ORDER — OXYCODONE HYDROCHLORIDE AND ACETAMINOPHEN 5; 325 MG/1; MG/1
1 TABLET ORAL EVERY 8 HOURS PRN
Qty: 90 TABLET | Refills: 0 | Status: SHIPPED | OUTPATIENT
Start: 2021-10-20 | End: 2021-11-15 | Stop reason: SDUPTHER

## 2021-11-15 DIAGNOSIS — M25.511 CHRONIC RIGHT SHOULDER PAIN: ICD-10-CM

## 2021-11-15 DIAGNOSIS — G89.29 CHRONIC RIGHT SHOULDER PAIN: ICD-10-CM

## 2021-11-15 DIAGNOSIS — R10.84 GENERALIZED POSTPRANDIAL ABDOMINAL PAIN: ICD-10-CM

## 2021-11-15 RX ORDER — OXYCODONE HYDROCHLORIDE AND ACETAMINOPHEN 5; 325 MG/1; MG/1
1 TABLET ORAL EVERY 8 HOURS PRN
Qty: 90 TABLET | Refills: 0 | Status: SHIPPED | OUTPATIENT
Start: 2021-11-15 | End: 2021-12-14 | Stop reason: SDUPTHER

## 2021-12-14 DIAGNOSIS — G89.29 CHRONIC RIGHT SHOULDER PAIN: ICD-10-CM

## 2021-12-14 DIAGNOSIS — M25.511 CHRONIC RIGHT SHOULDER PAIN: ICD-10-CM

## 2021-12-14 DIAGNOSIS — R10.84 GENERALIZED POSTPRANDIAL ABDOMINAL PAIN: ICD-10-CM

## 2021-12-14 RX ORDER — OXYCODONE HYDROCHLORIDE AND ACETAMINOPHEN 5; 325 MG/1; MG/1
1 TABLET ORAL EVERY 8 HOURS PRN
Qty: 90 TABLET | Refills: 0 | Status: SHIPPED | OUTPATIENT
Start: 2021-12-14 | End: 2022-01-13 | Stop reason: SDUPTHER

## 2022-01-10 ENCOUNTER — HOSPITAL ENCOUNTER (OUTPATIENT)
Dept: PREADMISSION TESTING | Age: 67
Discharge: HOME | End: 2022-01-14
Payer: MEDICARE

## 2022-01-10 VITALS
HEART RATE: 73 BPM | BODY MASS INDEX: 22.97 KG/M2 | TEMPERATURE: 98.6 F | WEIGHT: 117 LBS | OXYGEN SATURATION: 97 % | DIASTOLIC BLOOD PRESSURE: 77 MMHG | SYSTOLIC BLOOD PRESSURE: 157 MMHG | HEIGHT: 60 IN | RESPIRATION RATE: 16 BRPM

## 2022-01-10 LAB
ANION GAP SERPL CALCULATED.3IONS-SCNC: 9 MMOL/L (ref 9–17)
BUN BLDV-MCNC: 10 MG/DL (ref 8–23)
BUN/CREAT BLD: 13 (ref 9–20)
CALCIUM SERPL-MCNC: 8.9 MG/DL (ref 8.6–10.4)
CHLORIDE BLD-SCNC: 104 MMOL/L (ref 98–107)
CO2: 26 MMOL/L (ref 20–31)
CREAT SERPL-MCNC: 0.77 MG/DL (ref 0.5–0.9)
EKG ATRIAL RATE: 77 BPM
EKG P AXIS: 53 DEGREES
EKG P-R INTERVAL: 128 MS
EKG Q-T INTERVAL: 398 MS
EKG QRS DURATION: 86 MS
EKG QTC CALCULATION (BAZETT): 450 MS
EKG R AXIS: 45 DEGREES
EKG T AXIS: 40 DEGREES
EKG VENTRICULAR RATE: 77 BPM
GFR AFRICAN AMERICAN: >60 ML/MIN
GFR NON-AFRICAN AMERICAN: >60 ML/MIN
GFR SERPL CREATININE-BSD FRML MDRD: ABNORMAL ML/MIN/{1.73_M2}
GFR SERPL CREATININE-BSD FRML MDRD: ABNORMAL ML/MIN/{1.73_M2}
GLUCOSE BLD-MCNC: 111 MG/DL (ref 70–99)
HCT VFR BLD CALC: 38.7 % (ref 36.3–47.1)
HEMOGLOBIN: 13.3 G/DL (ref 11.9–15.1)
MCH RBC QN AUTO: 33.1 PG (ref 25.2–33.5)
MCHC RBC AUTO-ENTMCNC: 34.4 G/DL (ref 28.4–34.8)
MCV RBC AUTO: 96.3 FL (ref 82.6–102.9)
NRBC AUTOMATED: 0 PER 100 WBC
PDW BLD-RTO: 12.8 % (ref 11.8–14.4)
PLATELET # BLD: 284 K/UL (ref 138–453)
PMV BLD AUTO: 9.9 FL (ref 8.1–13.5)
POTASSIUM SERPL-SCNC: 3.8 MMOL/L (ref 3.7–5.3)
RBC # BLD: 4.02 M/UL (ref 3.95–5.11)
SODIUM BLD-SCNC: 139 MMOL/L (ref 135–144)
WBC # BLD: 10.3 K/UL (ref 3.5–11.3)

## 2022-01-10 PROCEDURE — 93005 ELECTROCARDIOGRAM TRACING: CPT | Performed by: ANESTHESIOLOGY

## 2022-01-10 PROCEDURE — 80048 BASIC METABOLIC PNL TOTAL CA: CPT

## 2022-01-10 PROCEDURE — 85027 COMPLETE CBC AUTOMATED: CPT

## 2022-01-10 PROCEDURE — 36415 COLL VENOUS BLD VENIPUNCTURE: CPT

## 2022-01-10 ASSESSMENT — PAIN DESCRIPTION - PAIN TYPE: TYPE: CHRONIC PAIN

## 2022-01-10 ASSESSMENT — PAIN DESCRIPTION - ORIENTATION: ORIENTATION: MID

## 2022-01-10 ASSESSMENT — PAIN DESCRIPTION - LOCATION: LOCATION: ABDOMEN

## 2022-01-10 ASSESSMENT — PAIN SCALES - GENERAL: PAINLEVEL_OUTOF10: 9

## 2022-01-10 ASSESSMENT — PAIN DESCRIPTION - DESCRIPTORS: DESCRIPTORS: ACHING;SORE;DISCOMFORT;CONSTANT

## 2022-01-10 ASSESSMENT — PAIN DESCRIPTION - FREQUENCY: FREQUENCY: CONTINUOUS

## 2022-01-10 NOTE — H&P (VIEW-ONLY)
Migraines     PVD (peripheral vascular disease) ([facility] [address]     Wears dentures         Past Surgical History:     Past Surgical History:   Procedure Laterality Date    ABDOMINAL AORTIC ANEURYSM REPAIR N/A 9/15/2020    AORTAL BIFEMORAL BYPASS  **CELLSAVER** performed by Jenise Guerrero MD at [address] [place] [address] AORTO-FEMORAL BYPASS GRAFT  09/15/2020    APPENDECTOMY      BREAST SURGERY      \"crystals removed from right breast\"    CATARACT REMOVAL  2015    CHOLECYSTECTOMY      COLONOSCOPY      ENDOSCOPY, COLON, DIAGNOSTIC      EYE SURGERY      TONSILLECTOMY      UPPER GASTROINTESTINAL ENDOSCOPY  06/08/2021    w/EUS FNA    UPPER GASTROINTESTINAL ENDOSCOPY N/A 6/8/2021    EGD W/EUS FNA performed by Zonia Silva MD at [facility]    UPPER GASTROINTESTINAL ENDOSCOPY  6/8/2021    EGD DILATION BALLOON 6-7.5MM performed by Zonia Silva MD at [facility]    UPPER GASTROINTESTINAL ENDOSCOPY N/A 6/8/2021    EGD BIOPSY performed by Zonia Silva MD at [facility]    UPPER GASTROINTESTINAL ENDOSCOPY N/A 7/22/2021    EGD DILATION BALLOON, pyloric performed by Jose Hamilton MD at [address] [place] [address] N/A 7/22/2021    EGD BIOPSY performed by Jose Hamilton MD at [address] [place] [address]  7/22/2021    EGD DILATION SAVORY performed by Jose Hamilton MD at [address] [place] [address]          Medications Prior to Admission:     Prior to Admission medications    Medication Sig Start Date End Date Taking? Authorizing Provider   oxyCODONE-acetaminophen (PERCOCET) 5-325 MG per tablet Take 1 tablet by mouth every 8 hours as needed for Pain for up to 30 days.  12/14/21 1/13/22 Yes Delma Montes, APRN - CNP   Multiple Vitamins-Minerals (MULTIVITAMIN ADULT EXTRA C PO) multivitamin   1 tab daily   Yes Historical Provider, MD   lisinopril (PRINIVIL;ZESTRIL) 20 MG tablet Take 1 tablet by mouth daily 4/15/21  Yes Delma AVALOS [facility], APRN - CNP   latanoprost (XALATAN) 0.005 % ophthalmic solution INSTILL 1 DROP INTO EACH EYE AT BEDTIME 11/5/20  Yes Historical Provider, MD   aspirin (ASPIRIN LOW DOSE ADULT) 81 MG chewable tablet Take 1 tablet by mouth daily 9/22/20  Yes Samantha Carlson APRN - NP   VORTIoxetine (TRINTELLIX) 10 MG TABS tablet Take 1 tablet by mouth 12/5/19  Yes Historical Provider, MD   [place]-3 1000 MG CAPS Fish Oil 120 mg-180 mg-1000 mg capsule   Take 1 capsule every day by oral route. Historical Provider, MD   omeprazole (PRILOSEC) 40 MG delayed release capsule Take 1 capsule by mouth every morning (before breakfast) 10/13/20   Lorrie Leonard, APRN - CNP        Allergies:     Dilaudid [hydromorphone hcl], Sulfa antibiotics, Sulfamethoxazole, Sulfamethoxazole-trimethoprim, Trimethoprim, and Tetanus toxoid    Social History:     Tobacco:    reports that she has been smoking cigarettes. She started smoking about 46 years ago. She has a 41.00 pack-year smoking history. She has never used smokeless tobacco.  Alcohol:      reports no history of alcohol use. Drug Use:  reports no history of drug use. Family History:     Family History   Problem Relation Age of Onset    Colon Cancer Mother     Prostate Cancer Father     Thyroid Cancer Sister        Review of Systems:     Positive and Negative as described in HPI. CONSTITUTIONAL: Unintentional weight loss of 40-45 pounds in the past year. Negative for fevers, chills, sweats, and fatigue. HEENT: Pt wears reading glasses. Glaucoma. Hearing loss. Negative for rhinorrhea and throat pain. RESPIRATORY: Mild chest congestion and cough since last week. Cough and congestion are improving. Negative for shortness of breath and wheezing. CARDIOVASCULAR: History of a blood clot in the right leg and right lung in 2020. Negative for chest pain, irregular heartbeat, and palpitations. GASTROINTESTINAL: See HPI.    GENITOURINARY: Negative for difficulty of urination, burning with urination, and frequency. INTEGUMENT: Negative for rash, skin lesions, and easy bruising. HEMATOLOGIC/LYMPHATIC: Negative for swelling/edema. ALLERGIC/IMMUNOLOGIC: Negative for urticaria and itching. ENDOCRINE: Diabetes. Last A1C was 6.1% on 10/18/21. Negative for increase in thirst, increase in urination, and heat or cold intolerance. MUSCULOSKELETAL: Right shoulder pain. NEUROLOGICAL: Occasional headaches. Migraines occur 2-3 times per year. Negative for dizziness, lightheadedness, numbness, and tingling extremities. BEHAVIOR/PSYCH: Treated depression. Pt denies anxiety. Physical Exam:   BP (!) 157/77   Pulse 73   Temp 98.6 °F (37 °C) (Temporal)   Resp 16   Ht 5' (1.524 m)   Wt 117 lb (53.1 kg)   SpO2 97%   BMI 22.85 kg/m²   No LMP recorded. Patient is postmenopausal.  No obstetric history on file. No results for input(s): POCGLU in the last 72 hours. General Appearance:  Alert, well appearing, and in no acute distress. Mental status: Oriented to person, place, and time. Head: Normocephalic and atraumatic. Eye: No icterus, redness, pupils equal and reactive, extraocular eye movements intact, and conjunctiva clear. Ear: Hard of hearing. Nose: No drainage noted. Mouth: Upper and lower dentures. Mucous membranes moist.  Neck: Supple and no carotid bruits noted. Lungs: Wheezes in bilateral lower lobes. Bilateral equal air entry. No rales or rhonchi. Normal effort. Cardiovascular: Normal rate, regular rhythm, no murmur, gallop, or rub. Abdomen: Distended. Tenderness in RLQ and LLQ. Soft and active bowel sounds. Neurologic: Normal speech and cranial nerves II through XII grossly intact. Strength 5/5 bilaterally. Skin: No gross lesions, rashes, bruising, or bleeding on exposed skin area. Extremities: Posterior tibial pulses are palpable bilaterally. No pedal edema. No calf tenderness with palpation. Psych: Normal affect.      Investigations:      Laboratory Testing:  No results found for this or any previous visit (from the past 24 hour(s)). No results for input(s): HGB, HCT, WBC, MCV, PLATELET, NA, K, CL, CO2, BUN, CREATININE, GLUCOSE, INR, PROTIME, APTT, AST, ALT, LABALBU, HCG in the last 720 hours. No results for input(s): COVID19 in the last 720 hours. EK/10/22. See Epic. Diagnosis:      1. Pyloric stenosis    Plans:     1.  Pyloroplasty      BAKARI Hernandez - CNP  1/10/2022  1:36 PM

## 2022-01-10 NOTE — H&P
History and Physical Service   [place] [address]    HISTORY AND PHYSICAL EXAMINATION            Date of Evaluation: 1/10/2022  Patient name:  Masah Bee  MRN:   [id number]  YOB: 1955  PCP:    BAKARI Jordan CNP    History Obtained From:     Patient, Medical records    History of Present Illness: This is Masha Bee a 78 y.o. female who presents for a pre-admission testing appointment for an upcoming pyloroplasty by Dr. Will Arreguin scheduled on 1/19/2022 at 200 due to pyloric stenosis. History of multiple abdominal surgeries including cholecystectomy, appendectomy, and aorto-femoral bypass surgery. S/p several endoscopies and a biliary sphincterotomy per Dr. Will Arreguin' note dated 11/19/21 in the pt's paper chart. The pt has been under the care of Dr. Juan Fuchs, Dr. Carlo Cannon, and Dr. Carey Parnell from gastroenterology. Pt states she had an endoscopy with dilation of the pyloric stenosis in 8/2021. Biopsy of the pyloric stenosis did not reveal any histological abnormality per Dr. Will Arreguin' note. The pt has nausea, bloating, and lower abdominal pain. The abdominal pain is aggravated by eating. Pt states she only eats soft food and is taking Percocet for abdominal pain and right shoulder pain. Pt states she has lost 40-45 pounds in the past year. History of constipation since aorto-femoral bypass surgery in 9/2020. Bowel movements are 5 days apart. Pt denies acid reflux, vomiting, dysphagia, diarrhea, and bloody stools. Functional Capacity per pt:   1) Pt is able to walk 2 city blocks on level ground without SOB. 2) Pt is able to climb 2 flights of stairs without SOB. 3) Pt is able to walk up a hill for 1-2 city blocks without SOB.     Past Medical History:     Past Medical History:   Diagnosis Date    Anxiety     Depression     Diabetes mellitus ([facility] [address]     oral meds    Glaucoma     Hearing loss     Hx of blood clots     right leg and right lung 2020    Hypertension     Migraines     PVD (peripheral vascular disease) ([facility] [address]     Wears dentures         Past Surgical History:     Past Surgical History:   Procedure Laterality Date    ABDOMINAL AORTIC ANEURYSM REPAIR N/A 9/15/2020    AORTAL BIFEMORAL BYPASS  **CELLSAVER** performed by Sanket Murillo MD at [address] [place] [address] AORTO-FEMORAL BYPASS GRAFT  09/15/2020    APPENDECTOMY      BREAST SURGERY      \"crystals removed from right breast\"    CATARACT REMOVAL  2015    CHOLECYSTECTOMY      COLONOSCOPY      ENDOSCOPY, COLON, DIAGNOSTIC      EYE SURGERY      TONSILLECTOMY      UPPER GASTROINTESTINAL ENDOSCOPY  06/08/2021    w/EUS FNA    UPPER GASTROINTESTINAL ENDOSCOPY N/A 6/8/2021    EGD W/EUS FNA performed by Hema Dixon MD at [facility]    UPPER GASTROINTESTINAL ENDOSCOPY  6/8/2021    EGD DILATION BALLOON 6-7.5MM performed by Hema Dixon MD at [facility]    UPPER GASTROINTESTINAL ENDOSCOPY N/A 6/8/2021    EGD BIOPSY performed by Hema Dixon MD at [facility]    UPPER GASTROINTESTINAL ENDOSCOPY N/A 7/22/2021    EGD DILATION BALLOON, pyloric performed by Valarie Ricketts MD at [address] [place] [address] N/A 7/22/2021    EGD BIOPSY performed by Valarie Ricketts MD at [address] [place] [address]  7/22/2021    EGD DILATION SAVORY performed by Valarie Ricketts MD at [address] [place] [address]          Medications Prior to Admission:     Prior to Admission medications    Medication Sig Start Date End Date Taking? Authorizing Provider   oxyCODONE-acetaminophen (PERCOCET) 5-325 MG per tablet Take 1 tablet by mouth every 8 hours as needed for Pain for up to 30 days.  12/14/21 1/13/22 Yes Delma Gama Agent, APRN - CNP   Multiple Vitamins-Minerals (MULTIVITAMIN ADULT EXTRA C PO) multivitamin   1 tab daily   Yes Historical Provider, MD   lisinopril (PRINIVIL;ZESTRIL) 20 MG tablet Take 1 tablet by mouth daily 4/15/21  Yes Delma AVALOS [place] APRN - CNP   latanoprost (XALATAN) 0.005 % ophthalmic solution INSTILL 1 DROP INTO EACH EYE AT BEDTIME 11/5/20  Yes Historical Provider, MD   aspirin (ASPIRIN LOW DOSE ADULT) 81 MG chewable tablet Take 1 tablet by mouth daily 9/22/20  Yes Samantha Carlson APRN - NP   VORTIoxetine (TRINTELLIX) 10 MG TABS tablet Take 1 tablet by mouth 12/5/19  Yes Historical Provider, MD   [place]-3 1000 MG CAPS Fish Oil 120 mg-180 mg-1000 mg capsule   Take 1 capsule every day by oral route. Historical Provider, MD   omeprazole (PRILOSEC) 40 MG delayed release capsule Take 1 capsule by mouth every morning (before breakfast) 10/13/20   BAKARI Guillen CNP        Allergies:     Dilaudid [hydromorphone hcl], Sulfa antibiotics, Sulfamethoxazole, Sulfamethoxazole-trimethoprim, Trimethoprim, and Tetanus toxoid    Social History:     Tobacco:    reports that she has been smoking cigarettes. She started smoking about 46 years ago. She has a 41.00 pack-year smoking history. She has never used smokeless tobacco.  Alcohol:      reports no history of alcohol use. Drug Use:  reports no history of drug use. Family History:     Family History   Problem Relation Age of Onset    Colon Cancer Mother     Prostate Cancer Father     Thyroid Cancer Sister        Review of Systems:     Positive and Negative as described in HPI. CONSTITUTIONAL: Unintentional weight loss of 40-45 pounds in the past year. Negative for fevers, chills, sweats, and fatigue. HEENT: Pt wears reading glasses. Glaucoma. Hearing loss. Negative for rhinorrhea and throat pain. RESPIRATORY: Mild chest congestion and cough since last week. Cough and congestion are improving. Negative for shortness of breath and wheezing. CARDIOVASCULAR: History of a blood clot in the right leg and right lung in 2020. Negative for chest pain, irregular heartbeat, and palpitations. GASTROINTESTINAL: See HPI.    GENITOURINARY: Negative for difficulty of urination, burning with urination, and frequency. INTEGUMENT: Negative for rash, skin lesions, and easy bruising. HEMATOLOGIC/LYMPHATIC: Negative for swelling/edema. ALLERGIC/IMMUNOLOGIC: Negative for urticaria and itching. ENDOCRINE: Diabetes. Last A1C was 6.1% on 10/18/21. Negative for increase in thirst, increase in urination, and heat or cold intolerance. MUSCULOSKELETAL: Right shoulder pain. NEUROLOGICAL: Occasional headaches. Migraines occur 2-3 times per year. Negative for dizziness, lightheadedness, numbness, and tingling extremities. BEHAVIOR/PSYCH: Treated depression. Pt denies anxiety. Physical Exam:   BP (!) 157/77   Pulse 73   Temp 98.6 °F (37 °C) (Temporal)   Resp 16   Ht 5' (1.524 m)   Wt 117 lb (53.1 kg)   SpO2 97%   BMI 22.85 kg/m²   No LMP recorded. Patient is postmenopausal.  No obstetric history on file. No results for input(s): POCGLU in the last 72 hours. General Appearance:  Alert, well appearing, and in no acute distress. Mental status: Oriented to person, place, and time. Head: Normocephalic and atraumatic. Eye: No icterus, redness, pupils equal and reactive, extraocular eye movements intact, and conjunctiva clear. Ear: Hard of hearing. Nose: No drainage noted. Mouth: Upper and lower dentures. Mucous membranes moist.  Neck: Supple and no carotid bruits noted. Lungs: Wheezes in bilateral lower lobes. Bilateral equal air entry. No rales or rhonchi. Normal effort. Cardiovascular: Normal rate, regular rhythm, no murmur, gallop, or rub. Abdomen: Distended. Tenderness in RLQ and LLQ. Soft and active bowel sounds. Neurologic: Normal speech and cranial nerves II through XII grossly intact. Strength 5/5 bilaterally. Skin: No gross lesions, rashes, bruising, or bleeding on exposed skin area. Extremities: Posterior tibial pulses are palpable bilaterally. No pedal edema. No calf tenderness with palpation. Psych: Normal affect.      Investigations:      Laboratory Testing:  No results found for this or any previous visit (from the past 24 hour(s)). No results for input(s): HGB, HCT, WBC, MCV, PLATELET, NA, K, CL, CO2, BUN, CREATININE, GLUCOSE, INR, PROTIME, APTT, AST, ALT, LABALBU, HCG in the last 720 hours. No results for input(s): COVID19 in the last 720 hours. EK/10/22. See Epic. Diagnosis:      1. Pyloric stenosis    Plans:     1.  Pyloroplasty      BAKARI Hernandez - CNP  1/10/2022  1:36 PM

## 2022-01-10 NOTE — PRE-PROCEDURE INSTRUCTIONS
ARRIVE  Wayland Roby Wednesday, January 19 at 1030 AM    Once you enter the hospital lobby, take the elevators to the second floor. Check-In is at the surgery registration desk. Continue to take your home medications as you normally do up to and including the night before surgery with the exception of any blood thinning medications. Please stop any blood thinning medications as directed by your surgeon or prescribing physician. Failure to stop certain medications may interfere with your scheduled surgery. These may include:  Aspirin, Warfarin (Coumadin), Clopidogrel (Plavix), Ibuprofen (Motrin, Advil), Naproxen (Aleve), Meloxicam (Mobic), Celecoxib (Celebrex), Eliquis, Pradaxa, Xarelto, Effient, Fish Oil, Herbal supplements. Stop all blood thinning medications 7 days prior to surgery. Ask physician when to stop aspirin. Please take the following medication(s) the day of surgery with a small sip of water:  Nothing needed       PREPARING FOR YOUR SURGERY:     Before surgery, you can play an important role in your own health. Because skin is not sterile, we need to be sure that your skin is as free of germs as possible before surgery by carefully washing before surgery. Preparing or prepping skin before surgery can reduce the risk of a surgical site infection.   Do not shave the area of your body where your surgery will be performed unless you received specific permission from your physician. You will need to shower at home the night before surgery and the morning of surgery with a special soap called chlorhexidine gluconate (CHG*). *Not to be used by people allergic to Chlorhexidine Gluconate (CHG). Following these instructions will help you be sure that your skin is clean before surgery. Instructions on cleaning your skin before surgery: The night before your surgery:      You will need to shower with warm water (not hot) and the CHG soap.        Use a clean wash cloth and a clean towel. Have clean clothes available to put on after the shower.   First wash your hair with regular shampoo. Rinse your hair and body thoroughly to remove the shampoo.  Wash your face and genital area (private parts) with your regular soap or water only. Thoroughly rinse your body with warm water from the neck down.  Turn water off to prevent rinsing the soap off too soon.  With a clean wet washcloth and half of the CHG soap in the bottle, lather your entire body from the neck down. Do not use CHG soap near your eyes or ears to avoid injury to those areas.  Wash thoroughly, paying special attention to the area where your surgery will be performed.  Wash your body gently for five (5) minutes. Avoid scrubbing your skin too hard.  Turn the water back on and rinse your body thoroughly.  Pat yourself dry with a clean, soft towel. Do not apply lotion, cream or powder.  Dress with clean freshly washed clothes. The morning of surgery:     Repeat shower following steps above - using remaining half of CHG soap in bottle. Patient Instructions:    Prince Lai If you are having any type of anesthesia you are to have nothing to eat or drink after midnight the night before your surgery. This includes gum, hard candy, mints, water or smoking or chewing tobacco.  The only exception to this is a small sip of water to take with any morning dose of heart, blood pressure, or seizure medications. No alcoholic beverages for 24 hours prior to surgery.  Brush your teeth but do not swallow water.  Bring your eye glasses and case with you. No contacts are to be worn the day of surgery. You also may bring your hearing aids. Most surgical procedures involving anesthesia will require that you remove your dentures prior to surgery. · Do not wear any jewelry or body piercings day of surgery. Also, NO lotion, perfume or deodorant to be used the day of surgery.   No nail polish on the operative extremity (arm/leg surgeries)    · If you are staying overnight with us, please bring a small bag of necessary personal items.  Please wear loose, comfortable clothing. If you are potentially going to have a cast or brace bring clothing that will fit over them.  In case of illness - If you have cold or flu like symptoms (high fever, runny nose, sore throat, cough, etc.) rash, nausea, vomiting, loose stools, and/or recent contact with someone who has a contagious disease (chicken pox, measles, etc.) Please call your doctor before coming to the hospital.         Day of Surgery/Procedure:    As a patient at [facility] - INPATIENT you can expect quality medical and nursing care that is centered on your individual needs. Our goal is to make your surgical experience as comfortable as possible    . Transportation After Your Surgery/Procedure: You will need a friend or family member to drive you home after your procedure. Your  must be 25years of age or older and able to sign off on your discharge instructions. A taxi cab or any other form of public transportation is not acceptable. Your friend or family member must stay at the hospital throughout your procedure. Someone must remain with you for the first 24 hours after your surgery if you receive anesthesia or medication. If you do not have someone to stay with you, your procedure may be cancelled.       If you have any other questions regarding your procedure or the day of surgery, please call [phone]      _________________________  ____________________________  Signature (Patient)              Signature (Provider)               Date

## 2022-01-13 DIAGNOSIS — G89.29 CHRONIC RIGHT SHOULDER PAIN: ICD-10-CM

## 2022-01-13 DIAGNOSIS — R10.84 GENERALIZED POSTPRANDIAL ABDOMINAL PAIN: ICD-10-CM

## 2022-01-13 DIAGNOSIS — M25.511 CHRONIC RIGHT SHOULDER PAIN: ICD-10-CM

## 2022-01-13 RX ORDER — OXYCODONE HYDROCHLORIDE AND ACETAMINOPHEN 5; 325 MG/1; MG/1
1 TABLET ORAL EVERY 8 HOURS PRN
Qty: 90 TABLET | Refills: 0 | Status: SHIPPED | OUTPATIENT
Start: 2022-01-13 | End: 2022-02-07 | Stop reason: SDUPTHER

## 2022-01-19 ENCOUNTER — HOSPITAL ENCOUNTER (INPATIENT)
Age: 67
LOS: 3 days | Discharge: HOME | DRG: 327 | End: 2022-01-22
Attending: SURGERY | Admitting: SURGERY
Payer: MEDICARE

## 2022-01-19 ENCOUNTER — ANESTHESIA EVENT (OUTPATIENT)
Dept: OPERATING ROOM | Age: 67
DRG: 327 | End: 2022-01-19
Payer: MEDICARE

## 2022-01-19 ENCOUNTER — ANESTHESIA (OUTPATIENT)
Dept: OPERATING ROOM | Age: 67
DRG: 327 | End: 2022-01-19
Payer: MEDICARE

## 2022-01-19 VITALS — SYSTOLIC BLOOD PRESSURE: 147 MMHG | DIASTOLIC BLOOD PRESSURE: 73 MMHG | OXYGEN SATURATION: 99 % | TEMPERATURE: 53.2 F

## 2022-01-19 DIAGNOSIS — G89.18 POSTOPERATIVE PAIN: Primary | ICD-10-CM

## 2022-01-19 PROBLEM — K31.1 GASTRIC OUTLET OBSTRUCTION: Status: ACTIVE | Noted: 2022-01-19

## 2022-01-19 PROCEDURE — 2709999900 HC NON-CHARGEABLE SUPPLY: Performed by: SURGERY

## 2022-01-19 PROCEDURE — 2500000003 HC RX 250 WO HCPCS

## 2022-01-19 PROCEDURE — 6360000002 HC RX W HCPCS: Performed by: ANESTHESIOLOGY

## 2022-01-19 PROCEDURE — 7100000001 HC PACU RECOVERY - ADDTL 15 MIN: Performed by: SURGERY

## 2022-01-19 PROCEDURE — 0DQ70ZZ REPAIR STOMACH, PYLORUS, OPEN APPROACH: ICD-10-PCS | Performed by: SURGERY

## 2022-01-19 PROCEDURE — 6360000002 HC RX W HCPCS: Performed by: NURSE ANESTHETIST, CERTIFIED REGISTERED

## 2022-01-19 PROCEDURE — 6360000002 HC RX W HCPCS: Performed by: SURGERY

## 2022-01-19 PROCEDURE — 1200000000 HC SEMI PRIVATE

## 2022-01-19 PROCEDURE — 2500000003 HC RX 250 WO HCPCS: Performed by: SURGERY

## 2022-01-19 PROCEDURE — 7100000000 HC PACU RECOVERY - FIRST 15 MIN: Performed by: SURGERY

## 2022-01-19 PROCEDURE — 3600000002 HC SURGERY LEVEL 2 BASE: Performed by: SURGERY

## 2022-01-19 PROCEDURE — 3700000000 HC ANESTHESIA ATTENDED CARE: Performed by: SURGERY

## 2022-01-19 PROCEDURE — 2580000003 HC RX 258: Performed by: ANESTHESIOLOGY

## 2022-01-19 PROCEDURE — 2500000003 HC RX 250 WO HCPCS: Performed by: FAMILY MEDICINE

## 2022-01-19 PROCEDURE — 3600000012 HC SURGERY LEVEL 2 ADDTL 15MIN: Performed by: SURGERY

## 2022-01-19 PROCEDURE — 2580000003 HC RX 258: Performed by: SURGERY

## 2022-01-19 PROCEDURE — 6370000000 HC RX 637 (ALT 250 FOR IP): Performed by: FAMILY MEDICINE

## 2022-01-19 PROCEDURE — 2500000003 HC RX 250 WO HCPCS: Performed by: NURSE ANESTHETIST, CERTIFIED REGISTERED

## 2022-01-19 PROCEDURE — 3700000001 HC ADD 15 MINUTES (ANESTHESIA): Performed by: SURGERY

## 2022-01-19 RX ORDER — MIDAZOLAM HYDROCHLORIDE 1 MG/ML
INJECTION INTRAMUSCULAR; INTRAVENOUS PRN
Status: DISCONTINUED | OUTPATIENT
Start: 2022-01-19 | End: 2022-01-19 | Stop reason: SDUPTHER

## 2022-01-19 RX ORDER — FENTANYL CITRATE 50 UG/ML
25 INJECTION, SOLUTION INTRAMUSCULAR; INTRAVENOUS EVERY 5 MIN PRN
Status: DISCONTINUED | OUTPATIENT
Start: 2022-01-19 | End: 2022-01-19 | Stop reason: HOSPADM

## 2022-01-19 RX ORDER — PROMETHAZINE HYDROCHLORIDE 25 MG/ML
6.25 INJECTION, SOLUTION INTRAMUSCULAR; INTRAVENOUS
Status: DISCONTINUED | OUTPATIENT
Start: 2022-01-19 | End: 2022-01-19 | Stop reason: HOSPADM

## 2022-01-19 RX ORDER — FAMOTIDINE 20 MG/1
20 TABLET, FILM COATED ORAL 2 TIMES DAILY
Status: DISCONTINUED | OUTPATIENT
Start: 2022-01-19 | End: 2022-01-22 | Stop reason: HOSPADM

## 2022-01-19 RX ORDER — FENTANYL CITRATE 50 UG/ML
50 INJECTION, SOLUTION INTRAMUSCULAR; INTRAVENOUS EVERY 5 MIN PRN
Status: COMPLETED | OUTPATIENT
Start: 2022-01-19 | End: 2022-01-19

## 2022-01-19 RX ORDER — KETOROLAC TROMETHAMINE 30 MG/ML
15 INJECTION, SOLUTION INTRAMUSCULAR; INTRAVENOUS EVERY 6 HOURS
Status: DISCONTINUED | OUTPATIENT
Start: 2022-01-19 | End: 2022-01-22 | Stop reason: HOSPADM

## 2022-01-19 RX ORDER — SODIUM CHLORIDE 9 MG/ML
INJECTION, SOLUTION INTRAVENOUS CONTINUOUS
Status: DISCONTINUED | OUTPATIENT
Start: 2022-01-19 | End: 2022-01-19

## 2022-01-19 RX ORDER — POTASSIUM CHLORIDE 20 MEQ/1
40 TABLET, EXTENDED RELEASE ORAL PRN
Status: DISCONTINUED | OUTPATIENT
Start: 2022-01-19 | End: 2022-01-22 | Stop reason: HOSPADM

## 2022-01-19 RX ORDER — SODIUM CHLORIDE 9 MG/ML
25 INJECTION, SOLUTION INTRAVENOUS PRN
Status: DISCONTINUED | OUTPATIENT
Start: 2022-01-19 | End: 2022-01-19 | Stop reason: HOSPADM

## 2022-01-19 RX ORDER — MORPHINE SULFATE 4 MG/ML
4 INJECTION, SOLUTION INTRAMUSCULAR; INTRAVENOUS
Status: DISCONTINUED | OUTPATIENT
Start: 2022-01-19 | End: 2022-01-21

## 2022-01-19 RX ORDER — MORPHINE SULFATE 4 MG/ML
4 INJECTION, SOLUTION INTRAMUSCULAR; INTRAVENOUS
Status: DISCONTINUED | OUTPATIENT
Start: 2022-01-19 | End: 2022-01-19 | Stop reason: SDUPTHER

## 2022-01-19 RX ORDER — LABETALOL HYDROCHLORIDE 5 MG/ML
INJECTION, SOLUTION INTRAVENOUS
Status: COMPLETED
Start: 2022-01-19 | End: 2022-01-19

## 2022-01-19 RX ORDER — LIDOCAINE HYDROCHLORIDE 20 MG/ML
INJECTION, SOLUTION EPIDURAL; INFILTRATION; INTRACAUDAL; PERINEURAL PRN
Status: DISCONTINUED | OUTPATIENT
Start: 2022-01-19 | End: 2022-01-19 | Stop reason: SDUPTHER

## 2022-01-19 RX ORDER — BUPIVACAINE HYDROCHLORIDE AND EPINEPHRINE 5; 5 MG/ML; UG/ML
INJECTION, SOLUTION EPIDURAL; INTRACAUDAL; PERINEURAL PRN
Status: DISCONTINUED | OUTPATIENT
Start: 2022-01-19 | End: 2022-01-19 | Stop reason: HOSPADM

## 2022-01-19 RX ORDER — ONDANSETRON 2 MG/ML
4 INJECTION INTRAMUSCULAR; INTRAVENOUS EVERY 6 HOURS PRN
Status: DISCONTINUED | OUTPATIENT
Start: 2022-01-19 | End: 2022-01-22 | Stop reason: HOSPADM

## 2022-01-19 RX ORDER — LABETALOL HYDROCHLORIDE 5 MG/ML
INJECTION, SOLUTION INTRAVENOUS PRN
Status: DISCONTINUED | OUTPATIENT
Start: 2022-01-19 | End: 2022-01-19 | Stop reason: SDUPTHER

## 2022-01-19 RX ORDER — SODIUM CHLORIDE, SODIUM LACTATE, POTASSIUM CHLORIDE, CALCIUM CHLORIDE 600; 310; 30; 20 MG/100ML; MG/100ML; MG/100ML; MG/100ML
INJECTION, SOLUTION INTRAVENOUS CONTINUOUS
Status: DISCONTINUED | OUTPATIENT
Start: 2022-01-19 | End: 2022-01-19

## 2022-01-19 RX ORDER — LATANOPROST 50 UG/ML
1 SOLUTION/ DROPS OPHTHALMIC DAILY
Status: DISCONTINUED | OUTPATIENT
Start: 2022-01-19 | End: 2022-01-22 | Stop reason: HOSPADM

## 2022-01-19 RX ORDER — KETOROLAC TROMETHAMINE 15 MG/ML
15 INJECTION, SOLUTION INTRAMUSCULAR; INTRAVENOUS ONCE
Status: COMPLETED | OUTPATIENT
Start: 2022-01-19 | End: 2022-01-19

## 2022-01-19 RX ORDER — LABETALOL HYDROCHLORIDE 5 MG/ML
10 INJECTION, SOLUTION INTRAVENOUS ONCE
Status: DISCONTINUED | OUTPATIENT
Start: 2022-01-19 | End: 2022-01-22 | Stop reason: HOSPADM

## 2022-01-19 RX ORDER — ONDANSETRON 2 MG/ML
4 INJECTION INTRAMUSCULAR; INTRAVENOUS
Status: DISCONTINUED | OUTPATIENT
Start: 2022-01-19 | End: 2022-01-19 | Stop reason: HOSPADM

## 2022-01-19 RX ORDER — LIDOCAINE HYDROCHLORIDE 10 MG/ML
1 INJECTION, SOLUTION EPIDURAL; INFILTRATION; INTRACAUDAL; PERINEURAL
Status: DISCONTINUED | OUTPATIENT
Start: 2022-01-19 | End: 2022-01-19 | Stop reason: HOSPADM

## 2022-01-19 RX ORDER — MORPHINE SULFATE 2 MG/ML
2 INJECTION, SOLUTION INTRAMUSCULAR; INTRAVENOUS
Status: DISCONTINUED | OUTPATIENT
Start: 2022-01-19 | End: 2022-01-21

## 2022-01-19 RX ORDER — ENALAPRILAT 2.5 MG/2ML
0.62 INJECTION INTRAVENOUS EVERY 6 HOURS PRN
Status: DISCONTINUED | OUTPATIENT
Start: 2022-01-19 | End: 2022-01-21

## 2022-01-19 RX ORDER — POTASSIUM CHLORIDE 7.45 MG/ML
10 INJECTION INTRAVENOUS PRN
Status: DISCONTINUED | OUTPATIENT
Start: 2022-01-19 | End: 2022-01-22 | Stop reason: HOSPADM

## 2022-01-19 RX ORDER — SODIUM CHLORIDE 0.9 % (FLUSH) 0.9 %
5-40 SYRINGE (ML) INJECTION PRN
Status: DISCONTINUED | OUTPATIENT
Start: 2022-01-19 | End: 2022-01-22 | Stop reason: HOSPADM

## 2022-01-19 RX ORDER — SODIUM CHLORIDE 0.9 % (FLUSH) 0.9 %
10 SYRINGE (ML) INJECTION EVERY 12 HOURS SCHEDULED
Status: DISCONTINUED | OUTPATIENT
Start: 2022-01-19 | End: 2022-01-19 | Stop reason: HOSPADM

## 2022-01-19 RX ORDER — FENTANYL CITRATE 50 UG/ML
INJECTION, SOLUTION INTRAMUSCULAR; INTRAVENOUS PRN
Status: DISCONTINUED | OUTPATIENT
Start: 2022-01-19 | End: 2022-01-19 | Stop reason: SDUPTHER

## 2022-01-19 RX ORDER — SODIUM CHLORIDE 0.9 % (FLUSH) 0.9 %
10 SYRINGE (ML) INJECTION PRN
Status: DISCONTINUED | OUTPATIENT
Start: 2022-01-19 | End: 2022-01-19 | Stop reason: HOSPADM

## 2022-01-19 RX ORDER — ROCURONIUM BROMIDE 10 MG/ML
INJECTION, SOLUTION INTRAVENOUS PRN
Status: DISCONTINUED | OUTPATIENT
Start: 2022-01-19 | End: 2022-01-19 | Stop reason: SDUPTHER

## 2022-01-19 RX ORDER — PROPOFOL 10 MG/ML
INJECTION, EMULSION INTRAVENOUS PRN
Status: DISCONTINUED | OUTPATIENT
Start: 2022-01-19 | End: 2022-01-19 | Stop reason: SDUPTHER

## 2022-01-19 RX ORDER — SODIUM CHLORIDE 9 MG/ML
25 INJECTION, SOLUTION INTRAVENOUS PRN
Status: DISCONTINUED | OUTPATIENT
Start: 2022-01-19 | End: 2022-01-22 | Stop reason: HOSPADM

## 2022-01-19 RX ORDER — DEXTROSE AND SODIUM CHLORIDE 5; .45 G/100ML; G/100ML
INJECTION, SOLUTION INTRAVENOUS CONTINUOUS
Status: DISCONTINUED | OUTPATIENT
Start: 2022-01-19 | End: 2022-01-22

## 2022-01-19 RX ORDER — SODIUM CHLORIDE 0.9 % (FLUSH) 0.9 %
5-40 SYRINGE (ML) INJECTION EVERY 12 HOURS SCHEDULED
Status: DISCONTINUED | OUTPATIENT
Start: 2022-01-19 | End: 2022-01-22 | Stop reason: HOSPADM

## 2022-01-19 RX ADMIN — FENTANYL CITRATE 50 MCG: 50 INJECTION INTRAMUSCULAR; INTRAVENOUS at 14:02

## 2022-01-19 RX ADMIN — SUGAMMADEX 200 MG: 100 INJECTION, SOLUTION INTRAVENOUS at 13:26

## 2022-01-19 RX ADMIN — CEFAZOLIN SODIUM 2000 MG: 10 INJECTION, POWDER, FOR SOLUTION INTRAVENOUS at 12:25

## 2022-01-19 RX ADMIN — FENTANYL CITRATE 50 MCG: 50 INJECTION INTRAMUSCULAR; INTRAVENOUS at 13:45

## 2022-01-19 RX ADMIN — MORPHINE SULFATE 4 MG: 4 INJECTION, SOLUTION INTRAMUSCULAR; INTRAVENOUS at 19:38

## 2022-01-19 RX ADMIN — FENTANYL CITRATE 50 MCG: 50 INJECTION INTRAMUSCULAR; INTRAVENOUS at 13:50

## 2022-01-19 RX ADMIN — ROCURONIUM BROMIDE 30 MG: 10 INJECTION, SOLUTION INTRAVENOUS at 12:18

## 2022-01-19 RX ADMIN — Medication 50 MCG: at 13:02

## 2022-01-19 RX ADMIN — FENTANYL CITRATE 50 MCG: 50 INJECTION INTRAMUSCULAR; INTRAVENOUS at 13:55

## 2022-01-19 RX ADMIN — MORPHINE SULFATE 4 MG: 4 INJECTION, SOLUTION INTRAMUSCULAR; INTRAVENOUS at 21:33

## 2022-01-19 RX ADMIN — DEXTROSE AND SODIUM CHLORIDE: 5; 450 INJECTION, SOLUTION INTRAVENOUS at 17:34

## 2022-01-19 RX ADMIN — Medication 50 MCG: at 12:47

## 2022-01-19 RX ADMIN — Medication 50 MCG: at 12:18

## 2022-01-19 RX ADMIN — BENZOCAINE AND MENTHOL 1 LOZENGE: 15; 3.6 LOZENGE ORAL at 20:57

## 2022-01-19 RX ADMIN — MORPHINE SULFATE 4 MG: 4 INJECTION, SOLUTION INTRAMUSCULAR; INTRAVENOUS at 17:12

## 2022-01-19 RX ADMIN — SODIUM CHLORIDE, POTASSIUM CHLORIDE, SODIUM LACTATE AND CALCIUM CHLORIDE: 600; 310; 30; 20 INJECTION, SOLUTION INTRAVENOUS at 11:04

## 2022-01-19 RX ADMIN — KETOROLAC TROMETHAMINE 15 MG: 15 INJECTION, SOLUTION INTRAMUSCULAR; INTRAVENOUS at 14:20

## 2022-01-19 RX ADMIN — MORPHINE SULFATE 4 MG: 4 INJECTION, SOLUTION INTRAMUSCULAR; INTRAVENOUS at 14:18

## 2022-01-19 RX ADMIN — LABETALOL HYDROCHLORIDE 10 MG: 5 INJECTION INTRAVENOUS at 14:32

## 2022-01-19 RX ADMIN — LIDOCAINE HYDROCHLORIDE 80 MG: 20 INJECTION, SOLUTION EPIDURAL; INFILTRATION; INTRACAUDAL; PERINEURAL at 12:18

## 2022-01-19 RX ADMIN — LABETALOL HYDROCHLORIDE 5 MG: 5 INJECTION INTRAVENOUS at 13:19

## 2022-01-19 RX ADMIN — Medication 50 MCG: at 12:40

## 2022-01-19 RX ADMIN — FAMOTIDINE 20 MG: 10 INJECTION, SOLUTION INTRAVENOUS at 20:56

## 2022-01-19 RX ADMIN — KETOROLAC TROMETHAMINE 15 MG: 30 INJECTION, SOLUTION INTRAMUSCULAR; INTRAVENOUS at 23:14

## 2022-01-19 RX ADMIN — MIDAZOLAM 2 MG: 1 INJECTION INTRAMUSCULAR; INTRAVENOUS at 12:13

## 2022-01-19 RX ADMIN — PROPOFOL 150 MG: 10 INJECTION, EMULSION INTRAVENOUS at 12:18

## 2022-01-19 RX ADMIN — ENALAPRILAT 0.62 MG: 1.25 INJECTION INTRAVENOUS at 20:56

## 2022-01-19 ASSESSMENT — PULMONARY FUNCTION TESTS
PIF_VALUE: 16
PIF_VALUE: 15
PIF_VALUE: 17
PIF_VALUE: 15
PIF_VALUE: 14
PIF_VALUE: 15
PIF_VALUE: 1
PIF_VALUE: 14
PIF_VALUE: 13
PIF_VALUE: 15
PIF_VALUE: 23
PIF_VALUE: 15
PIF_VALUE: 14
PIF_VALUE: 17
PIF_VALUE: 16
PIF_VALUE: 16
PIF_VALUE: 15
PIF_VALUE: 14
PIF_VALUE: 15
PIF_VALUE: 2
PIF_VALUE: 14
PIF_VALUE: 16
PIF_VALUE: 23
PIF_VALUE: 13
PIF_VALUE: 1
PIF_VALUE: 15
PIF_VALUE: 1
PIF_VALUE: 14
PIF_VALUE: 15
PIF_VALUE: 2
PIF_VALUE: 15
PIF_VALUE: 3
PIF_VALUE: 1
PIF_VALUE: 3
PIF_VALUE: 14
PIF_VALUE: 16
PIF_VALUE: 15
PIF_VALUE: 13
PIF_VALUE: 23
PIF_VALUE: 15
PIF_VALUE: 16
PIF_VALUE: 1
PIF_VALUE: 15
PIF_VALUE: 24
PIF_VALUE: 15
PIF_VALUE: 15
PIF_VALUE: 17
PIF_VALUE: 17
PIF_VALUE: 15
PIF_VALUE: 14
PIF_VALUE: 3
PIF_VALUE: 15
PIF_VALUE: 14
PIF_VALUE: 4
PIF_VALUE: 15
PIF_VALUE: 15
PIF_VALUE: 16
PIF_VALUE: 16
PIF_VALUE: 15
PIF_VALUE: 14
PIF_VALUE: 13
PIF_VALUE: 13
PIF_VALUE: 16
PIF_VALUE: 14
PIF_VALUE: 1
PIF_VALUE: 15
PIF_VALUE: 16
PIF_VALUE: 14
PIF_VALUE: 15
PIF_VALUE: 17

## 2022-01-19 ASSESSMENT — PAIN SCALES - GENERAL
PAINLEVEL_OUTOF10: 10
PAINLEVEL_OUTOF10: 10
PAINLEVEL_OUTOF10: 8
PAINLEVEL_OUTOF10: 10
PAINLEVEL_OUTOF10: 5
PAINLEVEL_OUTOF10: 10

## 2022-01-19 ASSESSMENT — PAIN DESCRIPTION - ORIENTATION
ORIENTATION: MID;UPPER

## 2022-01-19 ASSESSMENT — PAIN - FUNCTIONAL ASSESSMENT
PAIN_FUNCTIONAL_ASSESSMENT: 0-10
PAIN_FUNCTIONAL_ASSESSMENT: PREVENTS OR INTERFERES SOME ACTIVE ACTIVITIES AND ADLS

## 2022-01-19 ASSESSMENT — PAIN DESCRIPTION - ONSET
ONSET: ON-GOING

## 2022-01-19 ASSESSMENT — PAIN DESCRIPTION - PROGRESSION
CLINICAL_PROGRESSION: NOT CHANGED
CLINICAL_PROGRESSION: GRADUALLY WORSENING
CLINICAL_PROGRESSION: GRADUALLY WORSENING
CLINICAL_PROGRESSION: NOT CHANGED

## 2022-01-19 ASSESSMENT — PAIN DESCRIPTION - DESCRIPTORS
DESCRIPTORS: STABBING
DESCRIPTORS: ACHING;CONSTANT

## 2022-01-19 ASSESSMENT — PAIN DESCRIPTION - FREQUENCY
FREQUENCY: CONTINUOUS

## 2022-01-19 ASSESSMENT — PAIN DESCRIPTION - PAIN TYPE
TYPE: SURGICAL PAIN

## 2022-01-19 ASSESSMENT — ENCOUNTER SYMPTOMS: SHORTNESS OF BREATH: 0

## 2022-01-19 ASSESSMENT — PAIN DESCRIPTION - LOCATION
LOCATION: ABDOMEN

## 2022-01-19 ASSESSMENT — LIFESTYLE VARIABLES: SMOKING_STATUS: 1

## 2022-01-19 NOTE — ANESTHESIA PRE PROCEDURE
Department of Anesthesiology  Preprocedure Note       Name:  Masha Bee   Age:  78 y.o.  :  1955                                          MRN:  [id number]         Date:  2022      Surgeon: Corinthia Goodpasture): Estiven Retana MD    Procedure: Procedure(s):  PYLOROPLASTY    Medications prior to admission:   Prior to Admission medications    Medication Sig Start Date End Date Taking? Authorizing Provider   oxyCODONE-acetaminophen (PERCOCET) 5-325 MG per tablet Take 1 tablet by mouth every 8 hours as needed for Pain for up to 30 days. 22  BAKARI Arshad - CNP   [place]-3 1000 MG CAPS Fish Oil 120 mg-180 mg-1000 mg capsule   Take 1 capsule every day by oral route.     Historical Provider, MD   Multiple Vitamins-Minerals (MULTIVITAMIN ADULT EXTRA C PO) multivitamin   1 tab daily    Historical Provider, MD   lisinopril (PRINIVIL;ZESTRIL) 20 MG tablet Take 1 tablet by mouth daily 4/15/21   BAKARI Jordan CNP   latanoprost (XALATAN) 0.005 % ophthalmic solution INSTILL 1 DROP INTO EACH EYE AT BEDTIME 20   Historical Provider, MD   omeprazole (PRILOSEC) 40 MG delayed release capsule Take 1 capsule by mouth every morning (before breakfast) 10/13/20   BAKARI Jordan CNP   aspirin (ASPIRIN LOW DOSE ADULT) 81 MG chewable tablet Take 1 tablet by mouth daily 20   BAKARI Lau - NP   VORTIoxetine (TRINTELLIX) 10 MG TABS tablet Take 1 tablet by mouth 19   Historical Provider, MD       Current medications:    Current Facility-Administered Medications   Medication Dose Route Frequency Provider Last Rate Last Admin    0.9 % sodium chloride infusion   IntraVENous Continuous Ndal Kelsea Nascimento MD        lactated ringers infusion   IntraVENous Continuous Ndal Kelsea Nascimento MD        sodium chloride flush 0.9 % injection 10 mL  10 mL IntraVENous 2 times per day Karen Owens MD        sodium chloride flush 0.9 % injection 10 mL  10 mL IntraVENous PRN MD Nilda Burger 0.9 % sodium chloride infusion  25 mL IntraVENous PRN Ndal Dominik Dhaliwal MD        lidocaine PF 1 % injection 1 mL  1 mL IntraDERmal Once PRN Roberta Sultana MD        ceFAZolin (ANCEF) 2000 mg in dextrose 5 % 50 mL IVPB  2,000 mg IntraVENous Once Chad Zhang MD        metronidazole (FLAGYL) 500 mg in NaCl 100 mL IVPB premix  500 mg IntraVENous Once Chad Zhang MD           Allergies: Allergies   Allergen Reactions    Dilaudid [Hydromorphone Hcl] Swelling    Sulfa Antibiotics Swelling     Anything with sulfa    Sulfamethoxazole Swelling    Sulfamethoxazole-Trimethoprim Swelling    Trimethoprim Swelling    Tetanus Toxoid Swelling     Arm swelling.        Problem List:    Patient Active Problem List   Diagnosis Code    Occlusion of aorta ([facility]) I70.0    New onset type 2 diabetes mellitus ([facility] E11.9    PAD (peripheral artery disease) ([facility]) I73.9    Claudication ([facility] I73.9    Aortoiliac occlusive disease ([facility] I74.09    Gastroesophageal reflux disease K21.9    Status post aortobifemoral bypass surgery Z95.828    Chronic right shoulder pain M25.511, G89.29    Generalized postprandial abdominal pain R10.84       Past Medical History:        Diagnosis Date    Anxiety     Depression     Diabetes mellitus ([facility]     oral meds    Glaucoma     Hearing loss     Hx of blood clots     right leg and right lung 2020    Hypertension     Migraines     PVD (peripheral vascular disease) ([facility]     Wears dentures        Past Surgical History:        Procedure Laterality Date    ABDOMINAL AORTIC ANEURYSM REPAIR N/A 9/15/2020    AORTAL BIFEMORAL BYPASS  **CELLSAVER** performed by Taylor Garcia MD at [address] [place] [address] AORTO-FEMORAL BYPASS GRAFT  09/15/2020    APPENDECTOMY      BREAST SURGERY      \"crystals removed from right breast\"    CATARACT REMOVAL  2015    CHOLECYSTECTOMY      COLONOSCOPY      ENDOSCOPY, COLON, DIAGNOSTIC      EYE SURGERY      TONSILLECTOMY      UPPER GASTROINTESTINAL ENDOSCOPY 06/08/2021    w/EUS FNA    UPPER GASTROINTESTINAL ENDOSCOPY N/A 6/8/2021    EGD W/EUS FNA performed by Bre Price MD at [address] [facility] END[facility]COPY  6/8/2021    EGD DILATION BALLOON 6-7.5MM performed by Bre Price MD at [facility]    UPPER GASTROINTESTINAL ENDOSCOPY N/A 6/8/2021    EGD BIOPSY performed by Bre Price MD at [facility]    UPPER GASTROINTESTINAL ENDOSCOPY N/A 7/22/2021    EGD DILATION BALLOON, pyloric performed by Louis Cardenas MD at [facility] N/A 7/22/2021    EGD BIOPSY performed by Louis Cardenas MD at [facility]  7/22/2021    EGD DILATION SAVORY performed by Louis Cardenas MD at [address] [place] [address]         Social History:    Social History     Tobacco Use    Smoking status: Current Every Day Smoker     Packs/day: 1.00     Years: 41.00     Pack years: 41.00     Types: Cigarettes     Start date: 7/22/1975    Smokeless tobacco: Never Used    Tobacco comment: Cut back   Substance Use Topics    Alcohol use: Never                                Ready to quit: Not Answered  Counseling given: Not Answered  Comment: Cut back      Vital Signs (Current):   Vitals:    01/19/22 1038 01/19/22 1040   BP:  (!) 185/92   Resp:  16   Temp:  96.8 °F (36 °C)   TempSrc:  Temporal   SpO2:  98%   Weight: 117 lb (53.1 kg)    Height: 5' (1.524 m)                                               BP Readings from Last 3 Encounters:   01/19/22 (!) 185/92   01/10/22 (!) 157/77   10/18/21 (!) 150/90       NPO Status:                                                                                 BMI:   Wt Readings from Last 3 Encounters:   01/19/22 117 lb (53.1 kg)   01/10/22 117 lb (53.1 kg)   10/18/21 117 lb (53.1 kg)     Body mass index is 22.85 kg/m².     CBC:   Lab Results   Component Value Date    WBC 10.3 01/10/2022    RBC 4.02 01/10/2022    HGB 13.3 01/10/2022    HCT 38.7 01/10/2022    MCV 96.3 01/10/2022    RDW 12.8 01/10/2022     01/10/2022       CMP:   Lab Results   Component Value Date     01/10/2022    K 3.8 01/10/2022     01/10/2022    CO2 26 01/10/2022    BUN 10 01/10/2022    CREATININE 0.77 01/10/2022    GFRAA >60 01/10/2022    LABGLOM >60 01/10/2022    GLUCOSE 111 01/10/2022    PROT 6.7 04/19/2021    CALCIUM 8.9 01/10/2022    BILITOT 0.36 04/19/2021    ALKPHOS 89 04/19/2021    AST 17 04/19/2021    ALT 14 04/19/2021       POC Tests: No results for input(s): POCGLU, POCNA, POCK, POCCL, POCBUN, POCHEMO, POCHCT in the last 72 hours. Coags: No results found for: PROTIME, INR, APTT    HCG (If Applicable): No results found for: PREGTESTUR, PREGSERUM, HCG, HCGQUANT     ABGs: No results found for: PHART, PO2ART, ILT2MQG, MWI4TTK, BEART, O6BZQCVL     Type & Screen (If Applicable):  No results found for: LABABO, LABRH    Drug/Infectious Status (If Applicable):  No results found for: HIV, HEPCAB    COVID-19 Screening (If Applicable):   Lab Results   Component Value Date    COVID19 Not Detected 07/18/2021    COVID19 Not Detected 09/11/2020           Anesthesia Evaluation    Airway: Mallampati: I  TM distance: >3 FB   Neck ROM: full  Mouth opening: > = 3 FB Dental:          Pulmonary:   (+) current smoker    (-) shortness of breath                           Cardiovascular:    (+) hypertension:,     (-)  angina                Neuro/Psych:               GI/Hepatic/Renal:             Endo/Other:    (+) Diabetes, . Abdominal:             Vascular:           Other Findings:             Anesthesia Plan      general     ASA 3                                 Jade Omer MD   1/19/2022

## 2022-01-19 NOTE — PROGRESS NOTES
Report called to [place] rn. Transported per bed to room. Pt with personal bags . Resting comfortably.  in room on arrival. Kay Ring informed that patient did have bloody nose after ng tube insertion.

## 2022-01-19 NOTE — FLOWSHEET NOTE
Dr Divine Girard notified bp is 182/85 after medication.  Order to inform if bp systolic is greater than 063

## 2022-01-19 NOTE — PROGRESS NOTES
Pt admitted to room. Oriented to room, call light and bed mechanics. Side rails up x2. Call light within reach. Orders reviewed.  at bedside and patient agreeable to information being given to .

## 2022-01-19 NOTE — FLOWSHEET NOTE
Informed that left arm with petechiae below the bp cuff. bp was elevated during surgery. cuff to right arm. bp medication ordered. Pt does take lisinopril at home but has not taken in last 2 days.   New order

## 2022-01-19 NOTE — OP NOTE
Operative Note      Patient: Jes Cruz  YOB: 1955  MRN: [id number]    Date of Procedure: 1/19/2022    Pre-Op Diagnosis: DX PYLORIC STENOSIS  (TC)    Post-Op Diagnosis: Same       Procedure(s):  PYLOROPLASTY    Surgeon(s): Isaiah Cardenas MD    Assistant:   First Assistant: Claudia Carvajal    Anesthesia: General    Estimated Blood Loss (mL): 20 M    Complications: None    Specimens:   * No specimens in log *    Implants:  * No implants in log *      Drains: * No LDAs found *    Findings: Hypertrophy of the pyloric muscle noted    Detailed Description of Procedure:   Patient was brought to the operating and was put in supine position. After induction of general endotracheal anesthesia, abdomen was prepped and draped in usual sterile fashion. Initially a midline incision was made from subxiphoid area to mid epigastrium. Incision was taken through the patient's previous incision. Incision was taken down to the fascia which was carefully opened. On entering the abdominal cavity, no obvious adhesions in the area were noted. Some adhesions deep in the lower abdomen could be identified. A wound protector was placed. The stomach was visualized and examination revealed very stenotic pyloric outlet for most likely hypertrophied pyloric muscle. No other gross abnormality in the region was identified. We decided to undertake a pyloroplasty. Initially 2 stay sutures of 4-0 Prolene was placed on either side of the midline of the pylorus. A vertical incision was made through the serosa of the pylorus and through the pyloric muscles. We then entered the stomach. After finding no other gross pathology, we decided to undertake pyloroplasty by doing a Heineke-Mikulicz approximation in a horizontal manner. 2-0 PDS suture was next used in interrupted fashion to approximate serosa and the mucosa. Following the approximation all the sutures were tied.   We then used a second layer of interrupted sutures over the prior suture line. At this time nasogastric tube was also inserted and was secured after confirming the placement. At this time after finding no other pathology, abdomen was irrigated. #1 Prolene was used in running fashion to close the fascia with a few interrupted Prolene sutures. Subcutaneous tissue was irrigated Marcaine. [place] were used to close the skin. Dry dressing was applied.   Patient was brought recovery room in stable condition    Electronically signed by Amberly Mitchell MD on 1/19/2022 at 1:37 PM

## 2022-01-19 NOTE — INTERVAL H&P NOTE
Interval H&P Note    Pt Name: Mary Linder  MRN: [id number]  YOB: 1955  Date of evaluation: 1/19/2022      [x] I have reviewed in epic the H&P by MEGAN Foster for an Interval History and Physical note. [x] I have examined  Mary Linder  There are no changes to the patient who is scheduled for a pyloroplasty by Dr Tanisha Albert for pyloric stenosis. The patient denies new health changes, fever, chills, wheezing, cough, increased SOB, chest pain, open sores or wounds. Last ASA  81mg 1/12/22    Vital signs: BP (!) 185/92   Pulse 85   Temp 96.8 °F (36 °C) (Temporal)   Resp 16   Ht 5' (1.524 m)   Wt 117 lb (53.1 kg)   SpO2 98%   BMI 22.85 kg/m²     Allergies:  Dilaudid [hydromorphone hcl], Sulfa antibiotics, Sulfamethoxazole, Sulfamethoxazole-trimethoprim, Trimethoprim, and Tetanus toxoid    Medications:    Prior to Admission medications    Medication Sig Start Date End Date Taking? Authorizing Provider   oxyCODONE-acetaminophen (PERCOCET) 5-325 MG per tablet Take 1 tablet by mouth every 8 hours as needed for Pain for up to 30 days. 1/13/22 2/12/22 Yes BAKARI Mayfield CNP   Multiple Vitamins-Minerals (MULTIVITAMIN ADULT EXTRA C PO) multivitamin   1 tab daily   Yes Historical Provider, MD   lisinopril (PRINIVIL;ZESTRIL) 20 MG tablet Take 1 tablet by mouth daily 4/15/21  Yes BAKARI [place]field CNP   latanoprost (XALATAN) 0.005 % ophthalmic solution INSTILL 1 DROP INTO EACH EYE AT BEDTIME 11/5/20  Yes Historical Provider, MD   VORTIoxetine (TRINTELLIX) 10 MG TABS tablet Take 1 tablet by mouth 12/5/19  Yes Historical Provider, MD   [place]-3 1000 MG CAPS Fish Oil 120 mg-180 mg-1000 mg capsule   Take 1 capsule every day by oral route.     Historical Provider, MD   omeprazole (PRILOSEC) 40 MG delayed release capsule Take 1 capsule by mouth every morning (before breakfast) 10/13/20   BAKARI Jackson CNP   aspirin (ASPIRIN LOW DOSE ADULT) 81 MG chewable tablet Take 1 tablet by mouth daily 9/22/20   Heath Player, APRN - NP         This is a 78 y.o. female who is pleasant, cooperative, alert and oriented x3, in no acute distress    Physical Exam:  Lungs: Bilateral equal air entry, unlabored, clear to ausculation, no wheezing, rales or rhonchi, normal effort  Cardiovascular: HR 85 normal rate, regular rhythm, no murmur, gallop, rub. Abdomen: Soft, nontender, nondistended, normal bowel sounds. Labs:  Recent Labs     01/10/22  1334   HGB 13.3   HCT 38.7   WBC 10.3   MCV 96.3         K 3.8      CO2 26   BUN 10   CREATININE 0.77   GLUCOSE 111*       No results for input(s): COVID19 in the last 720 hours.     BAKARI Harvey CNP   Electronically signed 1/19/2022 at 11:01 AM

## 2022-01-19 NOTE — ANESTHESIA POSTPROCEDURE EVALUATION
Department of Anesthesiology  Postprocedure Note    Patient: Sonali Delgado  MRN: [id number]  YOB: 1955  Date of evaluation: 1/19/2022  Time:  3:01 PM     Procedure Summary     Date: 01/19/22 Room / Location: [address] [place] [address] / [facility] - INPATIENT    Anesthesia Start: 6166 Anesthesia Stop: 5054    Procedure: PYLOROPLASTY (N/A Abdomen) Diagnosis: (DX PYLORIC STENOSIS  (TC))    Surgeons: Kunal Simental MD Responsible Provider: Dora Haynes MD    Anesthesia Type: general ASA Status: 3          Anesthesia Type: general    Evelyn Phase I: Evelyn Score: 6    Evelyn Phase II:      Last vitals: Reviewed and per EMR flowsheets.        Anesthesia Post Evaluation    Complications: no

## 2022-01-19 NOTE — CONSULTS
[address],    Adult Hospitalist      Name: Kenneth Higuera  MRN: [id number]     Acct: [de-identified]  Room: 2005/2005-01    Admit Date: 1/19/2022 10:26 AM  PCP: BAKARI Morgan CNP    Primary Problem  Active Problems:    Gastric outlet obstruction  Resolved Problems:    * No resolved hospital problems. *        Assesment:     · Pyloroplasty dated 1/19/2022  · History of pyloric stenosis  · Essential hypertension  · Diabetes mellitus type 2, diet controlled  · Anxiety disorder  · Glaucoma  · Hearing loss  · History of DVT and PE in 2020  · Peripheral vascular disease  · History of migraine  · Sore throat post endotracheal intubation        Plan:     · Patient admitted to [facility]  · Monitor vitals closely  · Keep SPO2 above 90%  · I's and O's  · IV fluids  · NG  · Pain control  · Antiemetics as needed  · Enalapril as needed  · Resume Xalatan eyedrops  · Protonix IV  · Lovenox for prophylaxis  · Incentive spirometry  · Up in chair  · Ambulate early as long as hemodynamically stable  · CBC, BMP  · Cepacol lozenges  · Accu-Cheks twice daily  · Insulin sliding scale medium  · Hypoglycemia protocol  · DVT and GI prophylaxis. Chief Complaint:     No chief complaint on file. Medical management       History of Present Illness:      Kenneth Higuera is a 78 y.o.  female who presents with No chief complaint on file. Patient underwent pyloroplasty for pyloric stenosis without incident. Patient says she had femoropopliteal bypass surgery last year and after that started having anorexia and abdominal bloating. Patient says she was diagnosed with pyloric stenosis and surgery was planned. Presently she denies any chest pain, dyspnea or orthopnea. Denies any headache, blurred vision, diplopia or photophobia. Denies any neck pain or back pain. Patient says she has abdominal pain which worsens with movement. She thinks she has passed flatus. She denies any fever or chills.   Denies any diarrhea. Denies any joint swelling or rash    I have personally reviewed the past medical history, past surgical history, medications, social history, and family history, and summarized in the note. Review of Systems:     All 10 point system is reviewed and negative otherwise mentioned in HPI.       Past Medical History:     Past Medical History:   Diagnosis Date    Anxiety     Depression     Diabetes mellitus ([facility]     oral meds    Glaucoma     Hearing loss     Hx of blood clots     right leg and right lung 2020    Hypertension     Migraines     PVD (peripheral vascular disease) ([facility] [address] [facility]     Wears dentures         Past Surgical History:     Past Surgical History:   Procedure Laterality Date    ABDOMINAL AORTIC ANEURYSM REPAIR N/A 9/15/2020    AORTAL BIFEMORAL BYPASS  **CELLSAVER** performed by Shane Carlisle MD at [address] [place] [address] AORTO-FEMORAL BYPASS GRAFT  09/15/2020    APPENDECTOMY      BREAST SURGERY      \"crystals removed from right breast\"    CATARACT REMOVAL  2015    CHOLECYSTECTOMY      COLONOSCOPY      ENDOSCOPY, COLON, DIAGNOSTIC      EYE SURGERY      TONSILLECTOMY      UPPER GASTROINTESTINAL ENDOSCOPY  06/08/2021    w/EUS FNA    UPPER GASTROINTESTINAL ENDOSCOPY N/A 6/8/2021    EGD W/EUS FNA performed [facility]    UPPER GASTROINTESTINAL ENDOSCOPY  6/8/2021    EGD DILATION BALLOON 6-7.5MM performed by [facility] S[facility] at [facility]    UPPER GASTROINTESTINAL ENDOSCOPY N/A 6/8/2021    EGD BIOPSY performed [facility]    UPPER GASTROINTESTINAL ENDOSCOPY N/A 7/22/2021    EGD DILATION BALLOON, pyloric performed by Luis Canada MD at [address] [place] [address] 7/22/2021    EGD BIOPSY performed by Luis Canada MD at [address] [place] [address]  7/22/2021    EGD DILATION SAVORY performed by Luis Canada MD at  [address] 117 lb (53.1 kg)   SpO2 95%   BMI 22.85 kg/m²   Temp (24hrs), Av °F (35 °C), Min:53.2 °F (11.8 °C), Max:98.8 °F (37.1 °C)          General appearance - alert, well appearing, and in mild acute distress  Mental status - oriented to person, place, and time with normal affect  Head - normocephalic and atraumatic  Eyes - pupils equal and reactive, extraocular eye movements intact, conjunctiva clear  Ears - hearing appears to be intact  Nose - no drainage noted  Mouth - mucous membranes moist  Neck - supple, no carotid bruits, thyroid not palpable  Chest - clear to auscultation, normal effort  Heart - normal rate, regular rhythm, no murmur  Abdomen - soft, tender, distended,  bowel sounds present ,   Neurological - normal speech, no focal findings or movement disorder noted, cranial nerves II through XII grossly intact  Extremities - peripheral pulses palpable, no pedal edema or calf pain with palpation  Skin - no gross lesions, rashes, or induration noted        Data:     Labs:    Hematology:No results for input(s): WBC, RBC, HGB, HCT, MCV, MCH, MCHC, RDW, PLT, MPV, SEDRATE, CRP, INR, DDIMER, ID9VKTZX, LABABSO in the last 72 hours. Invalid input(s): PT  Chemistry:No results for input(s): NA, K, CL, CO2, GLUCOSE, BUN, CREATININE, MG, ANIONGAP, LABGLOM, GFRAA, CALCIUM, CAION, PHOS, PSA, PROBNP, TROPHS, CKTOTAL, CKMB, CKMBINDEX, MYOGLOBIN, DIGOXIN, LACTACIDWB in the last 72 hours. No results for input(s): PROT, LABALBU, LABA1C, K3ZFSZJ, J2UQYVK, FT4, TSH, AST, ALT, LDH, GGT, ALKPHOS, LABGGT, BILITOT, BILIDIR, AMMONIA, AMYLASE, LIPASE, LACTATE, CHOL, HDL, LDLCHOLESTEROL, CHOLHDLRATIO, TRIG, VLDL, VAK96HY, PHENYTOIN, PHENYF, URICACID, POCGLU in the last 72 hours.     No results found for: INR, PROTIME    Lab Results   Component Value Date/Time    SPECIAL NOT REPORTED 09/15/2020 08:37 AM     Lab Results   Component Value Date/Time    CULTURE NO GROWTH 09/15/2020 08:37 AM       Lab Results   Component Value Date POCPH 7.301 09/15/2020    POCPCO2 45.0 09/15/2020    POCPO2 221.6 09/15/2020    POCHCO3 22.2 09/15/2020    NBEA 4 09/15/2020    PBEA NOT REPORTED 09/15/2020    IFS7XFX 24 09/15/2020    ITPA9XPF 100 09/15/2020    FIO2 NOT REPORTED 09/15/2020       Radiology:    No results found. All radiological studies reviewed                Code Status:  Full Code    Electronically signed by Katherine Sparks MD on 1/19/2022 at 6:23 PM     Copy sent to Dr. Rebecca Wellington, APRN - CNP    This note was created with the assistance of a speech-recognition program.  Although the intention is to generate a document that actually reflects the content of the visit, no guarantees can be provided that every mistake has been identified and corrected by editing. Note was updated later by me after  physical examination and  completion of the assessment.

## 2022-01-19 NOTE — PLAN OF CARE
Problem: SAFETY  Goal: Free from accidental physical injury  Outcome: Ongoing     Problem: DAILY CARE  Goal: Daily care needs are met  Outcome: Ongoing     Problem: PAIN  Goal: Patient's pain/discomfort is manageable  Outcome: Ongoing     Problem: KNOWLEDGE DEFICIT  Goal: Patient/S.O. demonstrates understanding of disease process, treatment plan, medications, and discharge instructions.   Outcome: Ongoing     Problem: DISCHARGE BARRIERS  Goal: Patient's continuum of care needs are met  Outcome: Ongoing     Problem: Infection - Surgical Site:  Goal: Will show no infection signs and symptoms  Description: Will show no infection signs and symptoms  Outcome: Ongoing

## 2022-01-20 LAB
ANION GAP SERPL CALCULATED.3IONS-SCNC: 10 MMOL/L (ref 9–17)
BUN BLDV-MCNC: 16 MG/DL (ref 8–23)
BUN/CREAT BLD: 21 (ref 9–20)
CALCIUM SERPL-MCNC: 8.6 MG/DL (ref 8.6–10.4)
CHLORIDE BLD-SCNC: 105 MMOL/L (ref 98–107)
CO2: 21 MMOL/L (ref 20–31)
CREAT SERPL-MCNC: 0.78 MG/DL (ref 0.5–0.9)
ESTIMATED AVERAGE GLUCOSE: 128 MG/DL
GFR AFRICAN AMERICAN: >60 ML/MIN
GFR NON-AFRICAN AMERICAN: >60 ML/MIN
GFR SERPL CREATININE-BSD FRML MDRD: ABNORMAL ML/MIN/{1.73_M2}
GFR SERPL CREATININE-BSD FRML MDRD: ABNORMAL ML/MIN/{1.73_M2}
GLUCOSE BLD-MCNC: 136 MG/DL (ref 65–105)
GLUCOSE BLD-MCNC: 141 MG/DL (ref 65–105)
GLUCOSE BLD-MCNC: 160 MG/DL (ref 65–105)
GLUCOSE BLD-MCNC: 206 MG/DL (ref 70–99)
HBA1C MFR BLD: 6.1 % (ref 4–6)
HCT VFR BLD CALC: 37.5 % (ref 36.3–47.1)
HEMOGLOBIN: 12.5 G/DL (ref 11.9–15.1)
MCH RBC QN AUTO: 32.7 PG (ref 25.2–33.5)
MCHC RBC AUTO-ENTMCNC: 33.3 G/DL (ref 28.4–34.8)
MCV RBC AUTO: 98.2 FL (ref 82.6–102.9)
NRBC AUTOMATED: 0 PER 100 WBC
PDW BLD-RTO: 13 % (ref 11.8–14.4)
PLATELET # BLD: 267 K/UL (ref 138–453)
PMV BLD AUTO: 10.1 FL (ref 8.1–13.5)
POTASSIUM SERPL-SCNC: 4.6 MMOL/L (ref 3.7–5.3)
RBC # BLD: 3.82 M/UL (ref 3.95–5.11)
SODIUM BLD-SCNC: 136 MMOL/L (ref 135–144)
WBC # BLD: 14.4 K/UL (ref 3.5–11.3)

## 2022-01-20 PROCEDURE — 97167 OT EVAL HIGH COMPLEX 60 MIN: CPT

## 2022-01-20 PROCEDURE — 97162 PT EVAL MOD COMPLEX 30 MIN: CPT

## 2022-01-20 PROCEDURE — 2500000003 HC RX 250 WO HCPCS: Performed by: SURGERY

## 2022-01-20 PROCEDURE — 85027 COMPLETE CBC AUTOMATED: CPT

## 2022-01-20 PROCEDURE — 83036 HEMOGLOBIN GLYCOSYLATED A1C: CPT

## 2022-01-20 PROCEDURE — 80048 BASIC METABOLIC PNL TOTAL CA: CPT

## 2022-01-20 PROCEDURE — 97530 THERAPEUTIC ACTIVITIES: CPT

## 2022-01-20 PROCEDURE — 97535 SELF CARE MNGMENT TRAINING: CPT

## 2022-01-20 PROCEDURE — 82947 ASSAY GLUCOSE BLOOD QUANT: CPT

## 2022-01-20 PROCEDURE — 1200000000 HC SEMI PRIVATE

## 2022-01-20 PROCEDURE — 36415 COLL VENOUS BLD VENIPUNCTURE: CPT

## 2022-01-20 PROCEDURE — 6360000002 HC RX W HCPCS: Performed by: SURGERY

## 2022-01-20 PROCEDURE — 97116 GAIT TRAINING THERAPY: CPT

## 2022-01-20 PROCEDURE — 2500000003 HC RX 250 WO HCPCS: Performed by: FAMILY MEDICINE

## 2022-01-20 PROCEDURE — 2580000003 HC RX 258: Performed by: SURGERY

## 2022-01-20 PROCEDURE — 6360000002 HC RX W HCPCS: Performed by: FAMILY MEDICINE

## 2022-01-20 RX ORDER — DEXTROSE MONOHYDRATE 50 MG/ML
100 INJECTION, SOLUTION INTRAVENOUS PRN
Status: DISCONTINUED | OUTPATIENT
Start: 2022-01-20 | End: 2022-01-22 | Stop reason: HOSPADM

## 2022-01-20 RX ORDER — DEXTROSE MONOHYDRATE 25 G/50ML
12.5 INJECTION, SOLUTION INTRAVENOUS PRN
Status: DISCONTINUED | OUTPATIENT
Start: 2022-01-20 | End: 2022-01-22 | Stop reason: HOSPADM

## 2022-01-20 RX ORDER — LISINOPRIL 5 MG/1
5 TABLET ORAL DAILY
Status: DISCONTINUED | OUTPATIENT
Start: 2022-01-20 | End: 2022-01-21

## 2022-01-20 RX ORDER — HYDRALAZINE HYDROCHLORIDE 20 MG/ML
10 INJECTION INTRAMUSCULAR; INTRAVENOUS EVERY 6 HOURS PRN
Status: DISCONTINUED | OUTPATIENT
Start: 2022-01-20 | End: 2022-01-22 | Stop reason: HOSPADM

## 2022-01-20 RX ORDER — NICOTINE POLACRILEX 4 MG
15 LOZENGE BUCCAL PRN
Status: DISCONTINUED | OUTPATIENT
Start: 2022-01-20 | End: 2022-01-22 | Stop reason: HOSPADM

## 2022-01-20 RX ADMIN — BENZOCAINE AND MENTHOL 1 LOZENGE: 15; 3.6 LOZENGE ORAL at 05:43

## 2022-01-20 RX ADMIN — BENZOCAINE AND MENTHOL 1 LOZENGE: 15; 3.6 LOZENGE ORAL at 17:17

## 2022-01-20 RX ADMIN — BENZOCAINE AND MENTHOL 1 LOZENGE: 15; 3.6 LOZENGE ORAL at 14:07

## 2022-01-20 RX ADMIN — DEXTROSE AND SODIUM CHLORIDE: 5; 450 INJECTION, SOLUTION INTRAVENOUS at 14:06

## 2022-01-20 RX ADMIN — ENOXAPARIN SODIUM 40 MG: 100 INJECTION SUBCUTANEOUS at 07:54

## 2022-01-20 RX ADMIN — BENZOCAINE AND MENTHOL 1 LOZENGE: 15; 3.6 LOZENGE ORAL at 20:34

## 2022-01-20 RX ADMIN — FAMOTIDINE 20 MG: 10 INJECTION, SOLUTION INTRAVENOUS at 07:54

## 2022-01-20 RX ADMIN — KETOROLAC TROMETHAMINE 15 MG: 30 INJECTION, SOLUTION INTRAMUSCULAR; INTRAVENOUS at 15:52

## 2022-01-20 RX ADMIN — KETOROLAC TROMETHAMINE 15 MG: 30 INJECTION, SOLUTION INTRAMUSCULAR; INTRAVENOUS at 20:34

## 2022-01-20 RX ADMIN — HYDRALAZINE HYDROCHLORIDE 10 MG: 20 INJECTION INTRAMUSCULAR; INTRAVENOUS at 23:06

## 2022-01-20 RX ADMIN — ENALAPRILAT 0.62 MG: 1.25 INJECTION INTRAVENOUS at 20:35

## 2022-01-20 RX ADMIN — BENZOCAINE AND MENTHOL 1 LOZENGE: 15; 3.6 LOZENGE ORAL at 01:56

## 2022-01-20 RX ADMIN — KETOROLAC TROMETHAMINE 15 MG: 30 INJECTION, SOLUTION INTRAMUSCULAR; INTRAVENOUS at 10:16

## 2022-01-20 RX ADMIN — FAMOTIDINE 20 MG: 10 INJECTION, SOLUTION INTRAVENOUS at 20:34

## 2022-01-20 RX ADMIN — MORPHINE SULFATE 2 MG: 2 INJECTION, SOLUTION INTRAMUSCULAR; INTRAVENOUS at 17:15

## 2022-01-20 RX ADMIN — DEXTROSE AND SODIUM CHLORIDE: 5; 450 INJECTION, SOLUTION INTRAVENOUS at 03:44

## 2022-01-20 RX ADMIN — KETOROLAC TROMETHAMINE 15 MG: 30 INJECTION, SOLUTION INTRAMUSCULAR; INTRAVENOUS at 04:06

## 2022-01-20 RX ADMIN — MORPHINE SULFATE 4 MG: 4 INJECTION, SOLUTION INTRAMUSCULAR; INTRAVENOUS at 05:41

## 2022-01-20 RX ADMIN — MORPHINE SULFATE 4 MG: 4 INJECTION, SOLUTION INTRAMUSCULAR; INTRAVENOUS at 01:56

## 2022-01-20 RX ADMIN — MORPHINE SULFATE 2 MG: 2 INJECTION, SOLUTION INTRAMUSCULAR; INTRAVENOUS at 14:07

## 2022-01-20 ASSESSMENT — PAIN DESCRIPTION - ORIENTATION
ORIENTATION: MID;LOWER
ORIENTATION: MID
ORIENTATION: MID;LOWER
ORIENTATION: MID;UPPER
ORIENTATION: MID

## 2022-01-20 ASSESSMENT — PAIN DESCRIPTION - DESCRIPTORS
DESCRIPTORS: ACHING;DISCOMFORT
DESCRIPTORS: ACHING;DISCOMFORT
DESCRIPTORS: ACHING
DESCRIPTORS: ACHING
DESCRIPTORS: STABBING
DESCRIPTORS: ACHING;DISCOMFORT
DESCRIPTORS: ACHING;DISCOMFORT

## 2022-01-20 ASSESSMENT — PAIN DESCRIPTION - LOCATION
LOCATION: ABDOMEN
LOCATION: BACK
LOCATION: BACK

## 2022-01-20 ASSESSMENT — PAIN DESCRIPTION - PAIN TYPE
TYPE: SURGICAL PAIN
TYPE: ACUTE PAIN

## 2022-01-20 ASSESSMENT — PAIN - FUNCTIONAL ASSESSMENT
PAIN_FUNCTIONAL_ASSESSMENT: PREVENTS OR INTERFERES SOME ACTIVE ACTIVITIES AND ADLS

## 2022-01-20 ASSESSMENT — PAIN DESCRIPTION - ONSET
ONSET: ON-GOING

## 2022-01-20 ASSESSMENT — PAIN SCALES - GENERAL
PAINLEVEL_OUTOF10: 8
PAINLEVEL_OUTOF10: 8
PAINLEVEL_OUTOF10: 7
PAINLEVEL_OUTOF10: 8

## 2022-01-20 ASSESSMENT — PAIN DESCRIPTION - FREQUENCY
FREQUENCY: CONTINUOUS
FREQUENCY: INTERMITTENT
FREQUENCY: CONTINUOUS
FREQUENCY: INTERMITTENT
FREQUENCY: CONTINUOUS

## 2022-01-20 ASSESSMENT — PAIN DESCRIPTION - PROGRESSION
CLINICAL_PROGRESSION: NOT CHANGED

## 2022-01-20 NOTE — ACP (ADVANCE CARE PLANNING)
Advance Care Planning     Advance Care Planning Activator (Inpatient)  Conversation Note      Date of ACP Conversation: 1/20/2022     Conversation Conducted with: Patient with Decision Making Capacity    ACP Activator: Golden Wharton RN    {When Decision Maker makes decisions on behalf of the incapacitated patient: Decision Maker is asked to consider and make decisions based on patient values, known preferences, or best interests. Health Care Decision Maker:     Current Designated Health Care Decision Maker:     Primary Decision Maker: Katelynn Henderson - [phone]  Click here to complete Healthcare Decision Makers including section of the Healthcare Decision Maker Relationship (ie \"Primary\")      Care Preferences    Ventilation: \"If you were in your present state of health and suddenly became very ill and were unable to breathe on your own, what would your preference be about the use of a ventilator (breathing machine) if it were available to you? \"      Would the patient desire the use of ventilator (breathing machine)?: yes    \"If your health worsens and it becomes clear that your chance of recovery is unlikely, what would your preference be about the use of a ventilator (breathing machine) if it were available to you? \"     Would the patient desire the use of ventilator (breathing machine)?: No      Resuscitation  \"CPR works best to restart the heart when there is a sudden event, like a heart attack, in someone who is otherwise healthy. Unfortunately, CPR does not typically restart the heart for people who have serious health conditions or who are very sick. \"    \"In the event your heart stopped as a result of an underlying serious health condition, would you want attempts to be made to restart your heart (answer \"yes\" for attempt to resuscitate) or would you prefer a natural death (answer \"no\" for do not attempt to resuscitate)? \" yes       [] Yes   [x] No   Educated Patient / Geoffrey Pettit regarding differences between Advance Directives and portable DNR orders.     Length of ACP Conversation in minutes:  5    Conversation Outcomes:  [x] ACP discussion completed  [] Existing advance directive reviewed with patient; no changes to patient's previously recorded wishes  [] New Advance Directive completed  [] Portable Do Not Rescitate prepared for Provider review and signature  [] POLST/POST/MOLST/MOST prepared for Provider review and signature      Follow-up plan:    [] Schedule follow-up conversation to continue planning  [] Referred individual to Provider for additional questions/concerns   [] Advised patient/agent/surrogate to review completed ACP document and update if needed with changes in condition, patient preferences or care setting    [] This note routed to one or more involved healthcare providers

## 2022-01-20 NOTE — PROGRESS NOTES
[address],    Adult Hospitalist      Name: Erasmo López  MRN: [id number]     Acct: [de-identified]  Room: 2005/2005-01    Admit Date: 1/19/2022 10:26 AM  PCP: BAKARI Paul CNP    Primary Problem  Active Problems:    Gastric outlet obstruction  Resolved Problems:    * No resolved hospital problems. *        Assesment:     · Pyloroplasty dated 1/19/2022  · History of pyloric stenosis  · Essential hypertension  · Diabetes mellitus type 2, diet controlled  · Anxiety disorder  · Glaucoma  · Hearing loss  · History of DVT and PE in 2020  · Peripheral vascular disease  · History of migraine  · Sore throat post endotracheal intubation        Plan:     · Patient admitted to [facility]  · Monitor vitals closely  · Keep SPO2 above 90%  · I's and O's  · IV fluids  · NG  · Pain control  · Antiemetics as needed  · Enalapril as needed  · Resume Xalatan eyedrops  · Protonix IV  · Lovenox for prophylaxis  · Incentive spirometry  · Up in chair  · Ambulate early as long as hemodynamically stable  · CBC, BMP  · Cepacol lozenges  · Accu-Cheks twice daily  · Insulin sliding scale medium  · Hypoglycemia protocol  · DVT and GI prophylaxis. Chief Complaint:     No chief complaint on file. Medical management       History of Present Illness:        Pt says she feels okay  Passing flatus  NG clamped  C/o sore throat  Lozenges helping  May take Q2h prn  BG elevated  Continue ISS for now  Check HbA1C since not on any meds right now  BP better  Will resume home meds as son as resumes diet      Initial HPI  Erasmo López is a 78 y.o.  female who presents with No chief complaint on file. Patient underwent pyloroplasty for pyloric stenosis without incident. Patient says she had femoropopliteal bypass surgery last year and after that started having anorexia and abdominal bloating. Patient says she was diagnosed with pyloric stenosis and surgery was planned.     Presently she denies any chest pain, at [address] [place] [address] N/A 7/22/2021    EGD BIOPSY performed by Monica Bailey MD at [address] [place] [address]  7/22/2021    EGD DILATION SAVORY performed by Monica Bailey MD at [address] [place] [address]          Medications Prior to Admission:       Prior to Admission medications    Medication Sig Start Date End Date Taking? Authorizing Provider   oxyCODONE-acetaminophen (PERCOCET) 5-325 MG per tablet Take 1 tablet by mouth every 8 hours as needed for Pain for up to 30 days. 1/13/22 2/12/22 Yes BAKARI Keller CNP   Multiple Vitamins-Minerals (MULTIVITAMIN ADULT EXTRA C PO) multivitamin   1 tab daily   Yes Historical Provider, MD   lisinopril (PRINIVIL;ZESTRIL) 20 MG tablet Take 1 tablet by mouth daily 4/15/21  Yes BAKARI Keller - CNP   latanoprost (XALATAN) 0.005 % ophthalmic solution INSTILL 1 DROP INTO EACH EYE AT BEDTIME 11/5/20  Yes Historical Provider, MD   VORTIoxetine (TRINTELLIX) 10 MG TABS tablet Take 1 tablet by mouth 12/5/19  Yes Historical Provider, MD   [place]-3 1000 MG CAPS Fish Oil 120 mg-180 mg-1000 mg capsule   Take 1 capsule every day by oral route. Historical Provider, MD   omeprazole (PRILOSEC) 40 MG delayed release capsule Take 1 capsule by mouth every morning (before breakfast) 10/13/20   BAKARI Umana - CNP   aspirin (ASPIRIN LOW DOSE ADULT) 81 MG chewable tablet Take 1 tablet by mouth daily 9/22/20   BAKARI Lau - NP        Allergies:       Dilaudid [hydromorphone hcl], Sulfa antibiotics, Sulfamethoxazole, Sulfamethoxazole-trimethoprim, Trimethoprim, and Tetanus toxoid    Social History:     Tobacco:    reports that she has been smoking cigarettes. She started smoking about 46 years ago. She has a 41.00 pack-year smoking history. She has never used smokeless tobacco.  Alcohol:      reports no history of alcohol use. Drug Use:  reports no history of drug use.     Family History:     Family History   Problem Relation Age of Onset    Colon Cancer Mother     Prostate Cancer Father     Thyroid Cancer Sister          Physical Exam:     Vitals:  BP (!) 141/67   Pulse 72   Temp 97.9 °F (36.6 °C) (Oral)   Resp 18   Ht 5' (1.524 m)   Wt 117 lb (53.1 kg)   SpO2 98%   BMI 22.85 kg/m²   Temp (24hrs), Av.3 °F (35.2 °C), Min:53.2 °F (11.8 °C), Max:98.8 °F (37.1 °C)          General appearance - alert, well appearing, and in no acute distress  Mental status - oriented to person, place, and time with normal affect  Head - normocephalic and atraumatic  Eyes - pupils equal and reactive, extraocular eye movements intact, conjunctiva clear  Ears - hearing appears to be intact  Nose - no drainage noted  Mouth - mucous membranes moist  Neck - supple, no carotid bruits, thyroid not palpable  Chest - clear to auscultation, normal effort  Heart - normal rate, regular rhythm, no murmur  Abdomen - soft, tender, dressing. distended,  bowel sounds present ,   Neurological - normal speech, no focal findings or movement disorder noted, cranial nerves II through XII grossly intact  Extremities - peripheral pulses palpable, no pedal edema or calf pain with palpation  Skin - no gross lesions, rashes, or induration noted        Data:     Labs:    Hematology:  Recent Labs     22  0555   WBC 14.4*   RBC 3.82*   HGB 12.5   HCT 37.5   MCV 98.2   MCH 32.7   MCHC 33.3   RDW 13.0      MPV 10.1     Chemistry:  Recent Labs     22  0555      K 4.6      CO2 21   GLUCOSE 206*   BUN 16   CREATININE 0.78   ANIONGAP 10   LABGLOM >60   GFRAA >60   CALCIUM 8.6     No results for input(s): PROT, LABALBU, LABA1C, E3KYOKH, J1YYJYE, FT4, TSH, AST, ALT, LDH, GGT, ALKPHOS, LABGGT, BILITOT, BILIDIR, AMMONIA, AMYLASE, LIPASE, LACTATE, CHOL, HDL, LDLCHOLESTEROL, CHOLHDLRATIO, TRIG, VLDL, JCE97FC, PHENYTOIN, PHENYF, URICACID, POCGLU in the last 72 hours.     No results found for: INR, PROTIME    Lab Results   Component Value Date/Time    SPECIAL NOT REPORTED 09/15/2020 08:37 AM     Lab Results   Component Value Date/Time    CULTURE NO GROWTH 09/15/2020 08:37 AM       Lab Results   Component Value Date    POCPH 7.301 09/15/2020    POCPCO2 45.0 09/15/2020    POCPO2 221.6 09/15/2020    POCHCO3 22.2 09/15/2020    NBEA 4 09/15/2020    PBEA NOT REPORTED 09/15/2020    WHS4FDN 24 09/15/2020    PZNS3XIU 100 09/15/2020    FIO2 NOT REPORTED 09/15/2020       Radiology:    No results found. All radiological studies reviewed                Code Status:  Full Code    Electronically signed by Minnie Hodgkins, MD on 1/20/2022 at 8:35 AM     Copy sent to Dr. Joy Solis APRN - CNP    This note was created with the assistance of a speech-recognition program.  Although the intention is to generate a document that actually reflects the content of the visit, no guarantees can be provided that every mistake has been identified and corrected by editing. Note was updated later by me after  physical examination and  completion of the assessment.

## 2022-01-20 NOTE — PROGRESS NOTES
Occupational Therapy   Occupational Therapy Initial Assessment  Date: 2022   Patient Name: Ayanna Weber  MRN: [id number]     : 1955    Date of Service: 2022    Discharge Recommendations:  Patient would benefit from continued therapy after discharge   Due to recent hospitalization and medical condition, pt would benefit from additional therapy at time of discharge to ensure safety. Please refer to the AM-PAC score for current functional status. OT Equipment Recommendations  Equipment Needed: Yes  Mobility Devices: ADL Assistive Devices  ADL Assistive Devices: Reacher;Long-handled Sponge;Sock-Aid Hard;Dressing Stick; Emergency Alert System    Assessment   Performance deficits / Impairments: Decreased functional mobility ; Decreased safe awareness;Decreased balance;Decreased ADL status; Decreased cognition;Decreased endurance;Decreased strength  Assessment: Pt required 2 staff assist this date to ensure safety and balance during all tasks. Skilled OT is indicated to increase overall IND and safety with self-care and functional tasks to return home when appropriate. Prognosis: Good  Decision Making: High Complexity  OT Education: OT Role;Transfer Training;Equipment;Plan of Care;Energy Conservation;Home Exercise Program;Precautions; ADL Adaptive Strategies  Patient Education: OT POC, precautions, log rolling tech, RW safety/mgmt, safety in function, call light use/fall prevention  REQUIRES OT FOLLOW UP: Yes  Activity Tolerance  Activity Tolerance: Patient Tolerated treatment well;Patient limited by pain  Safety Devices  Safety Devices in place: Yes  Type of devices: All fall risk precautions in place;Nurse notified;Gait belt;Bed alarm in place; Patient at risk for falls; Left in bed;Call light within reach           Patient Diagnosis(es): There were no encounter diagnoses.      has a past medical history of Anxiety, Depression, Diabetes mellitus ([facility] [address] Glaucoma, Hearing loss, Hx of blood clots, Hypertension, Migraines, PVD (peripheral vascular disease) ([facility] [address] and Wears dentures. has a past surgical history that includes Appendectomy; Cholecystectomy; Tonsillectomy; Breast surgery; Cataract removal (2015); Aorto-femoral Bypass Graft (09/15/2020); Abdominal aortic aneurysm repair (N/A, 9/15/2020); Colonoscopy; Endoscopy, colon, diagnostic; Upper gastrointestinal endoscopy (06/08/2021); Upper gastrointestinal endoscopy (N/A, 6/8/2021); Upper gastrointestinal endoscopy (6/8/2021); Upper gastrointestinal endoscopy (N/A, 6/8/2021); Upper gastrointestinal endoscopy (N/A, 7/22/2021); Upper gastrointestinal endoscopy (N/A, 7/22/2021); Upper gastrointestinal endoscopy (7/22/2021); eye surgery; and vascular surgery. Per H&P:   This is Layne Eber a 78 y.o. female who presents for a pre-admission testing appointment for an upcoming pyloroplasty by Dr. Mariana Harry scheduled on 1/19/2022 at 200 due to pyloric stenosis. History of multiple abdominal surgeries including cholecystectomy, appendectomy, and aorto-femoral bypass surgery. S/p several endoscopies and a biliary sphincterotomy per Dr. Mariana Harry' note dated 11/19/21 in the pt's paper chart. The pt has been under the care of Dr. Gilberto Valdez, Dr. Aruna Arredondo, and Dr. Braden Braden from gastroenterology. Pt states she had an endoscopy with dilation of the pyloric stenosis in 8/2021. Biopsy of the pyloric stenosis did not reveal any histological abnormality per Dr. Mariana Harry' note. The pt has nausea, bloating, and lower abdominal pain. The abdominal pain is aggravated by eating. Pt states she only eats soft food and is taking Percocet for abdominal pain and right shoulder pain. Pt states she has lost 40-45 pounds in the past year. History of constipation since aorto-femoral bypass surgery in 9/2020. Bowel movements are 5 days apart. Pt denies acid reflux, vomiting, dysphagia, diarrhea, and bloody stools.       Restrictions  Restrictions/Precautions  Restrictions/Precautions: General Precautions,Up as Tolerated,Fall Risk  Required Braces or Orthoses?: Yes  Required Braces or Orthoses  Other: Abdominal Binder  Position Activity Restriction  Spinal Precautions: No Bending,No Lifting,No Twisting  Sternal Precautions: No Pushing,No Pulling,5# Lifting Restrictions  Other position/activity restrictions: Abdominal binder, 3L O2 NC, RUE IV, NPO, clamped NG tube, up with assist    Subjective   General  Chart Reviewed: Yes  Patient assessed for rehabilitation services?: Yes  Family / Caregiver Present: No  Subjective  Subjective: \"I'm so weak, I don't know if I can do this\"  General Comment  Comments: Pt pleasant and cooperative for OT eval  Pain Assessment  Pain Assessment: 0-10  Pain Level: 8  Pain Type: Surgical pain  Pain Location: Abdomen  Pain Orientation: Mid  Pain Descriptors: Aching;Discomfort  Pain Frequency: Intermittent  Vital Signs  Level of Consciousness: Alert (0)  Social/Functional History  Social/Functional History  Lives With: Spouse ( has brain cancer, unable to physically help.)  Type of Home: House  Home Layout: Two level,Laundry in basement,1/2 bath on main level (bedroom main level, full bath upstairs)  Home Access: Stairs to enter without rails  Entrance Stairs - Number of Steps: 5  Bathroom Shower/Tub: Tub/Shower unit  Bathroom Toilet: Standard  Bathroom Equipment: Shower chair (vanity is close)  Home Equipment: 4 wheeled walker (no DME at baseline)  Receives Help From: Family (reports 2 local daughters very supportive)  ADL Assistance: Independent  Homemaking Assistance: Independent  Homemaking Responsibilities: Yes  Ambulation Assistance: Independent  Transfer Assistance: Independent  Active : Yes  Occupation: Retired  Type of occupation: [address]: shopping  Additional Comments: Pt denies any falls       Objective   Vision: Impaired  Vision Exceptions: Wears glasses for reading  Hearing: Exceptions to [facility]  Hearing Exceptions: Hard of hearing/hearing concerns    Orientation  Overall Orientation Status: Within Functional Limits  Observation/Palpation  Posture: Good  Observation: Pt resting in bed, agreeable to OT eval.  Balance  Sitting Balance: Stand by assistance  Standing Balance: Moderate assistance (Min-Mod A x2)  Standing Balance  Time: ~1 min  Activity: static standing EOB, functional mob EOB  Functional Mobility  Functional - Mobility Device: Rolling Walker  Activity: Other  Assist Level: Moderate assistance (x2)  Functional Mobility Comments: Mod A x2 for balance and safety. Pt took ~3-4 steps forward away from bed and back toward bed, and ~3 side steps toward HOB. Mod VCs for RW safety/mgmt, upright posture, assist/awareness of lines, all to increase safety/reduce fall risk  ADL  Feeding: NPO  Grooming: Stand by assistance (seated)  UE Bathing: Moderate assistance  LE Bathing: Maximum assistance  UE Dressing: Moderate assistance (to adjust hosp gown)  LE Dressing: Maximum assistance  Toileting: Moderate assistance  Additional Comments: Pt limited d/t precautions, pain, dec standing ya/balance. Pt with Good motivation and egar to get home. Tone RUE  RUE Tone: Normotonic  Tone LUE  LUE Tone: Normotonic  Coordination  Movements Are Fluid And Coordinated: Yes     Bed mobility  Supine to Sit: Moderate assistance;2 Person assistance  Sit to Supine: Moderate assistance;2 Person assistance  Scooting: Moderate assistance;2 Person assistance  Comment: Pt required increased time and effort to complete bed mob tasks. Pt educated on proper log rolling tech with Fair carryover, pt requires continued education. Mod VCs for log rolling tech, use of bed rails, sequencing, and assist/awareness of lines. Transfers  Sit to stand: Minimal assistance;2 Person assistance  Stand to sit: Minimal assistance;2 Person assistance  Transfer Comments:  Mod VCs for proper hand placement on stable surface, controlled sit<>stand, exhale with exertion, squaring self/AD to surface, and RW safety/mgmt, all to increase safety/reduce fall risk     Cognition  Overall Cognitive Status: Exceptions  Safety Judgement: Decreased awareness of need for safety;Decreased awareness of need for assistance  Problem Solving: Assistance required to generate solutions  Sequencing: Requires cues for some        Sensation  Overall Sensation Status: WFL (denies any numbness/tingling)        LUE AROM (degrees)  LUE AROM : WFL  Left Hand AROM (degrees)  Left Hand AROM: WFL  RUE AROM (degrees)  RUE AROM : WFL  Right Hand AROM (degrees)  Right Hand AROM: WFL  LUE Strength  LUE Strength Comment: NT d/t precautions  RUE Strength  RUE Strength Comment: NT d/t precautions                   Plan   Plan  Times per week: 4-5x per week, 1-2x per day  Times per day: Daily  Current Treatment Recommendations: Strengthening,Endurance Training,Patient/Caregiver Education & Training,Self-Care / ADL,Equipment Evaluation, Education, & procurement,Balance Training,Functional Mobility Training,Safety Education & Training,Positioning,Pain Management      AM-PAC Score        AM-[facility] Inpatient Daily Activity Raw Score: 16 (01/20/22 1057)  AM-PAC Inpatient ADL T-Scale Score : 35.96 (01/20/22 1057)  ADL Inpatient CMS 0-100% Score: 53.32 (01/20/22 1057)  ADL Inpatient CMS G-Code Modifier : CK (01/20/22 1057)    Goals  Short term goals  Time Frame for Short term goals: by discharge, pt to demo  Short term goal 1: SUP for bed mob tasks with proper log rolling tech and Good safety  Short term goal 2: I/MI for UB ADLs (including abdominal binder) with SBA for LB ADLs with AE as needed and Good safety  Short term goal 3: SUP for ADL transfers and functional mob with AD, and Good safety  Short term goal 4: pt/family to be IND with EC/WS, fall prevention tech, precautions, as well as DME/AE recommendations with use of handouts as needed  Patient Goals   Patient goals :  To go home       Therapy Time   Individual Concurrent Group Co-treatment   Time In 0923         Time Out 0957 (+10 chart review)         Minutes 44          tx time: 34 min       Co-treatment with PT warranted secondary to decreased safety and independence requiring 2 skilled therapy professionals to address individual discipline's goals. OT addressing preparation for ADL transfer, sitting balance for increased ADL performance, sitting/activity tolerance, functional reaching, environmental safety/scanning, fall prevention, functional mobility for ADL transfers, ability to sequence and follow directions and functional UE strength. Upon writer exit, call light within reach, pt retired to bed. All lines intact and patient positioned comfortably. All patient needs addressed prior to ending therapy session. Chart reviewed prior to treatment and patient is agreeable for therapy. RN reports patient is medically stable for therapy treatment this date.     Luisana Wilburn OTR/L

## 2022-01-20 NOTE — PLAN OF CARE
Problem: SAFETY  Goal: Free from accidental physical injury  1/20/2022 1032 by Delfino Schulz RN  Outcome: Ongoing  Note: Ongoing     Problem: DAILY CARE  Goal: Daily care needs are met  Outcome: Ongoing  Note: Ongoin     Problem: PAIN  Goal: Patient's pain/discomfort is manageable  1/20/2022 1032 by Delfino Schulz RN  Outcome: Ongoing  Note: Ongoing     Problem: KNOWLEDGE DEFICIT  Goal: Patient/S.O. demonstrates understanding of disease process, treatment plan, medications, and discharge instructions.   Outcome: Ongoing  Note: Ongoing     Problem: DISCHARGE BARRIERS  Goal: Patient's continuum of care needs are met  Outcome: Ongoing  Note: Ongoing     Problem: Infection - Surgical Site:  Goal: Will show no infection signs and symptoms  Description: Will show no infection signs and symptoms  Outcome: Ongoing  Note: Ongoing

## 2022-01-20 NOTE — CARE COORDINATION
Case Management Initial Discharge Plan  Magali Jiménez,             Met with:patient to discuss discharge plans. Information verified: address, contacts, phone number, , insurance Yes  PCP: BAKARI Hahn CNP  Date of last visit: 10-    Insurance Provider: Medicare, [address]    Discharge Planning    Living Arrangements:  Spouse/Significant Other   Support Systems:  Spouse/Significant Other    Home has 2 stories  5 stairs to climb to get into front door, 12 stairs to climb to reach second floor  Location of bedroom/bathroom in home  - bedroom and 1/2 bath on main floor    Patient able to perform ADL's:Independent    Current Services (outpatient & in home) none  DME equipment: none  DME provider: N/A    Pharmacy: [facility]    Potential Assistance Needed:  N/A    Patient agreeable to home care: No  [place] of choice provided:  n/a    Prior SNF/Rehab Placement and Facility: No  Agreeable to SNF/Rehab: No  [place] of choice provided: n/a   Evaluation: n/a    Expected Discharge date:  22  Patient expects to be discharged to: Follow Up Appointment: Best Day/ Time:      Transportation provider:   Transportation arrangements needed for discharge: No    Readmission Risk              Risk of Unplanned Readmission:  5             Does patient have a readmission risk score greater than 14?: No  If yes, follow-up appointment must be made within 7 days of discharge. Goal of Care:       Discharge Plan: Met with patient at bedside. Lives with , independent and drives. POD #1 pyloroplasty per Dr. Monique Can. NG in place and clamped. Dressing change Friday per surgery. Pt declines HHC needs. Continue to follow post op.               Electronically signed by Ulises Danielson RN on 22 at 10:51 AM EST

## 2022-01-20 NOTE — PROGRESS NOTES
General Surgery:  Daily Progress Note              PATIENT NAME: Nayeli Howard DATE: 1/20/2022, 5:47 AM  CC:  Sore throat    SUBJECTIVE:     Pt seen and examined at bedside. No acute events overnight. Afebrile. Pt states overall pain is controlled. C/O sore throat today. Denies fever, chills, nausea, vomiting, chest pain, and SOB. Currently NPO with NGT. Has not ambulated yet.      OBJECTIVE:   VITALS:  /61   Pulse 63   Temp 98.1 °F (36.7 °C) (Oral)   Resp 16   Ht 5' (1.524 m)   Wt 117 lb (53.1 kg)   SpO2 97%   BMI 22.85 kg/m²      INTAKE/OUTPUT:      Intake/Output Summary (Last 24 hours) at 1/20/2022 0547  Last data filed at 1/19/2022 2136  Gross per 24 hour   Intake 269 ml   Output 925 ml   Net -656 ml       PHYSICAL EXAM:  General Appearance:  awake, alert, oriented, in no acute distress  HEENT:  Normocephalic, atraumatic, mucus membranes moist, NGT  Lungs: no inc WOB  Heart:  Heart regular rate and rhythm  Abdomen:  soft, ND, appropriately tender, dressing intact     Data:  CBC with Differential:    Lab Results   Component Value Date    WBC 14.4 01/20/2022    RBC 3.82 01/20/2022    HGB 12.5 01/20/2022    HCT 37.5 01/20/2022     01/20/2022    MCV 98.2 01/20/2022    MCH 32.7 01/20/2022    MCHC 33.3 01/20/2022    RDW 13.0 01/20/2022    LYMPHOPCT 26 01/04/2020    MONOPCT 3 01/04/2020    BASOPCT 0 01/04/2020    MONOSABS 0.19 01/04/2020    LYMPHSABS 1.66 01/04/2020    EOSABS 0.00 01/04/2020    BASOSABS 0.00 01/04/2020    DIFFTYPE NOT REPORTED 01/04/2020     BMP:    Lab Results   Component Value Date     01/10/2022    K 3.8 01/10/2022     01/10/2022    CO2 26 01/10/2022    BUN 10 01/10/2022    LABALBU 4.0 04/19/2021    CREATININE 0.77 01/10/2022    CALCIUM 8.9 01/10/2022    GFRAA >60 01/10/2022    LABGLOM >60 01/10/2022    GLUCOSE 111 01/10/2022       ASSESSMENT:  Active Hospital Problems    Diagnosis Date Noted    Gastric outlet obstruction [K31.1] 01/19/2022

## 2022-01-20 NOTE — PROGRESS NOTES
Physical Therapy    Facility/Department: [facility]  Initial Assessment    NAME: Cuong Youngblood  : 1955  MRN: [id number]    Date of Service: 2022   SANTOS Lara reports patient is medically stable for therapy treatment this date. Chart reviewed prior to treatment and patient is agreeable for therapy. All lines intact and patient positioned comfortably at end of treatment. All patient needs addressed prior to ending therapy session. Per H&P: This is Mariya M Melgar a 78 y.o. female who presents for a pre-admission testing appointment for an upcoming pyloroplasty by Dr. Sukhwinder Álvarez on 2022 at 1230 due to pyloric stenosis. History of multiple abdominal surgeries including cholecystectomy, appendectomy, and aorto-femoral bypass surgery. S/p several endoscopies and a biliary sphincterotomy per Dr. Edie Bernheim' note dated 21 in the pt's paper chart. The pt has been under the care of Dr. Carmen Ritchie, Dr. Tj Erwin, and Dr. Regino Parker from gastroenterology. Pt states she had an endoscopy with dilation of the pyloric stenosis in 2021. Biopsy of the pyloric stenosis did not reveal any histological abnormality per Dr. Edie Bernheim' note. The pt has nausea, bloating, and lower abdominal pain. The abdominal pain is aggravated by eating. Pt states she only eats soft food and is taking Percocet for abdominal pain and right shoulder pain. Pt states she has lost 40-45 pounds in the past year. History of constipation since aorto-femoral bypass surgery in 2020. Bowel movements are 5 days apart. Pt denies acid reflux, vomiting, dysphagia, diarrhea, and bloody stools. Discharge Recommendations:  Due to recent hospitalization and medical condition, pt would benefit from additional therapy at time of discharge to ensure safety. Please refer to the AM-PAC score for current functional status.    Patient would benefit from continued therapy after discharge     PT Equipment Recommendations  Equipment Needed: Yes  Mobility Devices: Corita Yanelis: Rolling    Assessment   Body structures, Functions, Activity limitations: Decreased functional mobility ; Decreased strength;Decreased safe awareness;Decreased balance;Decreased endurance; Increased pain  Assessment: Pt tolerated PT eval fair. Pt w/ fair steadiness throughout ambulation this date w/ RW, most limited by significant abdominal pain. Pt would benefit from continued skilled physical therapy to address strength, mobility, and endurance deficits in order to return to PLOF. Prognosis: Excellent  Decision Making: Medium Complexity  PT Education: Goals;PT Role;Plan of Care;General Safety; Energy Conservation;Transfer Training;Functional Mobility Training;Gait Training;Precautions  Patient Education: Pt educated on: purpose of acute PT eval, importance of continued mobility throughout admission, general safety awareness, prevention of sedentary complications, safe transfers and ambulation w/ RW, log-roll technique for bed mobility, pursed lip breathing, paced breathing for pain control, and PT POC. Pt verbalized fair understanding. REQUIRES PT FOLLOW UP: Yes  Activity Tolerance  Activity Tolerance: Patient limited by pain; Patient limited by endurance       Patient Diagnosis(es): There were no encounter diagnoses. has a past medical history of Anxiety, Depression, Diabetes mellitus ([address] Glaucoma, Hearing loss, Hx of blood clots, Hypertension, Migraines, PVD (peripheral vascular disease) ([address] and Wears dentures. has a past surgical history that includes Appendectomy; Cholecystectomy; Tonsillectomy; Breast surgery; Cataract removal (2015); Aorto-femoral Bypass Graft (09/15/2020); Abdominal aortic aneurysm repair (N/A, 9/15/2020); Colonoscopy; Endoscopy, colon, diagnostic; Upper gastrointestinal endoscopy (06/08/2021); Upper gastrointestinal endoscopy (N/A, 6/8/2021); Upper gastrointestinal endoscopy (6/8/2021);  Upper gastrointestinal endoscopy (N/A, 6/8/2021); Upper gastrointestinal endoscopy (N/A, 7/22/2021); Upper gastrointestinal endoscopy (N/A, 7/22/2021); Upper gastrointestinal endoscopy (7/22/2021); eye surgery; and vascular surgery. Restrictions  Restrictions/Precautions  Restrictions/Precautions: General Precautions,Fall Risk  Required Braces or Orthoses?: Yes  Required Braces or Orthoses  Other: Abdominal Binder  Position Activity Restriction  Spinal Precautions: No Bending,No Lifting,No Twisting  Sternal Precautions: No Pushing,No Pulling,5# Lifting Restrictions  Other position/activity restrictions: Up w/ assist, NG tube, RUE IV, 3L O2 NC, NPO  Vision/Hearing  Vision: Impaired  Vision Exceptions: Wears glasses for reading  Hearing: Exceptions to [facility]  Hearing Exceptions: Hard of hearing/hearing concerns     Subjective  General  Patient assessed for rehabilitation services?: Yes  Response To Previous Treatment: Not applicable  Family / Caregiver Present: Yes (daughter present at bedside throughout PT eval)  Follows Commands: Within Functional Limits  General Comment  Comments: RN and pt agreeable to therapy. Pt supine in bed upon arrival w/ abdominal binder in place. Pt pleasant and cooperative throughout. Subjective  Subjective: Pt reporting 8/10 abdominal pain at time of PT eval, RN notified.         Orientation  Orientation  Overall Orientation Status: Within Functional Limits  Social/Functional History  Social/Functional History  Lives With: Spouse ( has brain cancer, unable to physically help.)  Type of Home: House  Home Layout: Two level,Laundry in basement,1/2 bath on main level (bedroom main level, full bath upstairs)  Home Access: Stairs to enter without rails  Entrance Stairs - Number of Steps: 5  Bathroom Shower/Tub: Tub/Shower unit  Bathroom Toilet: Standard  Bathroom Equipment: Shower chair (vanity is close)  Home Equipment: 4 wheeled walker (no DME at baseline)  Receives Help From: Family (reports 2 local daughters very supportive)  ADL Assistance: Independent  Homemaking Assistance: Independent  Homemaking Responsibilities: Yes  Ambulation Assistance: Independent  Transfer Assistance: Independent  Active : Yes  Occupation: Retired  Type of occupation: [address]: shopping  Additional Comments: Pt denies any falls  Cognition   Cognition  Overall Cognitive Status: Exceptions  Arousal/Alertness: Appropriate responses to stimuli  Following Commands: Follows multistep commands with repitition  Attention Span: Attends with cues to redirect  Memory: Appears intact  Safety Judgement: Decreased awareness of need for safety;Decreased awareness of need for assistance  Problem Solving: Decreased awareness of errors;Assistance required to identify errors made;Assistance required to correct errors made  Insights: Fully aware of deficits  Initiation: Requires cues for some  Sequencing: Requires cues for some    Objective     Observation/Palpation  Posture: Good  Observation: erythema on LUE from elbow down - RN notified. AROM RLE (degrees)  RLE AROM: WFL  AROM LLE (degrees)  LLE AROM : WFL  AROM RUE (degrees)  RUE AROM : WFL  AROM LUE (degrees)  LUE AROM : WFL  Strength RLE  Strength RLE: WFL  Comment: MMT not formally assessed this date. Movement against gravity noted at hip, knee, and ankle throughout session. Strength LLE  Strength LLE: WFL  Comment: MMT not formally assessed this date. Movement against gravity noted at hip, knee, and ankle throughout session.   Strength RUE  Comment: See OT assessment for detail  Strength LUE  Comment: See OT assessment for detail  Tone RLE  RLE Tone: Normotonic  Tone LLE  LLE Tone: Normotonic  Motor Control  Gross Motor?: WFL  Sensation  Overall Sensation Status: WFL (denies any numbness/tingling)  Bed mobility  Rolling to Left: Moderate assistance;2 Person assistance  Rolling to Right: Moderate assistance;2 Person assistance  Supine to Sit: Moderate assistance;2 Person assistance  Sit to Supine: Moderate assistance;2 Person assistance  Scooting: Moderate assistance;2 Person assistance  Comment: Pt w/ moderate difficulty throughout bed mobility this date requiring max verbal cueing and education for log -roll technique throughout w/ fair return demo. Pt also requiring max verbal cueing for paced breathing for pain control throughout. Assist required for safe line mgmt throughout. Transfers  Sit to Stand: Minimal Assistance;2 Person Assistance  Stand to sit: Minimal Assistance;2 Person Assistance  Comment: Pt w/ fair steadiness throughout STS transfers throughout session. Pt requiring max verbal cueing for proper hand placement throughout transfers w/ RW w/ fair return demo. Pt also demonstrating poor eccentric quad control throughout stand>sit transfers this date. Ambulation  Ambulation?: Yes  More Ambulation?: No  Ambulation 1  Surface: level tile  Device: Rolling Walker  Other Apparatus: O2  Assistance: Minimal assistance;2 Person assistance  Quality of Gait: fair steadiness, assist w/ progression of RW  Gait Deviations: Slow [place]; Shuffles;Decreased step length;Decreased step height  Distance: 5ft x 2 + 2ft  Comments: Pt ambulating w/ fair steadiness this date requiring assist w/ progression of RW throughout. Pt also requiring assist for safe line mgmt and max verbal cueing to maintain PARAG within RW w/ fair return demo.   Stairs/Curb  Stairs?: No     Balance  Posture: Fair  Sitting - Static: Good  Sitting - Dynamic: Good;-  Standing - Static: Good;-  Standing - Dynamic: Fair;-  Comments: Standing balance assessed w/ RW        Plan   Plan  Times per week: 1-2x/day, 6-7x/week  Current Treatment Recommendations: Strengthening,Balance Training,Functional Mobility Training,Stair training,Gait Training,Transfer Training,Endurance Training,Safety Education & Training,Home Exercise Program,Equipment Evaluation, Education, & procurement,Patient/Caregiver Education & Training,Pain Management  Safety Devices  Type of devices: Bed alarm in place,Call light within reach,Gait belt,Nurse notified,Left in bed  Restraints  Initially in place: No    AM-PAC Score  AM-PAC Inpatient Mobility Raw Score : 15 (01/20/22 1307)  AM-PAC Inpatient T-Scale Score : 39.45 (01/20/22 1307)  Mobility Inpatient CMS 0-100% Score: 57.7 (01/20/22 1307)  Mobility Inpatient CMS G-Code Modifier : CK (01/20/22 1307)       Functional Outcome Measure-   Single Leg Stance Test:  0 sec. (<5 sec.= fall risk)    Goals  Short term goals  Time Frame for Short term goals: 14 visits  Short term goal 1: Pt to demonstrate bed mobility using log-roll technique Hannah  Short term goal 2: Pt to perform STS transfers w/ least restrictive AD Hannah  Short term goal 3: Pt to ambulate at least 300ft w/ least restrictive AD Hannah  Short term goal 4: Pt to ascend/descend 10 stairs w/ handrails CGA  Short term goal 5: Pt to actively participate in at least 30 minutes of physical therapy for ther act, ther ex, balance, gait, and endurance training  Patient Goals   Patient goals : To go home       Therapy Time   Individual Concurrent Group Co-treatment   Time In 0924         Time Out 1007         Minutes 43           Treatment time: 30 minutes       Co-treatment with OT warranted secondary to decreased safety and independence requiring 2 skilled therapy professionals to address individual discipline's goals.      Smitha Reed, PT

## 2022-01-20 NOTE — FLOWSHEET NOTE
Patient is sleeping. No family is present. Writer leaves note of support on the tray table and offers a prayer of healing. Patient does not respond. Spiritual Care will follow as needed. 01/19/22 1295   Encounter Summary   Services provided to: Patient   Referral/Consult From: [facility]   Support System Spouse; Children   Continue Visiting   (1/19/22)   Complexity of Encounter Low   Length of Encounter 15 minutes   Routine   Type Initial   Assessment Sleeping   Intervention Prayer   Outcome Did not respond

## 2022-01-21 PROBLEM — E44.1 MILD MALNUTRITION (HCC): Status: ACTIVE | Noted: 2022-01-21

## 2022-01-21 LAB
GLUCOSE BLD-MCNC: 106 MG/DL (ref 65–105)
GLUCOSE BLD-MCNC: 127 MG/DL (ref 65–105)
GLUCOSE BLD-MCNC: 131 MG/DL (ref 65–105)
GLUCOSE BLD-MCNC: 135 MG/DL (ref 65–105)

## 2022-01-21 PROCEDURE — 6370000000 HC RX 637 (ALT 250 FOR IP): Performed by: FAMILY MEDICINE

## 2022-01-21 PROCEDURE — 2580000003 HC RX 258: Performed by: SURGERY

## 2022-01-21 PROCEDURE — 82947 ASSAY GLUCOSE BLOOD QUANT: CPT

## 2022-01-21 PROCEDURE — 97535 SELF CARE MNGMENT TRAINING: CPT

## 2022-01-21 PROCEDURE — 6370000000 HC RX 637 (ALT 250 FOR IP): Performed by: SURGERY

## 2022-01-21 PROCEDURE — 6360000002 HC RX W HCPCS: Performed by: STUDENT IN AN ORGANIZED HEALTH CARE EDUCATION/TRAINING PROGRAM

## 2022-01-21 PROCEDURE — 97530 THERAPEUTIC ACTIVITIES: CPT

## 2022-01-21 PROCEDURE — 97116 GAIT TRAINING THERAPY: CPT

## 2022-01-21 PROCEDURE — 6360000002 HC RX W HCPCS: Performed by: FAMILY MEDICINE

## 2022-01-21 PROCEDURE — 1200000000 HC SEMI PRIVATE

## 2022-01-21 PROCEDURE — 6360000002 HC RX W HCPCS: Performed by: SURGERY

## 2022-01-21 PROCEDURE — 6370000000 HC RX 637 (ALT 250 FOR IP): Performed by: STUDENT IN AN ORGANIZED HEALTH CARE EDUCATION/TRAINING PROGRAM

## 2022-01-21 RX ORDER — DEXTROSE MONOHYDRATE 50 MG/ML
100 INJECTION, SOLUTION INTRAVENOUS PRN
Status: DISCONTINUED | OUTPATIENT
Start: 2022-01-21 | End: 2022-01-21 | Stop reason: SDUPTHER

## 2022-01-21 RX ORDER — NICOTINE POLACRILEX 4 MG
15 LOZENGE BUCCAL PRN
Status: DISCONTINUED | OUTPATIENT
Start: 2022-01-21 | End: 2022-01-21 | Stop reason: SDUPTHER

## 2022-01-21 RX ORDER — DEXTROSE MONOHYDRATE 25 G/50ML
12.5 INJECTION, SOLUTION INTRAVENOUS PRN
Status: DISCONTINUED | OUTPATIENT
Start: 2022-01-21 | End: 2022-01-21 | Stop reason: SDUPTHER

## 2022-01-21 RX ORDER — MORPHINE SULFATE 2 MG/ML
2 INJECTION, SOLUTION INTRAMUSCULAR; INTRAVENOUS EVERY 4 HOURS PRN
Status: DISCONTINUED | OUTPATIENT
Start: 2022-01-21 | End: 2022-01-22 | Stop reason: HOSPADM

## 2022-01-21 RX ORDER — LISINOPRIL 20 MG/1
20 TABLET ORAL DAILY
Status: DISCONTINUED | OUTPATIENT
Start: 2022-01-21 | End: 2022-01-22 | Stop reason: HOSPADM

## 2022-01-21 RX ORDER — OXYCODONE HYDROCHLORIDE 5 MG/1
5 TABLET ORAL EVERY 6 HOURS PRN
Status: DISCONTINUED | OUTPATIENT
Start: 2022-01-21 | End: 2022-01-22 | Stop reason: HOSPADM

## 2022-01-21 RX ADMIN — OXYCODONE HYDROCHLORIDE 5 MG: 5 TABLET ORAL at 05:32

## 2022-01-21 RX ADMIN — LISINOPRIL 5 MG: 5 TABLET ORAL at 00:12

## 2022-01-21 RX ADMIN — FAMOTIDINE 20 MG: 20 TABLET ORAL at 21:38

## 2022-01-21 RX ADMIN — ENOXAPARIN SODIUM 40 MG: 100 INJECTION SUBCUTANEOUS at 09:18

## 2022-01-21 RX ADMIN — KETOROLAC TROMETHAMINE 15 MG: 30 INJECTION, SOLUTION INTRAMUSCULAR; INTRAVENOUS at 17:13

## 2022-01-21 RX ADMIN — KETOROLAC TROMETHAMINE 15 MG: 30 INJECTION, SOLUTION INTRAMUSCULAR; INTRAVENOUS at 04:18

## 2022-01-21 RX ADMIN — MORPHINE SULFATE 2 MG: 2 INJECTION, SOLUTION INTRAMUSCULAR; INTRAVENOUS at 09:19

## 2022-01-21 RX ADMIN — KETOROLAC TROMETHAMINE 15 MG: 30 INJECTION, SOLUTION INTRAMUSCULAR; INTRAVENOUS at 09:18

## 2022-01-21 RX ADMIN — LISINOPRIL 20 MG: 20 TABLET ORAL at 21:38

## 2022-01-21 RX ADMIN — HYDRALAZINE HYDROCHLORIDE 10 MG: 20 INJECTION INTRAMUSCULAR; INTRAVENOUS at 05:26

## 2022-01-21 RX ADMIN — OXYCODONE HYDROCHLORIDE 5 MG: 5 TABLET ORAL at 11:25

## 2022-01-21 RX ADMIN — LISINOPRIL 5 MG: 5 TABLET ORAL at 09:21

## 2022-01-21 RX ADMIN — HYDRALAZINE HYDROCHLORIDE 10 MG: 20 INJECTION INTRAMUSCULAR; INTRAVENOUS at 11:25

## 2022-01-21 RX ADMIN — DEXTROSE AND SODIUM CHLORIDE: 5; 450 INJECTION, SOLUTION INTRAVENOUS at 00:18

## 2022-01-21 RX ADMIN — KETOROLAC TROMETHAMINE 15 MG: 30 INJECTION, SOLUTION INTRAMUSCULAR; INTRAVENOUS at 21:38

## 2022-01-21 RX ADMIN — FAMOTIDINE 20 MG: 20 TABLET ORAL at 09:18

## 2022-01-21 RX ADMIN — MORPHINE SULFATE 2 MG: 2 INJECTION, SOLUTION INTRAMUSCULAR; INTRAVENOUS at 00:17

## 2022-01-21 ASSESSMENT — PAIN DESCRIPTION - DESCRIPTORS
DESCRIPTORS: DISCOMFORT;SHARP
DESCRIPTORS: ACHING;DISCOMFORT
DESCRIPTORS: DISCOMFORT;SHARP

## 2022-01-21 ASSESSMENT — PAIN DESCRIPTION - FREQUENCY
FREQUENCY: CONTINUOUS
FREQUENCY: INTERMITTENT
FREQUENCY: CONTINUOUS

## 2022-01-21 ASSESSMENT — PAIN DESCRIPTION - ORIENTATION
ORIENTATION: MID

## 2022-01-21 ASSESSMENT — PAIN SCALES - GENERAL
PAINLEVEL_OUTOF10: 7
PAINLEVEL_OUTOF10: 4
PAINLEVEL_OUTOF10: 7
PAINLEVEL_OUTOF10: 4
PAINLEVEL_OUTOF10: 6
PAINLEVEL_OUTOF10: 9
PAINLEVEL_OUTOF10: 7
PAINLEVEL_OUTOF10: 4
PAINLEVEL_OUTOF10: 8

## 2022-01-21 ASSESSMENT — PAIN DESCRIPTION - PROGRESSION
CLINICAL_PROGRESSION: NOT CHANGED

## 2022-01-21 ASSESSMENT — PAIN DESCRIPTION - LOCATION
LOCATION: ABDOMEN

## 2022-01-21 ASSESSMENT — PAIN DESCRIPTION - ONSET
ONSET: ON-GOING

## 2022-01-21 ASSESSMENT — PAIN - FUNCTIONAL ASSESSMENT: PAIN_FUNCTIONAL_ASSESSMENT: PREVENTS OR INTERFERES SOME ACTIVE ACTIVITIES AND ADLS

## 2022-01-21 ASSESSMENT — PAIN DESCRIPTION - PAIN TYPE
TYPE: SURGICAL PAIN
TYPE: SURGICAL PAIN

## 2022-01-21 NOTE — PROGRESS NOTES
General Surgery:  Daily Progress Note              PATIENT NAME: Orion Credit     TODAY'S DATE: 1/21/2022, 5:25 AM  CC:  Sore throat    SUBJECTIVE:     Pt seen and examined at bedside. No acute events overnight. Afebrile. Pt states overall pain is controlled. Denies fever, chills, nausea, vomiting, chest pain, and SOB. Currently tolerating CLD. OBJECTIVE:   VITALS:  BP (!) 184/71   Pulse 71   Temp 97.5 °F (36.4 °C) (Oral)   Resp 20   Ht 5' (1.524 m)   Wt 117 lb (53.1 kg)   SpO2 95%   BMI 22.85 kg/m²      INTAKE/OUTPUT:      Intake/Output Summary (Last 24 hours) at 1/21/2022 0525  Last data filed at 1/20/2022 1837  Gross per 24 hour   Intake 2459.34 ml   Output 900 ml   Net 1559.34 ml       PHYSICAL EXAM:  General Appearance:  awake, alert, oriented, in no acute distress  Lungs: no inc WOB  Heart:  Heart regular rate and rhythm  Abdomen:  soft, ND, appropriately tender, incision CDI    Data:  CBC with Differential:    Lab Results   Component Value Date    WBC 14.4 01/20/2022    RBC 3.82 01/20/2022    HGB 12.5 01/20/2022    HCT 37.5 01/20/2022     01/20/2022    MCV 98.2 01/20/2022    MCH 32.7 01/20/2022    MCHC 33.3 01/20/2022    RDW 13.0 01/20/2022    LYMPHOPCT 26 01/04/2020    MONOPCT 3 01/04/2020    BASOPCT 0 01/04/2020    MONOSABS 0.19 01/04/2020    LYMPHSABS 1.66 01/04/2020    EOSABS 0.00 01/04/2020    BASOSABS 0.00 01/04/2020    DIFFTYPE NOT REPORTED 01/04/2020     BMP:    Lab Results   Component Value Date     01/20/2022    K 4.6 01/20/2022     01/20/2022    CO2 21 01/20/2022    BUN 16 01/20/2022    LABALBU 4.0 04/19/2021    CREATININE 0.78 01/20/2022    CALCIUM 8.6 01/20/2022    GFRAA >60 01/20/2022    LABGLOM >60 01/20/2022    GLUCOSE 206 01/20/2022       ASSESSMENT:  Active Hospital Problems    Diagnosis Date Noted    Gastric outlet obstruction [K31.1] 01/19/2022       1. 78 y.o. female s/p pyloroplasty    Plan:  1. Diet: FLD  2. Pain control  3.  GI prophylaxis: pepcid  4. DVT prophylaxis:  lovenox  5. Continue to encourage ambulation/activity w/ PT/OT  6. Continue to encourage incentive spirometry      Electronically signed by Anna Bryson DO  on 1/21/2022 at 5:25 AM     Attending Physician Statement  I have discussed the case, including pertinent history and exam findings with the resident. I agree with the assessment, plan and orders as documented by the resident. Patient seen and examined. Agree with above. Wound dressing removed. Incision C/D/I. Discontinue NG and slowly advance diet to full liquids today.

## 2022-01-21 NOTE — PROGRESS NOTES
Comprehensive Nutrition Assessment    Type and Reason for Visit:  Positive Nutrition Screen (Unplanned weight loss, decreased appetite, decreased appetite, difficulty chewing or swallowing food. Dietitian consult needed: poor intake, poor appetite)    Nutrition Recommendations/Plan:   1. Continue ADULT DIET; Full Liquid  2. Start Glucerna 3x/day  3. Monitor p.o intakes, diet tolerance, GI function and labs    Nutrition Assessment:  Patient admission is related to gastric outlet obstruction. Patient reports issues with diffuculty tolerating food due to narrowed gastric outlet since 2020. Patient reports only being able to tolerate very soft food and liquids like pudding, jello and Ovaltine. Patient reports weight loss over the years. Patient is mildly malnourished. Patient was encouraged to try oral nutrition supplements. Will start Glucerna 3x/day. Continue Full liquid diet. Malnutrition Assessment:  Malnutrition Status:  Mild malnutrition    Context:  Chronic Illness     Findings of the 6 clinical characteristics of malnutrition:  Energy Intake:  7 - 75% or less estimated energy requirements for 1 month or longer  Weight Loss:  1 - Mild weight loss (specify amount and time period) (4.9% loss in 4 months)     Body Fat Loss:  Unable to assess     Muscle Mass Loss:  Unable to assess    Fluid Accumulation:  No significant fluid accumulation Extremities   Strength:  Not Performed    Estimated Daily Nutrient Needs:  Energy (kcal):  1018-5004 kcal (28-30 kcal/kg); Weight Used for Energy Requirements:  Current     Protein (g):  55-59 gm (1.2-1.3 gm/kg); Weight Used for Protein Requirements:  Current          Nutrition Related Findings:  No edema. S/P Pyloromyotomy 1/19/22. Complete edentulism (dentures at home)         Current Nutrition Therapies:    ADULT DIET;  Full Liquid  ADULT ORAL NUTRITION SUPPLEMENT; Breakfast, Dinner, Lunch; Diabetic Oral Supplement    Anthropometric Measures:  · Height: 5' (152.4 cm)  · Current Body Weight: 117 lb (53.1 kg)   · Admission Body Weight: 117 lb (53.1 kg)    · Usual Body Weight: 123 lb (55.8 kg) (4/15/21)     · Ideal Body Weight: 100 lbs; % Ideal Body Weight 117 %   · BMI: 22.9  · BMI Categories: Normal Weight (BMI 22.0 to 24.9) age over 72       Nutrition Diagnosis:   · Mild malnutrition related to inadequate protein-energy intake as evidenced by intake 0-25%,intake 26-50%,weight loss    · Altered GI function related to altered GI function (gastric outlet obstruction) as evidenced by intake 26-50%,intake 0-25%,GI abnormality (liquid diet)      Nutrition Interventions:   Food and/or Nutrient Delivery:  Continue Current Diet,Start Oral Nutrition Supplement  Nutrition Education/Counseling:  Education not indicated   Coordination of Nutrition Care:  Continue to monitor while inpatient    Goals:  PO intakes are greater than 75% at meals       Nutrition Monitoring and Evaluation:   Food/Nutrient Intake Outcomes:  Food and Nutrient Intake,Supplement Intake,Diet Advancement/Tolerance  Physical Signs/Symptoms Outcomes:  Biochemical Data,Fluid Status or Edema,Skin,Weight,GI Status     Discharge Planning:     Too soon to determine         Iesha MILLANN, RDN, LDN  Lead Clinical Dietitian  RD Office Phone [phone]

## 2022-01-21 NOTE — PLAN OF CARE
Problem: PAIN  Goal: Patient's pain/discomfort is manageable  Outcome: Ongoing     Problem: DAILY CARE  Goal: Daily care needs are met  Outcome: Ongoing     Problem: Infection - Surgical Site:  Goal: Will show no infection signs and symptoms  Description: Will show no infection signs and symptoms  Outcome: Ongoing

## 2022-01-21 NOTE — PROGRESS NOTES
Occupational [address]  Occupational Therapy Not Seen Note    Patient not available for Occupational Therapy due to:    [] Testing:    [] Hemodialysis    [] Cancelled by RN:    [x] Refusal by Patient: 1/21: (CX) Pt adamantly refusing therapy this AM stating \"I'm not doing it, I've been up walking all morning. \" Pt educated on importance of mobility for healing and OT participation. Pt continued to refuse. SANTOS Reddy notified.  Will check back later as able.     [] Surgery:     [] Intubation:     [] Pain Medication:    [] Sedation:     [] Spine Precautions :    [] Medical Instability:    [] Other:        Cintia Garcia, OT

## 2022-01-21 NOTE — PROGRESS NOTES
[address],    Adult Hospitalist      Name: Marcus Benjamin  MRN: [id number]     Acct: [de-identified]  Room: 2005/2005-01    Admit Date: 1/19/2022 10:26 AM  PCP: BAKARI Mckenna CNP    Primary Problem  Active Problems:    Gastric outlet obstruction  Resolved Problems:    * No resolved hospital problems. *        Assesment:     · Pyloroplasty dated 1/19/2022  · History of pyloric stenosis  · Essential hypertension  · Diabetes mellitus type 2, diet controlled  · Anxiety disorder  · Glaucoma  · Hearing loss  · History of DVT and PE in 2020  · Peripheral vascular disease  · History of migraine  · Sore throat post endotracheal intubation        Plan:     · Patient admitted to [facility]  · Monitor vitals closely  · Keep SPO2 above 90%  · I's and O's  · IV fluids  · NG  · Pain control  · Antiemetics as needed  · Enalapril as needed  · Resume Xalatan eyedrops  · Protonix IV  · Lovenox for prophylaxis  · Incentive spirometry  · Up in chair  · Ambulate early as long as hemodynamically stable  · CBC, BMP  · Cepacol lozenges  · Accu-Cheks twice daily  · Insulin sliding scale medium  · Hypoglycemia protocol  · DC planning  · DVT and GI prophylaxis. Chief Complaint:     No chief complaint on file. Medical management       History of Present Illness:        Pt says she feels muc better  Sitting up  Feeding better  Soft deiet  Passing flatus  no sore throat  Lozenges helping  Continue ISS for now  Back on PO meds  Prn hydralazine  BP better        Initial HPI  Marcus Benjamin is a 78 y.o.  female who presents with No chief complaint on file. Patient underwent pyloroplasty for pyloric stenosis without incident. Patient says she had femoropopliteal bypass surgery last year and after that started having anorexia and abdominal bloating. Patient says she was diagnosed with pyloric stenosis and surgery was planned. Presently she denies any chest pain, dyspnea or orthopnea.   Denies any headache, blurred vision, diplopia or photophobia. Denies any neck pain or back pain. Patient says she has abdominal pain which worsens with movement. She thinks she has passed flatus. She denies any fever or chills. Denies any diarrhea. Denies any joint swelling or rash    I have personally reviewed the past medical history, past surgical history, medications, social history, and family history, and summarized in the note. Review of Systems:     All 10 point system is reviewed and negative otherwise mentioned in HPI.       Past Medical History:     Past Medical History:   Diagnosis Date    Anxiety     Depression     Diabetes mellitus ([address]     oral meds    Glaucoma     Hearing loss     Hx of blood clots     right leg and right lung 2020    Hypertension     Migraines     PVD (peripheral vascular disease) ([address]     Wears dentures         Past Surgical History:     Past Surgical History:   Procedure Laterality Date    ABDOMINAL AORTIC ANEURYSM REPAIR N/A 9/15/2020    AORTAL BIFEMORAL BYPASS  **CELLSAVER** performed by Diogenes Mi MD at [address] [place] [address] AORTO-FEMORAL BYPASS GRAFT  09/15/2020    APPENDECTOMY      BREAST SURGERY      \"crystals removed from right breast\"    CATARACT REMOVAL  2015    CHOLECYSTECTOMY      COLONOSCOPY      ENDOSCOPY, COLON, DIAGNOSTIC      EYE SURGERY      PYLOROMYOTOMY N/A 1/19/2022    PYLOROPLASTY performed by Sylvain Holliday MD at [address]  06/08/2021    w/EUS FNA    UPPER GASTROINTESTINAL ENDOSCOPY N/A 6/8/2021    EGD W/EUS FNA performed by Hayden Angela MD at [facility]  6/8/2021    EGD DILATION BALLOON 6-7.5MM performed by Hayden Angela MD at [facility] End[facility]    UPPER GASTROINTESTINAL ENDOSCOPY N/A 6/8/2021    EGD BIOPSY performed by Hayden Angela MD at [facility]    UPPER GASTROINTESTINAL ENDOSCOPY N/A 7/22/2021    EGD DILATION BALLOON, pyloric performed by Jose Hamilton MD at [facility] N/A 7/22/2021    EGD BIOPSY performed by Jose Hamilton MD at [facility]  7/22/2021    EGD DILATION SAVORY performed by Jose Hamilton MD at [address] [place] [address]          Medications Prior to Admission:       Prior to Admission medications    Medication Sig Start Date End Date Taking? Authorizing Provider   oxyCODONE-acetaminophen (PERCOCET) 5-325 MG per tablet Take 1 tablet by mouth every 8 hours as needed for Pain for up to 30 days. 1/13/22 2/12/22 Yes BAKARI Jesus CNP   Multiple Vitamins-Minerals (MULTIVITAMIN ADULT EXTRA C PO) multivitamin   1 tab daily   Yes Historical Provider, MD   lisinopril (PRINIVIL;ZESTRIL) 20 MG tablet Take 1 tablet by mouth daily 4/15/21  Yes BAKARI Jesus CNP   latanoprost (XALATAN) 0.005 % ophthalmic solution INSTILL 1 DROP INTO EACH EYE AT BEDTIME 11/5/20  Yes Historical Provider, MD   VORTIoxetine (TRINTELLIX) 10 MG TABS tablet Take 1 tablet by mouth 12/5/19  Yes Historical Provider, MD   Owendale-3 1000 MG CAPS Fish Oil 120 mg-180 mg-1000 mg capsule   Take 1 capsule every day by oral route. Historical Provider, MD   omeprazole (PRILOSEC) 40 MG delayed release capsule Take 1 capsule by mouth every morning (before breakfast) 10/13/20   BAKARI Coats CNP   aspirin (ASPIRIN LOW DOSE ADULT) 81 MG chewable tablet Take 1 tablet by mouth daily 9/22/20   BAKARI Lau - NP        Allergies:       Dilaudid [hydromorphone hcl], Sulfa antibiotics, Sulfamethoxazole, Sulfamethoxazole-trimethoprim, Trimethoprim, and Tetanus toxoid    Social History:     Tobacco:    reports that she has been smoking cigarettes. She started smoking about 46 years ago. She has a 41.00 pack-year smoking history. She has never used smokeless tobacco.  Alcohol:      reports no history of alcohol use.   Drug Use: reports no history of drug use. Family History:     Family History   Problem Relation Age of Onset    Colon Cancer Mother     Prostate Cancer Father     Thyroid Cancer Sister          Physical Exam:     Vitals:  BP (!) 146/64   Pulse 72   Temp 98.1 °F (36.7 °C) (Oral)   Resp 22   Ht 5' (1.524 m)   Wt 117 lb (53.1 kg)   SpO2 100%   BMI 22.85 kg/m²   Temp (24hrs), Av.8 °F (36.6 °C), Min:97.5 °F (36.4 °C), Max:98.1 °F (36.7 °C)          General appearance - alert, well appearing, and in no acute distress  Mental status - oriented to person, place, and time with normal affect  Head - normocephalic and atraumatic  Eyes - pupils equal and reactive, extraocular eye movements intact, conjunctiva clear  Ears - hearing appears to be intact  Nose - no drainage noted  Mouth - mucous membranes moist  Neck - supple, no carotid bruits, thyroid not palpable  Chest - clear to auscultation, normal effort  Heart - normal rate, regular rhythm, no murmur  Abdomen - soft, tender, dressing.  distended,  bowel sounds present ,   Neurological - normal speech, no focal findings or movement disorder noted, cranial nerves II through XII grossly intact  Extremities - peripheral pulses palpable, no pedal edema or calf pain with palpation  Skin - no gross lesions, rashes, or induration noted        Data:     Labs:    Hematology:  Recent Labs     22  0555   WBC 14.4*   RBC 3.82*   HGB 12.5   HCT 37.5   MCV 98.2   MCH 32.7   MCHC 33.3   RDW 13.0      MPV 10.1     Chemistry:  Recent Labs     22  0555      K 4.6      CO2 21   GLUCOSE 206*   BUN 16   CREATININE 0.78   ANIONGAP 10   LABGLOM >60   GFRAA >60   CALCIUM 8.6     Recent Labs     22  0555 22  1148 22  1622 22  2018 22  0610 22  1159   LABA1C 6.1*  --   --   --   --   --    POCGLU  --  160* 141* 136* 127* 131*       No results found for: INR, PROTIME    Lab Results   Component Value Date/Time    SPECIAL NOT REPORTED 09/15/2020 08:37 AM     Lab Results   Component Value Date/Time    CULTURE NO GROWTH 09/15/2020 08:37 AM       Lab Results   Component Value Date    POCPH 7.301 09/15/2020    POCPCO2 45.0 09/15/2020    POCPO2 221.6 09/15/2020    POCHCO3 22.2 09/15/2020    NBEA 4 09/15/2020    PBEA NOT REPORTED 09/15/2020    RFP4BKS 24 09/15/2020    ANUO3UJO 100 09/15/2020    FIO2 NOT REPORTED 09/15/2020       Radiology:    No results found. All radiological studies reviewed                Code Status:  Full Code    Electronically signed by Sadia Henson MD on 1/21/2022 at 1:20 PM     Copy sent to Dr. Josh De Santiago, BAKARI - CNP    This note was created with the assistance of a speech-recognition program.  Although the intention is to generate a document that actually reflects the content of the visit, no guarantees can be provided that every mistake has been identified and corrected by editing. Note was updated later by me after  physical examination and  completion of the assessment.

## 2022-01-21 NOTE — PROGRESS NOTES
Occupational Therapy  Facility/Department: [facility] MED SURG  Daily Treatment Note  NAME: Magali Jiménez  : 1955  MRN: [id number]    Date of Service: 2022    Discharge Recommendations:  Patient would benefit from continued therapy after discharge   Due to recent hospitalization and medical condition, pt would benefit from additional therapy at time of discharge to ensure safety. Please refer to the AM-PAC score for current functional status. Assessment   Performance deficits / Impairments: Decreased functional mobility ; Decreased safe awareness;Decreased balance;Decreased ADL status; Decreased cognition;Decreased endurance;Decreased strength  Assessment: Pt progressing towards goals but is still limited by abdominal pain/precautions. Skilled OT is indicated to increase overall IND and safety with self-care and functional tasks to return home when appropriate. Prognosis: Good  OT Education: Precautions; Energy Conservation  Patient Education: Pt issued educational handouts on EC, fall prevention and ABD precautions. Access Code: [email or web address]  URL: [email or web address]  Date: 2022  Prepared by:    Patient Education  Abdominal Precautions  Understanding Energy Conservation  Energy Conservation During Daily Tasks  How to Prevent Falls  Stay Independent  REQUIRES OT FOLLOW UP: Yes  Activity Tolerance  Activity Tolerance: Patient Tolerated treatment well  Safety Devices  Safety Devices in place: Yes  Type of devices: Nurse notified; Bed alarm in place; Left in bed;Call light within reach         Patient Diagnosis(es): There were no encounter diagnoses. has a past medical history of Anxiety, Depression, Diabetes mellitus ([address] Glaucoma, Hearing loss, Hx of blood clots, Hypertension, Migraines, PVD (peripheral vascular disease) ([address] and Wears dentures. has a past surgical history that includes Appendectomy; Cholecystectomy; Tonsillectomy; Breast surgery;  Cataract removal (); Aorto-femoral Bypass Graft (09/15/2020); Abdominal aortic aneurysm repair (N/A, 9/15/2020); Colonoscopy; Endoscopy, colon, diagnostic; Upper gastrointestinal endoscopy (06/08/2021); Upper gastrointestinal endoscopy (N/A, 6/8/2021); Upper gastrointestinal endoscopy (6/8/2021); Upper gastrointestinal endoscopy (N/A, 6/8/2021); Upper gastrointestinal endoscopy (N/A, 7/22/2021); Upper gastrointestinal endoscopy (N/A, 7/22/2021); Upper gastrointestinal endoscopy (7/22/2021); eye surgery; vascular surgery; and pyloromyotomy (N/A, 1/19/2022). Restrictions  Restrictions/Precautions  Restrictions/Precautions: General Precautions,Fall Risk,Up as Tolerated  Required Braces or Orthoses?: Yes  Required Braces or Orthoses  Other: Abdominal Binder  Position Activity Restriction  Spinal Precautions: No Bending,No Lifting,No Twisting  Sternal Precautions: No Pushing,No Pulling,5# Lifting Restrictions  Other position/activity restrictions: Abdominal binder/precautions, up with assist, IV  Subjective   General  Chart Reviewed: Yes  Patient assessed for rehabilitation services?: Yes  Response to previous treatment: Patient with no complaints from previous session  Family / Caregiver Present: Yes (daughter)  Subjective  Subjective: Pt in bed upon arrival, agreeable to wash up at EOB. Pt eager to go home. General Comment  Comments: SANTOS Resendez) ok'd pt for therapy. Pt requested pain meds at end of session, RN aware.       Orientation  Orientation  Overall Orientation Status: Within Normal Limits  Objective    ADL  Grooming: Setup (to wash face seated EOB)  UE Bathing: Setup;Minimal assistance (assist to wash back seated EOB)  LE Bathing: Setup;Minimal assistance  UE Dressing: Setup;Minimal assistance (to change gowns and adjust ABD binder)  LE Dressing: Setup;Minimal assistance        Balance  Sitting Balance: Independent  Standing Balance: Stand by assistance  Bed mobility  Supine to Sit: Stand by assistance  Sit to Supine: Stand by assistance  Comment: Min cues for technique, assist with IV line  Transfers  Sit to stand: Stand by assistance  Stand to sit: Stand by assistance  Transfer Comments: Min cues for safety, assist with IV line                       Cognition  Overall Cognitive Status: Exceptions  Arousal/Alertness: Appropriate responses to stimuli  Following Commands:  Follows multistep commands with repitition  Attention Span: Appears intact  Memory: Appears intact  Safety Judgement: Decreased awareness of need for safety  Problem Solving: Decreased awareness of errors  Insights: Fully aware of deficits  Initiation: Requires cues for some  Sequencing: Requires cues for some     Perception  Overall Perceptual Status: Impaired  Initiation: Cues to initiate tasks                                   Plan   Plan  Times per week: 4-5x per week, 1-2x per day  Times per day: Daily  Current Treatment Recommendations: Strengthening,Endurance Training,Patient/Caregiver Education & Training,Self-Care / ADL,Equipment Evaluation, Education, & procurement,Balance Training,Functional Mobility Training,Safety Education & Training,Positioning,Pain Management                        AM-PAC Score        AM-[facility] Inpatient Daily Activity Raw Score: 21 (01/21/22 1121)  AM-PAC Inpatient ADL T-Scale Score : 44.27 (01/21/22 1121)  ADL Inpatient CMS 0-100% Score: 32.79 (01/21/22 1121)  ADL Inpatient CMS G-Code Modifier : [place] Dyson (01/21/22 1121)    Goals  Short term goals  Time Frame for Short term goals: by discharge, pt to demo  Short term goal 1: SUP for bed mob tasks with proper log rolling tech and Good safety  Short term goal 2: I/MI for UB ADLs (including abdominal binder) with SBA for LB ADLs with AE as needed and Good safety  Short term goal 3: SUP for ADL transfers and functional mob with AD, and Good safety  Short term goal 4: pt/family to be IND with EC/WS, fall prevention tech, precautions, as well as DME/AE recommendations with use of handouts as needed  Patient Goals   Patient goals :  To go home       Therapy Time   Individual Concurrent Group Co-treatment   Time In 1050         Time Out 1113         Minutes [address] [place], AGATA

## 2022-01-21 NOTE — PROGRESS NOTES
(N/A, 6/8/2021); Upper gastrointestinal endoscopy (6/8/2021); Upper gastrointestinal endoscopy (N/A, 6/8/2021); Upper gastrointestinal endoscopy (N/A, 7/22/2021); Upper gastrointestinal endoscopy (N/A, 7/22/2021); Upper gastrointestinal endoscopy (7/22/2021); eye surgery; vascular surgery; and pyloromyotomy (N/A, 1/19/2022). Restrictions  Restrictions/Precautions  Restrictions/Precautions: General Precautions,Fall Risk,Up as Tolerated  Required Braces or Orthoses?: Yes  Required Braces or Orthoses  Other: Abdominal Binder  Position Activity Restriction  Spinal Precautions: No Bending,No Lifting,No Twisting  Sternal Precautions: No Pushing,No Pulling,5# Lifting Restrictions  Other position/activity restrictions: Abdominal binder/precautions, up with assist, IV  Subjective   General  Chart Reviewed: Yes  Response To Previous Treatment: Not applicable  Family / Caregiver Present: No  Subjective  Subjective: Patient up in bed upon therapists arrival and agreeable to PT treatment. Patient with ab binder in place upon arrival.  General Comment  Comments: SANTOS Oquendo states patient is appropriate for PT treatment. Orientation  Orientation  Overall Orientation Status: Within Functional Limits  Cognition      Objective   Bed mobility  Rolling to Left: Contact guard assistance  Rolling to Right: Contact guard assistance  Supine to Sit: Contact guard assistance  Sit to Supine: Contact guard assistance  Scooting: Contact guard assistance  Comment: Patient with good bed mobility technique while maintaining abdominal precaustions (no bending, lifting, twisting). Patient requirs min vc's for safety awareness and progression techniques. Transfers  Sit to Stand: Contact guard assistance  Stand to sit: Contact guard assistance  Bed to Chair: Contact guard assistance  Lateral Transfers: Contact guard assistance  Comment: Patient with good technique and steadiness demo'ed throughout transfer technique.   Patient with proper hand placement and proper placement of RW. Patient requires verbal encouragement throughout ambulation  Ambulation  Ambulation?: Yes  More Ambulation?: No  Ambulation 1  Surface: level tile  Device: Rolling Walker  Other Apparatus: O2  Assistance: Contact guard assistance  Quality of Gait: fair steadiness, assist w/ progression of RW  Gait Deviations: Slow [place]; Shuffles;Decreased step length;Decreased step height  Distance: 50 feet x1 20 feet x1 in room  Comments: Patient declines amabulation in hallway this date. Patient requires seated restbreak between ambulation attempts to recover due to decreased endurance this date. Stairs/Curb  Stairs?: No  Neuromuscular Education  NDT Treatment: Gait ;Lower extremity; Sitting;Standing  Balance  Posture: Fair  Sitting - Static: Good  Sitting - Dynamic: Good;-  Standing - Static: Good;-  Standing - Dynamic: Fair;-  Comments: Standing balance assessed w/ RW  Exercises  Comments: Patient educated on seated AROM KODI LE with fair carry over this date and common sense approach to move what moves while maintaining abdominal precautions this date. AM-PAC Score  AM-PAC Inpatient Mobility Raw Score : 17 (01/21/22 1227)  AM-PAC Inpatient T-Scale Score : 42.13 (01/21/22 1227)  Mobility Inpatient CMS 0-100% Score: 50.57 (01/21/22 1227)  Mobility Inpatient CMS G-Code Modifier : CK (01/21/22 1227)          Goals  Short term goals  Time Frame for Short term goals: 14 visits  Short term goal 1: Pt to demonstrate bed mobility using log-roll technique Hannah  Short term goal 2: Pt to perform STS transfers w/ least restrictive AD Hannah  Short term goal 3: Pt to ambulate at least 300ft w/ least restrictive AD Hannah  Short term goal 4: Pt to ascend/descend 10 stairs w/ handrails CGA  Short term goal 5: Pt to actively participate in at least 30 minutes of physical therapy for ther act, ther ex, balance, gait, and endurance training  Patient Goals   Patient goals :  To go home    Plan Plan  Times per week: 1-2x/day, 6-7x/week  Current Treatment Recommendations: Strengthening,Balance Training,Functional Mobility Training,Stair training,Gait Training,Transfer Training,Endurance Training,Safety Education & Training,Home Exercise Program,Equipment Evaluation, Education, & procurement,Patient/Caregiver Education & Training,Pain Management  Safety Devices  Type of devices: Call light within reach,Gait belt,Nurse notified,Left in bed  Restraints  Initially in place: No     Therapy Time   Individual Concurrent Group Co-treatment   Time In 1141         Time Out 1206         Minutes [address] [place]

## 2022-01-22 VITALS
HEIGHT: 60 IN | DIASTOLIC BLOOD PRESSURE: 80 MMHG | SYSTOLIC BLOOD PRESSURE: 159 MMHG | RESPIRATION RATE: 19 BRPM | OXYGEN SATURATION: 99 % | HEART RATE: 58 BPM | WEIGHT: 117 LBS | BODY MASS INDEX: 22.97 KG/M2 | TEMPERATURE: 98.1 F

## 2022-01-22 LAB
GLUCOSE BLD-MCNC: 113 MG/DL (ref 65–105)
GLUCOSE BLD-MCNC: 114 MG/DL (ref 65–105)

## 2022-01-22 PROCEDURE — 6370000000 HC RX 637 (ALT 250 FOR IP): Performed by: STUDENT IN AN ORGANIZED HEALTH CARE EDUCATION/TRAINING PROGRAM

## 2022-01-22 PROCEDURE — 2580000003 HC RX 258: Performed by: STUDENT IN AN ORGANIZED HEALTH CARE EDUCATION/TRAINING PROGRAM

## 2022-01-22 PROCEDURE — 82947 ASSAY GLUCOSE BLOOD QUANT: CPT

## 2022-01-22 PROCEDURE — 6370000000 HC RX 637 (ALT 250 FOR IP): Performed by: SURGERY

## 2022-01-22 PROCEDURE — 6360000002 HC RX W HCPCS: Performed by: SURGERY

## 2022-01-22 PROCEDURE — 6370000000 HC RX 637 (ALT 250 FOR IP): Performed by: FAMILY MEDICINE

## 2022-01-22 PROCEDURE — 2500000003 HC RX 250 WO HCPCS: Performed by: SURGERY

## 2022-01-22 RX ORDER — ONDANSETRON 4 MG/1
4 TABLET, FILM COATED ORAL EVERY 8 HOURS PRN
Qty: 30 TABLET | Refills: 0 | Status: SHIPPED | OUTPATIENT
Start: 2022-01-22 | End: 2022-03-01 | Stop reason: ALTCHOICE

## 2022-01-22 RX ORDER — OXYCODONE HYDROCHLORIDE 5 MG/1
5 TABLET ORAL EVERY 6 HOURS PRN
Qty: 28 TABLET | Refills: 0 | Status: SHIPPED | OUTPATIENT
Start: 2022-01-22 | End: 2022-01-29

## 2022-01-22 RX ORDER — FAMOTIDINE 20 MG/1
20 TABLET, FILM COATED ORAL 2 TIMES DAILY
Qty: 60 TABLET | Refills: 3 | Status: SHIPPED | OUTPATIENT
Start: 2022-01-22 | End: 2022-03-01 | Stop reason: ALTCHOICE

## 2022-01-22 RX ORDER — DOCUSATE SODIUM 100 MG/1
100 CAPSULE, LIQUID FILLED ORAL DAILY
Qty: 30 CAPSULE | Refills: 0 | Status: SHIPPED | OUTPATIENT
Start: 2022-01-22 | End: 2022-06-02

## 2022-01-22 RX ADMIN — FAMOTIDINE 20 MG: 10 INJECTION, SOLUTION INTRAVENOUS at 07:23

## 2022-01-22 RX ADMIN — LISINOPRIL 20 MG: 20 TABLET ORAL at 07:33

## 2022-01-22 RX ADMIN — KETOROLAC TROMETHAMINE 15 MG: 30 INJECTION, SOLUTION INTRAMUSCULAR; INTRAVENOUS at 03:03

## 2022-01-22 RX ADMIN — ENOXAPARIN SODIUM 40 MG: 100 INJECTION SUBCUTANEOUS at 07:23

## 2022-01-22 RX ADMIN — LATANOPROST 1 DROP: 50 SOLUTION OPHTHALMIC at 07:29

## 2022-01-22 RX ADMIN — DEXTROSE AND SODIUM CHLORIDE: 5; 450 INJECTION, SOLUTION INTRAVENOUS at 07:21

## 2022-01-22 RX ADMIN — OXYCODONE HYDROCHLORIDE 5 MG: 5 TABLET ORAL at 03:04

## 2022-01-22 ASSESSMENT — PAIN SCALES - GENERAL
PAINLEVEL_OUTOF10: 0
PAINLEVEL_OUTOF10: 7

## 2022-01-22 NOTE — PLAN OF CARE
Problem: SAFETY  Goal: Free from accidental physical injury  Outcome: Ongoing  Note: Patient is free from injury this shift. Problem: PAIN  Goal: Patient's pain/discomfort is manageable  Outcome: Ongoing  Note: Patient has scheduled and as need pain control and is encouraged to  ask for it.

## 2022-01-22 NOTE — PROGRESS NOTES
[facility] 54  Occupational Therapy Not Seen    DATE: 2022    NAME: Kylie Voss  MRN: [id number]   : 1955    Patient not seen this date for Occupational Therapy due to:      [] Cancel by RN or physician due to:    [] Hemodialysis    [] Critical Lab Value Level     [] Blood transfusion in progress    [] Acute or unstable cardiovascular status   _MAP < 55 or more than >115  _HR < 40 or > 130    [] Acute or unstable pulmonary status   -FiO2 > 60%   _RR < 5 or >40    _O2 sats < 85%    [] Strict Bedrest    [] Off Unit for surgery or procedure    [] Off Unit for testing       [] Pending imaging to R/O fracture    [x] Refusal by Patient: Pt stated she is going home today and has no further OT needs at this time. Declined shower. [] Other      [] OT being discontinued at this time. Patient independent. No further needs. [] OT being discontinued at this time as the patient has been transferred to hospice care. No further needs.       Stevo Matthews AGATA

## 2022-01-22 NOTE — PROGRESS NOTES
General Surgery:  Daily Progress Note              PATIENT NAME: Kaylee Nolasco DATE: 1/22/2022, 6:52 AM  SUBJECTIVE:     Pt seen and examined at bedside. No acute events overnight. Afebrile. Pain controlled. Tolerating PO. Having flatus, no BM.  No N/V.    OBJECTIVE:   VITALS:  BP (!) 164/74   Pulse 72   Temp 98.1 °F (36.7 °C) (Oral)   Resp 16   Ht 5' (1.524 m)   Wt 117 lb (53.1 kg)   SpO2 92%   BMI 22.85 kg/m²      INTAKE/OUTPUT:      Intake/Output Summary (Last 24 hours) at 1/22/2022 9424  Last data filed at 1/21/2022 1403  Gross per 24 hour   Intake --   Output 300 ml   Net -300 ml       PHYSICAL EXAM:  General Appearance:  awake, alert, oriented, in no acute distress  Lungs: no inc WOB  Heart:  Heart regular rate and rhythm  Abdomen:  soft, ND, minimally tender, incision CDI    Data:  CBC with Differential:    Lab Results   Component Value Date    WBC 14.4 01/20/2022    RBC 3.82 01/20/2022    HGB 12.5 01/20/2022    HCT 37.5 01/20/2022     01/20/2022    MCV 98.2 01/20/2022    MCH 32.7 01/20/2022    MCHC 33.3 01/20/2022    RDW 13.0 01/20/2022    LYMPHOPCT 26 01/04/2020    MONOPCT 3 01/04/2020    BASOPCT 0 01/04/2020    MONOSABS 0.19 01/04/2020    LYMPHSABS 1.66 01/04/2020    EOSABS 0.00 01/04/2020    BASOSABS 0.00 01/04/2020    DIFFTYPE NOT REPORTED 01/04/2020     BMP:    Lab Results   Component Value Date     01/20/2022    K 4.6 01/20/2022     01/20/2022    CO2 21 01/20/2022    BUN 16 01/20/2022    LABALBU 4.0 04/19/2021    CREATININE 0.78 01/20/2022    CALCIUM 8.6 01/20/2022    GFRAA >60 01/20/2022    LABGLOM >60 01/20/2022    GLUCOSE 206 01/20/2022       ASSESSMENT:  Active Hospital Problems    Diagnosis Date Noted    Mild malnutrition ([facility] [E44.1] 01/21/2022    Gastric outlet obstruction [K31.1] 01/19/2022       78 y.o. female s/p pyloroplasty    Plan:  Diet: FLD  Pain control  GI prophylaxis: pepcid  DVT prophylaxis:  lovenox  Continue to encourage ambulation/activity w/ PT/OT  Continue to encourage incentive spirometry  Anticipate discharge home today with OP follow up in office with Dr Sandie Thornton    Electronically signed by Jamaica Colunga MD  on 1/22/2022 at 6:52 AM     General Surgery Attending Attestation Note    Patient was seen and examined. I agree with the above resident's exam, assessment and plan.      Advance to low fiber diet  Follow up with Dr. Sandie Thornton in the office in 1-2 weeks for staple removal  D/C today    Faisal Elizabeth, [address], [address], [address] [facility]  Office: [phone]  After Hours: Please call answering service

## 2022-01-22 NOTE — DISCHARGE SUMMARY
Surgery Discharge Summary     Patient Identification  Cuong Youngblood is a 78 y.o. female. :  1955  Admit Date:  2022    Discharge date:  2022                                  Disposition: home    Discharge Diagnoses:   Patient Active Problem List   Diagnosis    Occlusion of aorta ([address]    New onset type 2 diabetes mellitus ([facility] [facility]    PAD (peripheral artery disease) ([facility] [address]    Claudication ([facility] [address]    Aortoiliac occlusive disease ([facility] [address]    Gastroesophageal reflux disease    Status post aortobifemoral bypass surgery    Chronic right shoulder pain    Generalized postprandial abdominal pain    Gastric outlet obstruction    Mild malnutrition ([facility] [address]       Condition on discharge: Stable    Consults: Family Medicine    Surgery: Pyloroplasty    Patient Instructions: Activity: no heavy lifting, pushing, pulling for 6 weeks, no driving for 2 weeks or while on analgesics  Diet: gastric soft  Follow-up with Dr Edie Bernheim in 2 weeks. See pre-printed instructions in chart and given to patient upon discharge. Discharge Medications:        Medication List        START taking these medications      docusate sodium 100 MG capsule  Commonly known as: Colace  Take 1 capsule by mouth daily     famotidine 20 MG tablet  Commonly known as: PEPCID  Take 1 tablet by mouth 2 times daily     ondansetron 4 MG tablet  Commonly known as: Zofran  Take 1 tablet by mouth every 8 hours as needed for Nausea or Vomiting     oxyCODONE 5 MG immediate release tablet  Commonly known as: ROXICODONE  Take 1 tablet by mouth every 6 hours as needed for Pain for up to 7 days.             CONTINUE taking these medications      aspirin 81 MG chewable tablet  Commonly known as: Aspirin Low Dose Adult  Take 1 tablet by mouth daily     latanoprost 0.005 % ophthalmic solution  Commonly known as: XALATAN     lisinopril 20 MG tablet  Commonly known as: PRINIVIL;ZESTRIL  Take 1 tablet by mouth daily     MULTIVITAMIN ADULT EXTRA C PO     Omega-3 1000 MG Caps     omeprazole 40 MG delayed release capsule  Commonly known as: PRILOSEC  Take 1 capsule by mouth every morning (before breakfast)     oxyCODONE-acetaminophen 5-325 MG per tablet  Commonly known as: Percocet  Take 1 tablet by mouth every 8 hours as needed for Pain for up to 30 days. Trintellix 10 MG Tabs tablet  Generic drug: VORTIoxetine               Where to Get Your Medications        You can get these medications from any pharmacy    Bring a paper prescription for each of these medications  docusate sodium 100 MG capsule  famotidine 20 MG tablet  ondansetron 4 MG tablet  oxyCODONE 5 MG immediate release tablet          HPI and Hospital Course:   78 y.o. female presented on 1/19/2022 for elective pyloroplasty for gastric outlet obstruction. Procedure was uneventful. Hospital course was unremarkable. On day of discharge pt was tolerating full liquid diet, pain controlled with oral medications and ambulating without difficulty. Electronically signed by Alvin Diallo MD on 1/22/2022 at [place] [id number] Surgery Attending Attestation Note    Patient was seen and examined.  I agree with the above resident's discharge summary    Jamal Bui, [address] [place] [address], Hubert Fabianving, [facility], [address]  Office: [phone]  Pager: [phone]

## 2022-01-24 ENCOUNTER — CARE COORDINATION (OUTPATIENT)
Dept: CASE MANAGEMENT | Age: 67
End: 2022-01-24

## 2022-01-24 ENCOUNTER — TELEPHONE (OUTPATIENT)
Dept: PRIMARY CARE CLINIC | Age: 67
End: 2022-01-24

## 2022-01-24 DIAGNOSIS — K31.1 GASTRIC OUTLET OBSTRUCTION: Primary | ICD-10-CM

## 2022-01-24 PROCEDURE — 1111F DSCHRG MED/CURRENT MED MERGE: CPT | Performed by: NURSE PRACTITIONER

## 2022-01-24 NOTE — CARE COORDINATION
[address] Transitions Initial Follow Up Call    Call within 2 business days of discharge: Yes    Patient: Dary Calderon Patient : 1955   MRN: [id number]  Reason for Admission: Pyloroplasty  Discharge Date: 22 RARS: Readmission Risk Score: 7.1 ( )      Last Discharge [facility]       Complaint Diagnosis Description Type Department Provider    22  Postoperative pain Admission (Discharged) Cleo Hernandez MD           Spoke with: Shavon Bettencourt she states she is doing well, denies chest pain, SOB, dizziness, is eating and drinking well soft diet, denies nausea. Incision pain incison looks good no reddness or swelling. Has staples intact. Medications reviewed and taking as directed. 1111 f complete. Has f/u appointment has no concerns    Facility: [facility]    Non-face-to-face services provided:  Obtained and reviewed discharge summary and/or continuity of care documents  Transitions of Care Initial Call    Was this an external facility discharge? No     Challenges to be reviewed by the provider   Additional needs identified to be addressed with provider: No  none             Method of communication with provider : none      Advance Care Planning:   Does patient have an Advance Directive: reviewed and current, reviewed and needs to be updated, not on file; education provided, not on file, decision maker updated and referral to internal ACP facilitator. Was this a readmission? No  Patient stated reason for admission: Pyloroplasty  Patients top risk factors for readmission: functional physical ability  lack of knowledge about disease  medication management    Care Transition Nurse (CTN) contacted the patient by telephone to perform post hospital discharge assessment. Verified name and  with patient as identifiers. Provided introduction to self, and explanation of the CTN role.      CTN reviewed discharge instructions, medical action plan and red flags with patient who verbalized understanding. Patient given an opportunity to ask questions and does not have any further questions or concerns at this time. Were discharge instructions available to patient? Yes. Reviewed appropriate site of care based on symptoms and resources available to patient including: PCP and Specialist. The patient agrees to contact the PCP office for questions related to their healthcare. Medication reconciliation was performed with patient, who verbalizes understanding of administration of home medications. Advised obtaining a 90-day supply of all daily and as-needed medications. Covid Risk Education     Educated patient about risk for severe COVID-19 due to risk factors according to CDC guidelines. LPN CC reviewed discharge instructions, medical action plan and red flag symptoms with the patient who verbalized understanding. Discussed COVID vaccination status: Yes. Education provided on COVID-19 vaccination as appropriate. Discussed exposure protocols and quarantine with CDC Guidelines. Patient was given an opportunity to verbalize any questions and concerns and agrees to contact LPN CC or health care provider for questions related to their healthcare. Reviewed and educated patient on any new and changed medications related to discharge diagnosis. Was patient discharged with a pulse oximeter? No Discussed and confirmed pulse oximeter discharge instructions and when to notify provider or seek emergency care. LPN CC provided contact information. Plan for follow-up call in 5-7 days based on severity of symptoms and risk factors.   Plan for next call: symptom management-pain incision      Care Transitions 24 Hour Call    Do you have any ongoing symptoms?: Yes  Patient-reported symptoms: Pain  Interventions for patient-reported symptoms: Notified PCP/Physician  Do you have a copy of your discharge instructions?: Yes  Do you have all of your prescriptions and are they filled?: Yes  Have you been

## 2022-01-24 NOTE — TELEPHONE ENCOUNTER
[address] Transitions Initial Follow Up Call    Outreach made within 2 business days of discharge: Yes    Patient: Beryl Griffin Patient : 1955   MRN: [id number]  Reason for Admission: There are no discharge diagnoses documented for the most recent discharge. Discharge Date: 22       Spoke with: Patient    Discharge department/facility: [address] [address] Interactive Patient Contact:  Was patient able to fill all prescriptions: Yes  Was patient instructed to bring all medications to the follow-up visit: Yes  Is patient taking all medications as directed in the discharge summary? Yes  Does patient understand their discharge instructions: Yes  Does patient have questions or concerns that need addressed prior to 7-14 day follow up office visit: no    Patient states she is doing well and doesn't feel she needs a sooner appointment to f/u with Carlito Tamayo. Will keep scheduled appointment on .      Scheduled appointment with PCP within 7-14 days    Follow Up  Future Appointments   Date Time Provider Tennille Duran   2022  2:00 PM Ken[facility] Friday[facility] - [facility]   7/15/2022 10:00 AM Nnamdi Lambert [place]

## 2022-02-01 ENCOUNTER — CARE COORDINATION (OUTPATIENT)
Dept: CASE MANAGEMENT | Age: 67
End: 2022-02-01

## 2022-02-01 NOTE — CARE COORDINATION
[address] Transitions Follow Up Call- final call     2022    Patient: Masha Bee  Patient : 1955   MRN: [id number]  Reason for Admission: pylorpplasty  Discharge Date: 22 RARS: Readmission Risk Score: 7.1 ( )         Spoke with: Larry Ordoñez    Call to pt who states she is doing fine  States not much pain  States still eating soft diet and is hoping to advance diet after her appt  Denies fever/ chills, nausea  States incision looks good, states a couple staples look like they are coming out- writer advises calling surgeon's office in the morning to see if she will need to be seen before scheduled appt Friday  Denies needs  Writer informs this is final (CTC) phone call- v/u. Encouraged call writer/ CTC or providers if questions or concerns- v/u. Episode closed      Care Transitions Subsequent and Final Call    Subsequent and Final Calls  Do you have any ongoing symptoms?: No  Have your medications changed?: No  Do you have any questions related to your medications?: No  Do you currently have any active services?: No  Do you have any needs or concerns that I can assist you with?: No  Identified Barriers: Lack of Education  Care Transitions Interventions  Other Interventions:            Follow Up  Future Appointments   Date Time Provider Tennille Duran   2022  2:00 PM BAKARI Jordan - CNP [facility]   7/15/2022 10:00 AM Jennifer Arciniega MD [facility] Nicolasa Sanchez RN

## 2022-02-07 DIAGNOSIS — M25.511 CHRONIC RIGHT SHOULDER PAIN: ICD-10-CM

## 2022-02-07 DIAGNOSIS — G89.29 CHRONIC RIGHT SHOULDER PAIN: ICD-10-CM

## 2022-02-07 DIAGNOSIS — R10.84 GENERALIZED POSTPRANDIAL ABDOMINAL PAIN: ICD-10-CM

## 2022-02-08 RX ORDER — OXYCODONE HYDROCHLORIDE AND ACETAMINOPHEN 5; 325 MG/1; MG/1
1 TABLET ORAL EVERY 8 HOURS PRN
Qty: 90 TABLET | Refills: 0 | Status: SHIPPED | OUTPATIENT
Start: 2022-02-08 | End: 2022-03-08 | Stop reason: SDUPTHER

## 2022-02-27 SDOH — HEALTH STABILITY: PHYSICAL HEALTH: ON AVERAGE, HOW MANY MINUTES DO YOU ENGAGE IN EXERCISE AT THIS LEVEL?: 0 MIN

## 2022-02-27 SDOH — HEALTH STABILITY: PHYSICAL HEALTH: ON AVERAGE, HOW MANY DAYS PER WEEK DO YOU ENGAGE IN MODERATE TO STRENUOUS EXERCISE (LIKE A BRISK WALK)?: 0 DAYS

## 2022-02-27 ASSESSMENT — LIFESTYLE VARIABLES
HOW MANY STANDARD DRINKS CONTAINING ALCOHOL DO YOU HAVE ON A TYPICAL DAY: PATIENT DECLINED
HOW OFTEN DO YOU HAVE A DRINK CONTAINING ALCOHOL: 1
HOW OFTEN DO YOU HAVE SIX OR MORE DRINKS ON ONE OCCASION: 98
HOW OFTEN DO YOU HAVE A DRINK CONTAINING ALCOHOL: NEVER
HOW MANY STANDARD DRINKS CONTAINING ALCOHOL DO YOU HAVE ON A TYPICAL DAY: 98

## 2022-02-27 ASSESSMENT — PATIENT HEALTH QUESTIONNAIRE - PHQ9
SUM OF ALL RESPONSES TO PHQ QUESTIONS 1-9: 2
SUM OF ALL RESPONSES TO PHQ QUESTIONS 1-9: 2
SUM OF ALL RESPONSES TO PHQ9 QUESTIONS 1 & 2: 2
SUM OF ALL RESPONSES TO PHQ QUESTIONS 1-9: 2
SUM OF ALL RESPONSES TO PHQ QUESTIONS 1-9: 2
2. FEELING DOWN, DEPRESSED OR HOPELESS: 1
1. LITTLE INTEREST OR PLEASURE IN DOING THINGS: 1

## 2022-03-01 ENCOUNTER — OFFICE VISIT (OUTPATIENT)
Dept: PRIMARY CARE CLINIC | Age: 67
End: 2022-03-01
Payer: MEDICARE

## 2022-03-01 VITALS
WEIGHT: 118.4 LBS | HEIGHT: 60 IN | SYSTOLIC BLOOD PRESSURE: 175 MMHG | OXYGEN SATURATION: 97 % | HEART RATE: 85 BPM | BODY MASS INDEX: 23.25 KG/M2 | RESPIRATION RATE: 15 BRPM | DIASTOLIC BLOOD PRESSURE: 96 MMHG

## 2022-03-01 DIAGNOSIS — E11.9 NEW ONSET TYPE 2 DIABETES MELLITUS (HCC): ICD-10-CM

## 2022-03-01 DIAGNOSIS — I74.09 AORTOILIAC OCCLUSIVE DISEASE (HCC): ICD-10-CM

## 2022-03-01 DIAGNOSIS — I10 PRIMARY HYPERTENSION: ICD-10-CM

## 2022-03-01 DIAGNOSIS — I73.9 PAD (PERIPHERAL ARTERY DISEASE) (HCC): ICD-10-CM

## 2022-03-01 DIAGNOSIS — Z98.890 HISTORY OF PYLOROPLASTY: ICD-10-CM

## 2022-03-01 DIAGNOSIS — Z00.00 INITIAL MEDICARE ANNUAL WELLNESS VISIT: Primary | ICD-10-CM

## 2022-03-01 DIAGNOSIS — H91.93 BILATERAL HEARING LOSS, UNSPECIFIED HEARING LOSS TYPE: ICD-10-CM

## 2022-03-01 PROBLEM — E44.1 MILD MALNUTRITION (HCC): Status: RESOLVED | Noted: 2022-01-21 | Resolved: 2022-03-01

## 2022-03-01 PROCEDURE — G0438 PPPS, INITIAL VISIT: HCPCS | Performed by: NURSE PRACTITIONER

## 2022-03-01 PROCEDURE — 3017F COLORECTAL CA SCREEN DOC REV: CPT | Performed by: NURSE PRACTITIONER

## 2022-03-01 PROCEDURE — G8484 FLU IMMUNIZE NO ADMIN: HCPCS | Performed by: NURSE PRACTITIONER

## 2022-03-01 PROCEDURE — 4040F PNEUMOC VAC/ADMIN/RCVD: CPT | Performed by: NURSE PRACTITIONER

## 2022-03-01 PROCEDURE — 3044F HG A1C LEVEL LT 7.0%: CPT | Performed by: NURSE PRACTITIONER

## 2022-03-01 PROCEDURE — 1123F ACP DISCUSS/DSCN MKR DOCD: CPT | Performed by: NURSE PRACTITIONER

## 2022-03-01 RX ORDER — LISINOPRIL 40 MG/1
40 TABLET ORAL DAILY
Qty: 90 TABLET | Refills: 3 | Status: SHIPPED | OUTPATIENT
Start: 2022-03-01

## 2022-03-01 NOTE — PROGRESS NOTES
Medicare Annual Wellness Visit    Beryl Griffin is here for Medicare AWV and Health Maintenance (Declined Colonoscopy and Mammogram)    Assessment & Plan   Veronica Damico was seen today for medicare awv and health maintenance. Diagnoses and all orders for this visit:    Initial Medicare annual wellness visit    Aortoiliac occlusive disease (HCC)-has been stable, will follow up with vascular in 6 months    New onset type 2 diabetes mellitus (HCC)-recent A1c well controlled in hospital in January    History of pyloroplasty-recovering, still has difficulty tolerating solid foods and has abdominal pain, she will follow-up with surgeon as planned end of this week    Bilateral hearing loss, unspecified hearing loss type  -     AFL - Vinayak Seaman MD, Otolaryngology, [facility]    PAD (peripheral artery disease) [facility])    Primary hypertension-increase dose lisinopril-     lisinopril (PRINIVIL;ZESTRIL) 40 MG tablet; Take 1 tablet by mouth daily         Recommendations for Preventive Services Due: see orders and patient instructions/AVS.  Recommended screening schedule for the next 5-10 years is provided to the patient in written form: see Patient Instructions/AVS.     Return in 3 months (on 6/1/2022) for diabetes. Medicare Annual Wellness Visit in 1 year. Subjective   The following acute and/or chronic problems were also addressed today:  Had recent pyloroplasty in mid Jan, still in recovery, reports unable to tolerate most meats and high fiber foods like oatmeal, able to eat most soft foods like scrambled eggs, tuna salad and dairy products.  Has follow up with surgeon in 3 days  Continues to have abdominal pain most days, Percocet does help, had recent refill    Blood pressure elevated in office today, she reports \"a lot of stress\", has experienced several deaths of friends and family recently along with hers and her 's health issues and need for surgery    Patient's complete Health Risk Assessment and screening values have been reviewed and are found in Flowsheets. The following problems were reviewed today and where indicated follow up appointments were made and/or referrals ordered. Positive Risk Factor Screenings with Interventions:         Tobacco Use:     Tobacco Use: High Risk    Smoking Tobacco Use: Current Every Day Smoker    Smokeless Tobacco Use: Never Used     E-Cigarettes/Vaping Use     Questions Responses    E-Cigarette/Vaping Use Never User    Start Date     Passive Exposure     Quit Date     Counseling Given     Comments         Substance Abuse - Tobacco Interventions:  patient is not ready to work toward tobacco cessation at this time           Opioid Risk: (Low risk score <55) Opioid risk score: 18    Patient is low risk for opioid use disorder or overdose. Last PDMP Drea Moreno as Reviewed:  Review User Review Instant Review Result   Ina Ayala 12/14/2021  6:58 PM Reviewed PDMP [1]     Last Controlled Substance Monitoring Documentation      Office Visit from 3/1/2022 in [facility]   Periodic Controlled Substance Monitoring No signs of potential drug abuse or diversion identified., Obtaining appropriate analgesic effect of treatment. filed at 03/01/2022 1027   Chronic Pain > 50 MEDD Re-evaluated the status of the patient's underlying condition causing pain.  filed at 03/01/2022 [address] and ACP:  General  In general, how would you say your health is?: Fair  In the past 7 days, have you experienced any of the following: New or Increased Pain, New or Increased Fatigue, Loneliness, Social Isolation, Stress or Anger?: No  Do you get the social and emotional support that you need?: Yes  Do you have a Living Will?: (!) No    Advance Directives     Power of [address] [place] [address] Will ACP-Advance Directive ACP-Power of     Not on File Not on File Not on File Not on File      General Health Risk Interventions:  · is planning to see personal  with her  to have documents completed    Health Habits/Nutrition:     Physical Activity: Inactive    Days of Exercise per Week: 0 days    Minutes of Exercise per Session: 0 min     Have you lost any weight without trying in the past 3 months?: (!) Yes  Body mass index: 23.12  Have you seen the dentist within the past year?: N/A - wear dentures    Health Habits/Nutrition Interventions:  · recent surgery, weight stable as compared to october weight    Hearing/Vision:  Do you or your family notice any trouble with your hearing that hasn't been managed with hearing aids?: (!) Yes  Do you have difficulty driving, watching TV, or doing any of your daily activities because of your eyesight?: No  Have you had an eye exam within the past year?: (!) No  No exam data present    Hearing/Vision Interventions:  · Hearing concerns:  ENT referral provided   · seeing her eye Dr every 1-2 years            Objective   Vitals:    03/01/22 1003 03/01/22 1010   BP: (!) 144/92 (!) 175/96   Pulse: 85    Resp: 15    SpO2: 97%    Weight: 118 lb 6.4 oz (53.7 kg)    Height: 5' (1.524 m)       Body mass index is 23.12 kg/m². Allergies   Allergen Reactions    Dilaudid [Hydromorphone Hcl] Swelling    Sulfa Antibiotics Swelling     Anything with sulfa    Sulfamethoxazole Swelling    Sulfamethoxazole-Trimethoprim Swelling    Trimethoprim Swelling    Tetanus Toxoid Swelling     Arm swelling. Prior to Visit Medications    Medication Sig Taking? Authorizing Provider   lisinopril (PRINIVIL;ZESTRIL) 40 MG tablet Take 1 tablet by mouth daily Yes BAKARI Mayfield CNP   oxyCODONE-acetaminophen (PERCOCET) 5-325 MG per tablet Take 1 tablet by mouth every 8 hours as needed for Pain for up to 30 days.  Yes BAKARI Mayfield CNP   docusate sodium (COLACE) 100 MG capsule Take 1 capsule by mouth daily Yes Dalton Santos MD   Multiple Vitamins-Minerals (MULTIVITAMIN ADULT EXTRA C PO) multivitamin   1 tab daily Yes Historical Provider, MD   latanoprost (XALATAN) 0.005 % ophthalmic solution INSTILL 1 DROP INTO EACH EYE AT BEDTIME Yes Historical Provider, MD   aspirin (ASPIRIN LOW DOSE ADULT) 81 MG chewable tablet Take 1 tablet by mouth daily Yes BAKARI Lau NP   VORTIoxetine (TRINTELLIX) 10 MG TABS tablet Take 15 mg by mouth  Yes Historical Provider, MD [place] (Including outside providers/suppliers regularly involved in providing care):   Patient Care Team:  BAKARI Molina CNP as PCP - General (Family Nurse Practitioner)  BAKARI Molina CNP as PCP - [facility] Empaneled Provider    Reviewed and updated this visit:  Tobacco  Allergies  Meds  Problems  Med Hx  Surg Hx  Soc Hx  Fam Hx

## 2022-03-01 NOTE — PATIENT INSTRUCTIONS
Personalized Preventive Plan for Yee Castañeda - 3/1/2022  Medicare offers a range of preventive health benefits. Some of the tests and screenings are paid in full while other may be subject to a deductible, co-insurance, and/or copay. Some of these benefits include a comprehensive review of your medical history including lifestyle, illnesses that may run in your family, and various assessments and screenings as appropriate. After reviewing your medical record and screening and assessments performed today your provider may have ordered immunizations, labs, imaging, and/or referrals for you. A list of these orders (if applicable) as well as your Preventive Care list are included within your After Visit Summary for your review. Other Preventive Recommendations:    · A preventive eye exam performed by an eye specialist is recommended every 1-2 years to screen for glaucoma; cataracts, macular degeneration, and other eye disorders. · A preventive dental visit is recommended every 6 months. · Try to get at least 150 minutes of exercise per week or 10,000 steps per day on a pedometer . · Order or download the FREE \"Exercise & Physical Activity: Your Everyday Guide\" from [employer]. Call 0-858.345.2280 or search [employer] online. · You need 9876-5791 mg of calcium and 2088-4861 IU of vitamin D per day. It is possible to meet your calcium requirement with diet alone, but a vitamin D supplement is usually necessary to meet this goal.  · When exposed to the sun, use a sunscreen that protects against both UVA and UVB radiation with an SPF of 30 or greater. Reapply every 2 to 3 hours or after sweating, drying off with a towel, or swimming. · Always wear a seat belt when traveling in a car. Always wear a helmet when riding a bicycle or motorcycle.

## 2022-03-08 DIAGNOSIS — G89.29 CHRONIC RIGHT SHOULDER PAIN: ICD-10-CM

## 2022-03-08 DIAGNOSIS — M25.511 CHRONIC RIGHT SHOULDER PAIN: ICD-10-CM

## 2022-03-08 DIAGNOSIS — R10.84 GENERALIZED POSTPRANDIAL ABDOMINAL PAIN: ICD-10-CM

## 2022-03-09 RX ORDER — OXYCODONE HYDROCHLORIDE AND ACETAMINOPHEN 5; 325 MG/1; MG/1
1 TABLET ORAL EVERY 8 HOURS PRN
Qty: 90 TABLET | Refills: 0 | Status: SHIPPED | OUTPATIENT
Start: 2022-03-09 | End: 2022-04-07 | Stop reason: SDUPTHER

## 2022-04-07 DIAGNOSIS — G89.29 CHRONIC RIGHT SHOULDER PAIN: ICD-10-CM

## 2022-04-07 DIAGNOSIS — R10.84 GENERALIZED POSTPRANDIAL ABDOMINAL PAIN: ICD-10-CM

## 2022-04-07 DIAGNOSIS — M25.511 CHRONIC RIGHT SHOULDER PAIN: ICD-10-CM

## 2022-04-07 RX ORDER — OXYCODONE HYDROCHLORIDE AND ACETAMINOPHEN 5; 325 MG/1; MG/1
1 TABLET ORAL EVERY 8 HOURS PRN
Qty: 90 TABLET | Refills: 0 | Status: SHIPPED | OUTPATIENT
Start: 2022-04-07 | End: 2022-05-05 | Stop reason: SDUPTHER

## 2022-05-05 DIAGNOSIS — R10.84 GENERALIZED POSTPRANDIAL ABDOMINAL PAIN: ICD-10-CM

## 2022-05-05 DIAGNOSIS — M25.511 CHRONIC RIGHT SHOULDER PAIN: ICD-10-CM

## 2022-05-05 DIAGNOSIS — G89.29 CHRONIC RIGHT SHOULDER PAIN: ICD-10-CM

## 2022-05-05 RX ORDER — OXYCODONE HYDROCHLORIDE AND ACETAMINOPHEN 5; 325 MG/1; MG/1
1 TABLET ORAL EVERY 8 HOURS PRN
Qty: 90 TABLET | Refills: 0 | Status: SHIPPED | OUTPATIENT
Start: 2022-05-05 | End: 2022-05-31 | Stop reason: SDUPTHER

## 2022-05-31 DIAGNOSIS — M25.511 CHRONIC RIGHT SHOULDER PAIN: ICD-10-CM

## 2022-05-31 DIAGNOSIS — G89.29 CHRONIC RIGHT SHOULDER PAIN: ICD-10-CM

## 2022-05-31 DIAGNOSIS — R10.84 GENERALIZED POSTPRANDIAL ABDOMINAL PAIN: ICD-10-CM

## 2022-05-31 RX ORDER — OXYCODONE HYDROCHLORIDE AND ACETAMINOPHEN 5; 325 MG/1; MG/1
1 TABLET ORAL EVERY 8 HOURS PRN
Qty: 90 TABLET | Refills: 0 | Status: SHIPPED | OUTPATIENT
Start: 2022-05-31 | End: 2022-06-29 | Stop reason: SDUPTHER

## 2022-06-02 ENCOUNTER — OFFICE VISIT (OUTPATIENT)
Dept: PRIMARY CARE CLINIC | Age: 67
End: 2022-06-02
Payer: MEDICARE

## 2022-06-02 VITALS
HEART RATE: 96 BPM | OXYGEN SATURATION: 98 % | WEIGHT: 117.8 LBS | BODY MASS INDEX: 23.01 KG/M2 | RESPIRATION RATE: 16 BRPM | DIASTOLIC BLOOD PRESSURE: 100 MMHG | SYSTOLIC BLOOD PRESSURE: 162 MMHG

## 2022-06-02 DIAGNOSIS — I70.1 RENAL ARTERY STENOSIS (HCC): ICD-10-CM

## 2022-06-02 DIAGNOSIS — G89.29 CHRONIC RIGHT SHOULDER PAIN: ICD-10-CM

## 2022-06-02 DIAGNOSIS — I10 PRIMARY HYPERTENSION: ICD-10-CM

## 2022-06-02 DIAGNOSIS — I70.0 OCCLUSION OF AORTA (HCC): ICD-10-CM

## 2022-06-02 DIAGNOSIS — I74.09 AORTOILIAC OCCLUSIVE DISEASE (HCC): ICD-10-CM

## 2022-06-02 DIAGNOSIS — Z95.828 STATUS POST AORTOBIFEMORAL BYPASS SURGERY: ICD-10-CM

## 2022-06-02 DIAGNOSIS — M25.511 CHRONIC RIGHT SHOULDER PAIN: ICD-10-CM

## 2022-06-02 DIAGNOSIS — I73.9 PAD (PERIPHERAL ARTERY DISEASE) (HCC): ICD-10-CM

## 2022-06-02 DIAGNOSIS — E11.9 TYPE 2 DIABETES MELLITUS WITHOUT COMPLICATION, WITHOUT LONG-TERM CURRENT USE OF INSULIN (HCC): Primary | ICD-10-CM

## 2022-06-02 DIAGNOSIS — K59.00 CONSTIPATION, UNSPECIFIED CONSTIPATION TYPE: ICD-10-CM

## 2022-06-02 LAB — HBA1C MFR BLD: 6.2 %

## 2022-06-02 PROCEDURE — 3044F HG A1C LEVEL LT 7.0%: CPT | Performed by: NURSE PRACTITIONER

## 2022-06-02 PROCEDURE — 4004F PT TOBACCO SCREEN RCVD TLK: CPT | Performed by: NURSE PRACTITIONER

## 2022-06-02 PROCEDURE — G8427 DOCREV CUR MEDS BY ELIG CLIN: HCPCS | Performed by: NURSE PRACTITIONER

## 2022-06-02 PROCEDURE — 83036 HEMOGLOBIN GLYCOSYLATED A1C: CPT | Performed by: NURSE PRACTITIONER

## 2022-06-02 PROCEDURE — 2022F DILAT RTA XM EVC RTNOPTHY: CPT | Performed by: NURSE PRACTITIONER

## 2022-06-02 PROCEDURE — G8420 CALC BMI NORM PARAMETERS: HCPCS | Performed by: NURSE PRACTITIONER

## 2022-06-02 PROCEDURE — 1090F PRES/ABSN URINE INCON ASSESS: CPT | Performed by: NURSE PRACTITIONER

## 2022-06-02 PROCEDURE — 1123F ACP DISCUSS/DSCN MKR DOCD: CPT | Performed by: NURSE PRACTITIONER

## 2022-06-02 PROCEDURE — G8400 PT W/DXA NO RESULTS DOC: HCPCS | Performed by: NURSE PRACTITIONER

## 2022-06-02 PROCEDURE — 3017F COLORECTAL CA SCREEN DOC REV: CPT | Performed by: NURSE PRACTITIONER

## 2022-06-02 PROCEDURE — 99214 OFFICE O/P EST MOD 30 MIN: CPT | Performed by: NURSE PRACTITIONER

## 2022-06-02 RX ORDER — AMLODIPINE BESYLATE 5 MG/1
5 TABLET ORAL DAILY
Qty: 30 TABLET | Refills: 5 | Status: SHIPPED | OUTPATIENT
Start: 2022-06-02

## 2022-06-02 ASSESSMENT — PATIENT HEALTH QUESTIONNAIRE - PHQ9
6. FEELING BAD ABOUT YOURSELF - OR THAT YOU ARE A FAILURE OR HAVE LET YOURSELF OR YOUR FAMILY DOWN: 0
9. THOUGHTS THAT YOU WOULD BE BETTER OFF DEAD, OR OF HURTING YOURSELF: 0
SUM OF ALL RESPONSES TO PHQ QUESTIONS 1-9: 7
1. LITTLE INTEREST OR PLEASURE IN DOING THINGS: 1
5. POOR APPETITE OR OVEREATING: 1
4. FEELING TIRED OR HAVING LITTLE ENERGY: 3
3. TROUBLE FALLING OR STAYING ASLEEP: 1
SUM OF ALL RESPONSES TO PHQ QUESTIONS 1-9: 7
SUM OF ALL RESPONSES TO PHQ9 QUESTIONS 1 & 2: 2
2. FEELING DOWN, DEPRESSED OR HOPELESS: 1
7. TROUBLE CONCENTRATING ON THINGS, SUCH AS READING THE NEWSPAPER OR WATCHING TELEVISION: 0
8. MOVING OR SPEAKING SO SLOWLY THAT OTHER PEOPLE COULD HAVE NOTICED. OR THE OPPOSITE, BEING SO FIGETY OR RESTLESS THAT YOU HAVE BEEN MOVING AROUND A LOT MORE THAN USUAL: 0
10. IF YOU CHECKED OFF ANY PROBLEMS, HOW DIFFICULT HAVE THESE PROBLEMS MADE IT FOR YOU TO DO YOUR WORK, TAKE CARE OF THINGS AT HOME, OR GET ALONG WITH OTHER PEOPLE: 0

## 2022-06-02 ASSESSMENT — ENCOUNTER SYMPTOMS
SORE THROAT: 0
NAUSEA: 0
ABDOMINAL PAIN: 0
CONSTIPATION: 1
COUGH: 0
WHEEZING: 0
VOMITING: 0
DIARRHEA: 0
SINUS PRESSURE: 0
SHORTNESS OF BREATH: 0
TROUBLE SWALLOWING: 0
BLOOD IN STOOL: 0

## 2022-06-02 NOTE — PROGRESS NOTES
[address] PRIMARY CARE  [facility] [address] [facility] [address]  [address] [address]  Dept: [phone]  Dept Fax: [phone]    Colin Nicholas is a 78 y.o. female who presentstoday for her medical conditions/complaints as noted below. Colin Nicholas is c/o of  Chief Complaint   Patient presents with    Follow-up     DM    Health Maintenance     Will discuss with PCP about colonoscopy and mammogram       HPI:     Here today for follow-up  Reports she continues to struggle with abdominal pain after eating  If she eats anything more than full liquid diet she seems to have a lot of pain and discomfort  Had recent visit with surgeon with a CT  She is questioning the finding of possible renal artery stenosis on the left  She continues to have intermittent issues with constipation  She is taking a daily fiber supplement    Has continued to see psych, had recent increase in her trintellix and nerve pill that she does not now the name of  She will follow up next week     BP elevated in office, has been elevated her past few visits  She reports a lot of increased stress and anxiety at home and feels this is the cause    Diabetes  She presents for her follow-up diabetic visit. She has type 2 diabetes mellitus. Her disease course has been stable. There are no hypoglycemic associated symptoms. Pertinent negatives for hypoglycemia include no confusion, dizziness, headaches or nervousness/anxiousness. Pertinent negatives for diabetes include no chest pain, no fatigue, no polydipsia, no polyphagia, no polyuria and no weakness. Current diabetic treatment includes diet and oral agent (monotherapy). She is compliant with treatment most of the time. Her weight is stable. She is following a generally healthy diet. She rarely participates in exercise. There is no change in her home blood glucose trend. An ACE inhibitor/angiotensin II receptor blocker is being taken.        Hemoglobin A1C (%)   Date Value   06/02/2022 6.2   01/20/2022 6.1 (H)   10/18/2021 6.1             ( goal A1C is < 7)   No results found for: LABMICR  LDL Cholesterol (mg/dL)   Date Value   04/19/2021 127   01/04/2020 113       (goal LDL is <100)   AST (U/L)   Date Value   04/19/2021 17     ALT (U/L)   Date Value   04/19/2021 14     BUN (mg/dL)   Date Value   01/20/2022 16     BP Readings from Last 3 Encounters:   06/02/22 (!) 162/100   03/01/22 (!) 175/96   01/22/22 (!) 159/80          ([id number])    Past Medical History:   Diagnosis Date    Anxiety     Depression     Diabetes mellitus ([address]     oral meds    Glaucoma     Hearing loss     Hx of blood clots     right leg and right lung 2020    Hypertension     Migraines     PVD (peripheral vascular disease) ([address]     Wears dentures       Past Surgical History:   Procedure Laterality Date    ABDOMINAL AORTIC ANEURYSM REPAIR N/A 9/15/2020    AORTAL BIFEMORAL BYPASS  **CELLSAVER** performed by Maria Del Carmen Rico MD at [address] [place] [address] AORTO-FEMORAL BYPASS GRAFT  09/15/2020    APPENDECTOMY      BREAST SURGERY      \"crystals removed from right breast\"    CATARACT REMOVAL  2015    CHOLECYSTECTOMY      COLONOSCOPY      ENDOSCOPY, COLON, DIAGNOSTIC      EYE SURGERY      PYLOROMYOTOMY N/A 1/19/2022    PYLOROPLASTY performed by Roseanna Mccall MD at [address] [place] [address]  06/08/2021    w/EUS FNA    UPPER GASTROINTESTINAL ENDOSCOPY N/A 6/8/2021    EGD W/EUS FNA performed by Aye España MD at [facility]    UPPER GASTROINTESTINAL ENDOSCOPY  6/8/2021    EGD DILATION BALLOON 6-7.5MM performed by Aye España MD at [facility]    UPPER GASTROINTESTINAL ENDOSCOPY N/A 6/8/2021    EGD BIOPSY performed by Aye España MD at [facility]    UPPER GASTROINTESTINAL ENDOSCOPY N/A 7/22/2021    EGD DILATION BALLOON, pyloric performed by Norberto Kumar MD at [place] [address] 7/22/2021    EGD BIOPSY performed by Seun Eaton MD at [address] [place] [address] Avenue  7/22/2021    EGD DILATION SAVORY performed by Seun Eaton MD at [facility]    VASCULAR SURGERY         Family History   Problem Relation Age of Onset    Colon Cancer Mother     Prostate Cancer Father     Thyroid Cancer Sister           Social History     Tobacco Use    Smoking status: Current Every Day Smoker     Packs/day: 1.00     Years: 41.00     Pack years: 41.00     Types: Cigarettes     Start date: 7/22/1975    Smokeless tobacco: Never Used    Tobacco comment: Cut back   Substance Use Topics    Alcohol use: Never      Current Outpatient Medications   Medication Sig Dispense Refill    amLODIPine (NORVASC) 5 MG tablet Take 1 tablet by mouth daily 30 tablet 5    oxyCODONE-acetaminophen (PERCOCET) 5-325 MG per tablet Take 1 tablet by mouth every 8 hours as needed for Pain for up to 30 days. 90 tablet 0    lisinopril (PRINIVIL;ZESTRIL) 40 MG tablet Take 1 tablet by mouth daily 90 tablet 3    Multiple Vitamins-Minerals (MULTIVITAMIN ADULT EXTRA C PO) multivitamin   1 tab daily      latanoprost (XALATAN) 0.005 % ophthalmic solution INSTILL 1 DROP INTO EACH EYE AT BEDTIME      aspirin (ASPIRIN LOW DOSE ADULT) 81 MG chewable tablet Take 1 tablet by mouth daily 30 tablet 3    VORTIoxetine (TRINTELLIX) 10 MG TABS tablet Take 20 mg by mouth        No current facility-administered medications for this visit. Allergies   Allergen Reactions    Dilaudid [Hydromorphone Hcl] Swelling    Sulfa Antibiotics Swelling     Anything with sulfa    Sulfamethoxazole Swelling    Sulfamethoxazole-Trimethoprim Swelling    Trimethoprim Swelling    Tetanus Toxoid Swelling     Arm swelling.        Health Maintenance   Topic Date Due    Pneumococcal 65+ years Vaccine (1 - PCV) Never done    Diabetic microalbuminuria test  Never done    Diabetic retinal exam  Never done    Colorectal Cancer Screen  Never done    Breast cancer screen  Never done    Shingles vaccine (1 of 2) Never done    Low dose CT lung screening  Never done    DEXA (modify frequency per FRAX score)  Never done    Diabetic foot exam  05/22/2021    Lipids  04/19/2022    Flu vaccine (Season Ended) 10/18/2022 (Originally 9/1/2022)    Annual Wellness Visit (AWV)  03/02/2023    Depression Monitoring  03/08/2023    A1C test (Diabetic or Prediabetic)  06/02/2023    COVID-19 Vaccine  Completed    Hepatitis A vaccine  Aged Out    Hib vaccine  Aged Out    Meningococcal (ACWY) vaccine  Aged Out    Hepatitis C screen  Discontinued       Subjective:      Review of Systems   Constitutional: Negative for activity change, appetite change, chills, fatigue, fever and unexpected weight change. HENT: Negative for congestion, ear pain, hearing loss, sinus pressure, sore throat and trouble swallowing. Eyes: Negative for visual disturbance. Respiratory: Negative for cough, shortness of breath and wheezing. Cardiovascular: Negative for chest pain, palpitations and leg swelling. Gastrointestinal: Positive for constipation. Negative for abdominal pain, blood in stool, diarrhea, nausea and vomiting. Endocrine: Negative for cold intolerance, heat intolerance, polydipsia, polyphagia and polyuria. Genitourinary: Negative for difficulty urinating, frequency, hematuria and urgency. Musculoskeletal: Positive for arthralgias (shoulder). Negative for myalgias. Skin: Negative for rash. Allergic/Immunologic: Negative for environmental allergies. Neurological: Negative for dizziness, weakness, light-headedness and headaches. Psychiatric/Behavioral: Negative for confusion. The patient is not nervous/anxious. Objective:     Physical Exam  Constitutional:       Appearance: She is well-developed. HENT:      Head: Normocephalic.    Eyes:      Conjunctiva/sclera: Conjunctivae normal.      Pupils: Pupils are equal, round, and reactive to light. Cardiovascular:      Rate and Rhythm: Normal rate and regular rhythm. Heart sounds: Normal heart sounds. No murmur heard. Pulmonary:      Effort: Pulmonary effort is normal.      Breath sounds: Normal breath sounds. No wheezing. Abdominal:      General: Bowel sounds are normal. There is no distension. Palpations: Abdomen is soft. Musculoskeletal:         General: Normal range of motion. Cervical back: Normal range of motion. Skin:     General: Skin is warm and dry. Neurological:      Mental Status: She is alert and oriented to person, place, and time. Psychiatric:         Behavior: Behavior normal.         Thought Content: Thought content normal.         Judgment: Judgment normal.       BP (!) 162/100   Pulse 96   Resp 16   Wt 117 lb 12.8 oz (53.4 kg)   SpO2 98%   BMI 23.01 kg/m²     Assessment:       Diagnosis Orders   1. Type 2 diabetes mellitus without complication, without long-term current use of insulin ([facility])  POCT glycosylated hemoglobin (Hb A1C)   2. Primary hypertension  amLODIPine (NORVASC) 5 MG tablet   3. Aortoiliac occlusive disease ([address]     4. PAD (peripheral artery disease) ([address]     5. Occlusion of aorta (HCC)     6. Status post aortobifemoral bypass surgery     7. Renal artery stenosis (HCC)  US RENAL COMPLETE   8. Constipation, unspecified constipation type     9. Chronic right shoulder pain               Plan:      Return in about 3 months (around 9/2/2022) for diabetes check, shoulder pain. Diabetes-A1c remains stable on single agent metformin. Reviewed diet.   She sometimes has difficulty with varied diet due to her chronic abdominal pain  Hypertension- poorly controlled at this time, she agrees to start additional medicine, will add amlodipine  Aortoiliac disease, PAD, occlusion of aorta, status post aortofemoral bypass-she has follow-up scheduled with vascular surgeon in mid July  Renal artery stenosis-reviewed recent CT and finding of possible stenosis of the left renal artery, will check ultrasound. Also encouraged to discuss at her upcoming vascular appointment  Constipation, postprandial abdominal pain- encouraged to return to GI for further evaluation, continue daily fiber supplement, reviewed need for monitoring of bowel pattern and stool consistency, avoid skipping more than 2 days without intervention. Advised MiraLAX as needed  Chronic shoulder pain- remains problematic most days, PDMP reflects appropriate use opioid pain reliever  Orders Placed This Encounter   Procedures    US RENAL COMPLETE     Standing Status:   Future     Standing Expiration Date:   6/2/2023     Order Specific Question:   Reason for exam:     Answer:   suspicion for stenosis on left per recent CT    POCT glycosylated hemoglobin (Hb A1C)        Orders Placed This Encounter   Medications    amLODIPine (NORVASC) 5 MG tablet     Sig: Take 1 tablet by mouth daily     Dispense:  30 tablet     Refill:  5       Patient given educational materials - see patient instructions. Discussed use, benefit, and side effects of prescribed medications. All patientquestions answered. Pt voiced understanding. Reviewed health maintenance. Instructedto continue current medications, diet and exercise. Patient agreed with treatmentplan. Follow up as directed.      Electronicallysigned by BAKARI Yeager CNP on 6/2/2022 at 1:05 PM

## 2022-06-16 ENCOUNTER — TELEPHONE (OUTPATIENT)
Dept: PRIMARY CARE CLINIC | Age: 67
End: 2022-06-16

## 2022-06-16 DIAGNOSIS — I70.1 RENAL ARTERY STENOSIS (HCC): Primary | ICD-10-CM

## 2022-06-16 PROCEDURE — 93975 VASCULAR STUDY: CPT | Performed by: NURSE PRACTITIONER

## 2022-06-16 NOTE — TELEPHONE ENCOUNTER
Patient needs an updated order changed for Performed for Renal Artery Duplex. The one placed shows that patient is having it done OUR office, and its needs to for an outside office.

## 2022-06-29 DIAGNOSIS — R10.84 GENERALIZED POSTPRANDIAL ABDOMINAL PAIN: ICD-10-CM

## 2022-06-29 DIAGNOSIS — M25.511 CHRONIC RIGHT SHOULDER PAIN: ICD-10-CM

## 2022-06-29 DIAGNOSIS — G89.29 CHRONIC RIGHT SHOULDER PAIN: ICD-10-CM

## 2022-06-30 RX ORDER — OXYCODONE HYDROCHLORIDE AND ACETAMINOPHEN 5; 325 MG/1; MG/1
1 TABLET ORAL EVERY 8 HOURS PRN
Qty: 90 TABLET | Refills: 0 | Status: SHIPPED | OUTPATIENT
Start: 2022-06-30 | End: 2022-07-25 | Stop reason: SDUPTHER

## 2022-07-11 ENCOUNTER — HOSPITAL ENCOUNTER (OUTPATIENT)
Dept: VASCULAR LAB | Age: 67
Discharge: HOME | End: 2022-07-11
Payer: MEDICARE

## 2022-07-11 DIAGNOSIS — Z95.828 STATUS POST AORTOBIFEMORAL BYPASS SURGERY: ICD-10-CM

## 2022-07-11 DIAGNOSIS — I74.09 AORTOILIAC OCCLUSIVE DISEASE (HCC): ICD-10-CM

## 2022-07-11 DIAGNOSIS — I73.9 PAD (PERIPHERAL ARTERY DISEASE) (HCC): ICD-10-CM

## 2022-07-11 PROCEDURE — 93923 UPR/LXTR ART STDY 3+ LVLS: CPT

## 2022-07-15 ENCOUNTER — OFFICE VISIT (OUTPATIENT)
Dept: VASCULAR SURGERY | Age: 67
End: 2022-07-15
Payer: MEDICARE

## 2022-07-15 ENCOUNTER — HOSPITAL ENCOUNTER (OUTPATIENT)
Dept: VASCULAR LAB | Age: 67
Discharge: HOME | End: 2022-07-15
Payer: MEDICARE

## 2022-07-15 VITALS
BODY MASS INDEX: 23.95 KG/M2 | TEMPERATURE: 98.6 F | OXYGEN SATURATION: 98 % | HEART RATE: 73 BPM | RESPIRATION RATE: 20 BRPM | WEIGHT: 122 LBS | DIASTOLIC BLOOD PRESSURE: 87 MMHG | SYSTOLIC BLOOD PRESSURE: 169 MMHG | HEIGHT: 60 IN

## 2022-07-15 DIAGNOSIS — I70.1 RENAL ARTERY STENOSIS (HCC): ICD-10-CM

## 2022-07-15 DIAGNOSIS — Z95.828 STATUS POST AORTOBIFEMORAL BYPASS SURGERY: ICD-10-CM

## 2022-07-15 DIAGNOSIS — I70.1 STENOSIS OF LEFT RENAL ARTERY (HCC): Primary | ICD-10-CM

## 2022-07-15 PROCEDURE — 99213 OFFICE O/P EST LOW 20 MIN: CPT | Performed by: SURGERY

## 2022-07-15 PROCEDURE — 93975 VASCULAR STUDY: CPT

## 2022-07-15 PROCEDURE — 1123F ACP DISCUSS/DSCN MKR DOCD: CPT | Performed by: SURGERY

## 2022-07-15 RX ORDER — CLOPIDOGREL BISULFATE 75 MG/1
75 TABLET ORAL DAILY
Qty: 90 TABLET | Refills: 1 | Status: SHIPPED | OUTPATIENT
Start: 2022-07-15

## 2022-07-15 NOTE — PROGRESS NOTES
[facility]  [address]  [address] [place] [address]  Dept: [phone]     Patient: Maricruz Dillard  : 1955  MRN: [id number]  DOS: 7/15/2022    Referring provider:  No ref. provider found         HPI:  Maricruz Dillard is a 78 y.o. female who returns to the office for renal artery duplex. Recall that she had an aortobiiliac bypass graft for claudication. Her left renal artery has known stenosis. Duplex examination reveals that the right kidney is about 9.3 cm and the left kidney is no more than 8 cm. In some planes it is as small as 7 cm. There is an elevated velocity on the left to approximately 400 cm/s demonstrating significant stenosis. Her blood pressure is poorly controlled on 2 medications and she frequently reaches 170 to 097 mmHg systolic pressure. She has not had a drop in her urine output. I do not know of her recent creatinine. She denies claudication which was her symptom previous to bypass.   Past Medical History:   Diagnosis Date    Anxiety     Depression     Diabetes mellitus (HCC)     oral meds    Glaucoma     Hearing loss     Hx of blood clots     right leg and right lung 2020    Hypertension     Migraines     PVD (peripheral vascular disease) ([address]     Wears dentures      Family History   Problem Relation Age of Onset    Colon Cancer Mother     Prostate Cancer Father     Thyroid Cancer Sister       Social History     Socioeconomic History    Marital status:      Spouse name: Not on file    Number of children: Not on file    Years of education: Not on file    Highest education level: Not on file   Occupational History    Not on file   Tobacco Use    Smoking status: Every Day     Packs/day: 1.00     Years: 41.00     Pack years: 41.00     Types: Cigarettes     Start date: 1975    Smokeless tobacco: Never    Tobacco comments:     Cut back   Vaping Use    Vaping Use: Never used   Substance and Sexual Activity    Alcohol use: Never    Drug use: Never    Sexual activity: Not on file   Other Topics Concern    Not on file   Social History Narrative    Not on file     Social Determinants of Health     Financial Resource Strain: Low Risk     Difficulty of Paying Living Expenses: Not hard at all   Food Insecurity: No Food Insecurity    Worried About Running Out of Food in the Last Year: Never true    [address] in the Last Year: Never true   Transportation Needs: No Transportation Needs    Lack of Transportation (Medical): No    Lack of Transportation (Non-Medical):  No   Physical Activity: Insufficiently Active    Days of Exercise per Week: 2 days    Minutes of Exercise per Session: 20 min   Stress: Not on file   Social Connections: Not on file   Intimate Partner Violence: Not on file   Housing Stability: Not on file      Past Surgical History:   Procedure Laterality Date    ABDOMINAL AORTIC ANEURYSM REPAIR N/A 9/15/2020    AORTAL BIFEMORAL BYPASS  **CELLSAVER** performed by Juventino Plascencia MD at [facility]    AORTO-FEMORAL BYPASS GRAFT  09/15/2020    APPENDECTOMY      BREAST SURGERY      \"crystals removed from right breast\"    CATARACT REMOVAL  2015    CHOLECYSTECTOMY      COLONOSCOPY      ENDOSCOPY, COLON, DIAGNOSTIC      EYE SURGERY      PYLOROMYOTOMY N/A 1/19/2022    PYLOROPLASTY performed by Angelo Coello MD at [place] [address]  06/08/2021    w/EUS FNA    UPPER GASTROINTESTINAL ENDOSCOPY N/A 6/8/2021    EGD W/EUS FNA performed by Yao Ragsdale MD at [address] [facility] END[facility]COPY  6/8/2021    EGD DILATION BALLOON 6-7.5MM performed by Yao Ragsdale MD at [facility] 1 N/A 6/8/2021    EGD BIOPSY performed by Yao Ragsdale MD at [facility] 1 N/A 7/22/2021    EGD DILATION BALLOON, pyloric performed by Ling Rodríguez MD Deisy at [address] [facility] 7/22/2021    EGD BIOPSY performed by Tameka Viramontes MD at [address] [facility]  7/22/2021    EGD DILATION SAVORY performed by Tameka Viramontes MD at [facility]    VASCULAR SURGERY        Review of Systems    Vitals:    07/15/22 1016   BP: (!) 172/89   Site: Left Upper Arm   Position: Sitting   Cuff Size: Medium Adult   Pulse: 73   Resp: 20   Temp: 98.6 °F (37 °C)   TempSrc: Temporal   SpO2: 98%   Weight: 122 lb (55.3 kg)   Height: 5' (1.524 m)          Physical Exam  Constitutional:       General: She is not in acute distress. HENT:      Mouth/Throat:      Mouth: Mucous membranes are moist.      Pharynx: Oropharynx is clear. Eyes:      General: No scleral icterus. Extraocular Movements: Extraocular movements intact. Conjunctiva/sclera: Conjunctivae normal.   Neck:      Thyroid: No thyroid mass or thyromegaly. Cardiovascular:      Rate and Rhythm: Normal rate and regular rhythm. Heart sounds: No murmur heard. Comments: I cannot auscultate any bruits in the abdominal compartment. She has palpable femoral, popliteal, dorsalis pedis and posterior tibial pulses bilaterally which are strong and equal.  Her feet are warm and well perfused without ulceration or gangrene. She has no significant swelling. Pulmonary:      Effort: No respiratory distress. Breath sounds: No rales. Abdominal:      General: There is no distension. Palpations: There is no mass. Tenderness: There is no abdominal tenderness. There is no guarding. Musculoskeletal:      Cervical back: No rigidity or tenderness. Lymphadenopathy:      Cervical: No cervical adenopathy. Skin:     Coloration: Skin is not jaundiced. Findings: No rash. Neurological:      General: No focal deficit present. Mental Status: She is alert and oriented to person, place, and time.       Cranial Nerves: No cranial nerve deficit. Psychiatric:         Mood and Affect: Mood normal.       Assessment:  1. Stenosis of left renal artery (HCC)    2. Status post aortobifemoral bypass surgery          Plan: At this point I think her best option is renal artery stenting on the left. I do not like that the left kidney has a reduction in size compared to the right. It is this that makes me concerned the most regardless of her blood pressure status and regardless of only being on 2 medications. She understands and agrees with going ahead with renal arteriography and possible stenting. She also understands that these procedures are not perfect and that restenosis can occur as well as loss of the kidney from occlusion. My concern is that this lesion would go on to occlude if it is truly narrow enough. We will schedule her for the near future for left renal artery arteriography and possible intervention.     Electronically signed by:  Army Hallie MD

## 2022-07-19 NOTE — DISCHARGE INSTRUCTIONS
PRE-OPERATIVE INSTRUCTIONS:    Stop eating solid foods at MIDNIGHT the night prior to surgery. (This includes gum, mints, smoking, or chewing tobacco.)    Stop drinking clear liquids at MIDNIGHT the night prior to surgery. Arrive at the [address] Vascular center on 7/28/2022  (as directed by your surgeon's office). If you arrive before 6:00 AM, go to the first floor (CAR1), not the main floor. There is a large sign on the locked doors to push the button and someone will let you in. If you arrive after 6:00 AM, go to the main floor registration. Please check with your surgeon about medication(s) that must be stopped prior to surgery. These may include: Aspirin, Coumadin, Plavix, Ibuprofen, Fish Oil, Herbal supplements    Please do NOT take diabetes medication(s) morning of surgery. If applicable: If you have been given a blood band, you must bring it with you the day of surgery. Use and bring inhalers with you morning of surgery. Bring C-Pap/Bi-pap with you morning of surgery. _____________________             ________________________  Signature (Provider &date)   Signature (Patient)    Day of Surgery/Procedure    As a patient at [address] [place] [address] you can expect quality medical and nursing care that is centered on your individual needs. Our goal is to make your surgical experience as comfortable as possible  . Directions to the [address] [place] [address])  [facility] is located at [address] [facility], [address] [facility]. The [address] [place] [address]) is across the street from the [facility] hospital. You may park at the [address] [facility] center parking garage, or if handicapped there are closer spots in the surface lot directly in front of the [address] [place] [address].  The patients that arrive at 5:30 am need to report to the 1st floor nurse's station, otherwise please report to the main floor registration desk.   Transportation after your procedure   You will need a friend or family member to drive you home after your procedure. Your  must be 25years of age or older and able to sign off on your discharge instructions. Taxi cabs or any form of public transportation is not acceptable. It is preferable that the friend or family member stay at the hospital throughout your procedure. Someone must remain with you for the first 24 hours after your surgery if you receive anesthesia or medication. If you do not have someone to stay with you, your procedure may be cancelled. Patient Instructions    If you are having any type of anesthesia you are to have nothing to eat or drink after midnight the night before your surgery. This includes gum, mints, water or smoking or chewing tobacco.  The only exception to this is a small sip of water to take with any morning dose of heart, blood pressure, or seizure medications. Bring a list of all medications you take, along with the dose of the medications and how often you take it. If more convenient bring the pharmacy bottles in a zip lock bag. Please shower the night before and the morning of surgery with an antibacterial soap. Please use the wipes given to you the night before your surgery after your shower. Unless otherwise told by your physician, please do not shave legs or any part of your body below your neck the night before or day of your surgery. You may shave your face or neck. Brush your teeth but do not swallow water. Bring your inhaler if you are currently using one. Bring your eyeglasses and case with you. No contacts are to be worn the day of surgery. You also may bring your hearing aids. Bring your blood band if one has been given to you. Please do not close the clasp. If you are on C-PAP or Bi-PAP at home and plan on staying in the hospital overnight for your surgery please bring the machine with you.     Do not wear any jewelry or body piercings day of surgery. Also, NO lotion, perfume or deodorant to be used the day of surgery. Do not bring any valuables, such as jewelry, cash or credit cards. If you are staying overnight with us, please bring a SMALL bag of personal items. We cannot accommodate large items, like suitcases. Please wear loose, comfortable clothing. If you are potentially going to have a cast or brace bring clothing that will fit over them.                                                                       In case of illness - If you have cold or flu like symptoms (high fever, runny nose, sore throat, cough, etc.) rash, nausea, vomiting, loose stools, and/or recent contact with someone who has a contagious disease (chicken pox, measles, etc.) Please call your doctor before coming to the hospital.    If you have any other questions regarding your procedure or the day of surgery, please call [phone], or [phone]

## 2022-07-22 ENCOUNTER — HOSPITAL ENCOUNTER (OUTPATIENT)
Dept: PREADMISSION TESTING | Age: 67
Discharge: HOME | End: 2022-07-26
Payer: MEDICARE

## 2022-07-22 VITALS
OXYGEN SATURATION: 97 % | DIASTOLIC BLOOD PRESSURE: 88 MMHG | SYSTOLIC BLOOD PRESSURE: 148 MMHG | BODY MASS INDEX: 22.97 KG/M2 | WEIGHT: 117 LBS | HEIGHT: 60 IN | HEART RATE: 82 BPM | RESPIRATION RATE: 16 BRPM | TEMPERATURE: 98.9 F

## 2022-07-22 LAB
ANION GAP SERPL CALCULATED.3IONS-SCNC: 13 MMOL/L (ref 9–17)
BUN BLDV-MCNC: 21 MG/DL (ref 8–23)
CHLORIDE BLD-SCNC: 104 MMOL/L (ref 98–107)
CO2: 22 MMOL/L (ref 20–31)
CREAT SERPL-MCNC: 1.01 MG/DL (ref 0.5–0.9)
GFR AFRICAN AMERICAN: >60 ML/MIN
GFR NON-AFRICAN AMERICAN: 55 ML/MIN
GFR SERPL CREATININE-BSD FRML MDRD: ABNORMAL ML/MIN/{1.73_M2}
GLUCOSE BLD-MCNC: 106 MG/DL (ref 70–99)
HCT VFR BLD CALC: 45 % (ref 36.3–47.1)
HEMOGLOBIN: 14.9 G/DL (ref 11.9–15.1)
POTASSIUM SERPL-SCNC: 3.6 MMOL/L (ref 3.7–5.3)
SODIUM BLD-SCNC: 139 MMOL/L (ref 135–144)

## 2022-07-22 PROCEDURE — 82565 ASSAY OF CREATININE: CPT

## 2022-07-22 PROCEDURE — 85018 HEMOGLOBIN: CPT

## 2022-07-22 PROCEDURE — 93005 ELECTROCARDIOGRAM TRACING: CPT | Performed by: STUDENT IN AN ORGANIZED HEALTH CARE EDUCATION/TRAINING PROGRAM

## 2022-07-22 PROCEDURE — 85014 HEMATOCRIT: CPT

## 2022-07-22 PROCEDURE — 80051 ELECTROLYTE PANEL: CPT

## 2022-07-22 PROCEDURE — 82947 ASSAY GLUCOSE BLOOD QUANT: CPT

## 2022-07-22 PROCEDURE — 36415 COLL VENOUS BLD VENIPUNCTURE: CPT

## 2022-07-22 PROCEDURE — 84520 ASSAY OF UREA NITROGEN: CPT

## 2022-07-22 RX ORDER — SODIUM CHLORIDE, SODIUM LACTATE, POTASSIUM CHLORIDE, CALCIUM CHLORIDE 600; 310; 30; 20 MG/100ML; MG/100ML; MG/100ML; MG/100ML
1000 INJECTION, SOLUTION INTRAVENOUS CONTINUOUS
Status: CANCELLED | OUTPATIENT
Start: 2022-07-22

## 2022-07-22 ASSESSMENT — PAIN DESCRIPTION - LOCATION: LOCATION: ABDOMEN

## 2022-07-22 ASSESSMENT — PAIN SCALES - GENERAL: PAINLEVEL_OUTOF10: 6

## 2022-07-22 NOTE — PROGRESS NOTES
Anesthesia Focused Assessment    Has patient ever tested positive for COVID? No    STOP-BANG Sleep Apnea Questionnaire    SNORE loudly (heard through closed doors)? No  TIRED, fatigued, sleepy during daytime? No  OBSERVED stopping breathing during sleep? No  High blood PRESSURE being treated? Yes    BMI over 35? No  AGE over 48? Yes  NECK circumference over 16\"? No  GENDER (male)? No             Total 2  High risk 5-8  Intermediate risk 3-4  Low risk 0-2    Obstructive Sleep Apnea: no  If YES, machine used: no     Type 1 DM:   no  T2DM:  diet controlled    Coronary Artery Disease:  no  Hypertension:  yes    Active smoker:  1 ppd for 41 years  Drinks Alcohol:  no    Dentition: upper and lower full dentures    Defib / AICD / Pacemaker: no      Renal Failure/dialysis:  no    Patient was evaluated in PAT & anesthesia guidelines were applied. NPO guidelines, medication instructions and scheduled arrival time were reviewed with patient. I advised patient to please contact the surgeon's office, ahead of time if possible, if any new signs or symptoms of illness, infection, rash, etc    Hx of anesthesia complications:  no  Family hx of anesthesia complications:  no                                                                                                                     Anesthesia contacted:   no  Medical or cardiac clearance ordered: stress test and echo in paper chart from 2020, had cardiac evaluation at that time from [facility] (notes also in paper chart), patient is without cardiac or pulmonary complaint.     Regulo Gaspar PA-C  7/22/22  9:09 AM

## 2022-07-23 LAB
EKG ATRIAL RATE: 75 BPM
EKG P AXIS: 51 DEGREES
EKG P-R INTERVAL: 128 MS
EKG Q-T INTERVAL: 408 MS
EKG QRS DURATION: 88 MS
EKG QTC CALCULATION (BAZETT): 455 MS
EKG R AXIS: 23 DEGREES
EKG T AXIS: 5 DEGREES
EKG VENTRICULAR RATE: 75 BPM

## 2022-07-25 DIAGNOSIS — G89.29 CHRONIC RIGHT SHOULDER PAIN: ICD-10-CM

## 2022-07-25 DIAGNOSIS — R10.84 GENERALIZED POSTPRANDIAL ABDOMINAL PAIN: ICD-10-CM

## 2022-07-25 DIAGNOSIS — M25.511 CHRONIC RIGHT SHOULDER PAIN: ICD-10-CM

## 2022-07-26 RX ORDER — OXYCODONE HYDROCHLORIDE AND ACETAMINOPHEN 5; 325 MG/1; MG/1
1 TABLET ORAL EVERY 8 HOURS PRN
Qty: 90 TABLET | Refills: 0 | Status: SHIPPED | OUTPATIENT
Start: 2022-07-26 | End: 2022-08-24 | Stop reason: SDUPTHER

## 2022-08-08 ENCOUNTER — TELEPHONE (OUTPATIENT)
Dept: VASCULAR SURGERY | Age: 67
End: 2022-08-08

## 2022-08-08 NOTE — TELEPHONE ENCOUNTER
----- Message from Berkley Pro MA sent at 8/8/2022 12:31 PM EDT -----    ----- Message -----  From: Belle Padilla MD  Sent: 8/8/2022   9:03 AM EDT  To: Berkley Pro MA    I do not really follow my schedule in terms of putting people on the schedule for operative therapy. Please find the space for her. Thank you  ----- Message -----  From: Berkley Pro MA  Sent: 8/5/2022  11:06 AM EDT  To: Belle Padilla MD, #    PT called and wanted to know when her surgery is going to be rescheduled for.  Where would you like me to put her   Looks like this is what the plan was renal artery stenting on the left

## 2022-08-08 NOTE — TELEPHONE ENCOUNTER
Patient was informed and is aware that her surgery will be tomorrow, 8/9/22 at 10:20 with an arrival time of 9:30. Patient expressed understanding.

## 2022-08-09 ENCOUNTER — ANESTHESIA (OUTPATIENT)
Dept: OPERATING ROOM | Age: 67
End: 2022-08-09
Payer: MEDICARE

## 2022-08-09 ENCOUNTER — ANESTHESIA EVENT (OUTPATIENT)
Dept: OPERATING ROOM | Age: 67
End: 2022-08-09
Payer: MEDICARE

## 2022-08-09 ENCOUNTER — HOSPITAL ENCOUNTER (OUTPATIENT)
Age: 67
Setting detail: OBSERVATION
Discharge: HOME | End: 2022-08-10
Attending: SURGERY | Admitting: SURGERY
Payer: MEDICARE

## 2022-08-09 PROBLEM — I15.0 RENOVASCULAR HYPERTENSION: Status: ACTIVE | Noted: 2022-08-09

## 2022-08-09 LAB — POC POTASSIUM: 3.8 MMOL/L (ref 3.5–4.5)

## 2022-08-09 PROCEDURE — 7100000000 HC PACU RECOVERY - FIRST 15 MIN

## 2022-08-09 PROCEDURE — C1760 CLOSURE DEV, VASC: HCPCS | Performed by: SURGERY

## 2022-08-09 PROCEDURE — 2500000003 HC RX 250 WO HCPCS: Performed by: NURSE ANESTHETIST, CERTIFIED REGISTERED

## 2022-08-09 PROCEDURE — C1887 CATHETER, GUIDING: HCPCS | Performed by: SURGERY

## 2022-08-09 PROCEDURE — 2580000003 HC RX 258: Performed by: NURSE ANESTHETIST, CERTIFIED REGISTERED

## 2022-08-09 PROCEDURE — C1894 INTRO/SHEATH, NON-LASER: HCPCS

## 2022-08-09 PROCEDURE — 2580000003 HC RX 258: Performed by: SURGERY

## 2022-08-09 PROCEDURE — C1892 INTRO/SHEATH,FIXED,PEEL-AWAY: HCPCS

## 2022-08-09 PROCEDURE — 3700000000 HC ANESTHESIA ATTENDED CARE: Performed by: SURGERY

## 2022-08-09 PROCEDURE — C1769 GUIDE WIRE: HCPCS

## 2022-08-09 PROCEDURE — 10702042 HC MISC INTRODUCER/SHEATH

## 2022-08-09 PROCEDURE — C1725 CATH, TRANSLUMIN NON-LASER: HCPCS

## 2022-08-09 PROCEDURE — 75736 ARTERY X-RAYS PELVIS: CPT

## 2022-08-09 PROCEDURE — 10702042 HC MISC CLOSURE DEVICE VASC IMPLANTABLE/INSERTABLE

## 2022-08-09 PROCEDURE — 1200000000 HC SEMI PRIVATE

## 2022-08-09 PROCEDURE — C1894 INTRO/SHEATH, NON-LASER: HCPCS | Performed by: SURGERY

## 2022-08-09 PROCEDURE — 10702042 HC MISC CATH TRANSLUM ANGIO

## 2022-08-09 PROCEDURE — 6370000000 HC RX 637 (ALT 250 FOR IP): Performed by: STUDENT IN AN ORGANIZED HEALTH CARE EDUCATION/TRAINING PROGRAM

## 2022-08-09 PROCEDURE — 2500000003 HC RX 250 WO HCPCS: Performed by: SURGERY

## 2022-08-09 PROCEDURE — 2709999900 HC NON-CHARGEABLE SUPPLY: Performed by: SURGERY

## 2022-08-09 PROCEDURE — 7100000010 HC PHASE II RECOVERY - FIRST 15 MIN: Performed by: SURGERY

## 2022-08-09 PROCEDURE — C1874 STENT, COATED/COV W/DEL SYS: HCPCS | Performed by: SURGERY

## 2022-08-09 PROCEDURE — 94761 N-INVAS EAR/PLS OXIMETRY MLT: CPT

## 2022-08-09 PROCEDURE — 3600000007 HC SURGERY HYBRID BASE: Performed by: SURGERY

## 2022-08-09 PROCEDURE — 10702042 HC CATHETER GUIDING

## 2022-08-09 PROCEDURE — 10702042 HC MISC INTRODUCER PEEL-AWAY

## 2022-08-09 PROCEDURE — C1892 INTRO/SHEATH,FIXED,PEEL-AWAY: HCPCS | Performed by: SURGERY

## 2022-08-09 PROCEDURE — 7100000001 HC PACU RECOVERY - ADDTL 15 MIN

## 2022-08-09 PROCEDURE — 84132 ASSAY OF SERUM POTASSIUM: CPT

## 2022-08-09 PROCEDURE — 6370000000 HC RX 637 (ALT 250 FOR IP): Performed by: SURGERY

## 2022-08-09 PROCEDURE — 6360000002 HC RX W HCPCS: Performed by: NURSE ANESTHETIST, CERTIFIED REGISTERED

## 2022-08-09 PROCEDURE — C1887 CATHETER, GUIDING: HCPCS

## 2022-08-09 PROCEDURE — G0378 HOSPITAL OBSERVATION PER HR: HCPCS

## 2022-08-09 PROCEDURE — 6360000004 HC RX CONTRAST MEDICATION: Performed by: SURGERY

## 2022-08-09 PROCEDURE — 37236 OPEN/PERQ PLACE STENT 1ST: CPT | Performed by: SURGERY

## 2022-08-09 PROCEDURE — C1725 CATH, TRANSLUMIN NON-LASER: HCPCS | Performed by: SURGERY

## 2022-08-09 PROCEDURE — 7100000011 HC PHASE II RECOVERY - ADDTL 15 MIN: Performed by: SURGERY

## 2022-08-09 PROCEDURE — 2709999900 HC NON-CHARGEABLE SUPPLY

## 2022-08-09 PROCEDURE — 6370000000 HC RX 637 (ALT 250 FOR IP)

## 2022-08-09 PROCEDURE — 3600000017 HC SURGERY HYBRID ADDL 15MIN: Performed by: SURGERY

## 2022-08-09 PROCEDURE — 6360000002 HC RX W HCPCS: Performed by: SURGERY

## 2022-08-09 PROCEDURE — C1760 CLOSURE DEV, VASC: HCPCS

## 2022-08-09 PROCEDURE — C1769 GUIDE WIRE: HCPCS | Performed by: SURGERY

## 2022-08-09 PROCEDURE — 10702042 HC NON-CHARGEABLE SUPPLY

## 2022-08-09 PROCEDURE — 10702042 HC MISC GUIDEWIRE

## 2022-08-09 PROCEDURE — 36251 INS CATH REN ART 1ST UNILAT: CPT | Performed by: SURGERY

## 2022-08-09 PROCEDURE — A4217 STERILE WATER/SALINE, 500 ML: HCPCS | Performed by: SURGERY

## 2022-08-09 PROCEDURE — 3700000001 HC ADD 15 MINUTES (ANESTHESIA): Performed by: SURGERY

## 2022-08-09 DEVICE — IMPLANTABLE DEVICE: Type: IMPLANTABLE DEVICE | Site: RENAL | Status: FUNCTIONAL

## 2022-08-09 RX ORDER — HYDRALAZINE HYDROCHLORIDE 20 MG/ML
10 INJECTION INTRAMUSCULAR; INTRAVENOUS
Status: ACTIVE | OUTPATIENT
Start: 2022-08-09 | End: 2022-08-09

## 2022-08-09 RX ORDER — SODIUM CHLORIDE 0.9 % (FLUSH) 0.9 %
5-40 SYRINGE (ML) INJECTION EVERY 12 HOURS SCHEDULED
Status: DISCONTINUED | OUTPATIENT
Start: 2022-08-09 | End: 2022-08-10 | Stop reason: HOSPADM

## 2022-08-09 RX ORDER — LIDOCAINE HYDROCHLORIDE 10 MG/ML
INJECTION, SOLUTION EPIDURAL; INFILTRATION; INTRACAUDAL; PERINEURAL PRN
Status: DISCONTINUED | OUTPATIENT
Start: 2022-08-09 | End: 2022-08-09 | Stop reason: SDUPTHER

## 2022-08-09 RX ORDER — OXYCODONE HYDROCHLORIDE AND ACETAMINOPHEN 5; 325 MG/1; MG/1
1 TABLET ORAL EVERY 8 HOURS PRN
Status: DISCONTINUED | OUTPATIENT
Start: 2022-08-09 | End: 2022-08-09

## 2022-08-09 RX ORDER — HEPARIN SODIUM 1000 [USP'U]/ML
INJECTION, SOLUTION INTRAVENOUS; SUBCUTANEOUS PRN
Status: DISCONTINUED | OUTPATIENT
Start: 2022-08-09 | End: 2022-08-09 | Stop reason: SDUPTHER

## 2022-08-09 RX ORDER — ASPIRIN 81 MG/1
81 TABLET ORAL DAILY
Status: DISCONTINUED | OUTPATIENT
Start: 2022-08-10 | End: 2022-08-10 | Stop reason: HOSPADM

## 2022-08-09 RX ORDER — ONDANSETRON 2 MG/ML
4 INJECTION INTRAMUSCULAR; INTRAVENOUS
Status: DISCONTINUED | OUTPATIENT
Start: 2022-08-09 | End: 2022-08-09 | Stop reason: HOSPADM

## 2022-08-09 RX ORDER — PHENYLEPHRINE HCL IN 0.9% NACL 0.5 MG/5ML
SYRINGE (ML) INTRAVENOUS PRN
Status: DISCONTINUED | OUTPATIENT
Start: 2022-08-09 | End: 2022-08-09 | Stop reason: SDUPTHER

## 2022-08-09 RX ORDER — LIDOCAINE HYDROCHLORIDE 10 MG/ML
INJECTION, SOLUTION INFILTRATION; PERINEURAL
Status: DISPENSED
Start: 2022-08-09 | End: 2022-08-10

## 2022-08-09 RX ORDER — LISINOPRIL 20 MG/1
40 TABLET ORAL DAILY
Status: DISCONTINUED | OUTPATIENT
Start: 2022-08-09 | End: 2022-08-10 | Stop reason: HOSPADM

## 2022-08-09 RX ORDER — ONDANSETRON 4 MG/1
4 TABLET, ORALLY DISINTEGRATING ORAL EVERY 8 HOURS PRN
Status: DISCONTINUED | OUTPATIENT
Start: 2022-08-09 | End: 2022-08-10 | Stop reason: HOSPADM

## 2022-08-09 RX ORDER — SODIUM CHLORIDE 9 MG/ML
INJECTION, SOLUTION INTRAVENOUS PRN
Status: DISCONTINUED | OUTPATIENT
Start: 2022-08-09 | End: 2022-08-09 | Stop reason: HOSPADM

## 2022-08-09 RX ORDER — ONDANSETRON 2 MG/ML
4 INJECTION INTRAMUSCULAR; INTRAVENOUS EVERY 6 HOURS PRN
Status: DISCONTINUED | OUTPATIENT
Start: 2022-08-09 | End: 2022-08-10 | Stop reason: HOSPADM

## 2022-08-09 RX ORDER — SODIUM CHLORIDE 0.9 % (FLUSH) 0.9 %
5-40 SYRINGE (ML) INJECTION PRN
Status: DISCONTINUED | OUTPATIENT
Start: 2022-08-09 | End: 2022-08-10 | Stop reason: HOSPADM

## 2022-08-09 RX ORDER — SODIUM CHLORIDE 9 MG/ML
INJECTION, SOLUTION INTRAVENOUS CONTINUOUS
Status: DISCONTINUED | OUTPATIENT
Start: 2022-08-09 | End: 2022-08-10 | Stop reason: HOSPADM

## 2022-08-09 RX ORDER — CEFAZOLIN SODIUM 1 G/3ML
INJECTION, POWDER, FOR SOLUTION INTRAMUSCULAR; INTRAVENOUS PRN
Status: DISCONTINUED | OUTPATIENT
Start: 2022-08-09 | End: 2022-08-09 | Stop reason: SDUPTHER

## 2022-08-09 RX ORDER — PROPOFOL 10 MG/ML
INJECTION, EMULSION INTRAVENOUS CONTINUOUS PRN
Status: DISCONTINUED | OUTPATIENT
Start: 2022-08-09 | End: 2022-08-09 | Stop reason: SDUPTHER

## 2022-08-09 RX ORDER — LIDOCAINE HYDROCHLORIDE 10 MG/ML
INJECTION, SOLUTION EPIDURAL; INFILTRATION; INTRACAUDAL; PERINEURAL PRN
Status: DISCONTINUED | OUTPATIENT
Start: 2022-08-09 | End: 2022-08-09 | Stop reason: HOSPADM

## 2022-08-09 RX ORDER — SODIUM CHLORIDE 0.9 % (FLUSH) 0.9 %
5-40 SYRINGE (ML) INJECTION EVERY 12 HOURS SCHEDULED
Status: DISCONTINUED | OUTPATIENT
Start: 2022-08-09 | End: 2022-08-09 | Stop reason: HOSPADM

## 2022-08-09 RX ORDER — SODIUM CHLORIDE 9 MG/ML
INJECTION, SOLUTION INTRAVENOUS CONTINUOUS PRN
Status: DISCONTINUED | OUTPATIENT
Start: 2022-08-09 | End: 2022-08-09 | Stop reason: SDUPTHER

## 2022-08-09 RX ORDER — AMLODIPINE BESYLATE 5 MG/1
5 TABLET ORAL DAILY
Status: DISCONTINUED | OUTPATIENT
Start: 2022-08-09 | End: 2022-08-10 | Stop reason: HOSPADM

## 2022-08-09 RX ORDER — CLOPIDOGREL BISULFATE 75 MG/1
75 TABLET ORAL DAILY
Status: DISCONTINUED | OUTPATIENT
Start: 2022-08-09 | End: 2022-08-09 | Stop reason: SDUPTHER

## 2022-08-09 RX ORDER — HYDRALAZINE HYDROCHLORIDE 20 MG/ML
10 INJECTION INTRAMUSCULAR; INTRAVENOUS ONCE
Status: DISCONTINUED | OUTPATIENT
Start: 2022-08-09 | End: 2022-08-09

## 2022-08-09 RX ORDER — FENTANYL CITRATE 50 UG/ML
INJECTION, SOLUTION INTRAMUSCULAR; INTRAVENOUS PRN
Status: DISCONTINUED | OUTPATIENT
Start: 2022-08-09 | End: 2022-08-09 | Stop reason: SDUPTHER

## 2022-08-09 RX ORDER — ACETAMINOPHEN 325 MG/1
650 TABLET ORAL EVERY 4 HOURS PRN
Status: DISCONTINUED | OUTPATIENT
Start: 2022-08-09 | End: 2022-08-10 | Stop reason: HOSPADM

## 2022-08-09 RX ORDER — SODIUM CHLORIDE 9 MG/ML
INJECTION, SOLUTION INTRAVENOUS PRN
Status: DISCONTINUED | OUTPATIENT
Start: 2022-08-09 | End: 2022-08-10 | Stop reason: HOSPADM

## 2022-08-09 RX ORDER — OXYCODONE HYDROCHLORIDE AND ACETAMINOPHEN 5; 325 MG/1; MG/1
1 TABLET ORAL EVERY 6 HOURS PRN
Status: DISCONTINUED | OUTPATIENT
Start: 2022-08-09 | End: 2022-08-10 | Stop reason: HOSPADM

## 2022-08-09 RX ORDER — PROTAMINE SULFATE 10 MG/ML
INJECTION, SOLUTION INTRAVENOUS PRN
Status: DISCONTINUED | OUTPATIENT
Start: 2022-08-09 | End: 2022-08-09 | Stop reason: SDUPTHER

## 2022-08-09 RX ORDER — CLOPIDOGREL BISULFATE 75 MG/1
75 TABLET ORAL DAILY
Status: DISCONTINUED | OUTPATIENT
Start: 2022-08-10 | End: 2022-08-10 | Stop reason: HOSPADM

## 2022-08-09 RX ORDER — SODIUM CHLORIDE 0.9 % (FLUSH) 0.9 %
5-40 SYRINGE (ML) INJECTION PRN
Status: DISCONTINUED | OUTPATIENT
Start: 2022-08-09 | End: 2022-08-09 | Stop reason: HOSPADM

## 2022-08-09 RX ADMIN — PROTAMINE SULFATE 5 MG: 10 INJECTION, SOLUTION INTRAVENOUS at 14:59

## 2022-08-09 RX ADMIN — ACETAMINOPHEN 650 MG: 325 TABLET ORAL at 20:38

## 2022-08-09 RX ADMIN — OXYCODONE HYDROCHLORIDE AND ACETAMINOPHEN 1 TABLET: 5; 325 TABLET ORAL at 16:18

## 2022-08-09 RX ADMIN — FENTANYL CITRATE 25 MCG: 50 INJECTION, SOLUTION INTRAMUSCULAR; INTRAVENOUS at 14:37

## 2022-08-09 RX ADMIN — AMLODIPINE BESYLATE 5 MG: 5 TABLET ORAL at 18:27

## 2022-08-09 RX ADMIN — SODIUM CHLORIDE: 9 INJECTION, SOLUTION INTRAVENOUS at 10:31

## 2022-08-09 RX ADMIN — CLOPIDOGREL BISULFATE 75 MG: 75 TABLET ORAL at 10:39

## 2022-08-09 RX ADMIN — PROTAMINE SULFATE 5 MG: 10 INJECTION, SOLUTION INTRAVENOUS at 15:00

## 2022-08-09 RX ADMIN — PROTAMINE SULFATE 20 MG: 10 INJECTION, SOLUTION INTRAVENOUS at 15:02

## 2022-08-09 RX ADMIN — SODIUM CHLORIDE: 9 INJECTION, SOLUTION INTRAVENOUS at 13:48

## 2022-08-09 RX ADMIN — SODIUM CHLORIDE: 9 INJECTION, SOLUTION INTRAVENOUS at 17:13

## 2022-08-09 RX ADMIN — LIDOCAINE HYDROCHLORIDE 50 MG: 10 INJECTION, SOLUTION EPIDURAL; INFILTRATION; INTRACAUDAL; PERINEURAL at 13:52

## 2022-08-09 RX ADMIN — HEPARIN SODIUM 2000 UNITS: 1000 INJECTION INTRAVENOUS; SUBCUTANEOUS at 14:34

## 2022-08-09 RX ADMIN — PROPOFOL 200 MCG/KG/MIN: 10 INJECTION, EMULSION INTRAVENOUS at 13:52

## 2022-08-09 RX ADMIN — LISINOPRIL 40 MG: 20 TABLET ORAL at 18:27

## 2022-08-09 RX ADMIN — CEFAZOLIN 2000 MG: 1 INJECTION, POWDER, FOR SOLUTION INTRAMUSCULAR; INTRAVENOUS at 14:26

## 2022-08-09 RX ADMIN — FENTANYL CITRATE 25 MCG: 50 INJECTION, SOLUTION INTRAMUSCULAR; INTRAVENOUS at 13:52

## 2022-08-09 RX ADMIN — Medication 100 MCG: at 14:09

## 2022-08-09 RX ADMIN — HEPARIN SODIUM 3000 UNITS: 1000 INJECTION INTRAVENOUS; SUBCUTANEOUS at 14:29

## 2022-08-09 RX ADMIN — PROTAMINE SULFATE 10 MG: 10 INJECTION, SOLUTION INTRAVENOUS at 15:01

## 2022-08-09 RX ADMIN — PHENYLEPHRINE HYDROCHLORIDE 40 MCG/MIN: 10 INJECTION INTRAVENOUS at 14:06

## 2022-08-09 ASSESSMENT — PAIN DESCRIPTION - LOCATION
LOCATION: BACK
LOCATION: BACK

## 2022-08-09 ASSESSMENT — PAIN SCALES - GENERAL
PAINLEVEL_OUTOF10: 6
PAINLEVEL_OUTOF10: 4
PAINLEVEL_OUTOF10: 7
PAINLEVEL_OUTOF10: 8
PAINLEVEL_OUTOF10: 8

## 2022-08-09 ASSESSMENT — PAIN DESCRIPTION - DESCRIPTORS
DESCRIPTORS: ACHING;SHOOTING
DESCRIPTORS: PRESSURE;ACHING

## 2022-08-09 ASSESSMENT — PAIN DESCRIPTION - PAIN TYPE: TYPE: CHRONIC PAIN

## 2022-08-09 ASSESSMENT — PAIN - FUNCTIONAL ASSESSMENT: PAIN_FUNCTIONAL_ASSESSMENT: PREVENTS OR INTERFERES SOME ACTIVE ACTIVITIES AND ADLS

## 2022-08-09 NOTE — H&P
[facility]  [address] [facility] [address] [place] [address]  Dept: [phone]      Patient: Serena Gilman  : 1955  MRN: [id number]  DOS: 7/15/2022     Referring provider:  No ref. provider found            HPI:  Serena Gilman is a 78 y.o. female who returns to the office for renal artery duplex. Recall that she had an aortobiiliac bypass graft for claudication. Her left renal artery has known stenosis. Duplex examination reveals that the right kidney is about 9.3 cm and the left kidney is no more than 8 cm. In some planes it is as small as 7 cm. There is an elevated velocity on the left to approximately 400 cm/s demonstrating significant stenosis. Her blood pressure is poorly controlled on 2 medications and she frequently reaches 170 to 084 mmHg systolic pressure. She has not had a drop in her urine output. I do not know of her recent creatinine. She denies claudication which was her symptom previous to bypass.   Past Medical History        Past Medical History:   Diagnosis Date    Anxiety      Depression      Diabetes mellitus (HCC)       oral meds    Glaucoma      Hearing loss      Hx of blood clots       right leg and right lung 2020    Hypertension      Migraines      PVD (peripheral vascular disease) ([facility] [address]      Wears dentures           Family History         Family History   Problem Relation Age of Onset    Colon Cancer Mother      Prostate Cancer Father      Thyroid Cancer Sister           Social History               Socioeconomic History    Marital status:        Spouse name: Not on file    Number of children: Not on file    Years of education: Not on file    Highest education level: Not on file   Occupational History    Not on file   Tobacco Use    Smoking status: Every Day       Packs/day: 1.00       Years: 41.00       Pack years: 41.00       Types: Cigarettes       Start date: 7/22/1975    Smokeless tobacco: Never    Tobacco comments:       Cut back   Vaping Use    Vaping Use: Never used   Substance and Sexual Activity    Alcohol use: Never    Drug use: Never    Sexual activity: Not on file   Other Topics Concern    Not on file   Social History Narrative    Not on file      Social Determinants of Health          Financial Resource Strain: Low Risk    Difficulty of Paying Living Expenses: Not hard at all   Food Insecurity: No Food Insecurity    Worried About Running Out of Food in the Last Year: Never true    [address] in the Last Year: Never true   Transportation Needs: No Transportation Needs    Lack of Transportation (Medical): No    Lack of Transportation (Non-Medical):  No   Physical Activity: Insufficiently Active    Days of Exercise per Week: 2 days    Minutes of Exercise per Session: 20 min   Stress: Not on file   Social Connections: Not on file   Intimate Partner Violence: Not on file   Housing Stability: Not on file         Past Surgical History         Past Surgical History:   Procedure Laterality Date    ABDOMINAL AORTIC ANEURYSM REPAIR N/A 9/15/2020     AORTAL BIFEMORAL BYPASS  **CELLSAVER** performed by James Reardon MD at [address] [facility]    AORTO-FEMORAL BYPASS GRAFT   09/15/2020    APPENDECTOMY        BREAST SURGERY         \"crystals removed from right breast\"    CATARACT REMOVAL   2015    CHOLECYSTECTOMY        COLONOSCOPY        ENDOSCOPY, COLON, DIAGNOSTIC        EYE SURGERY        PYLOROMYOTOMY N/A 1/19/2022     PYLOROPLASTY performed by Ivania Hoang MD at [address] [facility],  [address]   06/08/2021     w/EUS FNA    UPPER GASTROINTESTINAL ENDOSCOPY N/A 6/8/2021     EGD W/EUS FNA performed by Rose Marie Woodward MD at [place] [address]   6/8/2021     EGD DILATION BALLOON 6-7.5MM performed by Rose Marie Woodward MD at [facility] End[facility]    UPPER GASTROINTESTINAL ENDOSCOPY N/A 6/8/2021     EGD BIOPSY performed by Selena Cali MD at [facility]    UPPER GASTROINTESTINAL ENDOSCOPY N/A 7/22/2021     EGD DILATION BALLOON, pyloric performed by Roslyn Rosa MD at [facility] 1 7/22/2021     [facility]   7/22/2021     EGD DILATION SAVORY performed by Roslyn Rosa MD at [facility] Endoscopy    VASCULAR SURGERY             Review of Systems     Vitals       Vitals:     07/15/22 1016   BP: (!) 172/89   Site: Left Upper Arm   Position: Sitting   Cuff Size: Medium Adult   Pulse: 73   Resp: 20   Temp: 98.6 °F (37 °C)   TempSrc: Temporal   SpO2: 98%   Weight: 122 lb (55.3 kg)   Height: 5' (1.524 m)             Physical Exam  Constitutional:       General: She is not in acute distress. HENT:     Mouth/Throat:     Mouth: Mucous membranes are moist.     Pharynx: Oropharynx is clear. Eyes:     General: No scleral icterus. Extraocular Movements: Extraocular movements intact. Conjunctiva/sclera: Conjunctivae normal.  Neck:     Thyroid: No thyroid mass or thyromegaly. Cardiovascular:     Rate and Rhythm: Normal rate and regular rhythm. Heart sounds: No murmur heard. Comments: No bruits in the abdominal compartment. She has palpable femoral, popliteal, dorsalis pedis and posterior tibial pulses bilaterally which are strong and equal.  Her feet are warm and well perfused without ulceration or gangrene. She has no significant swelling. Pulmonary:     Effort: No respiratory distress. Breath sounds: No rales. Abdominal:     General: There is no distension. Palpations: There is no mass. Tenderness: There is no abdominal tenderness. There is no guarding. Musculoskeletal:     Cervical back: No rigidity or tenderness. Lymphadenopathy:     Cervical: No cervical adenopathy. Skin:     Coloration: Skin is not jaundiced. Findings: No rash.   Neurological: General: No focal deficit present. Mental Status: She is alert and oriented to person, place, and time. Cranial Nerves: No cranial nerve deficit. Psychiatric:         Mood and Affect: Mood normal.        Assessment:  1. Stenosis of left renal artery (HCC)   2. Status post aortobifemoral bypass surgery             Plan:  We discussed with patient that the best option is renal artery stenting on the left. There is concern regarding the reduction in size of the left kidney compared to the right kidney. She remains hypertensive on 2 anti-hypertension medications. Combined she has good indication for renal arteriography and possible stenting. She understands and agrees with going ahead with renal arteriography and possible stenting. She also understands that these procedures are not perfect and that restenosis can occur as well as loss of the kidney from occlusion.      Aspen Hollingsworth, DO PGY-2  8/9/22 1:04 PM

## 2022-08-09 NOTE — PROGRESS NOTES
Received post procedure to [facility] to room 8. Assessment obtained. Restrictions reviewed with patient. Post procedure pathway initiated. Left groin area site soft ,pressure dressing dry and intact. No hematoma noted. Family at side. Patient without complaints. Head of bed flat with left leg straight.

## 2022-08-09 NOTE — OP NOTE
Operative Note      Patient: Nilton Luque  YOB: 1955  MRN: [id number]    Date of Procedure: 8/9/2022    Pre-Op Diagnosis: Left RENAL ARTERY STENOSIS with small size left kidney compared to the right    Post-Op Diagnosis: Same       Procedure(s):  LEFT RENAL ARTERY STENT (ATRIUM MEDICAL NU CAST COVERED STENT 6 MM X 22 MM X 120 CM, 6FR), RENAL ARTERIOGRAPHY    Surgeon(s):  Mack Paniagua MD    Assistant:   Cerebral    Anesthesia: General    Estimated Blood Loss (mL): Minimal    Complications: None    Specimens:   None    Implants:  Implant Name Type Inv. Item Serial No.  Lot No. LRB No. Used Action   ATRIUM CAST COVERED [place] 1466, REF [id number] Stent  [id number] [employer]CR  Left 1 Implanted         Drains: None    Findings: 80% left renal artery stenosis just beyond the ostium with a torturous left renal artery. This responded very well to a 6 x 22 I cast covered stent. There was excellent flow through the renal artery at the conclusion of the stent. There was no kinking of the artery. Nephrogram was normal.  There was no dissection. 6 Romanian Angio-Seal device was unable to be placed as it would not enter into the common femoral artery which in fact was the femoral limb of the aortobifemoral bypass graft. Direct pressure was held for over 20 minutes. Detailed Description of Procedure:   Patient was brought to the operating room and placed in the supine position with her arms tucked at her sides. She was then to fight as [facility] for left renal arteriography and possible stent. She was given monitored anesthesia care. Her groins abdomen and upper thighs were prepped and draped in sterile fashion. Timeout was held before lidocaine without epinephrine was infiltrated into the skin and subcutaneous tissue overlying the right common femoral artery.   The right common femoral artery/right femoral limb of the aortobifemoral bypass graft was accessed under direct ultrasound guidance. An 18-gauge needle was used for this through which modified Seldinger technique was used to establish a 4 Western Kristal sheath. A wire was placed into the distal aorta over which both an angled glide catheter and angled 4 Western Kristal Pineda cross catheter were unable to cannulate into the left renal artery. Left renal arteriography was obtained with those catheters and therefore I decided to place a 6 Western Kristal Ansell 1 sheath to the level of the renal artery. This was done over an 035 Glidewire advantage. An RBI catheter was then used to cannulate the renal artery as it was somewhat tortuous. This was done with the 035 Glidewire advantage and the RBI catheter. I confirmed that I was indeed in the left renal artery with that catheter using nephrogram.  The wire was exchanged to an 035 starter Edwards short tipped wire. Catheter was removed and the sheath was used to perform arteriography to direct first a 4 x 40 balloon angioplasty of the renal artery for sizing purposes and also to see if the lesion would respond only to angioplasty. The patient did receive 5000 units of heparin prior to angioplasty. That balloon was removed and the 6 x 22 I cast stent was placed in the appropriate position and deployed to near profile. There was some central wasting which was not significant and at approximately 10 to 20% stenosis. Completion arteriography was done with and without the wire through the sheath showing excellent results with no significant stenosis and no kinking. Nephrogram was good as well. The sheath was then used to perform right common femoral arteriography and the graft was suitable for an Angio-Seal device. I was unable to pass the Angio-Seal sheath into the artery after removing the 6 Western Kristal Ansell sheath. For this reason I remove the wire and held direct pressure on the common femoral artery/femoral limb of the aortobifemoral bypass graft for no less than 20 minutes.   There was no bleeding or hematoma at the immediate conclusion of the case and she tolerated all this quite well. There were no complications. She will be discharged to home with follow-up in the office in several weeks. She will need 4 hours flat in the recovery area. .    Electronically signed by Jluis Aldridge MD on 8/9/2022 at 3:12 PM

## 2022-08-09 NOTE — ANESTHESIA PRE PROCEDURE
Department of Anesthesiology  Preprocedure Note       Name:  Luisa Oden   Age:  78 y.o.  :  1955                                          MRN:  [id number]         Date:  2022      Surgeon: Seven Palmer):  Ozzie French MD    Procedure: Procedure(s):  RENAL STENT PLACEMENT    Medications prior to admission:   Prior to Admission medications    Medication Sig Start Date End Date Taking? Authorizing Provider   oxyCODONE-acetaminophen (PERCOCET) 5-325 MG per tablet Take 1 tablet by mouth every 8 hours as needed for Pain for up to 30 days. 22  BAKARI Perez CNP   clopidogrel (PLAVIX) 75 MG tablet Take 1 tablet by mouth in the morning. 7/15/22   Ozzie French MD   amLODIPine (NORVASC) 5 MG tablet Take 1 tablet by mouth daily 22   BAKARI Perez CNP   lisinopril (PRINIVIL;ZESTRIL) 40 MG tablet Take 1 tablet by mouth daily 3/1/22   BAKARI Perez CNP   Multiple Vitamins-Minerals (MULTIVITAMIN ADULT EXTRA C PO) multivitamin   1 tab daily    Historical Provider, MD   latanoprost (XALATAN) 0.005 % ophthalmic solution INSTILL 1 DROP INTO EACH EYE AT BEDTIME 20   Historical Provider, MD   aspirin (ASPIRIN LOW DOSE ADULT) 81 MG chewable tablet Take 1 tablet by mouth daily 20   BAKARI Lau - NP   VORTIoxetine (TRINTELLIX) 10 MG TABS tablet Take 20 mg by mouth  19   Historical Provider, MD       Current medications:    Current Facility-Administered Medications   Medication Dose Route Frequency Provider Last Rate Last Admin    0.9 % sodium chloride infusion   IntraVENous Continuous Ozzie French MD 75 mL/hr at 22 1031 New Bag at 22 1031    clopidogrel (PLAVIX) tablet 75 mg  75 mg Oral Daily Ozzie French MD   75 mg at 22 1039       Allergies:     Allergies   Allergen Reactions    Dilaudid [Hydromorphone Hcl] Swelling    Sulfa Antibiotics Swelling     Anything with sulfa    Sulfamethoxazole Swelling  Sulfamethoxazole-Trimethoprim Swelling    Trimethoprim Swelling    Tetanus Toxoid Swelling     Arm swelling.        Problem List:    Patient Active Problem List   Diagnosis Code    Occlusion of aorta ([facility]) I70.0    New onset type 2 diabetes mellitus ([facility] E11.9    PAD (peripheral artery disease) ([facility]) I73.9    Claudication ([facility] I73.9    Aortoiliac occlusive disease ([facility] I74.09    Gastroesophageal reflux disease K21.9    Status post aortobifemoral bypass surgery Z95.828    Chronic right shoulder pain M25.511, G89.29    Generalized postprandial abdominal pain R10.84    Gastric outlet obstruction K31.1    Primary hypertension I10    History of pyloroplasty Z98.890       Past Medical History:        Diagnosis Date    Anxiety     Depression     Diabetes mellitus ([facility]     oral meds    Glaucoma     Hearing loss     Hx of blood clots     right leg and right lung 2020    Hypertension     Migraines     PVD (peripheral vascular disease) ([facility]     Renal artery stenosis ([facility]     left    Wears dentures        Past Surgical History:        Procedure Laterality Date    ABDOMINAL AORTIC ANEURYSM REPAIR N/A 09/15/2020    AORTAL BIFEMORAL BYPASS  **CELLSAVER** performed by James Reardon MD at [address]      \"crystals removed from right breast\"    CATARACT REMOVAL  2015    CHOLECYSTECTOMY      COLONOSCOPY      ENDOSCOPY, COLON, DIAGNOSTIC      PYLOROMYOTOMY N/A 01/19/2022    PYLOROPLASTY performed by Ivania Hoang MD at [address] [place] [address]. N/A 06/08/2021    EGD W/EUS FNA performed by Rose Marie Woodward MD at [place] [address]  06/08/2021    EGD DILATION BALLOON 6-7.5MM performed by Rose Marie [facility]ward MD at [facility]    UPPER GASTROINTESTINAL ENDOSCOPY N/A 06/08/2021    EGD BIOPSY performed by Rose Marie Woodward MD at [address] [place] [address] ENDOSCOPY N/A 07/22/2021    EGD DILATION BALLOON, pyloric performed by Jose Dow MD at [address] [place] [address] N/A 07/22/2021    EGD BIOPSY performed by Jose Dow MD at [address] [place] [address]  07/22/2021    EGD DILATION SAVORY performed by Jose Dow MD at [facility]       Social History:    Social History     Tobacco Use    Smoking status: Every Day     Packs/day: 1.00     Years: 41.00     Pack years: 41.00     Types: Cigarettes     Start date: 7/22/1975    Smokeless tobacco: Never    Tobacco comments:     Cut back   Substance Use Topics    Alcohol use: Never                                Ready to quit: Not Answered  Counseling given: Not Answered  Tobacco comments: Cut back      Vital Signs (Current):   Vitals:    08/09/22 1025   BP: (!) 172/72   Pulse: 73   Resp: 18   Temp: 98.1 °F (36.7 °C)   TempSrc: Oral   SpO2: 98%   Weight: 120 lb (54.4 kg)   Height: 5' (1.524 m)                                              BP Readings from Last 3 Encounters:   08/09/22 (!) 172/72   07/22/22 (!) 148/88   07/15/22 (!) 169/87       NPO Status: Time of last liquid consumption: 1040 (sip with plavix)                        Time of last solid consumption: 1500                        Date of last liquid consumption: 08/09/22                        Date of last solid food consumption: 08/08/22    BMI:   Wt Readings from Last 3 Encounters:   08/09/22 120 lb (54.4 kg)   07/22/22 117 lb (53.1 kg)   07/15/22 122 lb (55.3 kg)     Body mass index is 23.44 kg/m².     CBC:   Lab Results   Component Value Date/Time    WBC 14.4 01/20/2022 05:55 AM    RBC 3.82 01/20/2022 05:55 AM    HGB 14.9 07/22/2022 09:44 AM    HCT 45.0 07/22/2022 09:44 AM    MCV 98.2 01/20/2022 05:55 AM    RDW 13.0 01/20/2022 05:55 AM     01/20/2022 05:55 AM       CMP:   Lab Results   Component Value Date/Time     07/22/2022 09:44 AM    K 3.6 07/22/2022 09:44 AM     to blood products. Plan discussed with CRNA.                     Layla Sierra MD   8/9/2022

## 2022-08-09 NOTE — PROGRESS NOTES
Patient admitted, consent signature verified and questions answered. Patient ready for procedure. Call light to reach with side rails up 2 of 2. Bilat groin  clipped with Alanna and Nicole present. Family at bedside with patient.

## 2022-08-10 ENCOUNTER — TELEPHONE (OUTPATIENT)
Dept: PRIMARY CARE CLINIC | Age: 67
End: 2022-08-10

## 2022-08-10 VITALS
TEMPERATURE: 98.2 F | WEIGHT: 120 LBS | OXYGEN SATURATION: 99 % | SYSTOLIC BLOOD PRESSURE: 192 MMHG | DIASTOLIC BLOOD PRESSURE: 104 MMHG | HEIGHT: 60 IN | BODY MASS INDEX: 23.56 KG/M2 | RESPIRATION RATE: 16 BRPM | HEART RATE: 69 BPM

## 2022-08-10 PROCEDURE — 6370000000 HC RX 637 (ALT 250 FOR IP): Performed by: STUDENT IN AN ORGANIZED HEALTH CARE EDUCATION/TRAINING PROGRAM

## 2022-08-10 PROCEDURE — G0378 HOSPITAL OBSERVATION PER HR: HCPCS

## 2022-08-10 RX ADMIN — AMLODIPINE BESYLATE 5 MG: 5 TABLET ORAL at 08:37

## 2022-08-10 RX ADMIN — CLOPIDOGREL 75 MG: 75 TABLET, FILM COATED ORAL at 08:37

## 2022-08-10 RX ADMIN — LISINOPRIL 40 MG: 20 TABLET ORAL at 08:36

## 2022-08-10 RX ADMIN — Medication 81 MG: at 08:37

## 2022-08-10 NOTE — PLAN OF CARE
Problem: Discharge Planning  Goal: Discharge to home or other facility with appropriate resources  8/10/2022 0931 by Natalia Oconnor RN  Outcome: Completed  8/10/2022 0003 by Genny Sylvester RN  Outcome: Progressing  Flowsheets (Taken 8/9/2022 1704 by Ivania Leung, RN)  Discharge to home or other facility with appropriate resources:   Identify barriers to discharge with patient and caregiver   Arrange for needed discharge resources and transportation as appropriate     Problem: Chronic Conditions and Co-morbidities  Goal: Patient's chronic conditions and co-morbidity symptoms are monitored and maintained or improved  8/10/2022 0931 by Natalia Oconnor RN  Outcome: Completed  8/10/2022 0003 by Genny Sylvester RN  Outcome: Progressing  [address] (Taken 8/9/2022 1704 by Ivania Leung, RN)  Care Plan - Patient's Chronic Conditions and Co-Morbidity Symptoms are Monitored and Maintained or Improved: Monitor and assess patient's chronic conditions and comorbid symptoms for stability, deterioration, or improvement     Problem: Pain  Goal: Verbalizes/displays adequate comfort level or baseline comfort level  8/10/2022 0931 by Natalia Oconnor RN  Outcome: Completed  8/10/2022 0003 by Genny Sylvester RN  Outcome: Progressing  Flowsheets (Taken 8/9/2022 1704 by Ivania Leung, RN)  Verbalizes/displays adequate comfort level or baseline comfort level: Encourage patient to monitor pain and request assistance     Problem: ABCDS Injury Assessment  Goal: Absence of physical injury  Outcome: Completed

## 2022-08-10 NOTE — TELEPHONE ENCOUNTER
[facility] Trans[facility] Initial Follow Up Call    Outreach made within 2 business days of discharge: Yes    Patient: Randy Robert Patient : 1955   MRN: [id number]  Reason for Admission: There are no discharge diagnoses documented for the most recent discharge.   Discharge Date: 8/10/22       Spoke with: Left voice message to call the office to schedule ER follow up    Discharge department/facility: [facility]      Scheduled appointment with PCP within 7-14 days    Follow Up  Future Appointments   Date Time Provider [place] Duran   2022 10:00 AM Jesus Elaine MD [facility]   2022 10:40 AM BAKARI Avalos - [facility] Alejo Mathews MA

## 2022-08-10 NOTE — DISCHARGE INSTRUCTIONS
Ok to shower, activity as tolerated  Blood pressure medications per primary care doctor  Please call the office for follow-up appointment

## 2022-08-10 NOTE — PROGRESS NOTES
Vascular Surgery   Progress Note    PATIENT NAME: Josee Lynch     TODAY'S DATE: 8/10/2022, 6:56 AM    SUBJECTIVE:     Pt seen and examined at bedside. No acute overnight events. Pt was admitted to obs to monitor for signs of hematoma in right groin access site. No hematoma, patient states she does not have pain at site and is tolerating a regular diet. OBJECTIVE:   VITALS:  BP (!) 152/82   Pulse 70   Temp 98.6 °F (37 °C) (Oral)   Resp 16   Ht 5' (1.524 m)   Wt 120 lb (54.4 kg)   SpO2 96%   BMI 23.44 kg/m²      INTAKE/OUTPUT:      Intake/Output Summary (Last 24 hours) at 8/10/2022 0656  Last data filed at 8/9/2022 1859  Gross per 24 hour   Intake 913.62 ml   Output 805 ml   Net 108.62 ml       PHYSICAL EXAM:  General Appearance: awake, alert, oriented, in no acute distress  HEENT:  Normocephalic, atraumatic, mucus membranes moist   Heart: Regular rate and rhythm  Lungs: normal effort with symmetric rise and fall of chest wall  Abdomen: soft, nondistended, nontender to palpation  Extremities: RLE - palpable DP and PT pulse; LLE -  palpable DP and PT pulse  Skin: Skin color, texture, turgor normal. No rashes or lesions. Data:  CBC: No results for input(s): WBC, HGB, PLT in the last 72 hours. Chemistry: No results for input(s): NA, K, CL, CO2, GLUCOSE, BUN, CREATININE, MG, ANIONGAP, LABGLOM, GFRAA, CALCIUM, CAION, PHOS, PSA, PROBNP, TROPHS, CKTOTAL, CKMB, CKMBINDEX, MYOGLOBIN, DIGOXIN, LACTACIDWB in the last 72 hours. Hepatic: No results for input(s): AST, ALT, ALB, ALKPHOS, BILITOT, BILIDIR in the last 72 hours. Coagulation: No results for input(s): APTT, PROT, INR in the last 72 hours. Radiology Review:    No results found.     ASSESSMENT:  Active Hospital Problems    Diagnosis Date Noted    Renovascular hypertension [I15.0] 08/09/2022     Priority: Medium       78 y.o. female with renovascular HTN POD# 1 s/p Endovascular left renal artery stent placement      Plan:  D/c this morning  F/u in office as needed.  Please call office for appointment    Electronically signed by Nubia Toledo DO  on 8/10/2022 at 6:56 AM

## 2022-08-10 NOTE — DISCHARGE SUMMARY
Critical access hospital  Vascular Surgery  Discharge Summary    Name: Andrei Yang  MRN: [id number]  Age: 78 y.o. Sex: female. : 1955  Admit Date:  2022  Discharge Date: 8/10/2022    Disposition: Home  Condition on Discharge: Stable    Consults:  None    Surgery:  Left renal artery stent placement    Patient Instructions:   See pre-printed instructions in chart and given to patient upon discharge. Discharge Medications:      Medication List        CONTINUE taking these medications      amLODIPine 5 MG tablet  Commonly known as: NORVASC  Take 1 tablet by mouth daily     aspirin 81 MG chewable tablet  Commonly known as: Aspirin Low Dose Adult  Take 1 tablet by mouth daily     clopidogrel 75 MG tablet  Commonly known as: PLAVIX  Take 1 tablet by mouth in the morning. latanoprost 0.005 % ophthalmic solution  Commonly known as: XALATAN     lisinopril 40 MG tablet  Commonly known as: PRINIVIL;ZESTRIL  Take 1 tablet by mouth daily     MULTIVITAMIN ADULT EXTRA C PO     oxyCODONE-acetaminophen 5-325 MG per tablet  Commonly known as: Percocet  Take 1 tablet by mouth every 8 hours as needed for Pain for up to 30 days. VORTIoxetine 10 MG Tabs tablet  Commonly known as: TRINTELLIX              Discharge Diagnoses:  Patient Active Problem List   Diagnosis    Occlusion of aorta ([address]    New onset type 2 diabetes mellitus ([address]    PAD (peripheral artery disease) ([address]    Claudication ([address]    Aortoiliac occlusive disease ([address]    Gastroesophageal reflux disease    Status post aortobifemoral bypass surgery    Chronic right shoulder pain    Generalized postprandial abdominal pain    Gastric outlet obstruction    Primary hypertension    History of pyloroplasty    Renovascular hypertension       HPI  Andrei Yang is a 78 y.o. female with history of renovascular HTN on anti-hypertensives and kidney size discrepancy who presented on 2022 for Endovascular left renal artery stent placement.       [facility] Course:  Hospital course was unremarkable. Pt was placed in observation overnight to monitor for signs of hematoma at right groin access site. No hematoma developed overnight. On day of discharge, patient was tolerating diet, pain controlled with oral medications, and otherwise meeting discharge criteria.       Electronically signed by Osmin Muñoz DO on 8/10/2022 at 9:27 AM

## 2022-08-10 NOTE — ACP (ADVANCE CARE PLANNING)
Advance Care Planning     Advance Care Planning Activator (Inpatient)  Conversation Note      Date of ACP Conversation: 8/10/2022     Conversation Conducted with: Patient with Decision Making Capacity    ACP Activator: Sergei Martinez RN      Health Care Decision Maker:     Current Designated Health Care Decision Maker:     Primary Decision Maker: Cynthia Graves - Spouse - [phone]  Click here to complete [address] including section of the Healthcare Decision Maker Relationship (ie \"Primary\")  Today we documented Decision Maker(s) consistent with Legal Next of Kin hierarchy. Care Preferences    Ventilation: \"If you were in your present state of health and suddenly became very ill and were unable to breathe on your own, what would your preference be about the use of a ventilator (breathing machine) if it were available to you? \"      Would the patient desire the use of ventilator (breathing machine)?: yes    \"If your health worsens and it becomes clear that your chance of recovery is unlikely, what would your preference be about the use of a ventilator (breathing machine) if it were available to you? \"     Would the patient desire the use of ventilator (breathing machine)?: Yes      Resuscitation  \"CPR works best to restart the heart when there is a sudden event, like a heart attack, in someone who is otherwise healthy. Unfortunately, CPR does not typically restart the heart for people who have serious health conditions or who are very sick. \"    \"In the event your heart stopped as a result of an underlying serious health condition, would you want attempts to be made to restart your heart (answer \"yes\" for attempt to resuscitate) or would you prefer a natural death (answer \"no\" for do not attempt to resuscitate)? \" yes       [] Yes   [] No   Educated Patient / Nguyễn Chapman regarding differences between Advance Directives and portable DNR orders.     Length of ACP Conversation in minutes: Conversation Outcomes:  [x] ACP discussion completed  [] Existing advance directive reviewed with patient; no changes to patient's previously recorded wishes  [] New Advance Directive completed  [] Portable Do Not Rescitate prepared for Provider review and signature  [] POLST/POST/MOLST/MOST prepared for Provider review and signature      Follow-up plan:    [] Schedule follow-up conversation to continue planning  [] Referred individual to Provider for additional questions/concerns   [] Advised patient/agent/surrogate to review completed ACP document and update if needed with changes in condition, patient preferences or care setting    [] This note routed to one or more involved healthcare providers

## 2022-08-10 NOTE — CARE COORDINATION
08/10/22 1013   Service Assessment   Patient Orientation Alert and Oriented   Cognition Alert   History Provided By Patient   Primary Caregiver Self   Support Systems Spouse/Significant Other;Family Members   Patient's Healthcare Decision Maker is: Named in [address] [place] [address]   PCP Verified by CM Yes   Last Visit to PCP Within last 3 months   Prior Functional Level Independent in ADLs/IADLs   Current Functional Level Independent in ADLs/IADLs   Can patient return to prior living arrangement Yes   Ability to make needs known: Good   Family able to assist with home care needs: Yes   Would you like for me to discuss the discharge plan with any other family members/significant others, and if so, who?  No   Social/Functional History   Lives With Spouse   Type of [address] Two level   Home Access Stairs to enter with rails   Entrance Stairs - Number of Steps 5   Bathroom Shower/Tub Tub/Shower unit   Bathroom Toilet Standard   Receives Help From Family   ADL Assistance Independent   Homemaking Assistance Independent   Homemaking Responsibilities Yes   Ambulation Assistance Independent   Transfer Assistance Independent   Active  Yes   Mode of Transportation Car   Discharge Planning   Type of Residence House   Living Arrangements Spouse/Significant Other   [address] Medications No   Type of [address] Discharge   Mode of Transport at Discharge Other (see comment)  (family)

## 2022-08-10 NOTE — PLAN OF CARE
Problem: Discharge Planning  Goal: Discharge to home or other facility with appropriate resources  Outcome: Progressing  Flowsheets (Taken 8/9/2022 1704 by Stephanie Hatsings, RN)  Discharge to home or other facility with appropriate resources:   Identify barriers to discharge with patient and caregiver   Arrange for needed discharge resources and transportation as appropriate     Problem: Chronic Conditions and Co-morbidities  Goal: Patient's chronic conditions and co-morbidity symptoms are monitored and maintained or improved  Outcome: Progressing  Flowsheets (Taken 8/9/2022 1704 by Stephanie Hastings RN)  Care Plan - Patient's Chronic Conditions and Co-Morbidity Symptoms are Monitored and Maintained or Improved: Monitor and assess patient's chronic conditions and comorbid symptoms for stability, deterioration, or improvement     Problem: Pain  Goal: Verbalizes/displays adequate comfort level or baseline comfort level  Outcome: Progressing  Flowsheets (Taken 8/9/2022 1704 by Stephanie Hastings RN)  Verbalizes/displays adequate comfort level or baseline comfort level: Encourage patient to monitor pain and request assistance

## 2022-08-11 ENCOUNTER — TELEPHONE (OUTPATIENT)
Dept: PRIMARY CARE CLINIC | Age: 67
End: 2022-08-11

## 2022-08-11 ENCOUNTER — CARE COORDINATION (OUTPATIENT)
Dept: CARE COORDINATION | Age: 67
End: 2022-08-11

## 2022-08-11 NOTE — CARE COORDINATION
Care Transitions Outreach Attempt #1    Call within 2 business days of discharge: Yes   Attempted to reach patient for transitions of care follow up. Unable to reach patient. HIPAA compliant message left on  requesting a return call. Patient: Viv Mcdermott Patient : 1955 MRN: [id number]    Last Discharge [facility]       Date Complaint Diagnosis Description Type Department Provider    22   Admission (Discharged) Kevon Finley MD              Was this an external facility discharge?  No Discharge Facility: [facility]    Noted following upcoming appointments from discharge chart review:   [facility] follow up appointment(s):   Future Appointments   Date Time Provider Tennille Duran   [facility]22 10:00 AM [facility]   2022 10:40 AM BAKARI Sanabria - CNP [place],4Th Floor, [facility]/ Care Transition Nurse  [phone]

## 2022-08-11 NOTE — TELEPHONE ENCOUNTER
[address] Transitions Initial Follow Up Call    Outreach made within 2 business days of discharge: Yes    Patient: Marquis Lance Patient : 1955   MRN: [id number]  Reason for Admission: There are no discharge diagnoses documented for the most recent discharge. Discharge Date: 8/10/22       Spoke with: SABINO Lowe department/facility: [place] [id number] Interactive Patient Contact:  Was patient able to fill all prescriptions: Yes  Was patient instructed to bring all medications to the follow-up visit: Yes  Is patient taking all medications as directed in the discharge summary?  Yes  Does patient understand their discharge instructions: Yes  Does patient have questions or concerns that need addressed prior to 7-14 day follow up office visit: no    Scheduled appointment with PCP within 7-14 days    Follow Up  Future Appointments   Date Time Provider Tennille Duran   2022  9:00 AM Liv Scott MD [facility]   2022 10:40 AM BAKARI Lynn - MEGAN [place] EAN Lackey MA

## 2022-08-12 ENCOUNTER — CARE COORDINATION (OUTPATIENT)
Dept: CARE COORDINATION | Age: 67
End: 2022-08-12

## 2022-08-12 NOTE — CARE COORDINATION
Care Transitions Outreach Attempt #2 final    Call within 2 business days of discharge: Yes   Attempted to reach patient for transitions of care follow up. Unable to reach patient. HIPAA compliant message left on  requesting a return call. Final attempt, episode resolved. Patient: Maricruz Dillard Patient : 1955 MRN: [id number]    Last Discharge [facility]       Date Complaint Diagnosis Description Type Department Provider    22   Admission (Discharged) Johnnie Jovel MD              Was this an external facility discharge?  No Discharge Facility: [facility]    Noted following upcoming appointments from discharge chart review:   [facility] follow up appointment(s):   Future Appointments   Date Time Provider Tennille Duran   2022  1:20 PM BAKARI Campos CNP [facility]   2022  9:00 AM [facility]   2022 10:40 AM BAKARI Campos CNP [facility] [address] [facility],4Th Floor, [facility]/ Care Transition Nurse  [phone]

## 2022-08-22 ENCOUNTER — OFFICE VISIT (OUTPATIENT)
Dept: PRIMARY CARE CLINIC | Age: 67
End: 2022-08-22
Payer: MEDICARE

## 2022-08-22 VITALS
BODY MASS INDEX: 23.05 KG/M2 | HEART RATE: 96 BPM | DIASTOLIC BLOOD PRESSURE: 86 MMHG | WEIGHT: 117.4 LBS | SYSTOLIC BLOOD PRESSURE: 138 MMHG | RESPIRATION RATE: 16 BRPM | OXYGEN SATURATION: 98 % | HEIGHT: 60 IN

## 2022-08-22 DIAGNOSIS — Z86.79 HX OF RENAL ARTERY STENOSIS: ICD-10-CM

## 2022-08-22 DIAGNOSIS — G89.29 CHRONIC RIGHT SHOULDER PAIN: ICD-10-CM

## 2022-08-22 DIAGNOSIS — Z09 HOSPITAL DISCHARGE FOLLOW-UP: ICD-10-CM

## 2022-08-22 DIAGNOSIS — I15.0 RENOVASCULAR HYPERTENSION: ICD-10-CM

## 2022-08-22 DIAGNOSIS — R10.84 GENERALIZED POSTPRANDIAL ABDOMINAL PAIN: ICD-10-CM

## 2022-08-22 DIAGNOSIS — M25.511 CHRONIC RIGHT SHOULDER PAIN: ICD-10-CM

## 2022-08-22 DIAGNOSIS — I74.09 AORTOILIAC OCCLUSIVE DISEASE (HCC): ICD-10-CM

## 2022-08-22 DIAGNOSIS — I70.1 RENAL ARTERY STENOSIS (HCC): Primary | ICD-10-CM

## 2022-08-22 PROCEDURE — 1111F DSCHRG MED/CURRENT MED MERGE: CPT | Performed by: NURSE PRACTITIONER

## 2022-08-22 PROCEDURE — 99495 TRANSJ CARE MGMT MOD F2F 14D: CPT | Performed by: NURSE PRACTITIONER

## 2022-08-22 SDOH — ECONOMIC STABILITY: FOOD INSECURITY: WITHIN THE PAST 12 MONTHS, YOU WORRIED THAT YOUR FOOD WOULD RUN OUT BEFORE YOU GOT MONEY TO BUY MORE.: NEVER TRUE

## 2022-08-22 SDOH — ECONOMIC STABILITY: FOOD INSECURITY: WITHIN THE PAST 12 MONTHS, THE FOOD YOU BOUGHT JUST DIDN'T LAST AND YOU DIDN'T HAVE MONEY TO GET MORE.: NEVER TRUE

## 2022-08-22 ASSESSMENT — ENCOUNTER SYMPTOMS
CONSTIPATION: 1
SINUS PRESSURE: 0
TROUBLE SWALLOWING: 0
WHEEZING: 0
SHORTNESS OF BREATH: 0
ABDOMINAL PAIN: 1
SORE THROAT: 0
NAUSEA: 0
VOMITING: 0
DIARRHEA: 1
COUGH: 0
BLOOD IN STOOL: 0

## 2022-08-22 ASSESSMENT — PATIENT HEALTH QUESTIONNAIRE - PHQ9
2. FEELING DOWN, DEPRESSED OR HOPELESS: 0
4. FEELING TIRED OR HAVING LITTLE ENERGY: 0
1. LITTLE INTEREST OR PLEASURE IN DOING THINGS: 0
8. MOVING OR SPEAKING SO SLOWLY THAT OTHER PEOPLE COULD HAVE NOTICED. OR THE OPPOSITE, BEING SO FIGETY OR RESTLESS THAT YOU HAVE BEEN MOVING AROUND A LOT MORE THAN USUAL: 0
7. TROUBLE CONCENTRATING ON THINGS, SUCH AS READING THE NEWSPAPER OR WATCHING TELEVISION: 0
SUM OF ALL RESPONSES TO PHQ QUESTIONS 1-9: 0
9. THOUGHTS THAT YOU WOULD BE BETTER OFF DEAD, OR OF HURTING YOURSELF: 0
3. TROUBLE FALLING OR STAYING ASLEEP: 0
10. IF YOU CHECKED OFF ANY PROBLEMS, HOW DIFFICULT HAVE THESE PROBLEMS MADE IT FOR YOU TO DO YOUR WORK, TAKE CARE OF THINGS AT HOME, OR GET ALONG WITH OTHER PEOPLE: 0
SUM OF ALL RESPONSES TO PHQ9 QUESTIONS 1 & 2: 0
SUM OF ALL RESPONSES TO PHQ QUESTIONS 1-9: 0
5. POOR APPETITE OR OVEREATING: 0
6. FEELING BAD ABOUT YOURSELF - OR THAT YOU ARE A FAILURE OR HAVE LET YOURSELF OR YOUR FAMILY DOWN: 0

## 2022-08-22 ASSESSMENT — SOCIAL DETERMINANTS OF HEALTH (SDOH): HOW HARD IS IT FOR YOU TO PAY FOR THE VERY BASICS LIKE FOOD, HOUSING, MEDICAL CARE, AND HEATING?: NOT HARD AT ALL

## 2022-08-22 NOTE — PROGRESS NOTES
[address] PRIMARY CARE  [address] [facility] 1560  [address] [address]  Dept: [phone]  Dept Fax: [phone]    Colette Ashraf is a 78 y.o. female who presentstoday for her medical conditions/complaints as noted below.   Colette Ashraf is c/o of  Chief Complaint   Patient presents with    Follow-Up from [facility]     Discharged from [facility] on 08/10/2022 for Renovascular hypertension       HPI:     Here today for follow up after hospital stay for renal vascular surgery  States procedure went well but still has abdominal pain particularly after eating  Stool pattern is variable despite use of stool softeners and fiber  Has continued percocet 3-4 times per day due to the pain, this will take the pain away for a few hours  Also using for her shoulder pain  Has follow up with surgeon on Friday  Also states while in hospital saw her vascular surgeon and she is to schedule appt with him for further eval of the abdominal pain  She also saw GI about a year ago for EGD and colonoscopy but recalls was told could not complete colonoscopy due to retained stool       Hemoglobin A1C (%)   Date Value   2022 6.2   2022 6.1 (H)   10/18/2021 6.1             ( goal A1C is < 7)   No results found for: LABMICR  LDL Cholesterol (mg/dL)   Date Value   2021 127   2020 113       (goal LDL is <100)   AST (U/L)   Date Value   2021 17     ALT (U/L)   Date Value   2021 14     BUN (mg/dL)   Date Value   2022 21     BP Readings from Last 3 Encounters:   22 138/86   08/10/22 (!) 192/104   22 (!) 148/88          ([id number])    Past Medical History:   Diagnosis Date    Anxiety     Depression     Diabetes mellitus ([address]     oral meds    Glaucoma     Hearing loss     Hx of blood clots     right leg and right lung     Hypertension     Migraines     PVD (peripheral vascular disease) ([address]     Renal artery stenosis ([address] left    Wears dentures       Past Surgical History:   Procedure Laterality Date    ABDOMINAL AORTIC ANEURYSM REPAIR N/A 09/15/2020    AORTAL BIFEMORAL BYPASS  **CELLSAVER** performed by Eyad Faustin MD at [facility] removed from right breast\"    CATARACT REMOVAL  2015    CHOLECYSTECTOMY      COLONOSCOPY      ENDOSCOPY, COLON, DIAGNOSTIC      PYLOROMYOTOMY N/A 01/19/2022    PYLOROPLASTY performed by Benjamín Vitale MD at [facility] 06/08/2021    EGD W/EUS FNA performed by Matt Braov MD at [address] [facility] ENDOSCOPY  06/08/2021    EGD DILATION BALLOON 6-7.5MM performed by Matt Bravo MD at [facility] 110 N/A 06/08/2021    EGD BIOPSY performed by Matt Bravo MD at [facility] 110 N/A 07/22/2021    EGD DILATION BALLOON, pyloric performed by Ami Hernandez MD at [facility] 110 07/22/2021    EGD BIOPSY performed by Ami Hernandez MD at [place] [address]  07/22/2021    EGD DILATION SAVORY performed by Ami Hernandez MD at [address] [place] [place] [address] 8/9/2022    LEFT RENAL ARTERY STENT ([facility] CAST COVERED STENT 6 MM X 22 MM X 120 CM, 6FR), RENAL ARTERIOGRAPHY performed by Rachana Schulz MD at [address] [place] [address] History   Problem Relation Age of Onset    Colon Cancer Mother     Prostate Cancer Father     Thyroid Cancer Sister           Social History     Tobacco Use    Smoking status: Every Day     Packs/day: 1.00     Years: 41.00     Pack years: 41.00     Types: Cigarettes     Start date: 7/22/1975    Smokeless tobacco: Never    Tobacco comments:     Cut back   Substance Use Topics    Alcohol use: Never      Current Outpatient Medications   Medication Sig Dispense Refill oxyCODONE-acetaminophen (PERCOCET) 5-325 MG per tablet Take 1 tablet by mouth every 8 hours as needed for Pain for up to 30 days. 90 tablet 0    clopidogrel (PLAVIX) 75 MG tablet Take 1 tablet by mouth in the morning. 90 tablet 1    amLODIPine (NORVASC) 5 MG tablet Take 1 tablet by mouth daily 30 tablet 5    lisinopril (PRINIVIL;ZESTRIL) 40 MG tablet Take 1 tablet by mouth daily 90 tablet 3    Multiple Vitamins-Minerals (MULTIVITAMIN ADULT EXTRA C PO) multivitamin   1 tab daily      latanoprost (XALATAN) 0.005 % ophthalmic solution INSTILL 1 DROP INTO EACH EYE AT BEDTIME      aspirin (ASPIRIN LOW DOSE ADULT) 81 MG chewable tablet Take 1 tablet by mouth daily 30 tablet 3    VORTIoxetine (TRINTELLIX) 10 MG TABS tablet Take 20 mg by mouth        No current facility-administered medications for this visit. Allergies   Allergen Reactions    Dilaudid [Hydromorphone Hcl] Swelling    Sulfa Antibiotics Swelling     Anything with sulfa    Sulfamethoxazole Swelling    Sulfamethoxazole-Trimethoprim Swelling    Trimethoprim Swelling    Tetanus Toxoid Swelling     Arm swelling.        Health Maintenance   Topic Date Due    Pneumococcal 65+ years Vaccine (1 - PCV) Never done    Diabetic microalbuminuria test  Never done    Diabetic retinal exam  Never done    Breast cancer screen  Never done    Shingles vaccine (1 of 2) Never done    Low dose CT lung screening  Never done    DEXA (modify frequency per FRAX score)  Never done    Diabetic foot exam  05/22/2021    Lipids  04/19/2022    COVID-19 Vaccine (4 - Booster for Pf[employer] series) 08/12/2022    Flu vaccine (1) 09/01/2022    Annual Wellness Visit (AWV)  03/02/2023    A1C test (Diabetic or Prediabetic)  06/02/2023    Depression Monitoring  06/02/2023    Colorectal Cancer Screen  05/13/2031    Hepatitis A vaccine  Aged Out    Hib vaccine  Aged Out    Meningococcal (ACWY) vaccine  Aged Out    Hepatitis C screen  Discontinued       Subjective:      Review of Systems Constitutional:  Negative for activity change, appetite change, chills, fatigue, fever and unexpected weight change. HENT:  Negative for congestion, ear pain, hearing loss, sinus pressure, sore throat and trouble swallowing. Eyes:  Negative for visual disturbance. Respiratory:  Negative for cough, shortness of breath and wheezing. Cardiovascular:  Negative for chest pain, palpitations and leg swelling. Gastrointestinal:  Positive for abdominal pain (chronic), constipation (intermittent) and diarrhea (intermittent). Negative for blood in stool, nausea and vomiting. Endocrine: Negative for cold intolerance, heat intolerance, polydipsia, polyphagia and polyuria. Genitourinary:  Negative for difficulty urinating, frequency, hematuria and urgency. Musculoskeletal:  Positive for arthralgias (right shoulder). Negative for myalgias. Skin:  Negative for rash. Allergic/Immunologic: Negative for environmental allergies. Neurological:  Negative for dizziness, weakness, light-headedness and headaches. Psychiatric/Behavioral:  Negative for confusion. The patient is not nervous/anxious. Objective:     Physical Exam  Constitutional:       Appearance: Normal appearance. She is well-developed. HENT:      Head: Normocephalic. Eyes:      Conjunctiva/sclera: Conjunctivae normal.      Pupils: Pupils are equal, round, and reactive to light. Cardiovascular:      Rate and Rhythm: Normal rate and regular rhythm. Heart sounds: Normal heart sounds. No murmur heard. Pulmonary:      Effort: Pulmonary effort is normal.      Breath sounds: Normal breath sounds. No wheezing. Abdominal:      General: Bowel sounds are normal. There is no distension. Palpations: Abdomen is soft. Musculoskeletal:         General: Normal range of motion. Cervical back: Normal range of motion. Skin:     General: Skin is warm and dry.    Neurological:      Mental Status: She is alert and oriented to person, place, and time. Psychiatric:         Behavior: Behavior normal.         Thought Content: Thought content normal.         Judgment: Judgment normal.     /86 (Site: Left Upper Arm, Position: Sitting, Cuff Size: Medium Adult)   Pulse 96   Resp 16   Ht 5' (1.524 m)   Wt 117 lb 6.4 oz (53.3 kg)   SpO2 98%   BMI 22.93 kg/m²     Assessment:       Diagnosis Orders   1. Renal artery stenosis ([address]        2. Generalized postprandial abdominal pain        3. Hx of renal artery stenosis  Basic Metabolic Panel      4. Renovascular hypertension  Basic Metabolic Panel      5. Chronic right shoulder pain        6. Aortoiliac occlusive disease ([address]                  Plan:      Return in about 16 days (around 9/7/2022) for diabetes check, as scheduled. Orders Placed This Encounter   Procedures    Basic Metabolic Panel     Standing Status:   Future     Standing Expiration Date:   8/22/2023     Renal artery stenosis, renal HTN-reviewed notes from hospital stay, she will follow up with renovascular surgeon later this week, recovering without complication. Will repeat BMP in 1 month to assess renal function  Abdominal pain, aortic occlusive disease-reviewed note from [facility] in May of 2021. She will see vascular surgeon next month, pending that evaluation discussed may need to return to GI  PDMP reflects appropriate of opioid pain reliever  Right shoulder pain-stable with as needed use of percocet, she can not tolerate nsaids due to plavix  Smoking-urged to consider quitting, discussed medication tx options, she will consider but not feeling motivated at this time      Patient given educational materials - see patient instructions. Discussed use, benefit, and side effects of prescribed medications. All patientquestions answered. Pt voiced understanding. Reviewed health maintenance. Instructedto continue current medications, diet and exercise. Patient agreed with treatmentplan. Follow up as directed. Electronicallysigned by BAKARI George CNP on 8/22/2022 at 2:20 PM   Post-Discharge Transitional Care  Follow Up      Yen Pablo   YOB: 1955    Date of Office Visit:  8/22/2022  Date of Hospital Admission: 8/9/22  Date of Hospital Discharge: 8/10/22  Risk of hospital readmission (high >=14%. Medium >=10%) :Readmission Risk Score: 5.5      Care management risk score Rising risk (score 2-5) and Complex Care (Scores >=6): No Risk Score On File     Non face to face  following discharge, date last encounter closed (first attempt may have been earlier): 08/12/2022    Call initiated 2 business days of discharge: Yes    ASSESSMENT/PLAN:   Renal artery stenosis (HCC)  Generalized postprandial abdominal pain  Hx of renal artery stenosis  -     Basic Metabolic Panel; Future  Renovascular hypertension  -     Basic Metabolic Panel; Future  Chronic right shoulder pain  Aortoiliac occlusive disease [facility])      Medical Decision Making: high complexity  Return in about 16 days (around 9/7/2022) for diabetes check, as scheduled. Subjective:   HPI:  Follow up of Hospital problems/diagnosis(es): see HPI    Inpatient course: Discharge summary reviewed- see chart. Interval history/Current status: improved    Patient Active Problem List   Diagnosis    Occlusion of aorta ([address]    New onset type 2 diabetes mellitus ([address]    PAD (peripheral artery disease) ([address]    Claudication ([address]    Aortoiliac occlusive disease ([address]    Gastroesophageal reflux disease    Status post aortobifemoral bypass surgery    Chronic right shoulder pain    Generalized postprandial abdominal pain    Gastric outlet obstruction    Primary hypertension    History of pyloroplasty    Renovascular hypertension       Medications listed as ordered at the time of discharge from hospital     Medication List            Accurate as of August 22, 2022  2:20 PM. If you have any questions, ask your nurse or doctor. CONTINUE taking these medications      amLODIPine 5 MG tablet  Commonly known as: NORVASC  Take 1 tablet by mouth daily     aspirin 81 MG chewable tablet  Commonly known as: Aspirin Low Dose Adult  Take 1 tablet by mouth daily     clopidogrel 75 MG tablet  Commonly known as: PLAVIX  Take 1 tablet by mouth in the morning. latanoprost 0.005 % ophthalmic solution  Commonly known as: XALATAN     lisinopril 40 MG tablet  Commonly known as: PRINIVIL;ZESTRIL  Take 1 tablet by mouth daily     MULTIVITAMIN ADULT EXTRA C PO     oxyCODONE-acetaminophen 5-325 MG per tablet  Commonly known as: Percocet  Take 1 tablet by mouth every 8 hours as needed for Pain for up to 30 days. VORTIoxetine 10 MG Tabs tablet  Commonly known as: TRINTELLIX                Medications marked \"taking\" at this time  Outpatient Medications Marked as Taking for the 8/22/22 encounter (Office Visit) with BAKARI Sanabria CNP   Medication Sig Dispense Refill    oxyCODONE-acetaminophen (PERCOCET) 5-325 MG per tablet Take 1 tablet by mouth every 8 hours as needed for Pain for up to 30 days. 90 tablet 0    clopidogrel (PLAVIX) 75 MG tablet Take 1 tablet by mouth in the morning.  90 tablet 1    amLODIPine (NORVASC) 5 MG tablet Take 1 tablet by mouth daily 30 tablet 5    lisinopril (PRINIVIL;ZESTRIL) 40 MG tablet Take 1 tablet by mouth daily 90 tablet 3    Multiple Vitamins-Minerals (MULTIVITAMIN ADULT EXTRA C PO) multivitamin   1 tab daily      latanoprost (XALATAN) 0.005 % ophthalmic solution INSTILL 1 DROP INTO EACH EYE AT BEDTIME      aspirin (ASPIRIN LOW DOSE ADULT) 81 MG chewable tablet Take 1 tablet by mouth daily 30 tablet 3    VORTIoxetine (TRINTELLIX) 10 MG TABS tablet Take 20 mg by mouth           Medications patient taking as of now reconciled against medications ordered at time of hospital discharge: Yes    See above    Objective:    /86 (Site: Left Upper Arm, Position: Sitting, Cuff Size: Medium Adult)   Pulse 96   Resp 16   Ht 5' (1.524 m)   Wt 117 lb 6.4 oz (53.3 kg)   SpO2 98%   BMI 22.93 kg/m²   See above    An electronic signature was used to authenticate this note.   --BAKARI Taylor - CNP

## 2022-08-24 DIAGNOSIS — M25.511 CHRONIC RIGHT SHOULDER PAIN: ICD-10-CM

## 2022-08-24 DIAGNOSIS — R10.84 GENERALIZED POSTPRANDIAL ABDOMINAL PAIN: ICD-10-CM

## 2022-08-24 DIAGNOSIS — G89.29 CHRONIC RIGHT SHOULDER PAIN: ICD-10-CM

## 2022-08-24 RX ORDER — OXYCODONE HYDROCHLORIDE AND ACETAMINOPHEN 5; 325 MG/1; MG/1
1 TABLET ORAL EVERY 8 HOURS PRN
Qty: 90 TABLET | Refills: 0 | Status: SHIPPED | OUTPATIENT
Start: 2022-08-24 | End: 2022-09-22 | Stop reason: SDUPTHER

## 2022-08-26 ENCOUNTER — OFFICE VISIT (OUTPATIENT)
Dept: VASCULAR SURGERY | Age: 67
End: 2022-08-26
Payer: MEDICARE

## 2022-08-26 VITALS
TEMPERATURE: 97.2 F | OXYGEN SATURATION: 97 % | SYSTOLIC BLOOD PRESSURE: 152 MMHG | HEART RATE: 109 BPM | HEIGHT: 60 IN | BODY MASS INDEX: 22.87 KG/M2 | DIASTOLIC BLOOD PRESSURE: 93 MMHG | WEIGHT: 116.5 LBS | RESPIRATION RATE: 17 BRPM

## 2022-08-26 DIAGNOSIS — I70.1 RENAL ARTERY STENOSIS (HCC): Primary | ICD-10-CM

## 2022-08-26 PROCEDURE — 99213 OFFICE O/P EST LOW 20 MIN: CPT | Performed by: SURGERY

## 2022-08-26 PROCEDURE — 1123F ACP DISCUSS/DSCN MKR DOCD: CPT | Performed by: SURGERY

## 2022-08-26 NOTE — PROGRESS NOTES
[facility]  [address] [facility]  [address] [place] [address]  Dept: [phone]     Patient: Meera Galdamez  : 1955  MRN: [id number]  DOS: 2022            HPI:  Meera Galdamez is a 78 y.o. female who returns to the office regarding the patient is doing well after left renal artery stenting. She had a very significant stenosis greater than 80% on arteriogram on the left. A 6 x 22 I cast was placed successfully and she has good flow to the kidney. Hopefully this will preserve her kidney size. I performed the procedure due to a total overall kidney size reduction on duplex examination. In addition she had very elevated velocities. Her blood pressures remain the same and I do not think that I affected them to any great extent. Review of Systems    Vitals:    22 0847 22 0851   BP: (!) 160/99 (!) 152/93   Site: Left Upper Arm    Position: Sitting    Cuff Size: Small Adult    Pulse: (!) 109    Resp: 17    Temp: 97.2 °F (36.2 °C)    TempSrc: Temporal    SpO2: 97%    Weight: 116 lb 8 oz (52.8 kg)    Height: 5' (1.524 m)           Physical Exam  Her exam is unchanged. Her feet are warm and well-perfused. Her groin is without hematoma or pseudoaneurysm. Assessment:  1. Renal artery stenosis ([address]          Plan: At this point we will have her back in 3 months with duplex examination of the renal arteries and the kidneys to evaluate the size and whether or not the stent is working well. She understands and agrees and we will see her at that time.     Electronically signed by:  Halle Armijo MD

## 2022-09-12 ENCOUNTER — OFFICE VISIT (OUTPATIENT)
Dept: PRIMARY CARE CLINIC | Age: 67
End: 2022-09-12
Payer: MEDICARE

## 2022-09-12 ENCOUNTER — HOSPITAL ENCOUNTER (OUTPATIENT)
Age: 67
Setting detail: SPECIMEN
Discharge: HOME | End: 2022-09-12

## 2022-09-12 VITALS
BODY MASS INDEX: 23.52 KG/M2 | HEIGHT: 60 IN | HEART RATE: 88 BPM | OXYGEN SATURATION: 96 % | DIASTOLIC BLOOD PRESSURE: 88 MMHG | SYSTOLIC BLOOD PRESSURE: 130 MMHG | WEIGHT: 119.8 LBS | RESPIRATION RATE: 16 BRPM

## 2022-09-12 DIAGNOSIS — M25.511 CHRONIC RIGHT SHOULDER PAIN: ICD-10-CM

## 2022-09-12 DIAGNOSIS — I10 PRIMARY HYPERTENSION: ICD-10-CM

## 2022-09-12 DIAGNOSIS — Z86.79 HX OF RENAL ARTERY STENOSIS: ICD-10-CM

## 2022-09-12 DIAGNOSIS — I70.1 RENAL ARTERY STENOSIS (HCC): ICD-10-CM

## 2022-09-12 DIAGNOSIS — E11.9 TYPE 2 DIABETES MELLITUS WITHOUT COMPLICATION, WITHOUT LONG-TERM CURRENT USE OF INSULIN (HCC): Primary | ICD-10-CM

## 2022-09-12 DIAGNOSIS — R10.84 GENERALIZED POSTPRANDIAL ABDOMINAL PAIN: ICD-10-CM

## 2022-09-12 DIAGNOSIS — I74.09 AORTOILIAC OCCLUSIVE DISEASE (HCC): ICD-10-CM

## 2022-09-12 DIAGNOSIS — G89.29 CHRONIC RIGHT SHOULDER PAIN: ICD-10-CM

## 2022-09-12 DIAGNOSIS — I15.0 RENOVASCULAR HYPERTENSION: ICD-10-CM

## 2022-09-12 DIAGNOSIS — E11.9 TYPE 2 DIABETES MELLITUS WITHOUT COMPLICATION, WITHOUT LONG-TERM CURRENT USE OF INSULIN (HCC): ICD-10-CM

## 2022-09-12 PROBLEM — K80.50: Status: ACTIVE | Noted: 2021-09-22

## 2022-09-12 PROBLEM — K31.1 ADULT HYPERTROPHIC PYLORIC STENOSIS: Status: ACTIVE | Noted: 2021-09-22

## 2022-09-12 PROBLEM — K83.9 DISEASE OF BILIARY TRACT, UNSPECIFIED: Status: ACTIVE | Noted: 2021-09-22

## 2022-09-12 PROBLEM — K57.10 DVRTCLOS OF SM INT W/O PERFORATION OR ABSCESS W/O BLEEDING: Status: ACTIVE | Noted: 2021-09-22

## 2022-09-12 LAB
ANION GAP SERPL CALCULATED.3IONS-SCNC: 10 MMOL/L (ref 9–17)
BUN BLDV-MCNC: 11 MG/DL (ref 8–23)
CALCIUM SERPL-MCNC: 9.4 MG/DL (ref 8.6–10.4)
CHLORIDE BLD-SCNC: 103 MMOL/L (ref 98–107)
CO2: 25 MMOL/L (ref 20–31)
CREAT SERPL-MCNC: 0.69 MG/DL (ref 0.5–0.9)
ESTIMATED AVERAGE GLUCOSE: 134 MG/DL
GFR AFRICAN AMERICAN: >60 ML/MIN
GFR NON-AFRICAN AMERICAN: >60 ML/MIN
GFR SERPL CREATININE-BSD FRML MDRD: ABNORMAL ML/MIN/{1.73_M2}
GLUCOSE BLD-MCNC: 146 MG/DL (ref 70–99)
HBA1C MFR BLD: 6.3 % (ref 4–6)
POTASSIUM SERPL-SCNC: 4.6 MMOL/L (ref 3.7–5.3)
SODIUM BLD-SCNC: 138 MMOL/L (ref 135–144)

## 2022-09-12 PROCEDURE — 1123F ACP DISCUSS/DSCN MKR DOCD: CPT | Performed by: NURSE PRACTITIONER

## 2022-09-12 PROCEDURE — 2022F DILAT RTA XM EVC RTNOPTHY: CPT | Performed by: NURSE PRACTITIONER

## 2022-09-12 PROCEDURE — 1090F PRES/ABSN URINE INCON ASSESS: CPT | Performed by: NURSE PRACTITIONER

## 2022-09-12 PROCEDURE — 3017F COLORECTAL CA SCREEN DOC REV: CPT | Performed by: NURSE PRACTITIONER

## 2022-09-12 PROCEDURE — 4004F PT TOBACCO SCREEN RCVD TLK: CPT | Performed by: NURSE PRACTITIONER

## 2022-09-12 PROCEDURE — G8400 PT W/DXA NO RESULTS DOC: HCPCS | Performed by: NURSE PRACTITIONER

## 2022-09-12 PROCEDURE — G8427 DOCREV CUR MEDS BY ELIG CLIN: HCPCS | Performed by: NURSE PRACTITIONER

## 2022-09-12 PROCEDURE — G8420 CALC BMI NORM PARAMETERS: HCPCS | Performed by: NURSE PRACTITIONER

## 2022-09-12 PROCEDURE — 3044F HG A1C LEVEL LT 7.0%: CPT | Performed by: NURSE PRACTITIONER

## 2022-09-12 PROCEDURE — 99214 OFFICE O/P EST MOD 30 MIN: CPT | Performed by: NURSE PRACTITIONER

## 2022-09-12 ASSESSMENT — PATIENT HEALTH QUESTIONNAIRE - PHQ9
9. THOUGHTS THAT YOU WOULD BE BETTER OFF DEAD, OR OF HURTING YOURSELF: 0
5. POOR APPETITE OR OVEREATING: 0
7. TROUBLE CONCENTRATING ON THINGS, SUCH AS READING THE NEWSPAPER OR WATCHING TELEVISION: 0
1. LITTLE INTEREST OR PLEASURE IN DOING THINGS: 0
SUM OF ALL RESPONSES TO PHQ QUESTIONS 1-9: 0
SUM OF ALL RESPONSES TO PHQ QUESTIONS 1-9: 0
6. FEELING BAD ABOUT YOURSELF - OR THAT YOU ARE A FAILURE OR HAVE LET YOURSELF OR YOUR FAMILY DOWN: 0
SUM OF ALL RESPONSES TO PHQ9 QUESTIONS 1 & 2: 0
4. FEELING TIRED OR HAVING LITTLE ENERGY: 0
2. FEELING DOWN, DEPRESSED OR HOPELESS: 0
SUM OF ALL RESPONSES TO PHQ QUESTIONS 1-9: 0
3. TROUBLE FALLING OR STAYING ASLEEP: 0
10. IF YOU CHECKED OFF ANY PROBLEMS, HOW DIFFICULT HAVE THESE PROBLEMS MADE IT FOR YOU TO DO YOUR WORK, TAKE CARE OF THINGS AT HOME, OR GET ALONG WITH OTHER PEOPLE: 0
SUM OF ALL RESPONSES TO PHQ QUESTIONS 1-9: 0
8. MOVING OR SPEAKING SO SLOWLY THAT OTHER PEOPLE COULD HAVE NOTICED. OR THE OPPOSITE, BEING SO FIGETY OR RESTLESS THAT YOU HAVE BEEN MOVING AROUND A LOT MORE THAN USUAL: 0

## 2022-09-12 ASSESSMENT — ENCOUNTER SYMPTOMS
VOMITING: 0
WHEEZING: 0
BLOOD IN STOOL: 0
COUGH: 0
DIARRHEA: 0
CONSTIPATION: 0
TROUBLE SWALLOWING: 0
SHORTNESS OF BREATH: 0
SORE THROAT: 0
ABDOMINAL PAIN: 1
NAUSEA: 0
SINUS PRESSURE: 0

## 2022-09-12 NOTE — PROGRESS NOTES
[facility] PRIMARY CARE  [facility] [address] [facility] [address]  [address] [address]  Dept: [phone]  Dept Fax: [phone]    Deniz Salas is a 78 y.o. female who presentstoday for her medical conditions/complaints as noted below. Deniz Salas is c/o of  Chief Complaint   Patient presents with    Diabetes       HPI:     Here today for follow up  Reports feeling well other than her persistent abdominal pain after meals  Drinking Ovaltine to supplement as she tolerates this well  She continues to vascular surgeon on regular basis, had recent renal artery stent  Planning to complete labs today to recheck kidney function    Using percocet to manage her right shoulder pain as needed  Also to treat sever episodes of the abdominal  She does find the medication to be helpful    No new concerns      Diabetes  She presents for her follow-up diabetic visit. She has type 2 diabetes mellitus. Her disease course has been stable. There are no hypoglycemic associated symptoms. Pertinent negatives for hypoglycemia include no confusion, dizziness, headaches or nervousness/anxiousness. Pertinent negatives for diabetes include no chest pain, no fatigue, no polydipsia, no polyphagia, no polyuria and no weakness. Current diabetic treatment includes diet. She is compliant with treatment most of the time. Her weight is stable. She is following a generally healthy diet. She rarely participates in exercise. Her home blood glucose trend is fluctuating minimally. An ACE inhibitor/angiotensin II receptor blocker is being taken.      Hemoglobin A1C (%)   Date Value   2022 6.2   2022 6.1 (H)   10/18/2021 6.1             ( goal A1C is < 7)   No results found for: LABMICR  LDL Cholesterol (mg/dL)   Date Value   2021 127   2020 113       (goal LDL is <100)   AST (U/L)   Date Value   2021 17     ALT (U/L)   Date Value   2021 14     BUN (mg/dL)   Date Value   2022 21 Labs linked.   BP Readings from Last 3 Encounters:   09/12/22 130/88   08/26/22 (!) 152/93   08/22/22 138/86          ([id number])    Past Medical History:   Diagnosis Date    Anxiety     Depression     Diabetes mellitus ([address]     oral meds    Glaucoma     Hearing loss     Hx of blood clots     right leg and right lung 2020    Hypertension     Migraines     PVD (peripheral vascular disease) ([facility]     Renal artery stenosis ([facility]     left    Wears dentures       Past Surgical History:   Procedure Laterality Date    ABDOMINAL AORTIC ANEURYSM REPAIR N/A 09/15/2020    AORTAL BIFEMORAL BYPASS  **CELLSAVER** performed by Farzana Bradley MD at [facility] removed from right breast\"    CATARACT REMOVAL  2015    CHOLECYSTECTOMY      COLONOSCOPY      ENDOSCOPY, COLON, DIAGNOSTIC      PYLOROMYOTOMY N/A 01/19/2022    PYLOROPLASTY performed by Parrish Waite MD at [facility] 06/08/2021    EGD W/EUS FNA performed by Sanchez Danielson MD at [address] [facility] ENDOSCOPY  06/08/2021    EGD DILATION BALLOON 6-7.5MM performed by Sanchez Danielson MD at [facility] 1 N/A 06/08/2021    EGD BIOPSY performed by Sanchez Danielson MD at [facility] 1 N/A 07/22/2021    EGD DILATION BALLOON, pyloric performed by Magdiel Mooney MD at [place] [address] 07/22/2021    EGD BIOPSY performed by Magdiel Mooney MD at [place] [address]  07/22/2021    EGD DILATION SAVORY performed by Magdiel Mooney MD at [facility] Left 8/9/2022    LEFT RENAL ARTERY STENT ([facility] CAST COVERED STENT 6 MM X 22 MM X 120 CM, 6FR), RENAL ARTERIOGRAPHY performed by Tami Styles MD at [address] [place] [address] History   Problem Relation Age of Onset Booster for [employer] series) 08/12/2022    Flu vaccine (1) Never done    Annual Wellness Visit (AWV)  03/02/2023    A1C test (Diabetic or Prediabetic)  06/02/2023    Depression Monitoring  09/12/2023    Colorectal Cancer Screen  05/13/2031    Hepatitis A vaccine  Aged Out    Hib vaccine  Aged Out    Meningococcal (ACWY) vaccine  Aged Out    Hepatitis C screen  Discontinued       Subjective:      Review of Systems   Constitutional:  Negative for activity change, appetite change, chills, fatigue, fever and unexpected weight change. HENT:  Negative for congestion, ear pain, hearing loss, sinus pressure, sore throat and trouble swallowing. Eyes:  Negative for visual disturbance. Respiratory:  Negative for cough, shortness of breath and wheezing. Cardiovascular:  Negative for chest pain, palpitations and leg swelling. Gastrointestinal:  Positive for abdominal pain (postprandial). Negative for blood in stool, constipation, diarrhea, nausea and vomiting. Endocrine: Negative for cold intolerance, heat intolerance, polydipsia, polyphagia and polyuria. Genitourinary:  Negative for difficulty urinating, frequency, hematuria and urgency. Musculoskeletal:  Positive for arthralgias (right shoulder). Negative for myalgias. Skin:  Negative for rash. Allergic/Immunologic: Negative for environmental allergies. Neurological:  Negative for dizziness, weakness, light-headedness and headaches. Psychiatric/Behavioral:  Negative for confusion. The patient is not nervous/anxious. Objective:     Physical Exam  Constitutional:       Appearance: Normal appearance. She is well-developed. HENT:      Head: Normocephalic. Eyes:      Conjunctiva/sclera: Conjunctivae normal.      Pupils: Pupils are equal, round, and reactive to light. Cardiovascular:      Rate and Rhythm: Normal rate and regular rhythm. Heart sounds: Normal heart sounds. No murmur heard.   Pulmonary:      Effort: Pulmonary effort is normal. Breath sounds: Normal breath sounds. No wheezing. Abdominal:      General: Bowel sounds are normal. There is no distension. Palpations: Abdomen is soft. Musculoskeletal:         General: Normal range of motion. Cervical back: Normal range of motion. Skin:     General: Skin is warm and dry. Neurological:      Mental Status: She is alert and oriented to person, place, and time. Psychiatric:         Behavior: Behavior normal.         Thought Content: Thought content normal.         Judgment: Judgment normal.     /88 (Site: Left Upper Arm, Position: Sitting, Cuff Size: Medium Adult)   Pulse 88   Resp 16   Ht 5' (1.524 m)   Wt 119 lb 12.8 oz (54.3 kg)   SpO2 96%   BMI 23.40 kg/m²     Assessment:       Diagnosis Orders   1. Type 2 diabetes mellitus without complication, without long-term current use of insulin ([facility])  Hemoglobin A1C      2. Generalized postprandial abdominal pain        3. Chronic right shoulder pain        4. Aortoiliac occlusive disease ([address]        5. Primary hypertension        6. Renal artery stenosis ([facility]                  Plan:      Return in about 3 months (around 12/12/2022) for diabetes check, hypertension check. Orders Placed This Encounter   Procedures    Hemoglobin A1C     Standing Status:   Future     Standing Expiration Date:   9/12/2023     Diabetes-diet controlled, reviewed diet, will check a1c along with other labs  Postprandial abd pain-unchanged and intermittent depending on meal choice, maintaining weight. PDMP reflects appropriate use of opioid pain reliever  Shoulder pain-stable, percocet allows her to use arm fully most days  Aortoiliac disease, renal stenosis-reviewed recent visit with surgeon, she has US scheduled on Nov and will follow up as directed  HTN-well controlled on norvasc and ACE      Patient given educational materials - see patient instructions. Discussed use, benefit, and side effects of prescribed medications.   All patientquestions answered. Pt voiced understanding. Reviewed health maintenance. Instructedto continue current medications, diet and exercise. Patient agreed with treatmentplan. Follow up as directed.      Electronicallysigned by BAKARI Samuel CNP on 9/12/2022 at 9:58 AM

## 2022-09-22 DIAGNOSIS — R10.84 GENERALIZED POSTPRANDIAL ABDOMINAL PAIN: ICD-10-CM

## 2022-09-22 DIAGNOSIS — M25.511 CHRONIC RIGHT SHOULDER PAIN: ICD-10-CM

## 2022-09-22 DIAGNOSIS — G89.29 CHRONIC RIGHT SHOULDER PAIN: ICD-10-CM

## 2022-09-22 RX ORDER — OXYCODONE HYDROCHLORIDE AND ACETAMINOPHEN 5; 325 MG/1; MG/1
1 TABLET ORAL EVERY 8 HOURS PRN
Qty: 90 TABLET | Refills: 0 | Status: SHIPPED | OUTPATIENT
Start: 2022-09-22 | End: 2022-10-20 | Stop reason: SDUPTHER

## 2022-10-20 DIAGNOSIS — R10.84 GENERALIZED POSTPRANDIAL ABDOMINAL PAIN: ICD-10-CM

## 2022-10-20 DIAGNOSIS — G89.29 CHRONIC RIGHT SHOULDER PAIN: ICD-10-CM

## 2022-10-20 DIAGNOSIS — M25.511 CHRONIC RIGHT SHOULDER PAIN: ICD-10-CM

## 2022-10-21 RX ORDER — OXYCODONE HYDROCHLORIDE AND ACETAMINOPHEN 5; 325 MG/1; MG/1
1 TABLET ORAL EVERY 8 HOURS PRN
Qty: 90 TABLET | Refills: 0 | Status: SHIPPED | OUTPATIENT
Start: 2022-10-21 | End: 2022-11-20

## 2022-11-06 DIAGNOSIS — I10 PRIMARY HYPERTENSION: ICD-10-CM

## 2022-11-07 RX ORDER — AMLODIPINE BESYLATE 5 MG/1
5 TABLET ORAL DAILY
Qty: 30 TABLET | Refills: 5 | Status: SHIPPED | OUTPATIENT
Start: 2022-11-07

## 2022-11-15 DIAGNOSIS — G89.29 CHRONIC RIGHT SHOULDER PAIN: ICD-10-CM

## 2022-11-15 DIAGNOSIS — R10.84 GENERALIZED POSTPRANDIAL ABDOMINAL PAIN: ICD-10-CM

## 2022-11-15 DIAGNOSIS — M25.511 CHRONIC RIGHT SHOULDER PAIN: ICD-10-CM

## 2022-11-16 RX ORDER — OXYCODONE HYDROCHLORIDE AND ACETAMINOPHEN 5; 325 MG/1; MG/1
1 TABLET ORAL EVERY 8 HOURS PRN
Qty: 90 TABLET | Refills: 0 | Status: SHIPPED | OUTPATIENT
Start: 2022-11-16 | End: 2022-12-16

## 2022-11-29 ENCOUNTER — TELEPHONE (OUTPATIENT)
Dept: VASCULAR SURGERY | Age: 67
End: 2022-11-29

## 2022-11-29 NOTE — TELEPHONE ENCOUNTER
Spoke with the patient in regards to her appt letting her know that she needed testing prior to her appt on 12/2, patient was then transferred to central scheduling to get that scheduled.

## 2022-12-05 ENCOUNTER — HOSPITAL ENCOUNTER (OUTPATIENT)
Dept: VASCULAR LAB | Age: 67
Discharge: HOME | End: 2022-12-05
Payer: MEDICARE

## 2022-12-05 DIAGNOSIS — I70.1 RENAL ARTERY STENOSIS (HCC): ICD-10-CM

## 2022-12-05 PROCEDURE — 93975 VASCULAR STUDY: CPT

## 2022-12-09 ENCOUNTER — OFFICE VISIT (OUTPATIENT)
Dept: VASCULAR SURGERY | Age: 67
End: 2022-12-09
Payer: MEDICARE

## 2022-12-09 VITALS
TEMPERATURE: 97.3 F | RESPIRATION RATE: 17 BRPM | HEIGHT: 60 IN | SYSTOLIC BLOOD PRESSURE: 142 MMHG | OXYGEN SATURATION: 94 % | HEART RATE: 93 BPM | WEIGHT: 117 LBS | BODY MASS INDEX: 22.97 KG/M2 | DIASTOLIC BLOOD PRESSURE: 80 MMHG

## 2022-12-09 DIAGNOSIS — I70.1 RENAL ARTERY STENOSIS (HCC): Primary | ICD-10-CM

## 2022-12-09 DIAGNOSIS — Z95.828 S/P AORTOBIFEMORAL BYPASS SURGERY: ICD-10-CM

## 2022-12-09 DIAGNOSIS — K55.1 SUPERIOR MESENTERIC ARTERY STENOSIS (HCC): ICD-10-CM

## 2022-12-09 PROCEDURE — G8400 PT W/DXA NO RESULTS DOC: HCPCS | Performed by: SURGERY

## 2022-12-09 PROCEDURE — 4004F PT TOBACCO SCREEN RCVD TLK: CPT | Performed by: SURGERY

## 2022-12-09 PROCEDURE — G8427 DOCREV CUR MEDS BY ELIG CLIN: HCPCS | Performed by: SURGERY

## 2022-12-09 PROCEDURE — 1090F PRES/ABSN URINE INCON ASSESS: CPT | Performed by: SURGERY

## 2022-12-09 PROCEDURE — 3078F DIAST BP <80 MM HG: CPT | Performed by: SURGERY

## 2022-12-09 PROCEDURE — 99213 OFFICE O/P EST LOW 20 MIN: CPT | Performed by: SURGERY

## 2022-12-09 PROCEDURE — 3074F SYST BP LT 130 MM HG: CPT | Performed by: SURGERY

## 2022-12-09 PROCEDURE — G8420 CALC BMI NORM PARAMETERS: HCPCS | Performed by: SURGERY

## 2022-12-09 PROCEDURE — 1123F ACP DISCUSS/DSCN MKR DOCD: CPT | Performed by: SURGERY

## 2022-12-09 PROCEDURE — G8484 FLU IMMUNIZE NO ADMIN: HCPCS | Performed by: SURGERY

## 2022-12-09 PROCEDURE — 3017F COLORECTAL CA SCREEN DOC REV: CPT | Performed by: SURGERY

## 2022-12-09 NOTE — PROGRESS NOTES
[facility]  [address]  [address] [place] [id number]  Dept: [phone]     Patient: Angelica Contreras  : 1955  MRN: [id number]  DOS: 2022            HPI:  Angelica Contreras is a 78 y.o. female who returns to the office regarding her left renal artery stent. Recall that she has a 6 mm x 22 mm I cast stent within the left renal artery. This is performing well and there are no velocity elevations through that stent. Her kidney size has gone from 8.1 on a duplex 6 months ago to 8.5 on the duplex just recently. Her right renal artery is widely patent with a renal size of approximately 9 cm. She complains of postprandial pain at times with significant constipation. The last time she was scheduled for colonoscopy they could not perform the colonoscopy due to impaction of stool. She has not lost any significant amounts of weight. She does not hurt every time she eats. She had an aortobifemoral bypass with an end-to-side proximal configuration and end-to-side distal configuration bilaterally. Review of Systems    Vitals:    22 1540 22 1543   BP: (!) 145/78 (!) 142/80   Site: Left Upper Arm Left Upper Arm   Position: Sitting Sitting   Cuff Size: Medium Adult Medium Adult   Pulse: 93    Resp: 17    Temp: 97.3 °F (36.3 °C)    SpO2: 94%    Weight: 117 lb (53.1 kg)    Height: 5' (1.524 m)           Physical Exam  Cardiovascular:      Comments: On examination I cannot auscultate any abdominal bruits. Her abdomen is soft nontender nondistended. She has palpable femoral pulses bilaterally. Her feet are warm and well perfused without ulceration or gangrene. Assessment:  1. Renal artery stenosis ([address]    2. Superior mesenteric artery stenosis ([address]    3. S/P aortobifemoral bypass surgery          Plan:  I examined her CT examination from 18 months ago.   The SMA at that time was widely patent as was the celiac artery. I do not see a reason for her to be having abdominal pain from a vascular standpoint. Her renal artery is widely patent at this time on duplex examination. We will continue to see her at 6-month intervals with renal artery duplex, aortoiliac duplex of the graft as well as a mesenteric duplex to make certain that she has not developed SMA stenosis. She does not have the triad of symptoms for true mesenteric ischemia. I think that this is more of a problem with constipation than anything else and I think that she should get back to a GI specialist for those issues.     Electronically signed by:  Donal Guy MD

## 2022-12-13 ENCOUNTER — OFFICE VISIT (OUTPATIENT)
Dept: PRIMARY CARE CLINIC | Age: 67
End: 2022-12-13
Payer: MEDICARE

## 2022-12-13 VITALS
HEART RATE: 95 BPM | OXYGEN SATURATION: 95 % | SYSTOLIC BLOOD PRESSURE: 120 MMHG | HEIGHT: 60 IN | BODY MASS INDEX: 24.03 KG/M2 | DIASTOLIC BLOOD PRESSURE: 80 MMHG | WEIGHT: 122.4 LBS

## 2022-12-13 DIAGNOSIS — Z90.49 HISTORY OF CHOLECYSTECTOMY: ICD-10-CM

## 2022-12-13 DIAGNOSIS — K58.2 IRRITABLE BOWEL SYNDROME WITH BOTH CONSTIPATION AND DIARRHEA: ICD-10-CM

## 2022-12-13 DIAGNOSIS — I70.1 RENAL ARTERY STENOSIS (HCC): ICD-10-CM

## 2022-12-13 DIAGNOSIS — Z95.828 STATUS POST AORTOBIFEMORAL BYPASS SURGERY: ICD-10-CM

## 2022-12-13 DIAGNOSIS — R10.84 GENERALIZED POSTPRANDIAL ABDOMINAL PAIN: ICD-10-CM

## 2022-12-13 DIAGNOSIS — Z98.890 HISTORY OF PYLOROPLASTY: ICD-10-CM

## 2022-12-13 DIAGNOSIS — E11.9 TYPE 2 DIABETES MELLITUS WITHOUT COMPLICATION, WITHOUT LONG-TERM CURRENT USE OF INSULIN (HCC): Primary | ICD-10-CM

## 2022-12-13 LAB — HBA1C MFR BLD: 6.3 %

## 2022-12-13 PROCEDURE — 3017F COLORECTAL CA SCREEN DOC REV: CPT | Performed by: NURSE PRACTITIONER

## 2022-12-13 PROCEDURE — G8427 DOCREV CUR MEDS BY ELIG CLIN: HCPCS | Performed by: NURSE PRACTITIONER

## 2022-12-13 PROCEDURE — G8420 CALC BMI NORM PARAMETERS: HCPCS | Performed by: NURSE PRACTITIONER

## 2022-12-13 PROCEDURE — 83036 HEMOGLOBIN GLYCOSYLATED A1C: CPT | Performed by: NURSE PRACTITIONER

## 2022-12-13 PROCEDURE — 2022F DILAT RTA XM EVC RTNOPTHY: CPT | Performed by: NURSE PRACTITIONER

## 2022-12-13 PROCEDURE — 3044F HG A1C LEVEL LT 7.0%: CPT | Performed by: NURSE PRACTITIONER

## 2022-12-13 PROCEDURE — G8484 FLU IMMUNIZE NO ADMIN: HCPCS | Performed by: NURSE PRACTITIONER

## 2022-12-13 PROCEDURE — 1123F ACP DISCUSS/DSCN MKR DOCD: CPT | Performed by: NURSE PRACTITIONER

## 2022-12-13 PROCEDURE — 4004F PT TOBACCO SCREEN RCVD TLK: CPT | Performed by: NURSE PRACTITIONER

## 2022-12-13 PROCEDURE — 99214 OFFICE O/P EST MOD 30 MIN: CPT | Performed by: NURSE PRACTITIONER

## 2022-12-13 PROCEDURE — G8400 PT W/DXA NO RESULTS DOC: HCPCS | Performed by: NURSE PRACTITIONER

## 2022-12-13 PROCEDURE — 3078F DIAST BP <80 MM HG: CPT | Performed by: NURSE PRACTITIONER

## 2022-12-13 PROCEDURE — 3074F SYST BP LT 130 MM HG: CPT | Performed by: NURSE PRACTITIONER

## 2022-12-13 PROCEDURE — 1090F PRES/ABSN URINE INCON ASSESS: CPT | Performed by: NURSE PRACTITIONER

## 2022-12-13 ASSESSMENT — PATIENT HEALTH QUESTIONNAIRE - PHQ9
4. FEELING TIRED OR HAVING LITTLE ENERGY: 0
8. MOVING OR SPEAKING SO SLOWLY THAT OTHER PEOPLE COULD HAVE NOTICED. OR THE OPPOSITE, BEING SO FIGETY OR RESTLESS THAT YOU HAVE BEEN MOVING AROUND A LOT MORE THAN USUAL: 0
2. FEELING DOWN, DEPRESSED OR HOPELESS: 0
SUM OF ALL RESPONSES TO PHQ QUESTIONS 1-9: 0
9. THOUGHTS THAT YOU WOULD BE BETTER OFF DEAD, OR OF HURTING YOURSELF: 0
7. TROUBLE CONCENTRATING ON THINGS, SUCH AS READING THE NEWSPAPER OR WATCHING TELEVISION: 0
5. POOR APPETITE OR OVEREATING: 0
SUM OF ALL RESPONSES TO PHQ9 QUESTIONS 1 & 2: 0
1. LITTLE INTEREST OR PLEASURE IN DOING THINGS: 0
3. TROUBLE FALLING OR STAYING ASLEEP: 0
SUM OF ALL RESPONSES TO PHQ QUESTIONS 1-9: 0
10. IF YOU CHECKED OFF ANY PROBLEMS, HOW DIFFICULT HAVE THESE PROBLEMS MADE IT FOR YOU TO DO YOUR WORK, TAKE CARE OF THINGS AT HOME, OR GET ALONG WITH OTHER PEOPLE: 0
6. FEELING BAD ABOUT YOURSELF - OR THAT YOU ARE A FAILURE OR HAVE LET YOURSELF OR YOUR FAMILY DOWN: 0
SUM OF ALL RESPONSES TO PHQ QUESTIONS 1-9: 0
SUM OF ALL RESPONSES TO PHQ QUESTIONS 1-9: 0

## 2022-12-13 ASSESSMENT — ENCOUNTER SYMPTOMS
VOMITING: 0
SORE THROAT: 0
CONSTIPATION: 1
SINUS PRESSURE: 0
NAUSEA: 0
SHORTNESS OF BREATH: 0
WHEEZING: 0
ABDOMINAL PAIN: 1
COUGH: 0
BLOOD IN STOOL: 0
DIARRHEA: 1
TROUBLE SWALLOWING: 0

## 2022-12-13 NOTE — PROGRESS NOTES
[facility]  [facility] [address] [facility] [address]  [address] [address]  Dept: [phone]  Dept Fax: [phone]    Terence Walton is a 78 y.o. female who presentstoday for her medical conditions/complaints as noted below. Terence Walton is c/o of  Chief Complaint   Patient presents with    3 Month Follow-Up    Diabetes    Other     Referral, for GI, stomach issues. When she eats she is in pain           HPI:     Here today for follow-up  Reports recent visit with vascular surgeon, \"my kidney is getting bigger\" since having stent placement    Continues to struggle with postprandial abdominal pain  She would like to discuss with gastroenterology as her vascular surgeon has ruled out any circulatory disorders that would be contributing  Reports would like a different GI office then where she was seen/evaluated in   She does have intermittent diarrhea and constipation but she states that she cannot seem to relate the pain to 1 or the other  She has days where she eats minimally because it will set off the discomfort  States yesterday she ate a regular meal when family was visiting and today she is having quite a bit of discomfort  Denies any current constipation    Diabetes  She presents for her follow-up diabetic visit. She has type 2 diabetes mellitus. Her disease course has been stable. There are no hypoglycemic associated symptoms. Pertinent negatives for hypoglycemia include no confusion, dizziness, headaches or nervousness/anxiousness. Pertinent negatives for diabetes include no chest pain, no fatigue, no polydipsia, no polyphagia, no polyuria and no weakness. There are no diabetic complications. Current diabetic treatment includes diet. She is compliant with treatment most of the time. Her weight is increasing steadily. She is following a generally healthy diet. She participates in exercise intermittently. There is no change in her home blood glucose trend. An ACE inhibitor/angiotensin II receptor blocker is being taken. Other  Associated symptoms include abdominal pain and arthralgias. Pertinent negatives include no chest pain, chills, congestion, coughing, fatigue, fever, headaches, myalgias, nausea, rash, sore throat, vomiting or weakness.      Hemoglobin A1C (%)   Date Value   12/13/2022 6.3   09/12/2022 6.3 (H)   06/02/2022 6.2             ( goal A1C is < 7)   No results found for: LABMICR  LDL Cholesterol (mg/dL)   Date Value   04/19/2021 127   01/04/2020 113       (goal LDL is <100)   AST (U/L)   Date Value   04/19/2021 17     ALT (U/L)   Date Value   04/19/2021 14     BUN (mg/dL)   Date Value   09/12/2022 11     BP Readings from Last 3 Encounters:   12/13/22 120/80   12/09/22 (!) 142/80   09/12/22 130/88          ([id number])    Past Medical History:   Diagnosis Date    Anxiety     Depression     Diabetes mellitus (HCC)     oral meds    Glaucoma     Hearing loss     Hx of blood clots     right leg and right lung 2020    Hypertension     Migraines     PVD (peripheral vascular disease) ([address]     Renal artery stenosis (HCC)     left    Wears dentures       Past Surgical History:   Procedure Laterality Date    ABDOMINAL AORTIC ANEURYSM REPAIR N/A 09/15/2020    AORTAL BIFEMORAL BYPASS  **CELLSAVER** performed by Tosin Martinez MD at [address] [place] [address]      \"crystals removed from right breast\"    CATARACT REMOVAL  2015    CHOLECYSTECTOMY      COLONOSCOPY      ENDOSCOPY, COLON, DIAGNOSTIC      PYLOROMYOTOMY N/A 01/19/2022    PYLOROPLASTY performed by Francisco Javier Kuhn MD at [address] [place] [address] 06/08/2021    EGD W/EUS FNA performed by Mee Moe MD at [facility]    UPPER GASTROINTESTINAL ENDOSCOPY  06/08/2021    EGD DILATION BALLOON 6-7.5MM performed by Mee Moe MD at [facility]    UPPER GASTROINTESTINAL ENDOSCOPY N/A 06/08/2021    EGD BIOPSY performed by Robert Morejon Mike Roblero MD at [facility]    UPPER GASTROINTESTINAL ENDOSCOPY N/A 07/22/2021    EGD DILATION BALLOON, pyloric performed by Eliza Lopez MD at [facility] 07/22/2021    EGD BIOPSY performed by Eliza Lopez MD at [facility]  07/22/2021    EGD DILATION SAVORY performed by Eliza Lopez MD at [address] [facility] [address] Left 8/9/2022    LEFT RENAL ARTERY STENT ([facility] CAST COVERED STENT 6 MM X 22 MM X 120 CM, 6FR), RENAL ARTERIOGRAPHY performed by Mady Michel MD at [place] History   Problem Relation Age of Onset    Colon Cancer Mother     Prostate Cancer Father     Thyroid Cancer Sister           Social History     Tobacco Use    Smoking status: Every Day     Packs/day: 1.00     Years: 41.00     Pack years: 41.00     Types: Cigarettes     Start date: 7/22/1975    Smokeless tobacco: Never    Tobacco comments:     Cut back   Substance Use Topics    Alcohol use: Never      Current Outpatient Medications   Medication Sig Dispense Refill    oxyCODONE-acetaminophen (PERCOCET) 5-325 MG per tablet Take 1 tablet by mouth every 8 hours as needed for Pain for up to 30 days. 90 tablet 0    amLODIPine (NORVASC) 5 MG tablet Take 1 tablet by mouth daily 30 tablet 5    clopidogrel (PLAVIX) 75 MG tablet Take 1 tablet by mouth in the morning. 90 tablet 1    lisinopril (PRINIVIL;ZESTRIL) 40 MG tablet Take 1 tablet by mouth daily 90 tablet 3    Multiple Vitamins-Minerals (MULTIVITAMIN ADULT EXTRA C PO) multivitamin   1 tab daily      latanoprost (XALATAN) 0.005 % ophthalmic solution INSTILL 1 DROP INTO EACH EYE AT BEDTIME      aspirin (ASPIRIN LOW DOSE ADULT) 81 MG chewable tablet Take 1 tablet by mouth daily 30 tablet 3    VORTIoxetine (TRINTELLIX) 10 MG TABS tablet Take 20 mg by mouth        No current facility-administered medications for this visit.      Allergies   Allergen Reactions Dilaudid [Hydromorphone Hcl] Swelling    Sulfa Antibiotics Swelling     Anything with sulfa    Sulfamethoxazole Swelling    Sulfamethoxazole-Trimethoprim Swelling    Trimethoprim Swelling    Tetanus Toxoid Swelling     Arm swelling. Health Maintenance   Topic Date Due    Pneumococcal 65+ years Vaccine (1 - PCV) Never done    Diabetic microalbuminuria test  Never done    Diabetic retinal exam  Never done    Breast cancer screen  Never done    Shingles vaccine (1 of 2) Never done    Low dose CT lung screening  Never done    DEXA (modify frequency per FRAX score)  Never done    Diabetic foot exam  05/22/2021    Lipids  04/19/2022    COVID-19 Vaccine (4 - Booster for Pfizer series) 06/07/2022    Flu vaccine (1) Never done    Annual Wellness Visit (AWV)  03/02/2023    A1C test (Diabetic or Prediabetic)  12/13/2023    Depression Monitoring  12/13/2023    Colorectal Cancer Screen  05/13/2031    Hepatitis A vaccine  Aged Out    Hib vaccine  Aged Out    Meningococcal (ACWY) vaccine  Aged Out    Hepatitis C screen  Discontinued       Subjective:      Review of Systems   Constitutional:  Negative for activity change, appetite change, chills, fatigue, fever and unexpected weight change. HENT:  Negative for congestion, ear pain, hearing loss, sinus pressure, sore throat and trouble swallowing. Eyes:  Negative for visual disturbance. Respiratory:  Negative for cough, shortness of breath and wheezing. Cardiovascular:  Negative for chest pain, palpitations and leg swelling. Gastrointestinal:  Positive for abdominal pain, constipation and diarrhea. Negative for blood in stool, nausea and vomiting. Endocrine: Negative for cold intolerance, heat intolerance, polydipsia, polyphagia and polyuria. Genitourinary:  Negative for difficulty urinating, frequency, hematuria and urgency. Musculoskeletal:  Positive for arthralgias. Negative for myalgias. Skin:  Negative for rash.    Allergic/Immunologic: Negative for environmental allergies. Neurological:  Negative for dizziness, weakness, light-headedness and headaches. Psychiatric/Behavioral:  Negative for confusion. The patient is not nervous/anxious. Objective:     Physical Exam  Constitutional:       Appearance: Normal appearance. She is well-developed. HENT:      Head: Normocephalic. Eyes:      Conjunctiva/sclera: Conjunctivae normal.      Pupils: Pupils are equal, round, and reactive to light. Cardiovascular:      Rate and Rhythm: Normal rate and regular rhythm. Heart sounds: Normal heart sounds. No murmur heard. Pulmonary:      Effort: Pulmonary effort is normal.      Breath sounds: Normal breath sounds. No wheezing. Abdominal:      General: Bowel sounds are normal. There is no distension. Palpations: Abdomen is soft. Musculoskeletal:         General: Normal range of motion. Cervical back: Normal range of motion. Skin:     General: Skin is warm and dry. Neurological:      Mental Status: She is alert and oriented to person, place, and time. Psychiatric:         Behavior: Behavior normal.         Thought Content: Thought content normal.         Judgment: Judgment normal.     /80   Pulse 95   Ht 5' (1.524 m)   Wt 122 lb 6.4 oz (55.5 kg)   SpO2 95%   BMI 23.90 kg/m²     Assessment:       Diagnosis Orders   1. Type 2 diabetes mellitus without complication, without long-term current use of insulin ([facility])  POCT glycosylated hemoglobin (Hb A1C)      2. Generalized postprandial abdominal pain  Karey Hummel MD, Gastroenterology, [place]      3. History of pyloroplasty  Kaery Hummel MD, Gastroenterology, [place]      4. History of cholecystectomy  Karey Hummel MD, Gastroenterology, [place]      5. Irritable bowel syndrome with both constipation and diarrhea  Karey Hummel MD, Gastroenterology, [place]      6.  Renal artery stenosis (HCC)        7. Status post aortobifemoral bypass surgery                  Plan:      Return in about 4 months (around 4/13/2023) for diabetes check. Diabetes-remains stable with diet, discussed foods to avoid and appropriate choices  Generalized abdominal pain, history bilateral plasty, cholecystectomy, IBS-reviewed notes and diagnostics from 2020 through 2021. She has not been reevaluated since that time. Referral placed to alternative GI office per her request  Renal artery stenosis, status post aortobifemoral bypass-reviewed recent note and scans with vascular surgeon, her conditions are stable/improving and she will follow-up as planned      Orders Placed This Encounter   Procedures    Kathleen Tatum MD, Gastroenterology, [place]     Referral Priority:   Routine     Referral Type:   Eval and Treat     Referral Reason:   Specialty Services Required     Referred to Provider:   Jaydon Perkins MD     Requested Specialty:   Gastroenterology     Number of Visits Requested:   1    POCT glycosylated hemoglobin (Hb A1C)            Patient given educational materials - see patient instructions. Discussed use, benefit, and side effects of prescribed medications. All patientquestions answered. Pt voiced understanding. Reviewed health maintenance. Instructedto continue current medications, diet and exercise. Patient agreed with treatmentplan. Follow up as directed.      Electronicallysigned by BAKARI Leger CNP on 12/13/2022 at 12:51 PM

## 2022-12-14 DIAGNOSIS — G89.29 CHRONIC RIGHT SHOULDER PAIN: ICD-10-CM

## 2022-12-14 DIAGNOSIS — M25.511 CHRONIC RIGHT SHOULDER PAIN: ICD-10-CM

## 2022-12-14 DIAGNOSIS — R10.84 GENERALIZED POSTPRANDIAL ABDOMINAL PAIN: ICD-10-CM

## 2022-12-14 RX ORDER — OXYCODONE HYDROCHLORIDE AND ACETAMINOPHEN 5; 325 MG/1; MG/1
1 TABLET ORAL EVERY 8 HOURS PRN
Qty: 90 TABLET | Refills: 0 | Status: SHIPPED | OUTPATIENT
Start: 2022-12-14 | End: 2023-01-13

## 2023-01-12 DIAGNOSIS — G89.29 CHRONIC RIGHT SHOULDER PAIN: ICD-10-CM

## 2023-01-12 DIAGNOSIS — M25.511 CHRONIC RIGHT SHOULDER PAIN: ICD-10-CM

## 2023-01-12 DIAGNOSIS — R10.84 GENERALIZED POSTPRANDIAL ABDOMINAL PAIN: ICD-10-CM

## 2023-01-12 RX ORDER — OXYCODONE HYDROCHLORIDE AND ACETAMINOPHEN 5; 325 MG/1; MG/1
1 TABLET ORAL EVERY 8 HOURS PRN
Qty: 90 TABLET | Refills: 0 | Status: SHIPPED | OUTPATIENT
Start: 2023-01-12 | End: 2023-02-11

## 2023-01-18 DIAGNOSIS — Z12.31 ENCOUNTER FOR SCREENING MAMMOGRAM FOR MALIGNANT NEOPLASM OF BREAST: Primary | ICD-10-CM

## 2023-02-09 DIAGNOSIS — R10.84 GENERALIZED POSTPRANDIAL ABDOMINAL PAIN: ICD-10-CM

## 2023-02-09 DIAGNOSIS — G89.29 CHRONIC RIGHT SHOULDER PAIN: ICD-10-CM

## 2023-02-09 DIAGNOSIS — M25.511 CHRONIC RIGHT SHOULDER PAIN: ICD-10-CM

## 2023-02-09 RX ORDER — OXYCODONE HYDROCHLORIDE AND ACETAMINOPHEN 5; 325 MG/1; MG/1
1 TABLET ORAL EVERY 8 HOURS PRN
Qty: 90 TABLET | Refills: 0 | Status: SHIPPED | OUTPATIENT
Start: 2023-02-09 | End: 2023-03-11

## 2023-03-06 DIAGNOSIS — M25.511 CHRONIC RIGHT SHOULDER PAIN: ICD-10-CM

## 2023-03-06 DIAGNOSIS — G89.29 CHRONIC RIGHT SHOULDER PAIN: ICD-10-CM

## 2023-03-06 DIAGNOSIS — R10.84 GENERALIZED POSTPRANDIAL ABDOMINAL PAIN: ICD-10-CM

## 2023-03-07 ENCOUNTER — OFFICE VISIT (OUTPATIENT)
Dept: GASTROENTEROLOGY | Age: 68
End: 2023-03-07
Payer: MEDICARE

## 2023-03-07 ENCOUNTER — HOSPITAL ENCOUNTER (OUTPATIENT)
Age: 68
Setting detail: SPECIMEN
Discharge: HOME | End: 2023-03-07

## 2023-03-07 VITALS
HEIGHT: 60 IN | SYSTOLIC BLOOD PRESSURE: 130 MMHG | OXYGEN SATURATION: 96 % | WEIGHT: 126 LBS | HEART RATE: 98 BPM | BODY MASS INDEX: 24.74 KG/M2 | DIASTOLIC BLOOD PRESSURE: 72 MMHG

## 2023-03-07 DIAGNOSIS — K31.1 PYLORIC STENOSIS: ICD-10-CM

## 2023-03-07 DIAGNOSIS — K31.1 PYLORIC STENOSIS: Primary | ICD-10-CM

## 2023-03-07 DIAGNOSIS — R10.13 DYSPEPSIA: ICD-10-CM

## 2023-03-07 PROCEDURE — 4004F PT TOBACCO SCREEN RCVD TLK: CPT | Performed by: INTERNAL MEDICINE

## 2023-03-07 PROCEDURE — 1123F ACP DISCUSS/DSCN MKR DOCD: CPT | Performed by: INTERNAL MEDICINE

## 2023-03-07 PROCEDURE — 1090F PRES/ABSN URINE INCON ASSESS: CPT | Performed by: INTERNAL MEDICINE

## 2023-03-07 PROCEDURE — 99203 OFFICE O/P NEW LOW 30 MIN: CPT | Performed by: INTERNAL MEDICINE

## 2023-03-07 PROCEDURE — G8484 FLU IMMUNIZE NO ADMIN: HCPCS | Performed by: INTERNAL MEDICINE

## 2023-03-07 PROCEDURE — G8420 CALC BMI NORM PARAMETERS: HCPCS | Performed by: INTERNAL MEDICINE

## 2023-03-07 PROCEDURE — 3017F COLORECTAL CA SCREEN DOC REV: CPT | Performed by: INTERNAL MEDICINE

## 2023-03-07 PROCEDURE — G8428 CUR MEDS NOT DOCUMENT: HCPCS | Performed by: INTERNAL MEDICINE

## 2023-03-07 PROCEDURE — 3075F SYST BP GE 130 - 139MM HG: CPT | Performed by: INTERNAL MEDICINE

## 2023-03-07 PROCEDURE — 3078F DIAST BP <80 MM HG: CPT | Performed by: INTERNAL MEDICINE

## 2023-03-07 PROCEDURE — G8400 PT W/DXA NO RESULTS DOC: HCPCS | Performed by: INTERNAL MEDICINE

## 2023-03-07 RX ORDER — OXYCODONE HYDROCHLORIDE AND ACETAMINOPHEN 5; 325 MG/1; MG/1
1 TABLET ORAL EVERY 8 HOURS PRN
Qty: 90 TABLET | Refills: 0 | OUTPATIENT
Start: 2023-03-07 | End: 2023-04-06

## 2023-03-07 RX ORDER — OMEPRAZOLE 20 MG/1
40 TABLET, DELAYED RELEASE ORAL DAILY
Qty: 30 TABLET | Refills: 3 | Status: SHIPPED | OUTPATIENT
Start: 2023-03-07

## 2023-03-07 ASSESSMENT — ENCOUNTER SYMPTOMS
ABDOMINAL DISTENTION: 1
EYES NEGATIVE: 1
CHOKING: 0
RECTAL PAIN: 1
TROUBLE SWALLOWING: 0
SORE THROAT: 0
CONSTIPATION: 1
VOMITING: 0
DIARRHEA: 1
BLOOD IN STOOL: 0
COUGH: 0
NAUSEA: 0
ANAL BLEEDING: 0
ABDOMINAL PAIN: 1
CHEST TIGHTNESS: 0
SHORTNESS OF BREATH: 0

## 2023-03-07 NOTE — PROGRESS NOTES
Reason for Referral: Abdominal pain      No referring provider defined for this encounter. Chief Complaint   Patient presents with    Abdominal Pain     3.5 years,  okay if not eating, worse after eating. No notice of specific foods:  heavier foods make it worse    Irritable Bowel Syndrome           HISTORY OF PRESENT ILLNESS: Raven Maher is a 75 y.o. female with a past history remarkable for prior history of reported pelvic stenosis status post CRE dilations, active smoker x40 years, referred for evaluation of recurrent abdominal pain. The patient has a known history of idiopathic adult onset hypertrophic pyloric stenosis status post CRE dilation and eventual pyloroplasty performed in 2022. Since her and whether not her abdominal pain appears to be residual from prior known disease. No significant change in weight. Does report intermittent loose bowel movements. Denies any dietary triggers. Denies any reflux. No recent upper endoscopy. Smoker: Active smoker- x 40 yrs. Drinking history: None   Illicit drugs:  None   Abdominal surgeries: Pyloroplasty in 2022  Prior Colonoscopy: 2 yrs ago--,   Prior EGD: 2021  FH of GI issues: None reported      Past Medical,Family, and Social History reviewed and does contribute to the patient presentingcondition. Patient's PMH/PSH,SH,PSYCH Hx, MEDs, ALLERGIES, and ROS were all reviewed and updated in the appropriate sections.     PAST MEDICAL HISTORY:  Past Medical History:   Diagnosis Date    Anxiety     Depression     Diabetes mellitus ([address]     oral meds    Glaucoma     Hearing loss     Hx of blood clots     right leg and right lung 2020    Hypertension     Migraines     PVD (peripheral vascular disease) ([address]     Renal artery stenosis (HCC)     left    Wears dentures        Past Surgical History:   Procedure Laterality Date    ABDOMINAL AORTIC ANEURYSM REPAIR N/A 09/15/2020    AORTAL BIFEMORAL BYPASS  **CELLSAVER** performed by [facility] Steffen Damico MD at [facility]    APPENDECTOMY      BREAST SURGERY      \"crystals removed from right breast\"    CATARACT REMOVAL  2015    CHOLECYSTECTOMY      COLONOSCOPY      ENDOSCOPY, COLON, DIAGNOSTIC      PYLOROMYOTOMY N/A 01/19/2022    PYLOROPLASTY performed by Doc Segovia MD at [facility] OR    TONSILLECTOMY      UPPER GASTROINTESTINAL ENDOSCOPY N/A 06/08/2021    EGD W/EUS FNA performed by Ralf Strange MD at [facility]    UPPER GASTROINTESTINAL ENDOSCOPY  06/08/2021    EGD DILATION BALLOON 6-7.5MM performed by Ralf Strange MD at [facility]    UPPER GASTROINTESTINAL ENDOSCOPY N/A 06/08/2021    EGD BIOPSY performed by Ralf Strange MD at [facility]    UPPER GASTROINTESTINAL ENDOSCOPY N/A 07/22/2021    EGD DILATION BALLOON, pyloric performed by Gui Olivas MD at [facility]    UPPER GASTROINTESTINAL ENDOSCOPY N/A 07/22/2021    EGD BIOPSY performed by Gui Olivas MD at [facility]    UPPER GASTROINTESTINAL ENDOSCOPY  07/22/2021    EGD DILATION SAVORY performed by Gui Olivas MD at [facility]    VASCULAR SURGERY Left 8/9/2022    LEFT RENAL ARTERY STENT ([facility] CAST COVERED STENT 6 MM X 22 MM X 120 CM, 6FR), RENAL ARTERIOGRAPHY performed by Omar Medina MD at [facility]       CURRENT MEDICATIONS:    Current Outpatient Medications:     omeprazole (PRILOSEC OTC) 20 MG tablet, Take 2 tablets by mouth daily, Disp: 30 tablet, Rfl: 3    oxyCODONE-acetaminophen (PERCOCET) 5-325 MG per tablet, Take 1 tablet by mouth every 8 hours as needed for Pain for up to 30 days., Disp: 90 tablet, Rfl: 0    amLODIPine (NORVASC) 5 MG tablet, Take 1 tablet by mouth daily, Disp: 30 tablet, Rfl: 5    clopidogrel (PLAVIX) 75 MG tablet, Take 1 tablet by mouth in the morning., Disp: 90 tablet, Rfl: 1    lisinopril (PRINIVIL;ZESTRIL) 40 MG tablet, Take 1 tablet by mouth daily, Disp: 90 tablet, Rfl: 3    Multiple Vitamins-Minerals (MULTIVITAMIN ADULT EXTRA C PO),  multivitamin  1 tab daily, Disp: , Rfl:     latanoprost (XALATAN) 0.005 % ophthalmic solution, INSTILL 1 DROP INTO EACH EYE AT BEDTIME, Disp: , Rfl:     VORTIoxetine (TRINTELLIX) 10 MG TABS tablet, Take 20 mg by mouth , Disp: , Rfl:     aspirin (ASPIRIN LOW DOSE ADULT) 81 MG chewable tablet, Take 1 tablet by mouth daily (Patient not taking: Reported on 3/7/2023), Disp: 30 tablet, Rfl: 3    ALLERGIES:   Allergies   Allergen Reactions    Dilaudid [Hydromorphone Hcl] Swelling    Sulfa Antibiotics Swelling     Anything with sulfa    Sulfamethoxazole Swelling    Sulfamethoxazole-Trimethoprim Swelling    Trimethoprim Swelling    Tetanus Toxoid Swelling     Arm swelling. FAMILY HISTORY:       Problem Relation Age of Onset    Colon Cancer Mother     Prostate Cancer Father     Thyroid Cancer Sister          SOCIAL HISTORY:   Social History     Socioeconomic History    Marital status:      Spouse name: Not on file    Number of children: Not on file    Years of education: Not on file    Highest education level: Not on file   Occupational History    Not on file   Tobacco Use    Smoking status: Every Day     Packs/day: 1.00     Years: 41.00     Pack years: 41.00     Types: Cigarettes     Start date: 7/22/1975    Smokeless tobacco: Never    Tobacco comments:     Cut back   Vaping Use    Vaping Use: Never used   Substance and Sexual Activity    Alcohol use: Never    Drug use: Never    Sexual activity: Not on file   Other Topics Concern    Not on file   Social History Narrative    Not on file     Social Determinants of Health     Financial Resource Strain: Low Risk     Difficulty of Paying Living Expenses: Not hard at all   Food Insecurity: No Food Insecurity    Worried About Running Out of Food in the Last Year: Never true    [address] in the Last Year: Never true   Transportation Needs: No Transportation Needs    Lack of Transportation (Medical): No    Lack of Transportation (Non-Medical):  No   Physical Activity: Not on file   Stress: Not on file   Social Connections: Not on file   Intimate Partner Violence: Not on file   Housing Stability: Not on file         REVIEW OF SYSTEMS: A 12-point review of systems was obtained and pertinent positives and negatives were listed below.     REVIEW OF SYSTEMS:     Constitutional: No fever, no chills, no lethargy, no weakness.  HEENT:  No headache, otalgia, itchy eyes, nasal discharge or sore throat.  Cardiac:  No chest pain, dyspnea, orthopnea or PND.  Chest:   No cough, phlegm or wheezing.  Abdomen:      Detailed by MA   Neuro:  No focal weakness, abnormal movements or seizure like activity.  Skin:   No rashes, no itching.  :   No hematuria, no pyuria, no dysuria, no flank pain.  Extremities:  No swelling or joint pains.  ROS was otherwise negative    Review of Systems   Constitutional:  Positive for appetite change. Negative for fatigue and unexpected weight change.   HENT:  Negative for sore throat and trouble swallowing.    Eyes: Negative.    Respiratory:  Negative for cough, choking, chest tightness and shortness of breath.    Cardiovascular:  Negative for chest pain and leg swelling.   Gastrointestinal:  Positive for abdominal distention, abdominal pain, constipation, diarrhea and rectal pain. Negative for anal bleeding, blood in stool, nausea and vomiting.   Endocrine: Negative.    Genitourinary: Negative.  Negative for difficulty urinating.   Musculoskeletal: Negative.    Allergic/Immunologic: Negative for environmental allergies and food allergies.   Neurological:  Negative for dizziness, seizures, light-headedness, numbness and headaches.   Hematological:  Bruises/bleeds easily.   Psychiatric/Behavioral:  Negative for confusion and decreased concentration. The patient is not nervous/anxious.      PHYSICAL EXAMINATION: Vital signs reviewed per the nursing documentation.     /72 (Site: Left Upper Arm, Position: Sitting, Cuff Size: Small Adult)   Pulse 98   Ht  5' (1.524 m)   Wt 126 lb (57.2 kg)   SpO2 96%   BMI 24.61 kg/m²   Body mass index is 24.61 kg/m².   Physical Exam    Physical Exam   Constitutional: Patient is oriented to person, place, and time. Patient appears well-developed and well-nourished.   HENT:   Head: Normocephalic and atraumatic.   Eyes: Pupils are equal, round, and reactive to light. EOM are normal.   Neck: Normal range of motion. Neck supple. No JVD present. No tracheal deviation present. No thyromegaly present.   Cardiovascular: Normal rate, regular rhythm, normal heart sounds and intact distal pulses.   Pulmonary/Chest: Effort normal and breath sounds normal. No stridor. No respiratory distress. He has no wheezes. He has no rales. He exhibits no tenderness.   Abdominal: Soft. Bowel sounds are normal. He exhibits no distension and no mass. There is no tenderness. There is no rebound and no guarding. No hernia.   Musculoskeletal: Normal range of motion.   Lymphadenopathy:    Patient has no cervical adenopathy.   Neurological: Patient is alert and oriented to person, place, and time.   Psychiatric: Patient has a normal mood and affect. Patient behavior is normal.       LABORATORY DATA: Reviewed  Lab Results   Component Value Date    WBC 14.4 (H) 01/20/2022    HGB 14.9 07/22/2022    HCT 45.0 07/22/2022    MCV 98.2 01/20/2022     01/20/2022     09/12/2022    K 4.6 09/12/2022     09/12/2022    CO2 25 09/12/2022    BUN 11 09/12/2022    CREATININE 0.69 09/12/2022    LABALBU 4.0 04/19/2021    BILITOT 0.36 04/19/2021    ALKPHOS 89 04/19/2021    AST 17 04/19/2021    ALT 14 04/19/2021         Lab Results   Component Value Date    RBC 3.82 (L) 01/20/2022    HGB 14.9 07/22/2022    MCV 98.2 01/20/2022    MCH 32.7 01/20/2022    MCHC 33.3 01/20/2022    RDW 13.0 01/20/2022    MPV 10.1 01/20/2022    BASOPCT 0 01/04/2020    LYMPHSABS 1.66 01/04/2020    MONOSABS 0.19 01/04/2020    NEUTROABS 4.55 01/04/2020    EOSABS 0.00 01/04/2020    BASOSABS 0.00  01/04/2020         DIAGNOSTIC TESTING:     No results found. IMPRESSION: Ms.Christine Goyo Alvarez is a 75 y.o. female with a past history remarkable for prior history of reported pelvic stenosis status post CRE dilations, active smoker x40 years, referred for evaluation of recurrent abdominal pain. The patient has a known history of idiopathic adult onset hypertrophic pyloric stenosis status post CRE dilation and eventual pyloroplasty performed in 2022. Since her and whether not her abdominal pain appears to be residual from prior known disease. No significant change in weight. Does report intermittent loose bowel movements. Denies any dietary triggers. Denies any reflux. No recent upper endoscopy. Assessment  1. Pyloric stenosis    2. Dyspepsia        Miranda Silva was seen today for abdominal pain and irritable bowel syndrome. Diagnoses and all orders for this visit:    Pyloric stenosis-status post pyloroplasty, likely secondary to idiopathic hypertrophic pyloric stenosis. Denies any significant upper GI symptoms aside from abdominal pain in the postprandial setting. We will send for upper GI series to evaluate for adequate flow through the pylorus. Patient to continue with PPI therapy with 20 mg of Prilosec twice daily.  -     FL UGI W AIR CONTRAST; Future  -     Calprotectin Stool; Future  -     Pancreatic elastase, fecal; Future  -     Celiac Disease Panel; Future  -     Food Comprehensive Panel; Future    Dyspepsia associated with diarrhea-I will send for pancreatic elastase celiac disease with comprehensive panel and fecal calprotectin. Other orders  -     omeprazole (PRILOSEC OTC) 20 MG tablet; Take 2 tablets by mouth daily       Will discuss Colonoscopy on next visit        RTC: After pending studies. Additional comments: Thank you for allowing me to participate in the care of Ms. Melgar. For any further questions please do not hesitate to contact me.       I have reviewed and agree with the MA/LPN ROS please refer to their documentation from today's encounter on a separate note. Maxwell Syed MD, MPH   Board Certified in Gastroenterology  Board Certified in [address] [place] [address] #: [phone]          this note is created with the assistance of a speech recognition program.  While intending to generate a document that actually reflects the content of the visit, the document can still have some errors including those of syntax and sound a like substitutions which may escape proof reading. It such instances, actual meaning can be extrapolated by contextual diversion.

## 2023-03-08 LAB
ALLERGEN BARLEY IGE: <0.1 KU/L (ref 0–0.34)
ALLERGEN BEEF: <0.1 KU/L (ref 0–0.34)
ALLERGEN CABBAGE IGE: <0.1 KU/L (ref 0–0.34)
ALLERGEN CARROT IGE: <0.1 KU/L (ref 0–0.34)
ALLERGEN CHICKEN IGE: <0.1 KU/L (ref 0–0.34)
ALLERGEN CODFISH IGE: <0.1 KU/L (ref 0–0.34)
ALLERGEN CORN IGE: <0.1 KU/L (ref 0–0.34)
ALLERGEN COW MILK IGE: <0.1 KU/L (ref 0–0.34)
ALLERGEN CRAB IGE: <0.1 KU/L (ref 0–0.34)
ALLERGEN EGG WHITE IGE: <0.1 KU/L (ref 0–0.34)
ALLERGEN GRAPE IGE: <0.1 KU/L (ref 0–0.34)
ALLERGEN LETTUCE IGE: <0.1 KU/L (ref 0–0.34)
ALLERGEN NAVY BEAN: <0.1 KU/L (ref 0–0.34)
ALLERGEN OAT: <0.1 KU/L (ref 0–0.34)
ALLERGEN ORANGE IGE: <0.1 KU/L (ref 0–0.34)
ALLERGEN PEANUT (F13) IGE: <0.1 KU/L (ref 0–0.34)
ALLERGEN PEPPER C. ANNUUM IGE: <0.1 KU/L (ref 0–0.34)
ALLERGEN PORK: <0.1 KU/L (ref 0–0.34)
ALLERGEN RICE IGE: <0.1 KU/L (ref 0–0.34)
ALLERGEN RYE IGE: <0.1 KU/L (ref 0–0.34)
ALLERGEN SOYBEAN IGE: <0.1 KU/L (ref 0–0.34)
ALLERGEN TOMATO IGE: <0.1 KU/L (ref 0–0.34)
ALLERGEN TUNA IGE: <0.1 KU/L (ref 0–0.34)
ALLERGEN WHEAT IGE: <0.1 KU/L (ref 0–0.34)
IGE: 153 IU/ML
POTATO, IGE: <0.1 KU/L (ref 0–0.34)
SHRIMP: <0.1 KU/L (ref 0–0.34)

## 2023-03-09 LAB
GLIADIN IGA SER IA-ACNC: 0.6 U/ML
GLIADIN IGG SER IA-ACNC: <0.4 U/ML
IGA SERPL-MCNC: 152 MG/DL (ref 70–400)
TTG IGA SER IA-ACNC: 0.3 U/ML

## 2023-03-11 DIAGNOSIS — G89.29 CHRONIC RIGHT SHOULDER PAIN: ICD-10-CM

## 2023-03-11 DIAGNOSIS — M25.511 CHRONIC RIGHT SHOULDER PAIN: ICD-10-CM

## 2023-03-11 DIAGNOSIS — R10.84 GENERALIZED POSTPRANDIAL ABDOMINAL PAIN: ICD-10-CM

## 2023-03-12 RX ORDER — OXYCODONE HYDROCHLORIDE AND ACETAMINOPHEN 5; 325 MG/1; MG/1
1 TABLET ORAL EVERY 8 HOURS PRN
Qty: 90 TABLET | Refills: 0 | Status: SHIPPED | OUTPATIENT
Start: 2023-03-12 | End: 2023-04-11

## 2023-03-15 ENCOUNTER — HOSPITAL ENCOUNTER (OUTPATIENT)
Dept: GENERAL RADIOLOGY | Age: 68
Discharge: HOME | End: 2023-03-17
Payer: MEDICARE

## 2023-03-15 DIAGNOSIS — K31.1 PYLORIC STENOSIS: ICD-10-CM

## 2023-03-15 PROCEDURE — 2500000003 HC RX 250 WO HCPCS: Performed by: INTERNAL MEDICINE

## 2023-03-15 PROCEDURE — 6370000000 HC RX 637 (ALT 250 FOR IP): Performed by: INTERNAL MEDICINE

## 2023-03-15 PROCEDURE — 74246 X-RAY XM UPR GI TRC 2CNTRST: CPT

## 2023-03-15 RX ADMIN — BARIUM SULFATE 50 ML: 960 POWDER, FOR SUSPENSION ORAL at 10:23

## 2023-03-15 RX ADMIN — BARIUM SULFATE 135 ML: 980 POWDER, FOR SUSPENSION ORAL at 10:23

## 2023-03-15 RX ADMIN — ANTACID/ANTIFLATULENT 1 EACH: 380; 550; 10; 10 GRANULE, EFFERVESCENT ORAL at 10:24

## 2023-03-17 ENCOUNTER — HOSPITAL ENCOUNTER (OUTPATIENT)
Age: 68
Setting detail: SPECIMEN
Discharge: HOME | End: 2023-03-17

## 2023-03-17 DIAGNOSIS — K31.1 PYLORIC STENOSIS: ICD-10-CM

## 2023-03-20 LAB — CALPROTECTIN, FECAL: 59 UG/G

## 2023-03-21 LAB — FECAL PANCREATIC ELASTASE-1: 490 UG/G

## 2023-03-30 ENCOUNTER — TELEPHONE (OUTPATIENT)
Dept: GASTROENTEROLOGY | Age: 68
End: 2023-03-30

## 2023-04-03 DIAGNOSIS — Z95.828 STATUS POST AORTOBIFEMORAL BYPASS SURGERY: ICD-10-CM

## 2023-04-03 DIAGNOSIS — I70.1 STENOSIS OF LEFT RENAL ARTERY (HCC): ICD-10-CM

## 2023-04-04 RX ORDER — CLOPIDOGREL BISULFATE 75 MG/1
75 TABLET ORAL DAILY
Qty: 90 TABLET | Refills: 1 | OUTPATIENT
Start: 2023-04-04

## 2023-04-05 DIAGNOSIS — M25.511 CHRONIC RIGHT SHOULDER PAIN: ICD-10-CM

## 2023-04-05 DIAGNOSIS — G89.29 CHRONIC RIGHT SHOULDER PAIN: ICD-10-CM

## 2023-04-05 DIAGNOSIS — R10.84 GENERALIZED POSTPRANDIAL ABDOMINAL PAIN: ICD-10-CM

## 2023-04-05 RX ORDER — OXYCODONE HYDROCHLORIDE AND ACETAMINOPHEN 5; 325 MG/1; MG/1
1 TABLET ORAL EVERY 8 HOURS PRN
Qty: 90 TABLET | Refills: 0 | Status: SHIPPED | OUTPATIENT
Start: 2023-04-05 | End: 2023-05-05

## 2023-04-06 ENCOUNTER — TELEPHONE (OUTPATIENT)
Dept: GASTROENTEROLOGY | Age: 68
End: 2023-04-06

## 2023-04-06 NOTE — TELEPHONE ENCOUNTER
----- Message from Dinh Cantrell MD sent at 3/29/2023  4:10 PM EDT -----  Regarding: RE: What now  Yes we can schedule an EGD and colonoscopy for this patient.   ----- Message -----  From: Dominique Black MA  Sent: 3/29/2023   7:57 AM EDT  To: Dinh Cantrell MD  Subject: FW: What now                                     See below message   ----- Message -----  From: Harlo Primrose: 3/28/2023   6:02 PM EDT  To: [facility] Clinical Staff  Subject: What now                                         Well I'm ready so I guess have them call and we can set both of them up, or give me info and I will set it up. Thank you so much. Thank you,    Amina Patterson     4/6/23   [facility] to call to schedule.

## 2023-04-07 RX ORDER — POLYETHYLENE GLYCOL 3350 17 G/17G
POWDER, FOR SOLUTION ORAL
Qty: 238 G | Refills: 0 | Status: SHIPPED | OUTPATIENT
Start: 2023-04-07

## 2023-04-07 RX ORDER — BISACODYL 5 MG/1
TABLET, DELAYED RELEASE ORAL
Qty: 4 TABLET | Refills: 0 | Status: SHIPPED | OUTPATIENT
Start: 2023-04-07

## 2023-04-07 NOTE — TELEPHONE ENCOUNTER
EGD/colonoscopy/Marli Ramírez    PBDANAY 5/2/23 @ 12:00pm    Verbal instructions given via phone  Written mailed and sent Priscila Tamayox/dulcolax

## 2023-04-11 DIAGNOSIS — Z95.828 STATUS POST AORTOBIFEMORAL BYPASS SURGERY: ICD-10-CM

## 2023-04-11 DIAGNOSIS — I70.1 STENOSIS OF LEFT RENAL ARTERY (HCC): ICD-10-CM

## 2023-04-11 RX ORDER — CLOPIDOGREL BISULFATE 75 MG/1
75 TABLET ORAL DAILY
Qty: 90 TABLET | Refills: 1 | OUTPATIENT
Start: 2023-04-11

## 2023-04-18 ENCOUNTER — TELEPHONE (OUTPATIENT)
Dept: VASCULAR SURGERY | Age: 68
End: 2023-04-18

## 2023-04-18 DIAGNOSIS — Z95.828 STATUS POST AORTOBIFEMORAL BYPASS SURGERY: ICD-10-CM

## 2023-04-18 DIAGNOSIS — I70.1 STENOSIS OF LEFT RENAL ARTERY (HCC): ICD-10-CM

## 2023-04-18 RX ORDER — CLOPIDOGREL BISULFATE 75 MG/1
75 TABLET ORAL DAILY
Qty: 90 TABLET | Refills: 5 | Status: SHIPPED | OUTPATIENT
Start: 2023-04-18

## 2023-04-21 ENCOUNTER — OFFICE VISIT (OUTPATIENT)
Dept: VASCULAR SURGERY | Age: 68
End: 2023-04-21
Payer: MEDICARE

## 2023-04-21 VITALS
OXYGEN SATURATION: 97 % | DIASTOLIC BLOOD PRESSURE: 91 MMHG | BODY MASS INDEX: 24.15 KG/M2 | WEIGHT: 123 LBS | HEIGHT: 60 IN | HEART RATE: 116 BPM | RESPIRATION RATE: 18 BRPM | SYSTOLIC BLOOD PRESSURE: 137 MMHG | TEMPERATURE: 98 F

## 2023-04-21 DIAGNOSIS — K55.1 CHRONIC VASCULAR INSUFFICIENCY OF INTESTINE (HCC): Primary | ICD-10-CM

## 2023-04-21 PROCEDURE — G8400 PT W/DXA NO RESULTS DOC: HCPCS | Performed by: SURGERY

## 2023-04-21 PROCEDURE — G8427 DOCREV CUR MEDS BY ELIG CLIN: HCPCS | Performed by: SURGERY

## 2023-04-21 PROCEDURE — 99214 OFFICE O/P EST MOD 30 MIN: CPT | Performed by: SURGERY

## 2023-04-21 PROCEDURE — 3017F COLORECTAL CA SCREEN DOC REV: CPT | Performed by: SURGERY

## 2023-04-21 PROCEDURE — 1123F ACP DISCUSS/DSCN MKR DOCD: CPT | Performed by: SURGERY

## 2023-04-21 PROCEDURE — 3080F DIAST BP >= 90 MM HG: CPT | Performed by: SURGERY

## 2023-04-21 PROCEDURE — 3075F SYST BP GE 130 - 139MM HG: CPT | Performed by: SURGERY

## 2023-04-21 PROCEDURE — G8420 CALC BMI NORM PARAMETERS: HCPCS | Performed by: SURGERY

## 2023-04-21 PROCEDURE — 1090F PRES/ABSN URINE INCON ASSESS: CPT | Performed by: SURGERY

## 2023-04-21 PROCEDURE — 4004F PT TOBACCO SCREEN RCVD TLK: CPT | Performed by: SURGERY

## 2023-04-21 ASSESSMENT — ENCOUNTER SYMPTOMS
NAUSEA: 0
ABDOMINAL DISTENTION: 0
DIARRHEA: 0
ABDOMINAL PAIN: 1
BLOOD IN STOOL: 0
APNEA: 0
COUGH: 0
SHORTNESS OF BREATH: 0
BACK PAIN: 0

## 2023-04-21 NOTE — PROGRESS NOTES
[facility]  [address] [facility]  [address] [place] [address]  Dept: [phone]     Patient: Claudene Hench  : 1955  MRN: [id number]  DOS: 2023            HPI:  Claudene Hench is a 75 y.o. female who returns to the office regarding her abdominal pain. Recall she had Left renal artery stent placed with post placement imaging showing wide patency and appropriate performance. She complains of post prandial abdominal pain, typically in the rigth lowe quadrant. She states she has not eaten since eating pizza on Wednesday due to continued cramping abdominal pain. She denies constipation but states that she does have a bowel movement every day. Her last colonoscopy was unable to be completed due to impaction. And she does say that having a bowel movement relieves most of her pain. She denies recent weight loss. She denies food fear, but states that she has to force herself to eat. She has also had an aortobifemoral bypass with an end-to-side proximal configuration and end-to-side distal configuration bilaterally. She also has a history of idiopathic adult onset hypertrophic pyloric stenosis status post CRE dilation and eventual pyloroplasty performed in . She is undergoing gastroenterology workup for cause of her pain. 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 Review of Systems   Constitutional:  Negative for fatigue, fever and unexpected weight change. Respiratory:  Negative for apnea, cough and shortness of breath. Cardiovascular:  Negative for chest pain, palpitations and leg swelling. Gastrointestinal:  Positive for abdominal pain. Negative for abdominal distention, blood in stool, diarrhea and nausea. Endocrine: Negative for cold intolerance, heat intolerance, polydipsia and polyuria.

## 2023-04-26 ENCOUNTER — HOSPITAL ENCOUNTER (OUTPATIENT)
Dept: CT IMAGING | Age: 68
Discharge: HOME | End: 2023-04-28
Payer: MEDICARE

## 2023-04-26 ENCOUNTER — HOSPITAL ENCOUNTER (OUTPATIENT)
Age: 68
Discharge: HOME | End: 2023-04-26

## 2023-04-26 DIAGNOSIS — K31.1 PYLORIC STENOSIS: Primary | ICD-10-CM

## 2023-04-26 DIAGNOSIS — K55.1 CHRONIC VASCULAR INSUFFICIENCY OF INTESTINE (HCC): ICD-10-CM

## 2023-04-26 LAB
CREAT SERPL-MCNC: 1.04 MG/DL (ref 0.5–0.9)
GFR SERPL CREATININE-BSD FRML MDRD: 59 ML/MIN/1.73M2

## 2023-04-26 PROCEDURE — 6360000004 HC RX CONTRAST MEDICATION: Performed by: SURGERY

## 2023-04-26 PROCEDURE — 74174 CTA ABD&PLVS W/CONTRAST: CPT

## 2023-04-26 RX ADMIN — IOPAMIDOL 75 ML: 755 INJECTION, SOLUTION INTRAVENOUS at 09:10

## 2023-04-28 ENCOUNTER — OFFICE VISIT (OUTPATIENT)
Dept: PRIMARY CARE CLINIC | Age: 68
End: 2023-04-28
Payer: MEDICARE

## 2023-04-28 VITALS
WEIGHT: 129.4 LBS | BODY MASS INDEX: 25.4 KG/M2 | OXYGEN SATURATION: 94 % | SYSTOLIC BLOOD PRESSURE: 120 MMHG | HEART RATE: 93 BPM | HEIGHT: 60 IN | DIASTOLIC BLOOD PRESSURE: 60 MMHG

## 2023-04-28 DIAGNOSIS — Z72.0 SMOKING TRYING TO QUIT: ICD-10-CM

## 2023-04-28 DIAGNOSIS — G89.29 CHRONIC RIGHT SHOULDER PAIN: ICD-10-CM

## 2023-04-28 DIAGNOSIS — I70.1 RENAL ARTERY STENOSIS (HCC): ICD-10-CM

## 2023-04-28 DIAGNOSIS — M25.511 CHRONIC RIGHT SHOULDER PAIN: ICD-10-CM

## 2023-04-28 DIAGNOSIS — Z95.828 STATUS POST AORTOBIFEMORAL BYPASS SURGERY: ICD-10-CM

## 2023-04-28 DIAGNOSIS — I74.09 AORTOILIAC OCCLUSIVE DISEASE (HCC): ICD-10-CM

## 2023-04-28 DIAGNOSIS — R10.84 GENERALIZED POSTPRANDIAL ABDOMINAL PAIN: ICD-10-CM

## 2023-04-28 DIAGNOSIS — I10 PRIMARY HYPERTENSION: ICD-10-CM

## 2023-04-28 DIAGNOSIS — E11.9 TYPE 2 DIABETES MELLITUS WITHOUT COMPLICATION, WITHOUT LONG-TERM CURRENT USE OF INSULIN (HCC): Primary | ICD-10-CM

## 2023-04-28 DIAGNOSIS — Z78.0 POSTMENOPAUSAL: ICD-10-CM

## 2023-04-28 DIAGNOSIS — Z12.31 SCREENING MAMMOGRAM FOR BREAST CANCER: ICD-10-CM

## 2023-04-28 LAB — HBA1C MFR BLD: 5.8 %

## 2023-04-28 PROCEDURE — 83036 HEMOGLOBIN GLYCOSYLATED A1C: CPT | Performed by: NURSE PRACTITIONER

## 2023-04-28 PROCEDURE — G8400 PT W/DXA NO RESULTS DOC: HCPCS | Performed by: NURSE PRACTITIONER

## 2023-04-28 PROCEDURE — 3044F HG A1C LEVEL LT 7.0%: CPT | Performed by: NURSE PRACTITIONER

## 2023-04-28 PROCEDURE — 3078F DIAST BP <80 MM HG: CPT | Performed by: NURSE PRACTITIONER

## 2023-04-28 PROCEDURE — G8427 DOCREV CUR MEDS BY ELIG CLIN: HCPCS | Performed by: NURSE PRACTITIONER

## 2023-04-28 PROCEDURE — G8419 CALC BMI OUT NRM PARAM NOF/U: HCPCS | Performed by: NURSE PRACTITIONER

## 2023-04-28 PROCEDURE — 3074F SYST BP LT 130 MM HG: CPT | Performed by: NURSE PRACTITIONER

## 2023-04-28 PROCEDURE — 1090F PRES/ABSN URINE INCON ASSESS: CPT | Performed by: NURSE PRACTITIONER

## 2023-04-28 PROCEDURE — 2022F DILAT RTA XM EVC RTNOPTHY: CPT | Performed by: NURSE PRACTITIONER

## 2023-04-28 PROCEDURE — 4004F PT TOBACCO SCREEN RCVD TLK: CPT | Performed by: NURSE PRACTITIONER

## 2023-04-28 PROCEDURE — 1123F ACP DISCUSS/DSCN MKR DOCD: CPT | Performed by: NURSE PRACTITIONER

## 2023-04-28 PROCEDURE — 99214 OFFICE O/P EST MOD 30 MIN: CPT | Performed by: NURSE PRACTITIONER

## 2023-04-28 PROCEDURE — 3017F COLORECTAL CA SCREEN DOC REV: CPT | Performed by: NURSE PRACTITIONER

## 2023-04-28 SDOH — ECONOMIC STABILITY: FOOD INSECURITY: WITHIN THE PAST 12 MONTHS, THE FOOD YOU BOUGHT JUST DIDN'T LAST AND YOU DIDN'T HAVE MONEY TO GET MORE.: NEVER TRUE

## 2023-04-28 SDOH — ECONOMIC STABILITY: HOUSING INSECURITY
IN THE LAST 12 MONTHS, WAS THERE A TIME WHEN YOU DID NOT HAVE A STEADY PLACE TO SLEEP OR SLEPT IN A SHELTER (INCLUDING NOW)?: NO

## 2023-04-28 SDOH — ECONOMIC STABILITY: INCOME INSECURITY: HOW HARD IS IT FOR YOU TO PAY FOR THE VERY BASICS LIKE FOOD, HOUSING, MEDICAL CARE, AND HEATING?: NOT HARD AT ALL

## 2023-04-28 SDOH — ECONOMIC STABILITY: FOOD INSECURITY: WITHIN THE PAST 12 MONTHS, YOU WORRIED THAT YOUR FOOD WOULD RUN OUT BEFORE YOU GOT MONEY TO BUY MORE.: NEVER TRUE

## 2023-04-28 ASSESSMENT — PATIENT HEALTH QUESTIONNAIRE - PHQ9
3. TROUBLE FALLING OR STAYING ASLEEP: 0
SUM OF ALL RESPONSES TO PHQ QUESTIONS 1-9: 0
10. IF YOU CHECKED OFF ANY PROBLEMS, HOW DIFFICULT HAVE THESE PROBLEMS MADE IT FOR YOU TO DO YOUR WORK, TAKE CARE OF THINGS AT HOME, OR GET ALONG WITH OTHER PEOPLE: 0
SUM OF ALL RESPONSES TO PHQ9 QUESTIONS 1 & 2: 0
6. FEELING BAD ABOUT YOURSELF - OR THAT YOU ARE A FAILURE OR HAVE LET YOURSELF OR YOUR FAMILY DOWN: 0
8. MOVING OR SPEAKING SO SLOWLY THAT OTHER PEOPLE COULD HAVE NOTICED. OR THE OPPOSITE, BEING SO FIGETY OR RESTLESS THAT YOU HAVE BEEN MOVING AROUND A LOT MORE THAN USUAL: 0
9. THOUGHTS THAT YOU WOULD BE BETTER OFF DEAD, OR OF HURTING YOURSELF: 0
1. LITTLE INTEREST OR PLEASURE IN DOING THINGS: 0
SUM OF ALL RESPONSES TO PHQ QUESTIONS 1-9: 0
4. FEELING TIRED OR HAVING LITTLE ENERGY: 0
2. FEELING DOWN, DEPRESSED OR HOPELESS: 0
7. TROUBLE CONCENTRATING ON THINGS, SUCH AS READING THE NEWSPAPER OR WATCHING TELEVISION: 0
5. POOR APPETITE OR OVEREATING: 0

## 2023-04-28 ASSESSMENT — ENCOUNTER SYMPTOMS
COUGH: 0
SHORTNESS OF BREATH: 0
SORE THROAT: 0
WHEEZING: 0
TROUBLE SWALLOWING: 0
NAUSEA: 0
SINUS PRESSURE: 0
DIARRHEA: 0
BLOOD IN STOOL: 0
ABDOMINAL PAIN: 0
CONSTIPATION: 0
VOMITING: 0

## 2023-04-28 NOTE — PROGRESS NOTES
MD Deisy at [facility] [address] N/A 07/22/2021    EGD BIOPSY performed by Chandler Stewart MD at [facility] [address]  07/22/2021    EGD DILATION SAVORY performed by Chandler Stewart MD at [facility] 45 Left 8/9/2022    LEFT RENAL ARTERY STENT ([facility] CAST COVERED STENT 6 MM X 22 MM X 120 CM, 6FR), RENAL ARTERIOGRAPHY performed by Zbigniew Timmons MD at [address] [place] [address] History   Problem Relation Age of Onset    Colon Cancer Mother     Prostate Cancer Father     Thyroid Cancer Sister           Social History     Tobacco Use    Smoking status: Every Day     Packs/day: 1.00     Years: 41.00     Pack years: 41.00     Types: Cigarettes     Start date: 7/22/1975    Smokeless tobacco: Never    Tobacco comments:     Cut back   Substance Use Topics    Alcohol use: Never      Current Outpatient Medications   Medication Sig Dispense Refill    clopidogrel (PLAVIX) 75 MG tablet Take 1 tablet by mouth daily 90 tablet 5    polyethylene glycol (GLYCOLAX) 17 GM/SCOOP powder Take as directed for bowel prep 238 g 0    bisacodyl 5 MG EC tablet Take as directed for bowel prep 4 tablet 0    oxyCODONE-acetaminophen (PERCOCET) 5-325 MG per tablet Take 1 tablet by mouth every 8 hours as needed for Pain for up to 30 days. 90 tablet 0    omeprazole (PRILOSEC OTC) 20 MG tablet Take 2 tablets by mouth daily 30 tablet 3    amLODIPine (NORVASC) 5 MG tablet Take 1 tablet by mouth daily 30 tablet 5    lisinopril (PRINIVIL;ZESTRIL) 40 MG tablet Take 1 tablet by mouth daily 90 tablet 3    Multiple Vitamins-Minerals (MULTIVITAMIN ADULT EXTRA C PO) multivitamin   1 tab daily      latanoprost (XALATAN) 0.005 % ophthalmic solution INSTILL 1 DROP INTO EACH EYE AT BEDTIME       No current facility-administered medications for this visit.      Allergies   Allergen Reactions    Dilaudid [Hydromorphone Hcl] Swelling    Sulfa Antibiotics Swelling

## 2023-05-05 ENCOUNTER — TELEPHONE (OUTPATIENT)
Dept: GASTROENTEROLOGY | Age: 68
End: 2023-05-05

## 2023-05-05 ENCOUNTER — OFFICE VISIT (OUTPATIENT)
Dept: VASCULAR SURGERY | Age: 68
End: 2023-05-05

## 2023-05-05 VITALS
SYSTOLIC BLOOD PRESSURE: 152 MMHG | HEART RATE: 78 BPM | TEMPERATURE: 98.4 F | RESPIRATION RATE: 17 BRPM | BODY MASS INDEX: 24.74 KG/M2 | OXYGEN SATURATION: 96 % | DIASTOLIC BLOOD PRESSURE: 76 MMHG | WEIGHT: 126 LBS | HEIGHT: 60 IN

## 2023-05-05 DIAGNOSIS — G89.29 CHRONIC RIGHT SHOULDER PAIN: ICD-10-CM

## 2023-05-05 DIAGNOSIS — K55.1 CHRONIC VASCULAR INSUFFICIENCY OF INTESTINE (HCC): Primary | ICD-10-CM

## 2023-05-05 DIAGNOSIS — I70.213 ATHEROSCLEROSIS OF NATIVE ARTERY OF BOTH LOWER EXTREMITIES WITH INTERMITTENT CLAUDICATION (HCC): ICD-10-CM

## 2023-05-05 DIAGNOSIS — I70.1 RENAL ARTERY STENOSIS (HCC): ICD-10-CM

## 2023-05-05 DIAGNOSIS — M25.511 CHRONIC RIGHT SHOULDER PAIN: ICD-10-CM

## 2023-05-05 DIAGNOSIS — R10.84 GENERALIZED POSTPRANDIAL ABDOMINAL PAIN: ICD-10-CM

## 2023-05-05 RX ORDER — OXYCODONE HYDROCHLORIDE AND ACETAMINOPHEN 5; 325 MG/1; MG/1
1 TABLET ORAL EVERY 8 HOURS PRN
Qty: 90 TABLET | Refills: 0 | Status: SHIPPED | OUTPATIENT
Start: 2023-05-05 | End: 2023-06-04

## 2023-05-05 ASSESSMENT — ENCOUNTER SYMPTOMS
NAUSEA: 1
TROUBLE SWALLOWING: 0
DIARRHEA: 1
VOMITING: 1
EYE PAIN: 0
SHORTNESS OF BREATH: 0
ABDOMINAL PAIN: 1
ABDOMINAL DISTENTION: 0
VOICE CHANGE: 0
COLOR CHANGE: 0
CHEST TIGHTNESS: 0
COUGH: 0

## 2023-05-05 NOTE — PROGRESS NOTES
[facility]  [address] [facility] [address] [place] [address]  Dept: [phone]     Patient: Trenton Bright  : 1955  MRN: [id number]  DOS: 2023            HPI:  Trenton Bright is a 75 y.o. female who returns to the office regarding her mesenteric ischemia. Symptoms are intermittent although given evaluating her for the possibility mesenteric ischemia. We ultimately obtained a CTA of the abdomen pelvis revealed no significant stenosis in the celiac or mesenteric artery. I do not think that her abdominal symptoms are related to any vascular insufficiency. Her renal artery stent on the left is patent. She continues to have problems with postprandial pain nausea and vomiting. She also has occasional diarrhea. Review of Systems   Constitutional:  Negative for activity change, appetite change, fever and unexpected weight change. HENT:  Negative for congestion, trouble swallowing and voice change. Eyes:  Negative for pain and visual disturbance. Respiratory:  Negative for cough, chest tightness and shortness of breath. Cardiovascular:  Negative for chest pain and palpitations. Gastrointestinal:  Positive for abdominal pain, diarrhea, nausea and vomiting. Negative for abdominal distention. Endocrine: Negative for cold intolerance and heat intolerance. Genitourinary:  Negative for dysuria, flank pain and hematuria. Musculoskeletal:  Negative for joint swelling and neck pain. Skin:  Negative for color change and rash. Allergic/Immunologic: Negative for immunocompromised state. Neurological:  Negative for syncope, speech difficulty, weakness, numbness and headaches. Hematological:  Negative for adenopathy. Psychiatric/Behavioral:  Negative for agitation and confusion.       Vitals:    23 1254 23 1256   BP: (!) 152/78 (!) 152/76   Site: Right Upper Arm Right Upper Arm

## 2023-05-09 ENCOUNTER — TELEPHONE (OUTPATIENT)
Dept: GASTROENTEROLOGY | Age: 68
End: 2023-05-09

## 2023-05-09 RX ORDER — POLYETHYLENE GLYCOL 3350 17 G/17G
POWDER, FOR SOLUTION ORAL
Qty: 238 G | Refills: 0 | Status: SHIPPED | OUTPATIENT
Start: 2023-05-09

## 2023-05-09 RX ORDER — BISACODYL 5 MG/1
TABLET, DELAYED RELEASE ORAL
Qty: 4 TABLET | Refills: 0 | Status: SHIPPED | OUTPATIENT
Start: 2023-05-09

## 2023-05-09 NOTE — TELEPHONE ENCOUNTER
EGD/colonoscopy/Marli Ramírez    PBDANAY 6/6/23  @ 10:00am    Verbal instructions given via phone  Written mailed    Miralax/ducolax    Plavix clearance received per Dr Orosco Given

## 2023-05-31 DIAGNOSIS — I10 PRIMARY HYPERTENSION: ICD-10-CM

## 2023-05-31 RX ORDER — LISINOPRIL 40 MG/1
TABLET ORAL
Qty: 90 TABLET | Refills: 3 | Status: SHIPPED | OUTPATIENT
Start: 2023-05-31

## 2023-06-01 DIAGNOSIS — R10.84 GENERALIZED POSTPRANDIAL ABDOMINAL PAIN: ICD-10-CM

## 2023-06-01 DIAGNOSIS — M25.511 CHRONIC RIGHT SHOULDER PAIN: ICD-10-CM

## 2023-06-01 DIAGNOSIS — G89.29 CHRONIC RIGHT SHOULDER PAIN: ICD-10-CM

## 2023-06-02 RX ORDER — OXYCODONE HYDROCHLORIDE AND ACETAMINOPHEN 5; 325 MG/1; MG/1
1 TABLET ORAL EVERY 8 HOURS PRN
Qty: 90 TABLET | Refills: 0 | Status: SHIPPED | OUTPATIENT
Start: 2023-06-02 | End: 2023-07-02

## 2023-06-05 ENCOUNTER — ANESTHESIA EVENT (OUTPATIENT)
Dept: OPERATING ROOM | Age: 68
End: 2023-06-05
Payer: MEDICARE

## 2023-06-05 DIAGNOSIS — I10 PRIMARY HYPERTENSION: ICD-10-CM

## 2023-06-05 RX ORDER — AMLODIPINE BESYLATE 5 MG/1
5 TABLET ORAL DAILY
Qty: 30 TABLET | Refills: 5 | Status: SHIPPED | OUTPATIENT
Start: 2023-06-05

## 2023-06-05 NOTE — TELEPHONE ENCOUNTER
From: Rios Chandler  To:  Office of Dr. Patterson Livers: 6/3/2023 8:30 PM EDT  Subject: Medication Renewal Request    Refills have been requested for the following medications:     amLODIPine (NORVASC) 5 MG tablet [Dr. Shae Shah MD]    Preferred pharmacy: [place] [address] [address] [facility] [phone]

## 2023-06-06 ENCOUNTER — ANESTHESIA (OUTPATIENT)
Dept: OPERATING ROOM | Age: 68
End: 2023-06-06
Payer: MEDICARE

## 2023-06-06 ENCOUNTER — HOSPITAL ENCOUNTER (OUTPATIENT)
Age: 68
Setting detail: OUTPATIENT SURGERY
Discharge: HOME | End: 2023-06-06
Attending: INTERNAL MEDICINE | Admitting: INTERNAL MEDICINE
Payer: MEDICARE

## 2023-06-06 VITALS
OXYGEN SATURATION: 99 % | HEIGHT: 60 IN | HEART RATE: 79 BPM | RESPIRATION RATE: 17 BRPM | TEMPERATURE: 97.5 F | SYSTOLIC BLOOD PRESSURE: 118 MMHG | BODY MASS INDEX: 23.4 KG/M2 | DIASTOLIC BLOOD PRESSURE: 92 MMHG | WEIGHT: 119.2 LBS

## 2023-06-06 DIAGNOSIS — Z12.11 SCREENING FOR COLON CANCER: ICD-10-CM

## 2023-06-06 DIAGNOSIS — R10.13 DYSPEPSIA: ICD-10-CM

## 2023-06-06 PROBLEM — K57.90 DIVERTICULOSIS: Status: ACTIVE | Noted: 2023-06-06

## 2023-06-06 PROBLEM — D12.6 ADENOMATOUS POLYP OF COLON: Status: ACTIVE | Noted: 2023-06-06

## 2023-06-06 PROCEDURE — 3609027000 HC COLONOSCOPY: Performed by: INTERNAL MEDICINE

## 2023-06-06 PROCEDURE — 7100000010 HC PHASE II RECOVERY - FIRST 15 MIN: Performed by: INTERNAL MEDICINE

## 2023-06-06 PROCEDURE — 7100000000 HC PACU RECOVERY - FIRST 15 MIN: Performed by: INTERNAL MEDICINE

## 2023-06-06 PROCEDURE — 6360000002 HC RX W HCPCS

## 2023-06-06 PROCEDURE — 2500000003 HC RX 250 WO HCPCS

## 2023-06-06 PROCEDURE — 2580000003 HC RX 258: Performed by: ANESTHESIOLOGY

## 2023-06-06 PROCEDURE — 3700000001 HC ADD 15 MINUTES (ANESTHESIA): Performed by: INTERNAL MEDICINE

## 2023-06-06 PROCEDURE — 3700000000 HC ANESTHESIA ATTENDED CARE: Performed by: INTERNAL MEDICINE

## 2023-06-06 PROCEDURE — 7100000001 HC PACU RECOVERY - ADDTL 15 MIN: Performed by: INTERNAL MEDICINE

## 2023-06-06 PROCEDURE — 7100000011 HC PHASE II RECOVERY - ADDTL 15 MIN: Performed by: INTERNAL MEDICINE

## 2023-06-06 PROCEDURE — 3609012400 HC EGD TRANSORAL BIOPSY SINGLE/MULTIPLE: Performed by: INTERNAL MEDICINE

## 2023-06-06 PROCEDURE — 2709999900 HC NON-CHARGEABLE SUPPLY: Performed by: INTERNAL MEDICINE

## 2023-06-06 PROCEDURE — 88305 TISSUE EXAM BY PATHOLOGIST: CPT

## 2023-06-06 RX ORDER — SODIUM CHLORIDE 0.9 % (FLUSH) 0.9 %
5-40 SYRINGE (ML) INJECTION PRN
Status: DISCONTINUED | OUTPATIENT
Start: 2023-06-06 | End: 2023-06-06 | Stop reason: HOSPADM

## 2023-06-06 RX ORDER — METOCLOPRAMIDE HYDROCHLORIDE 5 MG/ML
10 INJECTION INTRAMUSCULAR; INTRAVENOUS
Status: DISCONTINUED | OUTPATIENT
Start: 2023-06-06 | End: 2023-06-06 | Stop reason: HOSPADM

## 2023-06-06 RX ORDER — HYDRALAZINE HYDROCHLORIDE 20 MG/ML
10 INJECTION INTRAMUSCULAR; INTRAVENOUS
Status: DISCONTINUED | OUTPATIENT
Start: 2023-06-06 | End: 2023-06-06 | Stop reason: HOSPADM

## 2023-06-06 RX ORDER — DIPHENHYDRAMINE HYDROCHLORIDE 50 MG/ML
12.5 INJECTION INTRAMUSCULAR; INTRAVENOUS
Status: DISCONTINUED | OUTPATIENT
Start: 2023-06-06 | End: 2023-06-06 | Stop reason: HOSPADM

## 2023-06-06 RX ORDER — MIDAZOLAM HYDROCHLORIDE 1 MG/ML
INJECTION INTRAMUSCULAR; INTRAVENOUS PRN
Status: DISCONTINUED | OUTPATIENT
Start: 2023-06-06 | End: 2023-06-06 | Stop reason: SDUPTHER

## 2023-06-06 RX ORDER — SODIUM CHLORIDE 9 MG/ML
INJECTION, SOLUTION INTRAVENOUS PRN
Status: DISCONTINUED | OUTPATIENT
Start: 2023-06-06 | End: 2023-06-06 | Stop reason: HOSPADM

## 2023-06-06 RX ORDER — MORPHINE SULFATE 2 MG/ML
1 INJECTION, SOLUTION INTRAMUSCULAR; INTRAVENOUS EVERY 5 MIN PRN
Status: DISCONTINUED | OUTPATIENT
Start: 2023-06-06 | End: 2023-06-06 | Stop reason: HOSPADM

## 2023-06-06 RX ORDER — PROPOFOL 10 MG/ML
INJECTION, EMULSION INTRAVENOUS PRN
Status: DISCONTINUED | OUTPATIENT
Start: 2023-06-06 | End: 2023-06-06 | Stop reason: SDUPTHER

## 2023-06-06 RX ORDER — OXYCODONE HYDROCHLORIDE 5 MG/1
10 TABLET ORAL PRN
Status: DISCONTINUED | OUTPATIENT
Start: 2023-06-06 | End: 2023-06-06 | Stop reason: HOSPADM

## 2023-06-06 RX ORDER — SODIUM CHLORIDE 0.9 % (FLUSH) 0.9 %
5-40 SYRINGE (ML) INJECTION EVERY 12 HOURS SCHEDULED
Status: DISCONTINUED | OUTPATIENT
Start: 2023-06-06 | End: 2023-06-06 | Stop reason: HOSPADM

## 2023-06-06 RX ORDER — OXYCODONE HYDROCHLORIDE 5 MG/1
5 TABLET ORAL PRN
Status: DISCONTINUED | OUTPATIENT
Start: 2023-06-06 | End: 2023-06-06 | Stop reason: HOSPADM

## 2023-06-06 RX ORDER — LIDOCAINE HYDROCHLORIDE 20 MG/ML
INJECTION, SOLUTION EPIDURAL; INFILTRATION; INTRACAUDAL; PERINEURAL PRN
Status: DISCONTINUED | OUTPATIENT
Start: 2023-06-06 | End: 2023-06-06 | Stop reason: SDUPTHER

## 2023-06-06 RX ORDER — SODIUM CHLORIDE, SODIUM LACTATE, POTASSIUM CHLORIDE, CALCIUM CHLORIDE 600; 310; 30; 20 MG/100ML; MG/100ML; MG/100ML; MG/100ML
INJECTION, SOLUTION INTRAVENOUS CONTINUOUS
Status: DISCONTINUED | OUTPATIENT
Start: 2023-06-06 | End: 2023-06-06 | Stop reason: HOSPADM

## 2023-06-06 RX ORDER — ONDANSETRON 2 MG/ML
4 INJECTION INTRAMUSCULAR; INTRAVENOUS
Status: DISCONTINUED | OUTPATIENT
Start: 2023-06-06 | End: 2023-06-06 | Stop reason: HOSPADM

## 2023-06-06 RX ADMIN — PROPOFOL 50 MG: 10 INJECTION, EMULSION INTRAVENOUS at 10:09

## 2023-06-06 RX ADMIN — MIDAZOLAM 2 MG: 1 INJECTION INTRAMUSCULAR; INTRAVENOUS at 10:09

## 2023-06-06 RX ADMIN — LIDOCAINE HYDROCHLORIDE 80 MG: 20 INJECTION, SOLUTION EPIDURAL; INFILTRATION; INTRACAUDAL; PERINEURAL at 10:09

## 2023-06-06 RX ADMIN — SODIUM CHLORIDE: 9 INJECTION, SOLUTION INTRAVENOUS at 09:37

## 2023-06-06 RX ADMIN — PROPOFOL 200 MG: 10 INJECTION, EMULSION INTRAVENOUS at 10:12

## 2023-06-06 ASSESSMENT — PAIN - FUNCTIONAL ASSESSMENT: PAIN_FUNCTIONAL_ASSESSMENT: 0-10

## 2023-06-06 ASSESSMENT — LIFESTYLE VARIABLES: SMOKING_STATUS: 1

## 2023-06-06 NOTE — ANESTHESIA PRE PROCEDURE
Department of Anesthesiology  Preprocedure Note       Name:  Myriam Durán   Age:  75 y.o.  :  1955                                          MRN:  [id number]         Date:  2023      Surgeon: Mackenzie Roberts):  Zain Marin MD    Procedure: Procedure(s):  EGD BIOPSY  COLORECTAL CANCER SCREENING, NOT HIGH RISK    Medications prior to admission:   Prior to Admission medications    Medication Sig Start Date End Date Taking? Authorizing Provider   amLODIPine (NORVASC) 5 MG tablet Take 1 tablet by mouth daily 23   BAKARI Thompson CNP   oxyCODONE-acetaminophen (PERCOCET) 5-325 MG per tablet Take 1 tablet by mouth every 8 hours as needed for Pain for up to 30 days.  23  BAKARI Thompson CNP   lisinopril (PRINIVIL;ZESTRIL) 40 MG tablet Take 1 tablet by mouth once daily 23   BAKARI Thompson CNP   polyethylene glycol Yoan Ros) 17 GM/SCOOP powder Take as directed for bowel prep 23   Zain Marin MD   bisacodyl 5 MG EC tablet Take as directed for bowel prep 23   Zain Marin MD   clopidogrel (PLAVIX) 75 MG tablet Take 1 tablet by mouth daily 23   Jai Guardado MD   polyethylene glycol Yoan Ros) 17 GM/SCOOP powder Take as directed for bowel prep 23   Zain Marin MD   bisacodyl 5 MG EC tablet Take as directed for bowel prep 23   Zain Marin MD   omeprazole (PRILOSEC OTC) 20 MG tablet Take 2 tablets by mouth daily 3/7/23   Zain Marin MD   Multiple Vitamins-Minerals (MULTIVITAMIN ADULT EXTRA C PO) multivitamin   1 tab daily    Historical Provider, MD   latanoprost (XALATAN) 0.005 % ophthalmic solution INSTILL 1 DROP INTO EACH EYE AT BEDTIME 20   Historical Provider, MD       Current medications:    Current Facility-Administered Medications   Medication Dose Route Frequency Provider Last Rate Last Admin    lactated ringers IV soln infusion   IntraVENous Continuous Charmaine Jackson MD        sodium chloride flush 0.9 % injection 5-40 mL

## 2023-06-06 NOTE — ANESTHESIA POSTPROCEDURE EVALUATION
Department of Anesthesiology  Postprocedure Note    Patient: Chauncey Boast  MRN: [id number]  YOB: 1955  Date of evaluation: 6/6/2023      Procedure Summary     Date: 06/06/23 Room / Location: [place] [address] / [facility]    Anesthesia Start: 2015 Anesthesia Stop: 7012    Procedures:       EGD BIOPSY      COLONOSCOPY DIAGNOSTIC WITH INK INJECTION Diagnosis:       Dyspepsia      Screening for colon cancer      (Dyspepsia [R10.13])      (Screening for colon cancer [Z12.11])    Surgeons: Sukumar Quintanilla MD Responsible Provider: Nora Llanos MD    Anesthesia Type: MAC ASA Status: 3          Anesthesia Type: No value filed.     Evelyn Phase I: Evelyn Score: 7    Evelyn Phase II:        Anesthesia Post Evaluation    Patient location during evaluation: PACU  Patient participation: complete - patient participated  Level of consciousness: awake and alert  Airway patency: patent  Nausea & Vomiting: no nausea and no vomiting  Complications: no  Cardiovascular status: hemodynamically stable  Respiratory status: room air and spontaneous ventilation  Hydration status: euvolemic  Multimodal analgesia pain management approach

## 2023-06-06 NOTE — H&P
intercostal retractions   CV: RRR, no MRGs   Abdomen: Soft, non-tender; no masses or HSM   Skin: Normal temperature, turgor and texture; no rash, ulcers or subcutaneous nodules     DATA:  CBC:   Lab Results   Component Value Date    WBC 14.4 (H) 01/20/2022    HGB 14.9 07/22/2022    HCT 45.0 07/22/2022    MCV 98.2 01/20/2022     01/20/2022     BUN/Cr:   Lab Results   Component Value Date    BUN 11 09/12/2022   ,   Lab Results   Component Value Date    CREATININE 1.04 (H) 04/26/2023     Potassium:   Lab Results   Component Value Date    K 4.6 09/12/2022     PT/INR: No results found for: INR, PROTIME    ASSESSMENT AND PLAN:       1. Patient is a 75 y.o. female with above specified procedure planned. Expected Sedation/Anesthesia Type: MAC    2. ASA ([address] Anesthesiology) Anesthesia Status: Class 2 - A normal healthy patient with mild systemic disease    3. Mallampati: II (soft palate, uvula, fauces visible)  4. Procedure options, risks and benefits reviewed with Patient. Patient expresses understanding.     5.  Consent has been signed:  Yes    Grace Sloan MD

## 2023-06-07 ENCOUNTER — TELEPHONE (OUTPATIENT)
Dept: GASTROENTEROLOGY | Age: 68
End: 2023-06-07

## 2023-06-07 LAB — SURGICAL PATHOLOGY REPORT: NORMAL

## 2023-06-07 RX ORDER — SODIUM, POTASSIUM,MAG SULFATES 17.5-3.13G
1 SOLUTION, RECONSTITUTED, ORAL ORAL ONCE
Qty: 1 EACH | Refills: 0 | Status: SHIPPED | OUTPATIENT
Start: 2023-06-07 | End: 2023-06-07

## 2023-06-07 NOTE — TELEPHONE ENCOUNTER
Procedure scheduled/Jose Alfredo  Colon/EMR  Dx:  Polyp Ascending colon  9/13/23  8:30 am  [address] instructions mailed to patient. Patient advised by phone/mail. Patient to be called with instructions for Plavix.

## 2023-06-07 NOTE — TELEPHONE ENCOUNTER
----- Message from [facility] sent at 6/6/2023 10:56 AM EDT -----  Regarding: FW: Poor prep, need second stage EMR colonoscopy, can schedule with Dr. Miguel Peck in 3 months    ----- Message -----  From: Mario Cordero MD  Sent: 6/6/2023  10:48 AM EDT  To: [facility]  Subject: Poor prep, need second stage EMR colonoscopy#    2 day bowel prep.

## 2023-06-12 NOTE — OP NOTE
Operative Note      Patient: Erica Louis  YOB: 1955  MRN: [id number]    Date of Procedure: 6/6/2023    Pre-Op Diagnosis Codes: * Dyspepsia [R10.13]     * Screening for colon cancer [Z12.11]    Post-Op Diagnosis: Dyspepsia       Procedure(s):  EGD BIOPSY  COLONOSCOPY DIAGNOSTIC WITH INK INJECTION    Surgeon(s):  Nancy Sam MD    Assistant:   * No surgical staff found *    Anesthesia: Monitor Anesthesia Care    Estimated Blood Loss (mL): Minimal    Complications: None    Specimens:   ID Type Source Tests Collected by Time Destination   A : STOMACH BIOPSY *RULE OUT H.PYLORI* Tissue Stomach SURGICAL PATHOLOGY Nancy Sam MD 6/6/2023 1012        Implants:  * No implants in log *      Drains: * No LDAs found *              [facility]    EGD    PROCEDURE DATE: 06/12/23    REFERRING PHYSICIAN: No ref. provider found     PRIMARY CARE PROVIDER: BAKARI Kay - [facility]    ATTENDING PHYSICIAN: Nancy Sam MD     HISTORY: Ms. Erica Louis is a 75 y.o. female who presents to the  Endoscopy unit for upper endoscopy. The patient's clinical history is remarkable for dyspepsia. She is currently medically stable and appropriate for the planned procedure. PREOPERATIVE DIAGNOSIS: Dyspepsia. PROCEDURES:   1) Transoral Upper Endoscopy with cold biopsy. POSTOPERATIVE DIAGNOSIS:     1-edema identified in the gastric body and antrum. Cold biopsy taken for H. pylori testing. Ulcerations or erosions  2-normal-appearing esophageal mucosa and duodenal mucosa    MEDICATIONS:   MAC per anesthesia     EBL:  <10cc    INSTRUMENT: Olympus GIF-H190  flexible Gastroscope. PREPARATION: The nature and character of the procedure as well as risks, benefits, and alternatives were discussed with the patient and informed consent was obtained.  Complications were said to include, but were not limited to: medication allergy, medication reaction, cardiovascular and respiratory problems,
irrigation technique was used to navigate through the left-sided colon to reach the cecum. Significant redundancy however successful. 2-elongated Stacie 2A proximal ascending colon polyp, 20 mm in size, flat. Adjacent mucosa was injected with submucosal Endo [place] tattoo ink in anticipation of second stage EMR. 3-moderate left-sided diverticulosis  4-Limited examination due to prep quality  5-moderate external hemorrhoids    RECOMMENDATIONS:   1) Follow up with referring provider, as previously scheduled. 2) Repeat Colonoscopy in 3-6 month with extended bowel prep for second stage EMR  3) Instructions on resuming AP/AC therapy provided to patient. [address] [facility]  Gastroenterology   06/06/23    This note is created with the assistance of a speech recognition program.  While intending to generate a document that actually reflects the content of the visit, the document can still have some errors including those of syntax and sound a like substitutions which may escape proof reading. It such instances, actual meaning can be extrapolated by contextual diversion. The patient was counseled at length about the risks of ismael Covid-19 during their perioperative period and any recovery window from their procedure. The patient was made aware that ismael Covid-19  may worsen their prognosis for recovering from their procedure  and lend to a higher morbidity and/or mortality risk. All material risks, benefits, and reasonable alternatives including postponing the procedure were discussed. The patient DOES wish to proceed with the procedure at this time.      Electronically signed by Yony Will MD on 6/6/2023 at 10:49 AM

## 2023-06-30 DIAGNOSIS — G89.29 CHRONIC RIGHT SHOULDER PAIN: ICD-10-CM

## 2023-06-30 DIAGNOSIS — M25.511 CHRONIC RIGHT SHOULDER PAIN: ICD-10-CM

## 2023-06-30 DIAGNOSIS — R10.84 GENERALIZED POSTPRANDIAL ABDOMINAL PAIN: ICD-10-CM

## 2023-06-30 RX ORDER — OXYCODONE HYDROCHLORIDE AND ACETAMINOPHEN 5; 325 MG/1; MG/1
1 TABLET ORAL EVERY 8 HOURS PRN
Qty: 90 TABLET | Refills: 0 | Status: SHIPPED | OUTPATIENT
Start: 2023-06-30 | End: 2023-07-30

## 2023-07-28 DIAGNOSIS — R10.84 GENERALIZED POSTPRANDIAL ABDOMINAL PAIN: ICD-10-CM

## 2023-07-28 DIAGNOSIS — M25.511 CHRONIC RIGHT SHOULDER PAIN: ICD-10-CM

## 2023-07-28 DIAGNOSIS — G89.29 CHRONIC RIGHT SHOULDER PAIN: ICD-10-CM

## 2023-07-28 RX ORDER — OXYCODONE HYDROCHLORIDE AND ACETAMINOPHEN 5; 325 MG/1; MG/1
1 TABLET ORAL EVERY 8 HOURS PRN
Qty: 90 TABLET | Refills: 0 | Status: SHIPPED | OUTPATIENT
Start: 2023-07-28 | End: 2023-08-27

## 2023-08-25 DIAGNOSIS — G89.29 CHRONIC RIGHT SHOULDER PAIN: ICD-10-CM

## 2023-08-25 DIAGNOSIS — M25.511 CHRONIC RIGHT SHOULDER PAIN: ICD-10-CM

## 2023-08-25 DIAGNOSIS — R10.84 GENERALIZED POSTPRANDIAL ABDOMINAL PAIN: ICD-10-CM

## 2023-08-25 RX ORDER — OXYCODONE HYDROCHLORIDE AND ACETAMINOPHEN 5; 325 MG/1; MG/1
1 TABLET ORAL EVERY 8 HOURS PRN
Qty: 90 TABLET | Refills: 0 | Status: SHIPPED | OUTPATIENT
Start: 2023-08-25 | End: 2023-09-24

## 2023-08-29 ENCOUNTER — OFFICE VISIT (OUTPATIENT)
Dept: PRIMARY CARE CLINIC | Age: 68
End: 2023-08-29
Payer: MEDICARE

## 2023-08-29 VITALS
HEART RATE: 95 BPM | OXYGEN SATURATION: 94 % | BODY MASS INDEX: 24.32 KG/M2 | DIASTOLIC BLOOD PRESSURE: 72 MMHG | WEIGHT: 128.8 LBS | SYSTOLIC BLOOD PRESSURE: 126 MMHG | HEIGHT: 61 IN

## 2023-08-29 DIAGNOSIS — E11.9 TYPE 2 DIABETES MELLITUS WITHOUT COMPLICATION, WITHOUT LONG-TERM CURRENT USE OF INSULIN (HCC): ICD-10-CM

## 2023-08-29 DIAGNOSIS — E55.9 VITAMIN D DEFICIENCY: ICD-10-CM

## 2023-08-29 DIAGNOSIS — I10 PRIMARY HYPERTENSION: ICD-10-CM

## 2023-08-29 DIAGNOSIS — Z00.00 MEDICARE ANNUAL WELLNESS VISIT, SUBSEQUENT: Primary | ICD-10-CM

## 2023-08-29 DIAGNOSIS — Z13.0 SCREENING, ANEMIA, DEFICIENCY, IRON: ICD-10-CM

## 2023-08-29 DIAGNOSIS — G89.29 CHRONIC RIGHT SHOULDER PAIN: ICD-10-CM

## 2023-08-29 DIAGNOSIS — M25.511 CHRONIC RIGHT SHOULDER PAIN: ICD-10-CM

## 2023-08-29 DIAGNOSIS — Z12.31 SCREENING MAMMOGRAM FOR BREAST CANCER: ICD-10-CM

## 2023-08-29 DIAGNOSIS — R10.84 GENERALIZED POSTPRANDIAL ABDOMINAL PAIN: ICD-10-CM

## 2023-08-29 DIAGNOSIS — Z87.891 PERSONAL HISTORY OF TOBACCO USE: ICD-10-CM

## 2023-08-29 DIAGNOSIS — Z71.89 ACP (ADVANCE CARE PLANNING): ICD-10-CM

## 2023-08-29 LAB — HBA1C MFR BLD: 6.3 %

## 2023-08-29 PROCEDURE — 3078F DIAST BP <80 MM HG: CPT | Performed by: NURSE PRACTITIONER

## 2023-08-29 PROCEDURE — G0439 PPPS, SUBSEQ VISIT: HCPCS | Performed by: NURSE PRACTITIONER

## 2023-08-29 PROCEDURE — G0296 VISIT TO DETERM LDCT ELIG: HCPCS | Performed by: NURSE PRACTITIONER

## 2023-08-29 PROCEDURE — 3017F COLORECTAL CA SCREEN DOC REV: CPT | Performed by: NURSE PRACTITIONER

## 2023-08-29 PROCEDURE — 1123F ACP DISCUSS/DSCN MKR DOCD: CPT | Performed by: NURSE PRACTITIONER

## 2023-08-29 PROCEDURE — 3074F SYST BP LT 130 MM HG: CPT | Performed by: NURSE PRACTITIONER

## 2023-08-29 PROCEDURE — 3044F HG A1C LEVEL LT 7.0%: CPT | Performed by: NURSE PRACTITIONER

## 2023-08-29 PROCEDURE — 83036 HEMOGLOBIN GLYCOSYLATED A1C: CPT | Performed by: NURSE PRACTITIONER

## 2023-08-29 SDOH — HEALTH STABILITY: PHYSICAL HEALTH: ON AVERAGE, HOW MANY MINUTES DO YOU ENGAGE IN EXERCISE AT THIS LEVEL?: 0 MIN

## 2023-08-29 SDOH — HEALTH STABILITY: PHYSICAL HEALTH: ON AVERAGE, HOW MANY DAYS PER WEEK DO YOU ENGAGE IN MODERATE TO STRENUOUS EXERCISE (LIKE A BRISK WALK)?: 0 DAYS

## 2023-08-29 ASSESSMENT — LIFESTYLE VARIABLES
HOW OFTEN DO YOU HAVE A DRINK CONTAINING ALCOHOL: 1
HOW OFTEN DO YOU HAVE SIX OR MORE DRINKS ON ONE OCCASION: 1
HOW MANY STANDARD DRINKS CONTAINING ALCOHOL DO YOU HAVE ON A TYPICAL DAY: PATIENT DOES NOT DRINK
HOW MANY STANDARD DRINKS CONTAINING ALCOHOL DO YOU HAVE ON A TYPICAL DAY: 0
HOW OFTEN DO YOU HAVE A DRINK CONTAINING ALCOHOL: NEVER

## 2023-08-29 ASSESSMENT — PATIENT HEALTH QUESTIONNAIRE - PHQ9
2. FEELING DOWN, DEPRESSED OR HOPELESS: 1
SUM OF ALL RESPONSES TO PHQ QUESTIONS 1-9: 2
1. LITTLE INTEREST OR PLEASURE IN DOING THINGS: 1
SUM OF ALL RESPONSES TO PHQ9 QUESTIONS 1 & 2: 2

## 2023-08-30 ENCOUNTER — CLINICAL DOCUMENTATION (OUTPATIENT)
Dept: SPIRITUAL SERVICES | Age: 68
End: 2023-08-30

## 2023-08-30 NOTE — ACP (ADVANCE CARE PLANNING)
Advance Care Planning   Ambulatory ACP Specialist Patient Outreach    Date:  8/30/2023    ACP Specialist:  Kalpesh Eduardo    Outreach call to patient in follow-up to ACP Specialist referral from:BAKARI Mercer CNP    [x] PCP  [] Provider   [] Ambulatory Care Management [] Other     For:                  [x] Advance Directive Assistance              [] Complete Portable DNR order              [] Complete POST/POLST/MOST              [] Code Status Discussion             [] Discuss Goals of Care             [] Early ACP Decision-Making              [] Other (Specify)    Date Referral Received:8/29/23    Next Step:   [] ACP scheduled conversation  [] Outreach again in one week               [] Email / Mail [address]  [] Email / Mail Advance Directive   [] Closing referral.  Routing closure to referring provider/staff and to ACP Specialist . [] Closure letter mailed to patient with invitation to contact ACP Specialist if / when ready. [] Other (Specify here):         [x] At this time, Healthcare Decision [place] Decision Maker:    Primary Decision Maker: Landon Billingsley - [phone]    Secondary Decision Maker: Abram Stevens - [phone]    Click here to complete Healthcare Decision Makers including selection of the Healthcare Decision Maker Relationship (ie \"Primary\"). [] Primary agent named in scanned advance directive. [x] Legal Next of Kin. [] Unable to determine legal decision maker at this time. Outreaches:       [x] 1st -  Date:  8/30/23               Intervention:  [] Spoke with Patient   [x] Left Voice mail [] Email / Mail    [] [employer]  [] Other [phone] : Outcomes:Left detailed VM for Patient to return call. Will make another outreach in 1 week.            [] 2nd -  Date:                 Intervention:  [] Spoke with Patient  [] Left Voice mail [] Email / Mail    [] [employer]  [] Other

## 2023-09-11 RX ORDER — BUSPIRONE HYDROCHLORIDE 5 MG/1
5 TABLET ORAL 3 TIMES DAILY PRN
COMMUNITY

## 2023-09-11 RX ORDER — VORTIOXETINE 10 MG/1
10 TABLET, FILM COATED ORAL 2 TIMES DAILY
COMMUNITY

## 2023-09-13 ENCOUNTER — ANESTHESIA EVENT (OUTPATIENT)
Dept: OPERATING ROOM | Age: 68
End: 2023-09-13
Payer: MEDICARE

## 2023-09-13 ENCOUNTER — ANESTHESIA (OUTPATIENT)
Dept: OPERATING ROOM | Age: 68
End: 2023-09-13
Payer: MEDICARE

## 2023-09-13 ENCOUNTER — HOSPITAL ENCOUNTER (OUTPATIENT)
Age: 68
Setting detail: OUTPATIENT SURGERY
Discharge: HOME | End: 2023-09-13
Attending: INTERNAL MEDICINE | Admitting: INTERNAL MEDICINE
Payer: MEDICARE

## 2023-09-13 VITALS
SYSTOLIC BLOOD PRESSURE: 142 MMHG | WEIGHT: 127 LBS | OXYGEN SATURATION: 100 % | BODY MASS INDEX: 24.94 KG/M2 | RESPIRATION RATE: 16 BRPM | HEIGHT: 60 IN | DIASTOLIC BLOOD PRESSURE: 75 MMHG | TEMPERATURE: 97 F | HEART RATE: 77 BPM

## 2023-09-13 DIAGNOSIS — K63.5 POLYP OF ASCENDING COLON, UNSPECIFIED TYPE: ICD-10-CM

## 2023-09-13 LAB
BUN BLD-MCNC: 16 MG/DL (ref 8–26)
EGFR, POC: >60 ML/MIN/1.73M2
GLUCOSE BLD-MCNC: 158 MG/DL (ref 74–100)
POC CREATININE: 0.8 MG/DL (ref 0.51–1.19)

## 2023-09-13 PROCEDURE — A4216 STERILE WATER/SALINE, 10 ML: HCPCS | Performed by: NURSE ANESTHETIST, CERTIFIED REGISTERED

## 2023-09-13 PROCEDURE — 2720000010 HC SURG SUPPLY STERILE: Performed by: INTERNAL MEDICINE

## 2023-09-13 PROCEDURE — 7100000010 HC PHASE II RECOVERY - FIRST 15 MIN: Performed by: INTERNAL MEDICINE

## 2023-09-13 PROCEDURE — 84520 ASSAY OF UREA NITROGEN: CPT

## 2023-09-13 PROCEDURE — 82947 ASSAY GLUCOSE BLOOD QUANT: CPT

## 2023-09-13 PROCEDURE — 88305 TISSUE EXAM BY PATHOLOGIST: CPT

## 2023-09-13 PROCEDURE — 6360000002 HC RX W HCPCS: Performed by: NURSE ANESTHETIST, CERTIFIED REGISTERED

## 2023-09-13 PROCEDURE — 2500000003 HC RX 250 WO HCPCS: Performed by: NURSE ANESTHETIST, CERTIFIED REGISTERED

## 2023-09-13 PROCEDURE — 7100000011 HC PHASE II RECOVERY - ADDTL 15 MIN: Performed by: INTERNAL MEDICINE

## 2023-09-13 PROCEDURE — 3700000000 HC ANESTHESIA ATTENDED CARE: Performed by: INTERNAL MEDICINE

## 2023-09-13 PROCEDURE — 2709999900 HC NON-CHARGEABLE SUPPLY: Performed by: INTERNAL MEDICINE

## 2023-09-13 PROCEDURE — 2580000003 HC RX 258: Performed by: NURSE ANESTHETIST, CERTIFIED REGISTERED

## 2023-09-13 PROCEDURE — 3700000001 HC ADD 15 MINUTES (ANESTHESIA): Performed by: INTERNAL MEDICINE

## 2023-09-13 PROCEDURE — 3609010200 HC COLONOSCOPY ABLATION TUMOR POLYP/OTHER LES: Performed by: INTERNAL MEDICINE

## 2023-09-13 PROCEDURE — 45390 COLONOSCOPY W/RESECTION: CPT | Performed by: INTERNAL MEDICINE

## 2023-09-13 PROCEDURE — 2580000003 HC RX 258: Performed by: ANESTHESIOLOGY

## 2023-09-13 PROCEDURE — 82565 ASSAY OF CREATININE: CPT

## 2023-09-13 RX ORDER — FENTANYL CITRATE 50 UG/ML
25 INJECTION, SOLUTION INTRAMUSCULAR; INTRAVENOUS EVERY 5 MIN PRN
Status: CANCELLED | OUTPATIENT
Start: 2023-09-13

## 2023-09-13 RX ORDER — SODIUM CHLORIDE 0.9 % (FLUSH) 0.9 %
5-40 SYRINGE (ML) INJECTION EVERY 12 HOURS SCHEDULED
Status: CANCELLED | OUTPATIENT
Start: 2023-09-13

## 2023-09-13 RX ORDER — GLYCOPYRROLATE 0.2 MG/ML
INJECTION INTRAMUSCULAR; INTRAVENOUS PRN
Status: DISCONTINUED | OUTPATIENT
Start: 2023-09-13 | End: 2023-09-13 | Stop reason: SDUPTHER

## 2023-09-13 RX ORDER — MORPHINE SULFATE 2 MG/ML
2 INJECTION, SOLUTION INTRAMUSCULAR; INTRAVENOUS EVERY 5 MIN PRN
Status: CANCELLED | OUTPATIENT
Start: 2023-09-13

## 2023-09-13 RX ORDER — SODIUM CHLORIDE 9 MG/ML
INJECTION INTRAVENOUS PRN
Status: DISCONTINUED | OUTPATIENT
Start: 2023-09-13 | End: 2023-09-13 | Stop reason: SDUPTHER

## 2023-09-13 RX ORDER — ONDANSETRON 2 MG/ML
4 INJECTION INTRAMUSCULAR; INTRAVENOUS
Status: CANCELLED | OUTPATIENT
Start: 2023-09-13 | End: 2023-09-14

## 2023-09-13 RX ORDER — SODIUM CHLORIDE, SODIUM LACTATE, POTASSIUM CHLORIDE, CALCIUM CHLORIDE 600; 310; 30; 20 MG/100ML; MG/100ML; MG/100ML; MG/100ML
INJECTION, SOLUTION INTRAVENOUS CONTINUOUS
Status: DISCONTINUED | OUTPATIENT
Start: 2023-09-13 | End: 2023-09-13 | Stop reason: HOSPADM

## 2023-09-13 RX ORDER — SODIUM CHLORIDE 0.9 % (FLUSH) 0.9 %
5-40 SYRINGE (ML) INJECTION PRN
Status: CANCELLED | OUTPATIENT
Start: 2023-09-13

## 2023-09-13 RX ORDER — PROPOFOL 10 MG/ML
INJECTION, EMULSION INTRAVENOUS CONTINUOUS PRN
Status: DISCONTINUED | OUTPATIENT
Start: 2023-09-13 | End: 2023-09-13 | Stop reason: SDUPTHER

## 2023-09-13 RX ORDER — DIPHENHYDRAMINE HYDROCHLORIDE 50 MG/ML
12.5 INJECTION INTRAMUSCULAR; INTRAVENOUS
Status: CANCELLED | OUTPATIENT
Start: 2023-09-13 | End: 2023-09-14

## 2023-09-13 RX ORDER — SODIUM CHLORIDE 9 MG/ML
INJECTION, SOLUTION INTRAVENOUS PRN
Status: CANCELLED | OUTPATIENT
Start: 2023-09-13

## 2023-09-13 RX ADMIN — PHENYLEPHRINE HYDROCHLORIDE 100 MCG: 10 INJECTION INTRAVENOUS at 08:41

## 2023-09-13 RX ADMIN — SODIUM CHLORIDE 10 ML: 9 INJECTION INTRAMUSCULAR; INTRAVENOUS; SUBCUTANEOUS at 08:41

## 2023-09-13 RX ADMIN — SODIUM CHLORIDE, POTASSIUM CHLORIDE, SODIUM LACTATE AND CALCIUM CHLORIDE: 600; 310; 30; 20 INJECTION, SOLUTION INTRAVENOUS at 07:28

## 2023-09-13 RX ADMIN — GLYCOPYRROLATE 0.2 MG: 0.2 INJECTION INTRAMUSCULAR; INTRAVENOUS at 08:41

## 2023-09-13 RX ADMIN — PROPOFOL 125 MCG/KG/MIN: 10 INJECTION, EMULSION INTRAVENOUS at 08:35

## 2023-09-13 ASSESSMENT — PAIN - FUNCTIONAL ASSESSMENT: PAIN_FUNCTIONAL_ASSESSMENT: 0-10

## 2023-09-13 ASSESSMENT — PAIN DESCRIPTION - DESCRIPTORS: DESCRIPTORS: CRAMPING

## 2023-09-13 ASSESSMENT — LIFESTYLE VARIABLES: SMOKING_STATUS: 1

## 2023-09-13 NOTE — H&P
History and Physical    Pt Name: Natalia Fuentes  MRN: [id number]  YOB: 1955  Date of evaluation: 9/13/2023  Primary Care Physician: BAKARI Reyna CNP    SUBJECTIVE:   History of Chief Complaint:    Natalia Fuentes is a 75 y.o. female who is scheduled today for COLONOSCOPY EMR. Patient reports history of generalized abdominal pain following meals since 2020. She states her nausea has been present since then too and is worsening. Patient denies any dysphagia, vomiting or rectal bleeding. She states she has primarily constipation but diarrhea as well. Patient had a colonoscopy a few months ago with findings of colon polyps. Allergies  is allergic to sulfa antibiotics, sulfamethoxazole, sulfamethoxazole-trimethoprim, tetanus toxoid, trimethoprim, and dilaudid [hydromorphone hcl]. Medications  Prior to Admission medications    Medication Sig Start Date End Date Taking? Authorizing Provider   busPIRone (BUSPAR) 5 MG tablet Take 1 tablet by mouth 3 times daily as needed   Yes Historical Provider, MD   TRINTELLIX 10 MG TABS tablet Take 1 tablet by mouth 2 times daily   Yes Historical Provider, MD   Calcium Polycarbophil (FIBER-CAPS PO) Take 1 tablet by mouth daily   Yes Historical Provider, MD   oxyCODONE-acetaminophen (PERCOCET) 5-325 MG per tablet Take 1 tablet by mouth every 8 hours as needed for Pain for up to 30 days.  8/25/23 9/24/23  BAKARI Reyna CNP   amLODIPine (NORVASC) 5 MG tablet Take 1 tablet by mouth daily 6/5/23   BAKARI Reyna CNP   lisinopril (PRINIVIL;ZESTRIL) 40 MG tablet Take 1 tablet by mouth once daily 5/31/23   BAKARI Reyna CNP   polyethylene glycol Simuel Sera) 17 GM/SCOOP powder Take as directed for bowel prep 5/9/23   Mali Lara MD   bisacodyl 5 MG EC tablet Take as directed for bowel prep 5/9/23   Mali Lara MD   clopidogrel (PLAVIX) 75 MG tablet Take 1 tablet by mouth daily 4/18/23   Noemy Connor MD   omeprazole COLON POLYP. PLANS:   COLONOSCOPY EMR.     BAKARI Stubbs CNP   Electronically signed 9/13/2023 at 7:39 AM

## 2023-09-13 NOTE — ANESTHESIA POSTPROCEDURE EVALUATION
Department of Anesthesiology  Postprocedure Note    Patient: Elissa Lefort  MRN: [id number]  YOB: 1955  Date of evaluation: 9/13/2023      Procedure Summary     Date: 09/13/23 Room / Location: [address]    Anesthesia Start: 9524 Anesthesia Stop: 0930    Procedure: COLONOSCOPY W/ ENDOSCOPIC MUCOSAL RESECTION Diagnosis:       Polyp of ascending colon, unspecified type      (ASCENDING COLON POLYP)    Surgeons: Owen Mcbride MD Responsible Provider: Claude Arreguin MD    Anesthesia Type: MAC ASA Status: 3          Anesthesia Type: No value filed.     Evelyn Phase I:      Evelyn Phase II: Eevlyn Score: 10      Anesthesia Post Evaluation    Patient location during evaluation: PACU  Patient participation: complete - patient participated  Level of consciousness: awake  Pain score: 0  Cardiovascular status: hemodynamically stable  Respiratory status: room air Detail Level: Detailed Add 23906 Cpt? (Important Note: In 2017 The Use Of 03730 Is Being Tracked By Cms To Determine Future Global Period Reimbursement For Global Periods): no

## 2023-09-13 NOTE — OP NOTE
Operative Note      Patient: Jayna Mcfadden  YOB: 1955  MRN: [id number]    Date of Procedure: 9/13/2023    Pre-Op Diagnosis Codes:     * Polyp of ascending colon, unspecified type [K63.5]    Post-Op Diagnosis: Same       Procedure(s):  COLONOSCOPY W/ ENDOSCOPIC MUCOSAL RESECTION    Surgeon(s):  Naldo Lr MD    Assistant:   * No surgical staff found *    Anesthesia: Monitor Anesthesia Care    Estimated Blood Loss (mL): Minimal    Complications: None    Specimens:   ID Type Source Tests Collected by Time Destination   A : ascending colon polyp Tissue Colon-Ascending [address] Antonio MD 9/13/2023 6237        Implants:  * No implants in log *      Drains: * No LDAs found *    Findings:   A 20 mm lesion Stacie IIa was found in the proximal ascending colon. Preparations were made for endoscopic mucosal resection (EMR). The lesion was demarcated using narrowband imaging (NBI). The site of the lesion was injected with 5 mL of saline + Eleview solution to create a submucosal lift, and resected piecemeal using a hot snare. The margins were treated with soft coagulation current for tissue destruction of any remaining polyp tissue. The margins were approximated using 4 Endoclips. There was no bleeding at the end of the procedure. Resection and retrieval were complete. Few sigmoid diverticulosis. Remainder of the colon appeared normal.    Recommendations:  Okay to discharge home after postop anesthesia/nursing protocol. Follow-up in the GI clinic to discuss pathology results. Start full liquid diet today then advance to soft diet starting tomorrow for 1 week then advance as tolerated. Avoid NSAID use for 5 days. May resume Plavix after 3 days. Repeat colonoscopy in 1 year due to piecemeal resection. Follow-up with PCP as previously scheduled.      Detailed Description of Procedure:   Informed consent was obtained from the patient after explanation of the procedure

## 2023-09-14 LAB — SURGICAL PATHOLOGY REPORT: NORMAL

## 2023-09-22 ENCOUNTER — TRANSCRIBE ORDERS (OUTPATIENT)
Dept: ADMINISTRATIVE | Age: 68
End: 2023-09-22

## 2023-09-22 ENCOUNTER — TELEPHONE (OUTPATIENT)
Dept: GYNECOLOGIC ONCOLOGY | Age: 68
End: 2023-09-22

## 2023-09-22 DIAGNOSIS — I70.213 ATHEROSCLEROSIS OF NATIVE ARTERY OF BOTH LOWER EXTREMITIES WITH INTERMITTENT CLAUDICATION (HCC): Primary | ICD-10-CM

## 2023-09-22 DIAGNOSIS — G89.29 CHRONIC RIGHT SHOULDER PAIN: ICD-10-CM

## 2023-09-22 DIAGNOSIS — M25.511 CHRONIC RIGHT SHOULDER PAIN: ICD-10-CM

## 2023-09-22 DIAGNOSIS — I70.1 ATHEROSCLEROSIS OF RENAL ARTERY (HCC): ICD-10-CM

## 2023-09-22 DIAGNOSIS — R10.84 GENERALIZED POSTPRANDIAL ABDOMINAL PAIN: ICD-10-CM

## 2023-09-22 RX ORDER — OXYCODONE HYDROCHLORIDE AND ACETAMINOPHEN 5; 325 MG/1; MG/1
1 TABLET ORAL EVERY 8 HOURS PRN
Qty: 90 TABLET | Refills: 0 | Status: SHIPPED | OUTPATIENT
Start: 2023-09-22 | End: 2023-10-22 | Stop reason: SDUPTHER

## 2023-09-22 NOTE — TELEPHONE ENCOUNTER
Patient had a colonoscopy last week , she does not have a follow up appt she was wondering if she needs an appt to go over these results please advise

## 2023-10-05 ENCOUNTER — OFFICE VISIT (OUTPATIENT)
Dept: GASTROENTEROLOGY | Age: 68
End: 2023-10-05
Payer: MEDICARE

## 2023-10-05 VITALS
BODY MASS INDEX: 25.23 KG/M2 | HEART RATE: 77 BPM | OXYGEN SATURATION: 95 % | SYSTOLIC BLOOD PRESSURE: 127 MMHG | WEIGHT: 128.5 LBS | DIASTOLIC BLOOD PRESSURE: 72 MMHG | HEIGHT: 60 IN

## 2023-10-05 DIAGNOSIS — K57.90 DIVERTICULOSIS: Primary | ICD-10-CM

## 2023-10-05 DIAGNOSIS — D12.6 ADENOMATOUS POLYP OF COLON, UNSPECIFIED PART OF COLON: ICD-10-CM

## 2023-10-05 PROCEDURE — 1090F PRES/ABSN URINE INCON ASSESS: CPT | Performed by: INTERNAL MEDICINE

## 2023-10-05 PROCEDURE — 4004F PT TOBACCO SCREEN RCVD TLK: CPT | Performed by: INTERNAL MEDICINE

## 2023-10-05 PROCEDURE — G8400 PT W/DXA NO RESULTS DOC: HCPCS | Performed by: INTERNAL MEDICINE

## 2023-10-05 PROCEDURE — 3074F SYST BP LT 130 MM HG: CPT | Performed by: INTERNAL MEDICINE

## 2023-10-05 PROCEDURE — 1123F ACP DISCUSS/DSCN MKR DOCD: CPT | Performed by: INTERNAL MEDICINE

## 2023-10-05 PROCEDURE — 3078F DIAST BP <80 MM HG: CPT | Performed by: INTERNAL MEDICINE

## 2023-10-05 PROCEDURE — G8427 DOCREV CUR MEDS BY ELIG CLIN: HCPCS | Performed by: INTERNAL MEDICINE

## 2023-10-05 PROCEDURE — 99213 OFFICE O/P EST LOW 20 MIN: CPT | Performed by: INTERNAL MEDICINE

## 2023-10-05 PROCEDURE — 3017F COLORECTAL CA SCREEN DOC REV: CPT | Performed by: INTERNAL MEDICINE

## 2023-10-05 PROCEDURE — G8484 FLU IMMUNIZE NO ADMIN: HCPCS | Performed by: INTERNAL MEDICINE

## 2023-10-05 PROCEDURE — G8419 CALC BMI OUT NRM PARAM NOF/U: HCPCS | Performed by: INTERNAL MEDICINE

## 2023-10-05 RX ORDER — DOCUSATE SODIUM 100 MG/1
100 CAPSULE, LIQUID FILLED ORAL 2 TIMES DAILY PRN
Qty: 60 CAPSULE | Refills: 0 | Status: SHIPPED | OUTPATIENT
Start: 2023-10-05 | End: 2023-11-04

## 2023-10-05 ASSESSMENT — ENCOUNTER SYMPTOMS
CHOKING: 0
DIARRHEA: 1
ABDOMINAL DISTENTION: 1
SHORTNESS OF BREATH: 0
COUGH: 0
CONSTIPATION: 1
ABDOMINAL PAIN: 1
VOMITING: 0
EYES NEGATIVE: 1
CHEST TIGHTNESS: 0
RECTAL PAIN: 1
BLOOD IN STOOL: 0
TROUBLE SWALLOWING: 0
ANAL BLEEDING: 0
NAUSEA: 0
SORE THROAT: 0

## 2023-10-10 ENCOUNTER — HOSPITAL ENCOUNTER (OUTPATIENT)
Dept: VASCULAR LAB | Age: 68
Discharge: HOME | End: 2023-10-12
Attending: SURGERY
Payer: MEDICARE

## 2023-10-10 DIAGNOSIS — I70.1 RENAL ARTERY STENOSIS (HCC): ICD-10-CM

## 2023-10-10 DIAGNOSIS — I70.1 ATHEROSCLEROSIS OF RENAL ARTERY (HCC): ICD-10-CM

## 2023-10-10 DIAGNOSIS — I70.213 ATHEROSCLEROSIS OF NATIVE ARTERY OF BOTH LOWER EXTREMITIES WITH INTERMITTENT CLAUDICATION (HCC): ICD-10-CM

## 2023-10-10 LAB
VAS AORTA MID PSV: 56.3 CM/S
VAS AORTA PROX PSV: 15.6 CM/S
VAS AORTA PROX PSV: 56.3 CM/S
VAS L RENAL ORIG RI: 0.86
VAS LEFT ABI: 1.18
VAS LEFT ANKLE BP: 146 MMHG
VAS LEFT ARM BP: 121 MMHG
VAS LEFT CALF PRESSURE: 130 MMHG
VAS LEFT DORSALIS PEDIS BP: 133 MMHG
VAS LEFT HIGH THIGH PRESSURE: 134 MMHG
VAS LEFT KIDNEY LENGTH: 6.48 CM
VAS LEFT LOW THIGH PRESSURE: 145 MMHG
VAS LEFT PTA BP: 146 MMHG
VAS LEFT RENAL MID EDV: 10.9 CM/S
VAS LEFT RENAL MID PSV: 70.5 CM/S
VAS LEFT RENAL MID RAR: 1.25
VAS LEFT RENAL MID RI: 0.85
VAS LEFT RENAL ORIGIN EDV: 10.9 CM/S
VAS LEFT RENAL ORIGIN PSV: 75.7 CM/S
VAS LEFT RENAL ORIGIN RAR: 1.34
VAS LEFT RENAL PROX EDV: 9.9 CM/S
VAS LEFT RENAL PROX PSV: 60.1 CM/S
VAS LEFT RENAL PROX RAR: 1.07
VAS LEFT RENAL PROX RI: 0.84
VAS LEFT RENAL RAR: 1.34
VAS LEFT TBI: 0.83
VAS LEFT TOE PRESSURE: 103 MMHG
VAS RIGHT ABI: 1.15
VAS RIGHT ANKLE BP: 143 MMHG
VAS RIGHT ARM BP: 124 MMHG
VAS RIGHT CALF PRESSURE: 126 MMHG
VAS RIGHT DORSALIS PEDIS BP: 143 MMHG
VAS RIGHT HIGH THIGH PRESSURE: 164 MMHG
VAS RIGHT KIDNEY LENGTH: 10.1 CM
VAS RIGHT LOW THIGH PRESSURE: 144 MMHG
VAS RIGHT PTA BP: 133 MMHG
VAS RIGHT RENAL MID EDV: 29 CM/S
VAS RIGHT RENAL MID PSV: 83.5 CM/S
VAS RIGHT RENAL MID RAR: 1.48
VAS RIGHT RENAL MID RI: 0.65
VAS RIGHT RENAL ORIGIN EDV: 29.8 CM/S
VAS RIGHT RENAL ORIGIN PSV: 104.5 CM/S
VAS RIGHT RENAL ORIGIN RAR: 1.86
VAS RIGHT RENAL ORIGIN RI: 0.71
VAS RIGHT RENAL PROX EDV: 59 CM/S
VAS RIGHT RENAL PROX PSV: 149.7 CM/S
VAS RIGHT RENAL PROX RAR: 2.66
VAS RIGHT RENAL PROX RI: 0.61
VAS RIGHT RENAL RAR: 2.66
VAS RIGHT TBI: 0.93
VAS RIGHT TOE PRESSURE: 115 MMHG

## 2023-10-10 PROCEDURE — 93923 UPR/LXTR ART STDY 3+ LVLS: CPT | Performed by: SURGERY

## 2023-10-10 PROCEDURE — 93975 VASCULAR STUDY: CPT | Performed by: SURGERY

## 2023-10-10 PROCEDURE — 93975 VASCULAR STUDY: CPT

## 2023-10-10 PROCEDURE — 93923 UPR/LXTR ART STDY 3+ LVLS: CPT

## 2023-10-19 ENCOUNTER — HOSPITAL ENCOUNTER (OUTPATIENT)
Dept: MAMMOGRAPHY | Age: 68
Discharge: HOME | End: 2023-10-21
Attending: SURGERY
Payer: MEDICARE

## 2023-10-19 VITALS — BODY MASS INDEX: 25.13 KG/M2 | HEIGHT: 60 IN | WEIGHT: 128 LBS

## 2023-10-19 DIAGNOSIS — Z12.31 SCREENING MAMMOGRAM FOR BREAST CANCER: ICD-10-CM

## 2023-10-19 DIAGNOSIS — Z78.0 POSTMENOPAUSAL: ICD-10-CM

## 2023-10-19 PROCEDURE — 77080 DXA BONE DENSITY AXIAL: CPT

## 2023-10-19 PROCEDURE — 77063 BREAST TOMOSYNTHESIS BI: CPT

## 2023-10-22 DIAGNOSIS — G89.29 CHRONIC RIGHT SHOULDER PAIN: ICD-10-CM

## 2023-10-22 DIAGNOSIS — R10.84 GENERALIZED POSTPRANDIAL ABDOMINAL PAIN: ICD-10-CM

## 2023-10-22 DIAGNOSIS — M25.511 CHRONIC RIGHT SHOULDER PAIN: ICD-10-CM

## 2023-10-23 RX ORDER — OXYCODONE HYDROCHLORIDE AND ACETAMINOPHEN 5; 325 MG/1; MG/1
1 TABLET ORAL EVERY 8 HOURS PRN
Qty: 90 TABLET | Refills: 0 | Status: SHIPPED | OUTPATIENT
Start: 2023-10-23 | End: 2023-11-22

## 2023-10-26 DIAGNOSIS — R92.8 ABNORMAL MAMMOGRAM: Primary | ICD-10-CM

## 2023-11-10 ENCOUNTER — OFFICE VISIT (OUTPATIENT)
Dept: VASCULAR SURGERY | Age: 68
End: 2023-11-10
Payer: MEDICARE

## 2023-11-10 VITALS
DIASTOLIC BLOOD PRESSURE: 97 MMHG | HEART RATE: 134 BPM | OXYGEN SATURATION: 97 % | WEIGHT: 125 LBS | RESPIRATION RATE: 16 BRPM | SYSTOLIC BLOOD PRESSURE: 142 MMHG | BODY MASS INDEX: 24.54 KG/M2 | HEIGHT: 60 IN

## 2023-11-10 DIAGNOSIS — I70.1 RENAL ARTERY STENOSIS (HCC): Primary | ICD-10-CM

## 2023-11-10 PROCEDURE — 1123F ACP DISCUSS/DSCN MKR DOCD: CPT | Performed by: SURGERY

## 2023-11-10 PROCEDURE — 4004F PT TOBACCO SCREEN RCVD TLK: CPT | Performed by: SURGERY

## 2023-11-10 PROCEDURE — 99213 OFFICE O/P EST LOW 20 MIN: CPT | Performed by: SURGERY

## 2023-11-10 PROCEDURE — 1090F PRES/ABSN URINE INCON ASSESS: CPT | Performed by: SURGERY

## 2023-11-10 PROCEDURE — G8420 CALC BMI NORM PARAMETERS: HCPCS | Performed by: SURGERY

## 2023-11-10 PROCEDURE — G8399 PT W/DXA RESULTS DOCUMENT: HCPCS | Performed by: SURGERY

## 2023-11-10 PROCEDURE — 3077F SYST BP >= 140 MM HG: CPT | Performed by: SURGERY

## 2023-11-10 PROCEDURE — G8484 FLU IMMUNIZE NO ADMIN: HCPCS | Performed by: SURGERY

## 2023-11-10 PROCEDURE — 3080F DIAST BP >= 90 MM HG: CPT | Performed by: SURGERY

## 2023-11-10 PROCEDURE — 3017F COLORECTAL CA SCREEN DOC REV: CPT | Performed by: SURGERY

## 2023-11-10 PROCEDURE — G8427 DOCREV CUR MEDS BY ELIG CLIN: HCPCS | Performed by: SURGERY

## 2023-11-10 NOTE — PROGRESS NOTES
[address] [facility] [address] [facility] [address]  Dept: [phone]     Patient: Mandy Cortez  : 1955  MRN: [id number]  DOS: 11/10/2023            HPI:  Mandy Cortez is a 75 y.o. female who returns to the office regarding her renal artery stent. Recall that there was a suspicion that she had mesenteric ischemia however CTA showed no significant stenoses and she has never lost any significant amount of weight. We have been dealing with that diagnosis for nearly a year. Her renal stent was placed in the left by another physician. Her renal artery duplex shows no significant stenoses. Lower extremity CHRISTEL and PVR is normal.    Review of Systems    Vitals:    11/10/23 1253   BP: (!) 142/97   Site: Left Upper Arm   Position: Sitting   Cuff Size: Medium Adult   Pulse: (!) 134   Resp: 16   SpO2: 97%   Weight: 56.7 kg (125 lb)   Height: 1.524 m (5')          Physical Exam  On examination she does have palpable distal pulses bilaterally in both feet. The feet are warm and well perfused without ulceration or gangrene. Her abdomen is soft nontender nondistended. Assessment:  1. Renal artery stenosis (HCC)          Plan:  Currently I do not think that any further work-up or intervention is needed. We will follow her on a yearly basis with renal artery duplex examination to make certain that her stent is remaining patent and the other renal artery does not become significantly narrowed. She understands that if she has any problems in the meantime she is certainly welcome back at any time.     Electronically signed by:  Tiffanie Roberts MD

## 2023-11-17 ENCOUNTER — HOSPITAL ENCOUNTER (EMERGENCY)
Age: 68
Discharge: HOME | End: 2023-11-17
Attending: EMERGENCY MEDICINE
Payer: MEDICARE

## 2023-11-17 VITALS
SYSTOLIC BLOOD PRESSURE: 136 MMHG | RESPIRATION RATE: 16 BRPM | WEIGHT: 125 LBS | TEMPERATURE: 99.1 F | DIASTOLIC BLOOD PRESSURE: 74 MMHG | HEIGHT: 60 IN | BODY MASS INDEX: 24.54 KG/M2 | OXYGEN SATURATION: 95 % | HEART RATE: 96 BPM

## 2023-11-17 DIAGNOSIS — S20.211A CONTUSION OF RIGHT CHEST WALL, INITIAL ENCOUNTER: ICD-10-CM

## 2023-11-17 DIAGNOSIS — M79.601 RIGHT ARM PAIN: Primary | ICD-10-CM

## 2023-11-17 PROCEDURE — 99283 EMERGENCY DEPT VISIT LOW MDM: CPT

## 2023-11-17 RX ORDER — TRAMADOL HYDROCHLORIDE 50 MG/1
50 TABLET ORAL EVERY 6 HOURS PRN
Qty: 12 TABLET | Refills: 0 | Status: SHIPPED | OUTPATIENT
Start: 2023-11-17 | End: 2023-11-20

## 2023-11-17 ASSESSMENT — PAIN DESCRIPTION - ORIENTATION: ORIENTATION: RIGHT

## 2023-11-17 ASSESSMENT — PAIN DESCRIPTION - LOCATION: LOCATION: ARM;BREAST

## 2023-11-17 ASSESSMENT — PAIN - FUNCTIONAL ASSESSMENT: PAIN_FUNCTIONAL_ASSESSMENT: 0-10

## 2023-11-18 NOTE — ED PROVIDER NOTES
[address] [facility] Emergency Department  [address] [address] [facility] [place] [address]  Phone: [phone]  Fax: [address]      Pt Name: Lachelle Rios  MRN: [id number]  [address] 1955  Date of evaluation: 11/17/2023    CHIEF COMPLAINT       Chief Complaint   Patient presents with    Breast Pain    Arm Pain     Right breast and arm pain x 3 days; denies any fall, injury or MVC. Denies SOB yet pain increases w/ deep breaths. HISTORY OF PRESENT ILLNESS    Lachelle Rios is a 75 y.o. female who presents with right chest wall pain she states a couple days ago does not consider this to be an issue at this time she notes that there is no pain in her neck but if there is pain in the shoulder with range of motion is noted no other significant symptomatology her  had fallen and she did lift him up and has noticed the pain in her right chest wall area.   She notes no shortness of breath she states that she has had issues with her left breast and is going to get a biopsy on it but has had mammograms and her right breast was reported to be normal    REVIEW OF SYSTEMS     Constitutional: No fevers or chills   HEENT: No sore throat, rhinorrhea, or earache   Eyes: No blurry vision or double vision no drainage   Cardiovascular: No chest pain or tachycardia   Respiratory: No wheezing or shortness of breath no cough   Gastrointestinal: No nausea, vomiting, diarrhea, constipation, or abdominal pain   : No hematuria or dysuria   Musculoskeletal: See above  Skin: No rash   Neurological: No focal neurologic complaints, paresthesias, weakness, or headache    PAST MEDICAL HISTORY    has a past medical history of Anxiety, Breast mass, left, Colon polyps, Depression, Diabetes mellitus ([address]), Glaucoma, Hearing loss, Hx of blood clots, Hypertension, Migraines, PVD (peripheral vascular disease) ([address]), Renal artery stenosis ([address]), Under care of service provider, Under care of CNP  [address]  [address]  [facility] [place]  [phone]    In 2 days        DISCHARGE MEDICATIONS:  New Prescriptions    TRAMADOL (ULTRAM) 50 MG TABLET    Take 1 tablet by mouth every 6 hours as needed for Pain for up to 3 days. Intended supply: 3 days.  Take lowest dose possible to manage pain Max Daily Amount: 200 mg       (Please note that portions of thisnote were completed with a voice recognition program.  Efforts were made to edit the dictations but occasionally words are mis-transcribed.)    Guzman Lorenzo MD,, MD  Attending Emergency Physician        Guzman Lorenzo MD  11/17/23 3027

## 2023-11-19 DIAGNOSIS — I10 PRIMARY HYPERTENSION: ICD-10-CM

## 2023-11-20 RX ORDER — AMLODIPINE BESYLATE 5 MG/1
5 TABLET ORAL DAILY
Qty: 90 TABLET | Refills: 3 | Status: SHIPPED | OUTPATIENT
Start: 2023-11-20

## 2023-11-21 DIAGNOSIS — G89.29 CHRONIC RIGHT SHOULDER PAIN: ICD-10-CM

## 2023-11-21 DIAGNOSIS — R10.84 GENERALIZED POSTPRANDIAL ABDOMINAL PAIN: ICD-10-CM

## 2023-11-21 DIAGNOSIS — M25.511 CHRONIC RIGHT SHOULDER PAIN: ICD-10-CM

## 2023-11-21 RX ORDER — OXYCODONE HYDROCHLORIDE AND ACETAMINOPHEN 5; 325 MG/1; MG/1
1 TABLET ORAL EVERY 8 HOURS PRN
Qty: 90 TABLET | Refills: 0 | Status: SHIPPED | OUTPATIENT
Start: 2023-11-21 | End: 2023-12-21

## 2023-11-22 ENCOUNTER — HOSPITAL ENCOUNTER (OUTPATIENT)
Dept: ULTRASOUND IMAGING | Age: 68
Discharge: HOME | End: 2023-11-24
Payer: MEDICARE

## 2023-11-22 ENCOUNTER — HOSPITAL ENCOUNTER (OUTPATIENT)
Dept: MAMMOGRAPHY | Age: 68
Discharge: HOME | End: 2023-11-24
Payer: MEDICARE

## 2023-11-22 DIAGNOSIS — R92.8 ABNORMAL MAMMOGRAM OF LEFT BREAST: Primary | ICD-10-CM

## 2023-11-22 DIAGNOSIS — R92.8 ABNORMAL MAMMOGRAM: ICD-10-CM

## 2023-11-22 PROCEDURE — G0279 TOMOSYNTHESIS, MAMMO: HCPCS

## 2023-11-22 PROCEDURE — 76642 ULTRASOUND BREAST LIMITED: CPT

## 2023-11-30 ENCOUNTER — TELEPHONE (OUTPATIENT)
Dept: VASCULAR SURGERY | Age: 68
End: 2023-11-30

## 2023-11-30 NOTE — TELEPHONE ENCOUNTER
----- Message from Mariya Melgar sent at 11/29/2023  1:52 PM EST -----  Regarding: Letter  Contact: [phone]  Need letter sent to [facility] woman's department having a biopsy on the December 5 and need to stop blood thinners.    Thanks,  Tushar Melgar  [phone]

## 2023-11-30 NOTE — TELEPHONE ENCOUNTER
Are you okay with patient stopping her plavix shes having a biopsy December 5th and needs the okay to stop it.

## 2023-12-05 ENCOUNTER — HOSPITAL ENCOUNTER (OUTPATIENT)
Dept: MAMMOGRAPHY | Age: 68
Discharge: HOME | End: 2023-12-07
Payer: MEDICARE

## 2023-12-05 DIAGNOSIS — Z98.890 STATUS POST BREAST BIOPSY: ICD-10-CM

## 2023-12-05 DIAGNOSIS — R92.8 ABNORMAL MAMMOGRAM OF LEFT BREAST: ICD-10-CM

## 2023-12-05 PROCEDURE — A4648 IMPLANTABLE TISSUE MARKER: HCPCS

## 2023-12-05 PROCEDURE — 88305 TISSUE EXAM BY PATHOLOGIST: CPT

## 2023-12-05 PROCEDURE — 2500000003 HC RX 250 WO HCPCS

## 2023-12-05 PROCEDURE — 19081 BX BREAST 1ST LESION STRTCTC: CPT

## 2023-12-08 LAB — SURGICAL PATHOLOGY REPORT: NORMAL

## 2023-12-29 ENCOUNTER — HOSPITAL ENCOUNTER (OUTPATIENT)
Age: 68
Setting detail: SPECIMEN
Discharge: HOME | End: 2023-12-29

## 2023-12-29 ENCOUNTER — OFFICE VISIT (OUTPATIENT)
Dept: PRIMARY CARE CLINIC | Age: 68
End: 2023-12-29
Payer: MEDICARE

## 2023-12-29 VITALS
SYSTOLIC BLOOD PRESSURE: 136 MMHG | WEIGHT: 129.4 LBS | BODY MASS INDEX: 25.27 KG/M2 | DIASTOLIC BLOOD PRESSURE: 82 MMHG | HEART RATE: 83 BPM | OXYGEN SATURATION: 95 %

## 2023-12-29 DIAGNOSIS — G89.29 CHRONIC RIGHT SHOULDER PAIN: ICD-10-CM

## 2023-12-29 DIAGNOSIS — E11.9 TYPE 2 DIABETES MELLITUS WITHOUT COMPLICATION, WITHOUT LONG-TERM CURRENT USE OF INSULIN (HCC): ICD-10-CM

## 2023-12-29 DIAGNOSIS — R10.13 DYSPEPSIA: ICD-10-CM

## 2023-12-29 DIAGNOSIS — E55.9 VITAMIN D DEFICIENCY: ICD-10-CM

## 2023-12-29 DIAGNOSIS — I10 PRIMARY HYPERTENSION: ICD-10-CM

## 2023-12-29 DIAGNOSIS — E11.9 TYPE 2 DIABETES MELLITUS WITHOUT COMPLICATION, WITHOUT LONG-TERM CURRENT USE OF INSULIN (HCC): Primary | ICD-10-CM

## 2023-12-29 DIAGNOSIS — K57.10 DVRTCLOS OF SM INT W/O PERFORATION OR ABSCESS W/O BLEEDING: ICD-10-CM

## 2023-12-29 DIAGNOSIS — Z95.828 STATUS POST AORTOBIFEMORAL BYPASS SURGERY: ICD-10-CM

## 2023-12-29 DIAGNOSIS — Z13.0 SCREENING, ANEMIA, DEFICIENCY, IRON: ICD-10-CM

## 2023-12-29 DIAGNOSIS — M25.511 CHRONIC RIGHT SHOULDER PAIN: ICD-10-CM

## 2023-12-29 DIAGNOSIS — D12.6 ADENOMATOUS POLYP OF COLON, UNSPECIFIED PART OF COLON: ICD-10-CM

## 2023-12-29 DIAGNOSIS — K21.9 GASTROESOPHAGEAL REFLUX DISEASE, UNSPECIFIED WHETHER ESOPHAGITIS PRESENT: ICD-10-CM

## 2023-12-29 DIAGNOSIS — R10.84 GENERALIZED POSTPRANDIAL ABDOMINAL PAIN: ICD-10-CM

## 2023-12-29 PROBLEM — I73.9 CLAUDICATION (HCC): Status: RESOLVED | Noted: 2020-08-24 | Resolved: 2023-12-29

## 2023-12-29 LAB
25(OH)D3 SERPL-MCNC: 42.8 NG/ML
ALBUMIN SERPL-MCNC: 4.1 G/DL (ref 3.5–5.2)
ALBUMIN/GLOB SERPL: 1.6 {RATIO} (ref 1–2.5)
ALP SERPL-CCNC: 104 U/L (ref 35–104)
ALT SERPL-CCNC: 14 U/L (ref 5–33)
ANION GAP SERPL CALCULATED.3IONS-SCNC: 10 MMOL/L (ref 9–17)
AST SERPL-CCNC: 18 U/L
BASOPHILS # BLD: 0.09 K/UL (ref 0–0.2)
BASOPHILS NFR BLD: 1 % (ref 0–2)
BILIRUB SERPL-MCNC: 0.2 MG/DL (ref 0.3–1.2)
BUN SERPL-MCNC: 15 MG/DL (ref 8–23)
CALCIUM SERPL-MCNC: 9.3 MG/DL (ref 8.6–10.4)
CHLORIDE SERPL-SCNC: 104 MMOL/L (ref 98–107)
CHOLEST SERPL-MCNC: 252 MG/DL
CHOLESTEROL/HDL RATIO: 6
CO2 SERPL-SCNC: 23 MMOL/L (ref 20–31)
CREAT SERPL-MCNC: 0.7 MG/DL (ref 0.5–0.9)
EOSINOPHIL # BLD: 0.23 K/UL (ref 0–0.44)
EOSINOPHILS RELATIVE PERCENT: 2 % (ref 1–4)
ERYTHROCYTE [DISTWIDTH] IN BLOOD BY AUTOMATED COUNT: 13 % (ref 11.8–14.4)
GFR SERPL CREATININE-BSD FRML MDRD: >60 ML/MIN/1.73M2
GLUCOSE SERPL-MCNC: 121 MG/DL (ref 70–99)
HBA1C MFR BLD: 6.9 %
HCT VFR BLD AUTO: 42.9 % (ref 36.3–47.1)
HDLC SERPL-MCNC: 42 MG/DL
HGB BLD-MCNC: 14.1 G/DL (ref 11.9–15.1)
IMM GRANULOCYTES # BLD AUTO: 0.04 K/UL (ref 0–0.3)
IMM GRANULOCYTES NFR BLD: 0 %
LDLC SERPL CALC-MCNC: 141 MG/DL (ref 0–130)
LYMPHOCYTES NFR BLD: 2.72 K/UL (ref 1.1–3.7)
LYMPHOCYTES RELATIVE PERCENT: 26 % (ref 24–43)
MCH RBC QN AUTO: 32.9 PG (ref 25.2–33.5)
MCHC RBC AUTO-ENTMCNC: 32.9 G/DL (ref 28.4–34.8)
MCV RBC AUTO: 100.2 FL (ref 82.6–102.9)
MONOCYTES NFR BLD: 0.6 K/UL (ref 0.1–1.2)
MONOCYTES NFR BLD: 6 % (ref 3–12)
NEUTROPHILS NFR BLD: 65 % (ref 36–65)
NEUTS SEG NFR BLD: 6.67 K/UL (ref 1.5–8.1)
NRBC BLD-RTO: 0 PER 100 WBC
PLATELET # BLD AUTO: 365 K/UL (ref 138–453)
PMV BLD AUTO: 10.3 FL (ref 8.1–13.5)
POTASSIUM SERPL-SCNC: 4.6 MMOL/L (ref 3.7–5.3)
PROT SERPL-MCNC: 6.7 G/DL (ref 6.4–8.3)
RBC # BLD AUTO: 4.28 M/UL (ref 3.95–5.11)
SODIUM SERPL-SCNC: 137 MMOL/L (ref 135–144)
TRIGL SERPL-MCNC: 347 MG/DL
WBC OTHER # BLD: 10.4 K/UL (ref 3.5–11.3)

## 2023-12-29 PROCEDURE — 83036 HEMOGLOBIN GLYCOSYLATED A1C: CPT | Performed by: NURSE PRACTITIONER

## 2023-12-29 PROCEDURE — G8399 PT W/DXA RESULTS DOCUMENT: HCPCS | Performed by: NURSE PRACTITIONER

## 2023-12-29 PROCEDURE — 99214 OFFICE O/P EST MOD 30 MIN: CPT | Performed by: NURSE PRACTITIONER

## 2023-12-29 PROCEDURE — G8419 CALC BMI OUT NRM PARAM NOF/U: HCPCS | Performed by: NURSE PRACTITIONER

## 2023-12-29 PROCEDURE — 3017F COLORECTAL CA SCREEN DOC REV: CPT | Performed by: NURSE PRACTITIONER

## 2023-12-29 PROCEDURE — 2022F DILAT RTA XM EVC RTNOPTHY: CPT | Performed by: NURSE PRACTITIONER

## 2023-12-29 PROCEDURE — 1090F PRES/ABSN URINE INCON ASSESS: CPT | Performed by: NURSE PRACTITIONER

## 2023-12-29 PROCEDURE — G8484 FLU IMMUNIZE NO ADMIN: HCPCS | Performed by: NURSE PRACTITIONER

## 2023-12-29 PROCEDURE — 3079F DIAST BP 80-89 MM HG: CPT | Performed by: NURSE PRACTITIONER

## 2023-12-29 PROCEDURE — 4004F PT TOBACCO SCREEN RCVD TLK: CPT | Performed by: NURSE PRACTITIONER

## 2023-12-29 PROCEDURE — G8427 DOCREV CUR MEDS BY ELIG CLIN: HCPCS | Performed by: NURSE PRACTITIONER

## 2023-12-29 PROCEDURE — 1123F ACP DISCUSS/DSCN MKR DOCD: CPT | Performed by: NURSE PRACTITIONER

## 2023-12-29 PROCEDURE — 3044F HG A1C LEVEL LT 7.0%: CPT | Performed by: NURSE PRACTITIONER

## 2023-12-29 PROCEDURE — 3075F SYST BP GE 130 - 139MM HG: CPT | Performed by: NURSE PRACTITIONER

## 2023-12-29 NOTE — PROGRESS NOTES
continue Percocet as needed. PDMP reflects appropriate use of opioid pain reliever  Chronic right shoulder pain-she would like to pursue reevaluation and physical therapy but needs to postpone this for now due to illness and . Will readdress next visit. She will continue Percocet as needed     Patient given educational materials - see patient instructions. Discussed use, benefit, and side effects of prescribed medications. All patientquestions answered. Pt voiced understanding. Reviewed health maintenance. Instructedto continue current medications, diet and exercise. Patient agreed with treatmentplan. Follow up as directed.      Electronicallysigned by BAKARI Urias CNP on 12/29/2023 at 3:57 PM

## 2024-01-02 DIAGNOSIS — E78.2 MIXED HYPERLIPIDEMIA: Primary | ICD-10-CM

## 2024-01-02 RX ORDER — PRAVASTATIN SODIUM 20 MG
20 TABLET ORAL DAILY
Qty: 90 TABLET | Refills: 1 | Status: SHIPPED | OUTPATIENT
Start: 2024-01-02

## 2024-01-18 DIAGNOSIS — M25.511 CHRONIC RIGHT SHOULDER PAIN: ICD-10-CM

## 2024-01-18 DIAGNOSIS — R10.84 GENERALIZED POSTPRANDIAL ABDOMINAL PAIN: ICD-10-CM

## 2024-01-18 DIAGNOSIS — G89.29 CHRONIC RIGHT SHOULDER PAIN: ICD-10-CM

## 2024-01-18 RX ORDER — OXYCODONE HYDROCHLORIDE AND ACETAMINOPHEN 5; 325 MG/1; MG/1
1 TABLET ORAL EVERY 8 HOURS PRN
Qty: 90 TABLET | Refills: 0 | Status: SHIPPED | OUTPATIENT
Start: 2024-01-18 | End: 2024-02-17

## 2024-02-20 ENCOUNTER — PATIENT MESSAGE (OUTPATIENT)
Dept: PRIMARY CARE CLINIC | Age: 69
End: 2024-02-20

## 2024-02-20 DIAGNOSIS — M25.511 CHRONIC RIGHT SHOULDER PAIN: ICD-10-CM

## 2024-02-20 DIAGNOSIS — R10.84 GENERALIZED POSTPRANDIAL ABDOMINAL PAIN: ICD-10-CM

## 2024-02-20 DIAGNOSIS — G89.29 CHRONIC RIGHT SHOULDER PAIN: ICD-10-CM

## 2024-02-20 RX ORDER — OXYCODONE HYDROCHLORIDE AND ACETAMINOPHEN 5; 325 MG/1; MG/1
1 TABLET ORAL EVERY 8 HOURS PRN
Qty: 90 TABLET | Refills: 0 | Status: SHIPPED | OUTPATIENT
Start: 2024-02-20 | End: 2024-02-20 | Stop reason: SDUPTHER

## 2024-02-20 NOTE — TELEPHONE ENCOUNTER
From: Mariya Melgar  To: Delma Norman  Sent: 2/20/2024 1:22 PM EST  Subject: Percocet     Need refill for percocet

## 2024-02-21 RX ORDER — OXYCODONE HYDROCHLORIDE AND ACETAMINOPHEN 5; 325 MG/1; MG/1
1 TABLET ORAL EVERY 8 HOURS PRN
Qty: 90 TABLET | Refills: 0 | Status: SHIPPED | OUTPATIENT
Start: 2024-02-21 | End: 2024-03-22

## 2024-03-20 DIAGNOSIS — M25.511 CHRONIC RIGHT SHOULDER PAIN: ICD-10-CM

## 2024-03-20 DIAGNOSIS — G89.29 CHRONIC RIGHT SHOULDER PAIN: ICD-10-CM

## 2024-03-20 DIAGNOSIS — R10.84 GENERALIZED POSTPRANDIAL ABDOMINAL PAIN: ICD-10-CM

## 2024-03-20 RX ORDER — OXYCODONE HYDROCHLORIDE AND ACETAMINOPHEN 5; 325 MG/1; MG/1
1 TABLET ORAL EVERY 8 HOURS PRN
Qty: 90 TABLET | Refills: 0 | Status: SHIPPED | OUTPATIENT
Start: 2024-03-20 | End: 2024-04-19

## 2024-04-18 DIAGNOSIS — M25.511 CHRONIC RIGHT SHOULDER PAIN: ICD-10-CM

## 2024-04-18 DIAGNOSIS — G89.29 CHRONIC RIGHT SHOULDER PAIN: ICD-10-CM

## 2024-04-18 DIAGNOSIS — R10.84 GENERALIZED POSTPRANDIAL ABDOMINAL PAIN: ICD-10-CM

## 2024-04-18 RX ORDER — OXYCODONE HYDROCHLORIDE AND ACETAMINOPHEN 5; 325 MG/1; MG/1
1 TABLET ORAL EVERY 8 HOURS PRN
Qty: 90 TABLET | Refills: 0 | Status: SHIPPED | OUTPATIENT
Start: 2024-04-18 | End: 2024-05-18

## 2024-04-19 DIAGNOSIS — Z95.828 STATUS POST AORTOBIFEMORAL BYPASS SURGERY: ICD-10-CM

## 2024-04-19 DIAGNOSIS — I70.1 STENOSIS OF LEFT RENAL ARTERY (HCC): ICD-10-CM

## 2024-04-19 RX ORDER — CLOPIDOGREL BISULFATE 75 MG/1
75 TABLET ORAL DAILY
Qty: 90 TABLET | Refills: 0 | Status: SHIPPED | OUTPATIENT
Start: 2024-04-19

## 2024-04-29 ENCOUNTER — OFFICE VISIT (OUTPATIENT)
Dept: PRIMARY CARE CLINIC | Age: 69
End: 2024-04-29
Payer: MEDICARE

## 2024-04-29 VITALS
DIASTOLIC BLOOD PRESSURE: 82 MMHG | SYSTOLIC BLOOD PRESSURE: 128 MMHG | WEIGHT: 131.6 LBS | HEART RATE: 98 BPM | OXYGEN SATURATION: 93 % | BODY MASS INDEX: 25.7 KG/M2

## 2024-04-29 DIAGNOSIS — R10.84 GENERALIZED POSTPRANDIAL ABDOMINAL PAIN: ICD-10-CM

## 2024-04-29 DIAGNOSIS — E11.9 TYPE 2 DIABETES MELLITUS WITHOUT COMPLICATION, WITHOUT LONG-TERM CURRENT USE OF INSULIN (HCC): Primary | ICD-10-CM

## 2024-04-29 DIAGNOSIS — G89.29 CHRONIC RIGHT SHOULDER PAIN: ICD-10-CM

## 2024-04-29 DIAGNOSIS — M25.511 CHRONIC RIGHT SHOULDER PAIN: ICD-10-CM

## 2024-04-29 DIAGNOSIS — K31.1 ADULT HYPERTROPHIC PYLORIC STENOSIS: ICD-10-CM

## 2024-04-29 DIAGNOSIS — I74.09 AORTOILIAC OCCLUSIVE DISEASE (HCC): ICD-10-CM

## 2024-04-29 DIAGNOSIS — I70.1 RENAL ARTERY STENOSIS (HCC): ICD-10-CM

## 2024-04-29 DIAGNOSIS — Z72.0 SMOKING TRYING TO QUIT: ICD-10-CM

## 2024-04-29 DIAGNOSIS — I73.9 PAD (PERIPHERAL ARTERY DISEASE) (HCC): ICD-10-CM

## 2024-04-29 DIAGNOSIS — I10 PRIMARY HYPERTENSION: ICD-10-CM

## 2024-04-29 PROBLEM — K57.10 DVRTCLOS OF SM INT W/O PERFORATION OR ABSCESS W/O BLEEDING: Status: RESOLVED | Noted: 2021-09-22 | Resolved: 2024-04-29

## 2024-04-29 LAB — HBA1C MFR BLD: 7.1 %

## 2024-04-29 PROCEDURE — 3051F HG A1C>EQUAL 7.0%<8.0%: CPT | Performed by: NURSE PRACTITIONER

## 2024-04-29 PROCEDURE — 1123F ACP DISCUSS/DSCN MKR DOCD: CPT | Performed by: NURSE PRACTITIONER

## 2024-04-29 PROCEDURE — 99214 OFFICE O/P EST MOD 30 MIN: CPT | Performed by: NURSE PRACTITIONER

## 2024-04-29 PROCEDURE — 3079F DIAST BP 80-89 MM HG: CPT | Performed by: NURSE PRACTITIONER

## 2024-04-29 PROCEDURE — G8419 CALC BMI OUT NRM PARAM NOF/U: HCPCS | Performed by: NURSE PRACTITIONER

## 2024-04-29 PROCEDURE — 83036 HEMOGLOBIN GLYCOSYLATED A1C: CPT | Performed by: NURSE PRACTITIONER

## 2024-04-29 PROCEDURE — 2022F DILAT RTA XM EVC RTNOPTHY: CPT | Performed by: NURSE PRACTITIONER

## 2024-04-29 PROCEDURE — 1090F PRES/ABSN URINE INCON ASSESS: CPT | Performed by: NURSE PRACTITIONER

## 2024-04-29 PROCEDURE — 3074F SYST BP LT 130 MM HG: CPT | Performed by: NURSE PRACTITIONER

## 2024-04-29 PROCEDURE — G8399 PT W/DXA RESULTS DOCUMENT: HCPCS | Performed by: NURSE PRACTITIONER

## 2024-04-29 PROCEDURE — 4004F PT TOBACCO SCREEN RCVD TLK: CPT | Performed by: NURSE PRACTITIONER

## 2024-04-29 PROCEDURE — 3017F COLORECTAL CA SCREEN DOC REV: CPT | Performed by: NURSE PRACTITIONER

## 2024-04-29 PROCEDURE — G8427 DOCREV CUR MEDS BY ELIG CLIN: HCPCS | Performed by: NURSE PRACTITIONER

## 2024-04-29 RX ORDER — BUSPIRONE HYDROCHLORIDE 15 MG/1
15 TABLET ORAL 3 TIMES DAILY
COMMUNITY
Start: 2024-04-26

## 2024-04-29 ASSESSMENT — PATIENT HEALTH QUESTIONNAIRE - PHQ9
2. FEELING DOWN, DEPRESSED OR HOPELESS: SEVERAL DAYS
SUM OF ALL RESPONSES TO PHQ QUESTIONS 1-9: 2
1. LITTLE INTEREST OR PLEASURE IN DOING THINGS: SEVERAL DAYS
2. FEELING DOWN, DEPRESSED OR HOPELESS: SEVERAL DAYS
1. LITTLE INTEREST OR PLEASURE IN DOING THINGS: SEVERAL DAYS
SUM OF ALL RESPONSES TO PHQ9 QUESTIONS 1 & 2: 2
SUM OF ALL RESPONSES TO PHQ9 QUESTIONS 1 & 2: 2
SUM OF ALL RESPONSES TO PHQ QUESTIONS 1-9: 2

## 2024-04-29 ASSESSMENT — ENCOUNTER SYMPTOMS
CONSTIPATION: 0
NAUSEA: 0
ABDOMINAL PAIN: 1
COUGH: 0
DIARRHEA: 0
SHORTNESS OF BREATH: 0
WHEEZING: 0
SINUS PRESSURE: 0
TROUBLE SWALLOWING: 0
VOMITING: 0
BLOOD IN STOOL: 0
SORE THROAT: 0

## 2024-05-15 DIAGNOSIS — M25.511 CHRONIC RIGHT SHOULDER PAIN: ICD-10-CM

## 2024-05-15 DIAGNOSIS — G89.29 CHRONIC RIGHT SHOULDER PAIN: ICD-10-CM

## 2024-05-15 DIAGNOSIS — R10.84 GENERALIZED POSTPRANDIAL ABDOMINAL PAIN: ICD-10-CM

## 2024-05-15 RX ORDER — OXYCODONE HYDROCHLORIDE AND ACETAMINOPHEN 5; 325 MG/1; MG/1
1 TABLET ORAL EVERY 8 HOURS PRN
Qty: 90 TABLET | Refills: 0 | Status: SHIPPED | OUTPATIENT
Start: 2024-05-15 | End: 2024-06-14

## 2024-05-17 DIAGNOSIS — I10 PRIMARY HYPERTENSION: ICD-10-CM

## 2024-05-17 RX ORDER — LISINOPRIL 40 MG/1
TABLET ORAL
Qty: 90 TABLET | Refills: 3 | Status: SHIPPED | OUTPATIENT
Start: 2024-05-17

## 2024-06-12 DIAGNOSIS — R10.84 GENERALIZED POSTPRANDIAL ABDOMINAL PAIN: ICD-10-CM

## 2024-06-12 DIAGNOSIS — G89.29 CHRONIC RIGHT SHOULDER PAIN: ICD-10-CM

## 2024-06-12 DIAGNOSIS — M25.511 CHRONIC RIGHT SHOULDER PAIN: ICD-10-CM

## 2024-06-12 RX ORDER — OXYCODONE HYDROCHLORIDE AND ACETAMINOPHEN 5; 325 MG/1; MG/1
1 TABLET ORAL EVERY 8 HOURS PRN
Qty: 90 TABLET | Refills: 0 | Status: SHIPPED | OUTPATIENT
Start: 2024-06-12 | End: 2024-07-12

## 2024-07-10 DIAGNOSIS — G89.29 CHRONIC RIGHT SHOULDER PAIN: ICD-10-CM

## 2024-07-10 DIAGNOSIS — R10.84 GENERALIZED POSTPRANDIAL ABDOMINAL PAIN: ICD-10-CM

## 2024-07-10 DIAGNOSIS — M25.511 CHRONIC RIGHT SHOULDER PAIN: ICD-10-CM

## 2024-07-10 RX ORDER — OXYCODONE HYDROCHLORIDE AND ACETAMINOPHEN 5; 325 MG/1; MG/1
1 TABLET ORAL EVERY 8 HOURS PRN
Qty: 90 TABLET | Refills: 0 | Status: SHIPPED | OUTPATIENT
Start: 2024-07-10 | End: 2024-08-09

## 2024-07-15 DIAGNOSIS — Z95.828 STATUS POST AORTOBIFEMORAL BYPASS SURGERY: ICD-10-CM

## 2024-07-15 DIAGNOSIS — I70.1 STENOSIS OF LEFT RENAL ARTERY (HCC): ICD-10-CM

## 2024-07-30 RX ORDER — CLOPIDOGREL BISULFATE 75 MG/1
75 TABLET ORAL DAILY
Qty: 90 TABLET | Refills: 0 | Status: SHIPPED | OUTPATIENT
Start: 2024-07-30

## 2024-08-06 ENCOUNTER — OFFICE VISIT (OUTPATIENT)
Dept: PRIMARY CARE CLINIC | Age: 69
End: 2024-08-06
Payer: MEDICARE

## 2024-08-06 ENCOUNTER — HOSPITAL ENCOUNTER (OUTPATIENT)
Age: 69
Setting detail: SPECIMEN
Discharge: HOME | End: 2024-08-06

## 2024-08-06 VITALS
DIASTOLIC BLOOD PRESSURE: 82 MMHG | BODY MASS INDEX: 24.65 KG/M2 | SYSTOLIC BLOOD PRESSURE: 138 MMHG | OXYGEN SATURATION: 97 % | HEART RATE: 94 BPM | WEIGHT: 126.2 LBS

## 2024-08-06 DIAGNOSIS — Z13.9 ENCOUNTER FOR SCREENING INVOLVING SOCIAL DETERMINANTS OF HEALTH (SDOH): ICD-10-CM

## 2024-08-06 DIAGNOSIS — Z13.29 SCREENING FOR THYROID DISORDER: ICD-10-CM

## 2024-08-06 DIAGNOSIS — I10 PRIMARY HYPERTENSION: ICD-10-CM

## 2024-08-06 DIAGNOSIS — R10.84 GENERALIZED POSTPRANDIAL ABDOMINAL PAIN: ICD-10-CM

## 2024-08-06 DIAGNOSIS — R53.83 FATIGUE, UNSPECIFIED TYPE: ICD-10-CM

## 2024-08-06 DIAGNOSIS — M25.511 CHRONIC RIGHT SHOULDER PAIN: ICD-10-CM

## 2024-08-06 DIAGNOSIS — G89.29 CHRONIC RIGHT SHOULDER PAIN: ICD-10-CM

## 2024-08-06 DIAGNOSIS — E11.9 TYPE 2 DIABETES MELLITUS WITHOUT COMPLICATION, WITHOUT LONG-TERM CURRENT USE OF INSULIN (HCC): Primary | ICD-10-CM

## 2024-08-06 LAB
HBA1C MFR BLD: 7.3 %
T3FREE SERPL-MCNC: 3.8 PG/ML (ref 2–4.4)
T4 FREE SERPL-MCNC: 1.3 NG/DL (ref 0.92–1.68)
TSH SERPL DL<=0.05 MIU/L-ACNC: 1.14 UIU/ML (ref 0.27–4.2)

## 2024-08-06 PROCEDURE — 2022F DILAT RTA XM EVC RTNOPTHY: CPT | Performed by: NURSE PRACTITIONER

## 2024-08-06 PROCEDURE — 3075F SYST BP GE 130 - 139MM HG: CPT | Performed by: NURSE PRACTITIONER

## 2024-08-06 PROCEDURE — 3079F DIAST BP 80-89 MM HG: CPT | Performed by: NURSE PRACTITIONER

## 2024-08-06 PROCEDURE — 4004F PT TOBACCO SCREEN RCVD TLK: CPT | Performed by: NURSE PRACTITIONER

## 2024-08-06 PROCEDURE — G8399 PT W/DXA RESULTS DOCUMENT: HCPCS | Performed by: NURSE PRACTITIONER

## 2024-08-06 PROCEDURE — 83036 HEMOGLOBIN GLYCOSYLATED A1C: CPT | Performed by: NURSE PRACTITIONER

## 2024-08-06 PROCEDURE — 1123F ACP DISCUSS/DSCN MKR DOCD: CPT | Performed by: NURSE PRACTITIONER

## 2024-08-06 PROCEDURE — 3017F COLORECTAL CA SCREEN DOC REV: CPT | Performed by: NURSE PRACTITIONER

## 2024-08-06 PROCEDURE — 3051F HG A1C>EQUAL 7.0%<8.0%: CPT | Performed by: NURSE PRACTITIONER

## 2024-08-06 PROCEDURE — 99214 OFFICE O/P EST MOD 30 MIN: CPT | Performed by: NURSE PRACTITIONER

## 2024-08-06 PROCEDURE — 1090F PRES/ABSN URINE INCON ASSESS: CPT | Performed by: NURSE PRACTITIONER

## 2024-08-06 PROCEDURE — G8420 CALC BMI NORM PARAMETERS: HCPCS | Performed by: NURSE PRACTITIONER

## 2024-08-06 PROCEDURE — G8427 DOCREV CUR MEDS BY ELIG CLIN: HCPCS | Performed by: NURSE PRACTITIONER

## 2024-08-06 RX ORDER — PIOGLITAZONEHYDROCHLORIDE 15 MG/1
15 TABLET ORAL DAILY
Qty: 30 TABLET | Refills: 3 | Status: SHIPPED | OUTPATIENT
Start: 2024-08-06

## 2024-08-06 ASSESSMENT — ENCOUNTER SYMPTOMS
SHORTNESS OF BREATH: 0
BLOOD IN STOOL: 0
NAUSEA: 0
SORE THROAT: 0
WHEEZING: 0
TROUBLE SWALLOWING: 0
COUGH: 0
DIARRHEA: 0
ABDOMINAL PAIN: 1
SINUS PRESSURE: 0
CONSTIPATION: 0
VOMITING: 0

## 2024-08-06 NOTE — PROGRESS NOTES
season) 09/01/2023    Diabetic foot exam  04/28/2024    Flu vaccine (1) Never done    Annual Wellness Visit (Medicare)  08/29/2024    Lipids  12/29/2024    GFR test (Diabetes, CKD 3-4, OR last GFR 15-59)  12/29/2024    Depression Screen  04/29/2025    A1C test (Diabetic or Prediabetic)  08/06/2025    Breast cancer screen  11/22/2025    Colorectal Cancer Screen  09/13/2033    DEXA (modify frequency per FRAX score)  Completed    Hepatitis A vaccine  Aged Out    Hepatitis B vaccine  Aged Out    Hib vaccine  Aged Out    Polio vaccine  Aged Out    Meningococcal (ACWY) vaccine  Aged Out    Depression Monitoring  Discontinued    Hepatitis C screen  Discontinued       Subjective:      Review of Systems   Constitutional:  Positive for fatigue. Negative for activity change, appetite change, chills, fever and unexpected weight change.   HENT:  Negative for congestion, ear pain, hearing loss, sinus pressure, sore throat and trouble swallowing.    Eyes:  Negative for visual disturbance.   Respiratory:  Negative for cough, shortness of breath and wheezing.    Cardiovascular:  Negative for chest pain, palpitations and leg swelling.   Gastrointestinal:  Positive for abdominal pain (Chronic postprandial). Negative for blood in stool, constipation, diarrhea, nausea and vomiting.   Endocrine: Negative for cold intolerance, heat intolerance, polydipsia, polyphagia and polyuria.   Genitourinary:  Negative for difficulty urinating, frequency, hematuria and urgency.   Musculoskeletal:  Positive for arthralgias. Negative for myalgias.   Skin:  Negative for rash.   Allergic/Immunologic: Negative for environmental allergies.   Neurological:  Negative for dizziness, weakness, light-headedness and headaches.   Psychiatric/Behavioral:  Negative for confusion. The patient is not nervous/anxious.        Objective:     Physical Exam  Constitutional:       Appearance: Normal appearance. She is well-developed.   HENT:      Head: Normocephalic.

## 2024-08-08 DIAGNOSIS — M25.511 CHRONIC RIGHT SHOULDER PAIN: ICD-10-CM

## 2024-08-08 DIAGNOSIS — G89.29 CHRONIC RIGHT SHOULDER PAIN: ICD-10-CM

## 2024-08-08 DIAGNOSIS — R10.84 GENERALIZED POSTPRANDIAL ABDOMINAL PAIN: ICD-10-CM

## 2024-08-08 RX ORDER — OXYCODONE HYDROCHLORIDE AND ACETAMINOPHEN 5; 325 MG/1; MG/1
1 TABLET ORAL EVERY 8 HOURS PRN
Qty: 90 TABLET | Refills: 0 | Status: SHIPPED | OUTPATIENT
Start: 2024-08-08 | End: 2024-09-07

## 2024-08-09 ENCOUNTER — CARE COORDINATION (OUTPATIENT)
Dept: CARE COORDINATION | Age: 69
End: 2024-08-09

## 2024-08-09 NOTE — CARE COORDINATION
received the Referral from the Patient's PCP Office in regards to Utility assistance and Food insecurities.     mailed out a Resource List consisting of Different Agencies that can address the Food Insecurity and Utility assistance need.     will follow up with Patient in a week or so to ensure that they received the Mailing.

## 2024-08-22 ENCOUNTER — CARE COORDINATION (OUTPATIENT)
Dept: CARE COORDINATION | Age: 69
End: 2024-08-22

## 2024-08-22 NOTE — CARE COORDINATION
called Patient to do a follow up Social service call with her to see if the mailing that  sent out had made it to her or not.    No one answered the phone,  had to leave a voicemail asking for a return call from her, Tylerr will follow up in a week or so with Patient.

## 2024-09-04 ENCOUNTER — CARE COORDINATION (OUTPATIENT)
Dept: CARE COORDINATION | Age: 69
End: 2024-09-04

## 2024-09-04 NOTE — CARE COORDINATION
called Patient to do a follow up Social service call with her to see if she had received the Resource list  had mailed out to her.    No one answered the phone,  left a voicemail asking for a return call from her.     will call back in a week or so.

## 2024-09-05 DIAGNOSIS — R10.84 GENERALIZED POSTPRANDIAL ABDOMINAL PAIN: ICD-10-CM

## 2024-09-05 DIAGNOSIS — G89.29 CHRONIC RIGHT SHOULDER PAIN: ICD-10-CM

## 2024-09-05 DIAGNOSIS — M25.511 CHRONIC RIGHT SHOULDER PAIN: ICD-10-CM

## 2024-09-05 RX ORDER — OXYCODONE AND ACETAMINOPHEN 5; 325 MG/1; MG/1
1 TABLET ORAL EVERY 8 HOURS PRN
Qty: 90 TABLET | Refills: 0 | Status: SHIPPED | OUTPATIENT
Start: 2024-09-05 | End: 2024-10-05

## 2024-09-17 ENCOUNTER — CARE COORDINATION (OUTPATIENT)
Dept: CARE COORDINATION | Age: 69
End: 2024-09-17

## 2024-10-03 ENCOUNTER — CARE COORDINATION (OUTPATIENT)
Dept: CARE COORDINATION | Age: 69
End: 2024-10-03

## 2024-10-03 NOTE — CARE COORDINATION
Tylerr called Patient to do a follow up Social service call with her to gauge her needs.     Patient did not answer her phone,  left a voicemail asking for a return call from her.     Tylerr will call back in a week or so to follow up with her.

## 2024-10-04 ENCOUNTER — HOSPITAL ENCOUNTER (OUTPATIENT)
Dept: VASCULAR LAB | Age: 69
Discharge: HOME | End: 2024-10-06
Attending: SURGERY
Payer: MEDICARE

## 2024-10-04 DIAGNOSIS — I70.1 RENAL ARTERY STENOSIS (HCC): ICD-10-CM

## 2024-10-04 PROCEDURE — 93975 VASCULAR STUDY: CPT

## 2024-10-05 LAB
VAS AORTA MID PSV: 67.4 CM/S
VAS AORTA PROX PSV: 67.4 CM/S
VAS L RENAL ORIG RI: 0.79 NO UNITS
VAS LEFT INFERIOR ARCUATE RI: 0.65
VAS LEFT KIDNEY ARCUATE ARTERY INFERIOR EDV: 6.3 CM/S
VAS LEFT KIDNEY ARCUATE ARTERY INFERIOR PSV: 17.9 CM/S
VAS LEFT KIDNEY ARCUATE ARTERY MEDIAL EDV: 6.9 CM/S
VAS LEFT KIDNEY ARCUATE ARTERY MEDIAL PSV: 17.9 CM/S
VAS LEFT KIDNEY ARCUATE ARTERY SUPERIOR EDV: 6.3 CM/S
VAS LEFT KIDNEY ARCUATE ARTERY SUPERIOR PSV: 14.9 CM/S
VAS LEFT KIDNEY LENGTH: 9.23 CM
VAS LEFT MEDIAL ARCUATE RI: 0.61
VAS LEFT RENAL DIST EDV: 26.8 CM/S
VAS LEFT RENAL DIST PSV: 103.6 CM/S
VAS LEFT RENAL DIST RAR: 1.54
VAS LEFT RENAL DIST RI: 0.74 NO UNITS
VAS LEFT RENAL MID EDV: 29 CM/S
VAS LEFT RENAL MID PSV: 103.6 CM/S
VAS LEFT RENAL MID RAR: 1.54
VAS LEFT RENAL MID RI: 0.72 NO UNITS
VAS LEFT RENAL ORIGIN EDV: 15.8 CM/S
VAS LEFT RENAL ORIGIN PSV: 75.1 CM/S
VAS LEFT RENAL ORIGIN RAR: 1.11
VAS LEFT RENAL PROX EDV: 7.6 CM/S
VAS LEFT RENAL PROX PSV: 64.1 CM/S
VAS LEFT RENAL PROX RAR: 0.95
VAS LEFT RENAL PROX RI: 0.88 NO UNITS
VAS LEFT RENAL RAR: 1.54
VAS LEFT SUPERIOR ARCUATE RI: 0.58
VAS RIGHT INFERIOR ARCUATE RI: 0.63
VAS RIGHT KIDNEY ARCUATE ARTERY INFERIOR EDV: 5.3 CM/S
VAS RIGHT KIDNEY ARCUATE ARTERY INFERIOR PSV: 14.3 CM/S
VAS RIGHT KIDNEY ARCUATE ARTERY MEDIAL EDV: 5.3 CM/S
VAS RIGHT KIDNEY ARCUATE ARTERY MEDIAL PSV: 17.6 CM/S
VAS RIGHT KIDNEY ARCUATE ARTERY SUPERIOR EDV: 6.2 CM/S
VAS RIGHT KIDNEY ARCUATE ARTERY SUPERIOR PSV: 21.4 CM/S
VAS RIGHT KIDNEY LENGTH: 9.15 CM
VAS RIGHT MEDIAL ARCUATE RI: 0.7
VAS RIGHT RENAL DIST EDV: 29 CM/S
VAS RIGHT RENAL DIST PSV: 103.6 CM/S
VAS RIGHT RENAL DIST RAR: 1.54
VAS RIGHT RENAL DIST RI: 0.72 NO UNITS
VAS RIGHT RENAL MID EDV: 26.8 CM/S
VAS RIGHT RENAL MID PSV: 138.7 CM/S
VAS RIGHT RENAL MID RAR: 2.06
VAS RIGHT RENAL MID RI: 0.81 NO UNITS
VAS RIGHT RENAL ORIGIN EDV: 12.5 CM/S
VAS RIGHT RENAL ORIGIN PSV: 75.1 CM/S
VAS RIGHT RENAL ORIGIN RAR: 1.11
VAS RIGHT RENAL ORIGIN RI: 0.83
VAS RIGHT RENAL PROX EDV: 4.6 CM/S
VAS RIGHT RENAL PROX PSV: 21.8 CM/S
VAS RIGHT RENAL PROX RAR: 0.32
VAS RIGHT RENAL PROX RI: 0.79 NO UNITS
VAS RIGHT RENAL RAR: 2.06
VAS RIGHT SUPERIOR ARCUATE RI: 0.71

## 2024-10-05 PROCEDURE — 93975 VASCULAR STUDY: CPT | Performed by: STUDENT IN AN ORGANIZED HEALTH CARE EDUCATION/TRAINING PROGRAM

## 2024-10-08 ENCOUNTER — PATIENT MESSAGE (OUTPATIENT)
Dept: PRIMARY CARE CLINIC | Age: 69
End: 2024-10-08

## 2024-10-08 DIAGNOSIS — M25.511 CHRONIC RIGHT SHOULDER PAIN: ICD-10-CM

## 2024-10-08 DIAGNOSIS — G89.29 CHRONIC RIGHT SHOULDER PAIN: ICD-10-CM

## 2024-10-08 DIAGNOSIS — R10.84 GENERALIZED POSTPRANDIAL ABDOMINAL PAIN: ICD-10-CM

## 2024-10-09 RX ORDER — OXYCODONE AND ACETAMINOPHEN 5; 325 MG/1; MG/1
1 TABLET ORAL EVERY 8 HOURS PRN
Qty: 90 TABLET | Refills: 0 | Status: SHIPPED | OUTPATIENT
Start: 2024-10-09 | End: 2024-11-08

## 2024-10-11 ENCOUNTER — OFFICE VISIT (OUTPATIENT)
Dept: VASCULAR SURGERY | Age: 69
End: 2024-10-11
Payer: MEDICARE

## 2024-10-11 VITALS
RESPIRATION RATE: 16 BRPM | HEART RATE: 100 BPM | OXYGEN SATURATION: 95 % | HEIGHT: 60 IN | WEIGHT: 120 LBS | SYSTOLIC BLOOD PRESSURE: 145 MMHG | DIASTOLIC BLOOD PRESSURE: 90 MMHG | BODY MASS INDEX: 23.56 KG/M2

## 2024-10-11 DIAGNOSIS — I70.1 RENAL ARTERY STENOSIS (HCC): Primary | ICD-10-CM

## 2024-10-11 PROCEDURE — 1123F ACP DISCUSS/DSCN MKR DOCD: CPT | Performed by: SURGERY

## 2024-10-11 PROCEDURE — G8427 DOCREV CUR MEDS BY ELIG CLIN: HCPCS | Performed by: SURGERY

## 2024-10-11 PROCEDURE — G8399 PT W/DXA RESULTS DOCUMENT: HCPCS | Performed by: SURGERY

## 2024-10-11 PROCEDURE — 3017F COLORECTAL CA SCREEN DOC REV: CPT | Performed by: SURGERY

## 2024-10-11 PROCEDURE — 3080F DIAST BP >= 90 MM HG: CPT | Performed by: SURGERY

## 2024-10-11 PROCEDURE — G8420 CALC BMI NORM PARAMETERS: HCPCS | Performed by: SURGERY

## 2024-10-11 PROCEDURE — 99213 OFFICE O/P EST LOW 20 MIN: CPT | Performed by: SURGERY

## 2024-10-11 PROCEDURE — 3077F SYST BP >= 140 MM HG: CPT | Performed by: SURGERY

## 2024-10-11 PROCEDURE — 4004F PT TOBACCO SCREEN RCVD TLK: CPT | Performed by: SURGERY

## 2024-10-11 PROCEDURE — 1090F PRES/ABSN URINE INCON ASSESS: CPT | Performed by: SURGERY

## 2024-10-11 PROCEDURE — G8484 FLU IMMUNIZE NO ADMIN: HCPCS | Performed by: SURGERY

## 2024-10-11 NOTE — PROGRESS NOTES
[facility]  [address] [address] [address]  [place] [address]  Dept: [phone]     Patient: Mariya Melgar  : 1955  MRN: [id number]  DOS: 10/11/2024            HPI:  Mariya Melgar is a 68 y.o. female who returns to the office regarding her left renal artery stent.  The stent is widely patent on duplex examination with no evidence of significant stenosis.  The right renal artery is also widely patent.  The renal sizes are normal.  She is not having exorbitant hypertension.    Review of Systems    Vitals:    10/11/24 1316   BP: (!) 145/90   Site: Left Upper Arm   Position: Sitting   Cuff Size: Medium Adult   Pulse: 100   Resp: 16   SpO2: 95%   Weight: 54.4 kg (120 lb)   Height: 1.524 m (5')          Physical Exam    Examination she has palpable dorsalis pedis pulses bilaterally which are strong and equal.  Her feet are warm and well-perfused.  She has no significant edema.    Assessment:  1. Renal artery stenosis (HCC)          Plan:  At this point we will continue to follow her at yearly intervals with renal artery duplex.    Electronically signed by:  Omar Medina MD

## 2024-10-22 ENCOUNTER — CARE COORDINATION (OUTPATIENT)
Dept: CARE COORDINATION | Age: 69
End: 2024-10-22

## 2024-10-22 NOTE — CARE COORDINATION
Tylerr called Patient to do a follow up Social service call with her to gauge her needs and see how I can assist Patient.    Patient did not answer her phone,  left a voicemail asking for a return call from her. Tylerr will follow back up in a week or more.

## 2024-10-25 DIAGNOSIS — Z95.828 STATUS POST AORTOBIFEMORAL BYPASS SURGERY: ICD-10-CM

## 2024-10-25 DIAGNOSIS — I70.1 STENOSIS OF LEFT RENAL ARTERY (HCC): ICD-10-CM

## 2024-10-25 RX ORDER — CLOPIDOGREL BISULFATE 75 MG/1
75 TABLET ORAL DAILY
Qty: 90 TABLET | Refills: 0 | Status: SHIPPED | OUTPATIENT
Start: 2024-10-25

## 2024-11-06 DIAGNOSIS — G89.29 CHRONIC RIGHT SHOULDER PAIN: ICD-10-CM

## 2024-11-06 DIAGNOSIS — M25.511 CHRONIC RIGHT SHOULDER PAIN: ICD-10-CM

## 2024-11-06 DIAGNOSIS — R10.84 GENERALIZED POSTPRANDIAL ABDOMINAL PAIN: ICD-10-CM

## 2024-11-06 RX ORDER — OXYCODONE AND ACETAMINOPHEN 5; 325 MG/1; MG/1
1 TABLET ORAL EVERY 8 HOURS PRN
Qty: 90 TABLET | Refills: 0 | Status: SHIPPED | OUTPATIENT
Start: 2024-11-06 | End: 2024-12-06

## 2024-11-07 ENCOUNTER — OFFICE VISIT (OUTPATIENT)
Dept: PRIMARY CARE CLINIC | Age: 69
End: 2024-11-07

## 2024-11-07 VITALS
WEIGHT: 128.4 LBS | BODY MASS INDEX: 25.08 KG/M2 | DIASTOLIC BLOOD PRESSURE: 76 MMHG | OXYGEN SATURATION: 96 % | HEART RATE: 77 BPM | SYSTOLIC BLOOD PRESSURE: 134 MMHG

## 2024-11-07 DIAGNOSIS — M43.6 NECK STIFFNESS: ICD-10-CM

## 2024-11-07 DIAGNOSIS — M25.511 CHRONIC RIGHT SHOULDER PAIN: ICD-10-CM

## 2024-11-07 DIAGNOSIS — M54.9 UPPER BACK PAIN ON RIGHT SIDE: ICD-10-CM

## 2024-11-07 DIAGNOSIS — M79.621 PAIN IN BOTH UPPER ARMS: ICD-10-CM

## 2024-11-07 DIAGNOSIS — E11.9 TYPE 2 DIABETES MELLITUS WITHOUT COMPLICATION, WITHOUT LONG-TERM CURRENT USE OF INSULIN (HCC): Primary | ICD-10-CM

## 2024-11-07 DIAGNOSIS — M79.622 PAIN IN BOTH UPPER ARMS: ICD-10-CM

## 2024-11-07 DIAGNOSIS — G89.29 CHRONIC RIGHT SHOULDER PAIN: ICD-10-CM

## 2024-11-07 DIAGNOSIS — I74.09 AORTOILIAC OCCLUSIVE DISEASE (HCC): ICD-10-CM

## 2024-11-07 DIAGNOSIS — R10.30 ABDOMINAL PAIN, LOWER: ICD-10-CM

## 2024-11-07 DIAGNOSIS — R10.84 GENERALIZED POSTPRANDIAL ABDOMINAL PAIN: ICD-10-CM

## 2024-11-07 LAB — HBA1C MFR BLD: 6.9 %

## 2024-11-07 ASSESSMENT — ENCOUNTER SYMPTOMS
CONSTIPATION: 0
WHEEZING: 0
DIARRHEA: 0
NAUSEA: 0
SHORTNESS OF BREATH: 0
ABDOMINAL PAIN: 1
TROUBLE SWALLOWING: 0
BLOOD IN STOOL: 0
VOMITING: 0
COUGH: 0
SINUS PRESSURE: 0
SORE THROAT: 0

## 2024-11-07 NOTE — PROGRESS NOTES
Specific Question:   Reason for exam:     Answer:   cervical pain    XR SHOULDER RIGHT (MIN 2 VIEWS)     Standing Status:   Future     Standing Expiration Date:   11/7/2025     Order Specific Question:   Reason for exam:     Answer:   shoulder pain    XR HIP LEFT (2-3 VIEWS)     Standing Status:   Future     Standing Expiration Date:   11/7/2025     Order Specific Question:   Reason for exam:     Answer:   hip pain    [facility]     Referral Priority:   Routine     Referral Type:   Eval and Treat     Referral Reason:   Specialty Services Required     Requested Specialty:   Physical Therapy     Number of Visits Requested:   1    POCT glycosylated hemoglobin (Hb A1C)      No orders of the defined types were placed in this encounter.      Patient given educational materials - see patient instructions.Discussed use, benefit, and side effects of prescribed medications.  All patientquestions answered. Pt voiced understanding. Reviewed health maintenance.  Instructedto continue current medications, diet and exercise.  Patient agreed with treatmentplan. Follow up as directed.     Electronicallysigned by BAKARI Arshad CNP on 11/7/2024 at 5:06 PM

## 2024-11-08 ENCOUNTER — HOSPITAL ENCOUNTER (OUTPATIENT)
Dept: PHYSICAL THERAPY | Facility: CLINIC | Age: 69
Setting detail: THERAPIES SERIES
Discharge: HOME | End: 2024-11-08
Payer: MEDICARE

## 2024-11-08 PROCEDURE — 97016 VASOPNEUMATIC DEVICE THERAPY: CPT

## 2024-11-08 PROCEDURE — 97110 THERAPEUTIC EXERCISES: CPT

## 2024-11-08 PROCEDURE — 97162 PT EVAL MOD COMPLEX 30 MIN: CPT

## 2024-11-08 NOTE — CONSULTS
Cirilo, PT  Date: 11/8/2024        Assessment:  Patient is a 68 y.o.female  with the medical diagnosis of Pain in both upper arms [M79.621, M79.622], Upper back pain on right side [M54.9], Neck stiffness [M43.6], Chronic right shoulder pain [M25.511, G89.29] presenting to therapy with the following functional impairments: decreased UE strength, decreased UE ROM, decreased postural awareness, and increased bilateral shoulder pain. These functional impairments decrease this patient's ability to perform tasks including reach overhead, lift/carry objects, lift objects from floor, and sleep comfortably. These activity limitations hinder this patient from participating in regular exercise, self care tasks, house chores, and taking care of her  .    Patient would benefit from skilled physical therapy services in order to: increase bilateral shoulder strength and ROM within patient tolerance    Problem list, as detailed above:   [x] ? Pain     [x] ? ROM    [x] ? Strength    [x] ? Function:   [x] Postural Deviations    STG: (to be met in 6 treatments)  ? Pain: decrease resting pain to <2/10 to promote adequate rest between therapy sessions  ? ROM: increase bilateral shoulder flexion and abduction to at least 120 degrees to increase range needed for reaching overhead  ? Function: increase UEFI to at least 49/80 to show progression towards PLOF  Patient to be independent with home exercise program as demonstrated by performance with correct form without cues.  Demonstrate Knowledge of fall prevention  LTG: (to be met in 12 treatments)  ? Function: increase UEFI to at least 58/80 to show significant increase in upper extremity function  ? ROM: increase bilateral shoulder flexion and abduction to at least 140 degrees to increase range needed for reaching overhead  ? Strength: increase overall bilateral shoulder strength to at least 4+/5 without shoulder pain  ? Pain: decrease average pain to <3/10 during previously

## 2024-11-12 ENCOUNTER — HOSPITAL ENCOUNTER (OUTPATIENT)
Dept: PHYSICAL THERAPY | Facility: CLINIC | Age: 69
Setting detail: THERAPIES SERIES
Discharge: HOME | End: 2024-11-12
Payer: MEDICARE

## 2024-11-12 DIAGNOSIS — I10 PRIMARY HYPERTENSION: ICD-10-CM

## 2024-11-12 PROCEDURE — 97140 MANUAL THERAPY 1/> REGIONS: CPT

## 2024-11-12 PROCEDURE — 97110 THERAPEUTIC EXERCISES: CPT

## 2024-11-12 RX ORDER — AMLODIPINE BESYLATE 5 MG/1
5 TABLET ORAL DAILY
Qty: 90 TABLET | Refills: 0 | Status: SHIPPED | OUTPATIENT
Start: 2024-11-12

## 2024-11-12 NOTE — FLOWSHEET NOTE
strength and ROM within patient tolerance     STG: (to be met in 6 treatments)  ? Pain: decrease resting pain to <2/10 to promote adequate rest between therapy sessions  ? ROM: increase bilateral shoulder flexion and abduction to at least 120 degrees to increase range needed for reaching overhead  ? Function: increase UEFI to at least 49/80 to show progression towards PLOF  Patient to be independent with home exercise program as demonstrated by performance with correct form without cues.  Demonstrate Knowledge of fall prevention  LTG: (to be met in 12 treatments)  ? Function: increase UEFI to at least 58/80 to show significant increase in upper extremity function  ? ROM: increase bilateral shoulder flexion and abduction to at least 140 degrees to increase range needed for reaching overhead  ? Strength: increase overall bilateral shoulder strength to at least 4+/5 without shoulder pain  ? Pain: decrease average pain to <3/10 during previously painful activities to show progression towards PLOF    Pt. Education:  [x] Yes  [] No  [x] Reviewed Prior HEP/Ed  Method of Education: [x] Verbal  [] Demo  [] Written  \Access Code: [email or web address]  URL: [email or web address]  Date: 11/08/2024  Prepared by: Adrianne Cirilo     Exercises  - Seated Scapular Retraction  - 1 x daily - 7 x weekly - 2 sets - 10 reps  - Seated Upper Trapezius Stretch  - 1 x daily - 7 x weekly - 2 sets - 30 seconds hold  - Seated Levator Scapulae Stretch  - 1 x daily - 7 x weekly - 2 sets - 30 seconds hold  - Supine Shoulder Flexion Extension AAROM with Dowel  - 1 x daily - 7 x weekly - 2 sets - 10 reps  Comprehension of Education:  [x] Verbalizes understanding.  [x] Demonstrates understanding.  [] Needs review.  [x] Demonstrates/verbalizes HEP/Ed previously given.     Plan: [x] Continue current frequency toward long and short term goals.    [x] Specific Instructions for subsequent treatments: assess tolerance to previous treatment, start with manual

## 2024-11-13 ENCOUNTER — CARE COORDINATION (OUTPATIENT)
Dept: CARE COORDINATION | Age: 69
End: 2024-11-13

## 2024-11-13 NOTE — CARE COORDINATION
Writer called Patient to do a follow up Social service call with her to gauge her needs and see how I can assist Patient.     Patient did not answer her phone, Writer has not been able to reach Patient. Writer is signing off on the case at this time.

## 2024-11-14 ENCOUNTER — HOSPITAL ENCOUNTER (OUTPATIENT)
Dept: CT IMAGING | Age: 69
Discharge: HOME | End: 2024-11-16
Payer: MEDICARE

## 2024-11-14 ENCOUNTER — HOSPITAL ENCOUNTER (OUTPATIENT)
Dept: GENERAL RADIOLOGY | Age: 69
Discharge: HOME | End: 2024-11-16
Payer: MEDICARE

## 2024-11-14 ENCOUNTER — HOSPITAL ENCOUNTER (OUTPATIENT)
Age: 69
Discharge: HOME | End: 2024-11-16
Payer: MEDICARE

## 2024-11-14 DIAGNOSIS — M79.621 PAIN IN BOTH UPPER ARMS: ICD-10-CM

## 2024-11-14 DIAGNOSIS — M43.6 NECK STIFFNESS: ICD-10-CM

## 2024-11-14 DIAGNOSIS — M79.622 PAIN IN BOTH UPPER ARMS: ICD-10-CM

## 2024-11-14 DIAGNOSIS — M54.9 UPPER BACK PAIN ON RIGHT SIDE: ICD-10-CM

## 2024-11-14 DIAGNOSIS — R10.30 ABDOMINAL PAIN, LOWER: ICD-10-CM

## 2024-11-14 LAB — CREAT BLD-MCNC: 0.9 MG/DL (ref 0.6–1.4)

## 2024-11-14 PROCEDURE — 2580000003 HC RX 258: Performed by: NURSE PRACTITIONER

## 2024-11-14 PROCEDURE — 72040 X-RAY EXAM NECK SPINE 2-3 VW: CPT

## 2024-11-14 PROCEDURE — 2500000003 HC RX 250 WO HCPCS: Performed by: NURSE PRACTITIONER

## 2024-11-14 PROCEDURE — 82565 ASSAY OF CREATININE: CPT

## 2024-11-14 PROCEDURE — 73502 X-RAY EXAM HIP UNI 2-3 VIEWS: CPT

## 2024-11-14 PROCEDURE — 74177 CT ABD & PELVIS W/CONTRAST: CPT

## 2024-11-14 PROCEDURE — 73030 X-RAY EXAM OF SHOULDER: CPT

## 2024-11-14 PROCEDURE — 6360000004 HC RX CONTRAST MEDICATION: Performed by: NURSE PRACTITIONER

## 2024-11-14 RX ORDER — 0.9 % SODIUM CHLORIDE 0.9 %
80 INTRAVENOUS SOLUTION INTRAVENOUS ONCE
Status: COMPLETED | OUTPATIENT
Start: 2024-11-14 | End: 2024-11-14

## 2024-11-14 RX ORDER — SODIUM CHLORIDE 0.9 % (FLUSH) 0.9 %
10 SYRINGE (ML) INJECTION ONCE
Status: COMPLETED | OUTPATIENT
Start: 2024-11-14 | End: 2024-11-14

## 2024-11-14 RX ORDER — IOPAMIDOL 755 MG/ML
75 INJECTION, SOLUTION INTRAVASCULAR
Status: COMPLETED | OUTPATIENT
Start: 2024-11-14 | End: 2024-11-14

## 2024-11-14 RX ADMIN — IOPAMIDOL 75 ML: 755 INJECTION, SOLUTION INTRAVENOUS at 10:57

## 2024-11-14 RX ADMIN — BARIUM SULFATE 450 ML: 20 SUSPENSION ORAL at 10:57

## 2024-11-14 RX ADMIN — SODIUM CHLORIDE, PRESERVATIVE FREE 10 ML: 5 INJECTION INTRAVENOUS at 10:57

## 2024-11-14 RX ADMIN — SODIUM CHLORIDE 80 ML: 9 INJECTION, SOLUTION INTRAVENOUS at 10:57

## 2024-11-15 ENCOUNTER — HOSPITAL ENCOUNTER (OUTPATIENT)
Dept: PHYSICAL THERAPY | Facility: CLINIC | Age: 69
Setting detail: THERAPIES SERIES
Discharge: HOME | End: 2024-11-15
Payer: MEDICARE

## 2024-11-15 PROCEDURE — 97110 THERAPEUTIC EXERCISES: CPT

## 2024-11-15 PROCEDURE — 97140 MANUAL THERAPY 1/> REGIONS: CPT

## 2024-11-15 NOTE — FLOWSHEET NOTE
[] [facility]  Outpatient Rehabilitation &  Therapy  [address]  [phone]  [phone] [] [facility]  Outpatient Rehabilitation &  Therapy  [address]  P: [phone]  F: [phone] [] [facility]  Outpatient Rehabilitation &  Therapy  [address]  P: [phone]  F: [phone] [] [facility]  Outpatient Rehabilitation &  Therapy  [address]  [phone]  [phone] [] [facility]  Outpatient Rehabilitation &  Therapy  [address]   P: [phone]  F: [phone]  [] [facility]  Outpatient Rehabilitation &  Therapy  [address]  P: [phone]  F: [phone] [] [facility]  Outpatient Rehabilitation &  Therapy  [address]  P: [phone]  F: [phone] [] [facility]  Outpatient Rehabilitation &  Therapy  [address]  P: [phone]  F: [phone] [] [facility]  Outpatient Rehabilitation &  Therapy  [address]  P: [phone]  F: [phone]  [] [facility] Outpatient Rehabilitation &  Therapy  [address]  P: [phone]  F: [phone]     Physical Therapy Daily Treatment Note    Date:  11/15/2024  Patient Name:  Mariya Melgar    :  1955  MRN: [id number]  Physician: BAKARI Arshad*-CNP                                   Insurance: 1.Medicare; James yr; vs based on med nec; no auth req; 20% coins; ded met; no OOP limit 2.AARP Medicare Supp; James yr; follows primary; no pt resp due   Medical Diagnosis: Pain in both upper arms [M79.621, M79.622], Upper back pain on right side [M54.9], Neck stiffness [M43.6], Chronic right shoulder pain [M25.511, G89.29]  Rehab Codes: M54.2, M25.511, M79.621, M79.622  Onset Date: 2022             Next 's appt:

## 2024-11-19 ENCOUNTER — HOSPITAL ENCOUNTER (OUTPATIENT)
Dept: PHYSICAL THERAPY | Facility: CLINIC | Age: 69
Setting detail: THERAPIES SERIES
End: 2024-11-19
Payer: MEDICARE

## 2024-11-19 ENCOUNTER — HOSPITAL ENCOUNTER (OUTPATIENT)
Dept: VASCULAR LAB | Age: 69
Discharge: HOME | End: 2024-11-21
Payer: MEDICARE

## 2024-11-19 DIAGNOSIS — I80.11: ICD-10-CM

## 2024-11-19 DIAGNOSIS — R93.5 ABNORMAL CT OF THE ABDOMEN: ICD-10-CM

## 2024-11-19 PROCEDURE — 93971 EXTREMITY STUDY: CPT

## 2024-11-21 ENCOUNTER — TELEPHONE (OUTPATIENT)
Dept: VASCULAR SURGERY | Age: 69
End: 2024-11-21

## 2024-11-21 NOTE — TELEPHONE ENCOUNTER
Patient called in today stating she just had a duplex scan done and they found a DVT does this patient need to be seen sooner then a year? PCP found the DVT on her CT scan from 11/17.

## 2024-11-22 ENCOUNTER — APPOINTMENT (OUTPATIENT)
Dept: PHYSICAL THERAPY | Facility: CLINIC | Age: 69
End: 2024-11-22
Payer: MEDICARE

## 2024-11-26 ENCOUNTER — APPOINTMENT (OUTPATIENT)
Dept: PHYSICAL THERAPY | Facility: CLINIC | Age: 69
End: 2024-11-26
Payer: MEDICARE

## 2024-11-26 DIAGNOSIS — E11.9 TYPE 2 DIABETES MELLITUS WITHOUT COMPLICATION, WITHOUT LONG-TERM CURRENT USE OF INSULIN (HCC): ICD-10-CM

## 2024-11-26 RX ORDER — PIOGLITAZONE 15 MG/1
15 TABLET ORAL DAILY
Qty: 30 TABLET | Refills: 5 | Status: SHIPPED | OUTPATIENT
Start: 2024-11-26

## 2024-12-04 DIAGNOSIS — M25.511 CHRONIC RIGHT SHOULDER PAIN: ICD-10-CM

## 2024-12-04 DIAGNOSIS — R10.84 GENERALIZED POSTPRANDIAL ABDOMINAL PAIN: ICD-10-CM

## 2024-12-04 DIAGNOSIS — G89.29 CHRONIC RIGHT SHOULDER PAIN: ICD-10-CM

## 2024-12-04 RX ORDER — OXYCODONE AND ACETAMINOPHEN 5; 325 MG/1; MG/1
1 TABLET ORAL EVERY 8 HOURS PRN
Qty: 90 TABLET | Refills: 0 | Status: SHIPPED | OUTPATIENT
Start: 2024-12-04 | End: 2025-01-03

## 2025-01-03 DIAGNOSIS — G89.29 CHRONIC RIGHT SHOULDER PAIN: ICD-10-CM

## 2025-01-03 DIAGNOSIS — R10.84 GENERALIZED POSTPRANDIAL ABDOMINAL PAIN: ICD-10-CM

## 2025-01-03 DIAGNOSIS — M25.511 CHRONIC RIGHT SHOULDER PAIN: ICD-10-CM

## 2025-01-03 RX ORDER — OXYCODONE AND ACETAMINOPHEN 5; 325 MG/1; MG/1
1 TABLET ORAL EVERY 8 HOURS PRN
Qty: 90 TABLET | Refills: 0 | Status: SHIPPED | OUTPATIENT
Start: 2025-01-03 | End: 2025-02-02

## 2025-01-24 ENCOUNTER — OFFICE VISIT (OUTPATIENT)
Dept: VASCULAR SURGERY | Age: 70
End: 2025-01-24

## 2025-01-24 VITALS
BODY MASS INDEX: 25.13 KG/M2 | RESPIRATION RATE: 16 BRPM | WEIGHT: 128 LBS | OXYGEN SATURATION: 97 % | HEIGHT: 60 IN | SYSTOLIC BLOOD PRESSURE: 155 MMHG | DIASTOLIC BLOOD PRESSURE: 89 MMHG | HEART RATE: 97 BPM

## 2025-01-24 DIAGNOSIS — Z86.718 HISTORY OF DVT OF LOWER EXTREMITY: Primary | ICD-10-CM

## 2025-01-24 NOTE — PROGRESS NOTES
[facility], [facility]  [address]  [place] [address]  Dept: [phone]     Patient: Mariya Melgar  : 1955  MRN: [id number]  DOS: 2025            HPI:  Mariya Melgar is a 69 y.o. female who returns to the office regarding a newly diagnosed deep vein thrombus in the right lower extremity.  Interestingly on CT examination she had common femoral vein filling defect with contrast around the outside of the filling defect consistent with deep vein thrombus.  Duplex examination was done showing what I think was subacute versus chronic DVT.  It was read as chronic DVT.  I reviewed all the images today.  She has not complained of right lower extremity edema.  She is not anticoagulated.    She usually sees me due to an aortobifemoral bypass and a left renal artery stent.  Those structures are widely patent.    Review of Systems    Vitals:    25 1303   BP: (!) 155/89   Site: Left Upper Arm   Position: Sitting   Cuff Size: Medium Adult   Pulse: 97   Resp: 16   SpO2: 97%   Weight: 58.1 kg (128 lb)   Height: 1.524 m (5')          Physical Exam  On examination her right lower extremity has no edema.  She has no significant tenderness.  It is warm and well-perfused.    Assessment:  1. History of DVT of lower extremity          Plan:  I would like to repeat the duplex to see if this entity has resolved or is now truly a chronic entity.  She was not on anticoagulation for this and I need to make certain that things are not worsening.  She understands and agrees and we will see her back to the office in several weeks    Electronically signed by:  Omar Medina MD

## 2025-01-25 DIAGNOSIS — Z95.828 STATUS POST AORTOBIFEMORAL BYPASS SURGERY: ICD-10-CM

## 2025-01-25 DIAGNOSIS — I70.1 STENOSIS OF LEFT RENAL ARTERY (HCC): ICD-10-CM

## 2025-01-27 RX ORDER — CLOPIDOGREL BISULFATE 75 MG/1
75 TABLET ORAL DAILY
Qty: 90 TABLET | Refills: 0 | Status: SHIPPED | OUTPATIENT
Start: 2025-01-27

## 2025-01-30 DIAGNOSIS — G89.29 CHRONIC RIGHT SHOULDER PAIN: ICD-10-CM

## 2025-01-30 DIAGNOSIS — R10.84 GENERALIZED POSTPRANDIAL ABDOMINAL PAIN: ICD-10-CM

## 2025-01-30 DIAGNOSIS — M25.511 CHRONIC RIGHT SHOULDER PAIN: ICD-10-CM

## 2025-01-31 RX ORDER — OXYCODONE AND ACETAMINOPHEN 5; 325 MG/1; MG/1
1 TABLET ORAL EVERY 8 HOURS PRN
Qty: 90 TABLET | Refills: 0 | Status: SHIPPED | OUTPATIENT
Start: 2025-01-31 | End: 2025-03-02

## 2025-02-06 SDOH — ECONOMIC STABILITY: FOOD INSECURITY: WITHIN THE PAST 12 MONTHS, THE FOOD YOU BOUGHT JUST DIDN'T LAST AND YOU DIDN'T HAVE MONEY TO GET MORE.: SOMETIMES TRUE

## 2025-02-06 SDOH — ECONOMIC STABILITY: FOOD INSECURITY: WITHIN THE PAST 12 MONTHS, YOU WORRIED THAT YOUR FOOD WOULD RUN OUT BEFORE YOU GOT MONEY TO BUY MORE.: SOMETIMES TRUE

## 2025-02-06 SDOH — ECONOMIC STABILITY: INCOME INSECURITY: IN THE LAST 12 MONTHS, WAS THERE A TIME WHEN YOU WERE NOT ABLE TO PAY THE MORTGAGE OR RENT ON TIME?: PATIENT DECLINED

## 2025-02-06 ASSESSMENT — PATIENT HEALTH QUESTIONNAIRE - PHQ9
SUM OF ALL RESPONSES TO PHQ QUESTIONS 1-9: 2
2. FEELING DOWN, DEPRESSED OR HOPELESS: SEVERAL DAYS
SUM OF ALL RESPONSES TO PHQ9 QUESTIONS 1 & 2: 2
SUM OF ALL RESPONSES TO PHQ9 QUESTIONS 1 & 2: 2
2. FEELING DOWN, DEPRESSED OR HOPELESS: SEVERAL DAYS
SUM OF ALL RESPONSES TO PHQ QUESTIONS 1-9: 2
1. LITTLE INTEREST OR PLEASURE IN DOING THINGS: SEVERAL DAYS
SUM OF ALL RESPONSES TO PHQ QUESTIONS 1-9: 2
SUM OF ALL RESPONSES TO PHQ QUESTIONS 1-9: 2
1. LITTLE INTEREST OR PLEASURE IN DOING THINGS: SEVERAL DAYS

## 2025-02-07 ENCOUNTER — OFFICE VISIT (OUTPATIENT)
Dept: PRIMARY CARE CLINIC | Age: 70
End: 2025-02-07
Payer: MEDICARE

## 2025-02-07 ENCOUNTER — HOSPITAL ENCOUNTER (OUTPATIENT)
Dept: VASCULAR LAB | Age: 70
Discharge: HOME | End: 2025-02-09
Attending: SURGERY
Payer: MEDICARE

## 2025-02-07 VITALS
OXYGEN SATURATION: 95 % | BODY MASS INDEX: 24.37 KG/M2 | SYSTOLIC BLOOD PRESSURE: 114 MMHG | WEIGHT: 124.8 LBS | DIASTOLIC BLOOD PRESSURE: 66 MMHG | HEART RATE: 81 BPM

## 2025-02-07 DIAGNOSIS — E11.9 TYPE 2 DIABETES MELLITUS WITHOUT COMPLICATION, WITHOUT LONG-TERM CURRENT USE OF INSULIN (HCC): Primary | ICD-10-CM

## 2025-02-07 DIAGNOSIS — M25.511 CHRONIC RIGHT SHOULDER PAIN: ICD-10-CM

## 2025-02-07 DIAGNOSIS — K86.1 CHRONIC PANCREATITIS, UNSPECIFIED PANCREATITIS TYPE (HCC): ICD-10-CM

## 2025-02-07 DIAGNOSIS — G89.29 CHRONIC RIGHT SHOULDER PAIN: ICD-10-CM

## 2025-02-07 DIAGNOSIS — Z86.718 HISTORY OF DVT OF LOWER EXTREMITY: ICD-10-CM

## 2025-02-07 DIAGNOSIS — I10 PRIMARY HYPERTENSION: ICD-10-CM

## 2025-02-07 DIAGNOSIS — R10.84 GENERALIZED POSTPRANDIAL ABDOMINAL PAIN: ICD-10-CM

## 2025-02-07 DIAGNOSIS — I74.09 AORTOILIAC OCCLUSIVE DISEASE (HCC): ICD-10-CM

## 2025-02-07 DIAGNOSIS — I73.9 PAD (PERIPHERAL ARTERY DISEASE) (HCC): ICD-10-CM

## 2025-02-07 LAB — HBA1C MFR BLD: 6.8 %

## 2025-02-07 PROCEDURE — G8420 CALC BMI NORM PARAMETERS: HCPCS | Performed by: NURSE PRACTITIONER

## 2025-02-07 PROCEDURE — 99214 OFFICE O/P EST MOD 30 MIN: CPT | Performed by: NURSE PRACTITIONER

## 2025-02-07 PROCEDURE — 1160F RVW MEDS BY RX/DR IN RCRD: CPT | Performed by: NURSE PRACTITIONER

## 2025-02-07 PROCEDURE — 3078F DIAST BP <80 MM HG: CPT | Performed by: NURSE PRACTITIONER

## 2025-02-07 PROCEDURE — 3044F HG A1C LEVEL LT 7.0%: CPT | Performed by: NURSE PRACTITIONER

## 2025-02-07 PROCEDURE — 3017F COLORECTAL CA SCREEN DOC REV: CPT | Performed by: NURSE PRACTITIONER

## 2025-02-07 PROCEDURE — 83036 HEMOGLOBIN GLYCOSYLATED A1C: CPT | Performed by: NURSE PRACTITIONER

## 2025-02-07 PROCEDURE — 2022F DILAT RTA XM EVC RTNOPTHY: CPT | Performed by: NURSE PRACTITIONER

## 2025-02-07 PROCEDURE — 93971 EXTREMITY STUDY: CPT

## 2025-02-07 PROCEDURE — 4004F PT TOBACCO SCREEN RCVD TLK: CPT | Performed by: NURSE PRACTITIONER

## 2025-02-07 PROCEDURE — 1123F ACP DISCUSS/DSCN MKR DOCD: CPT | Performed by: NURSE PRACTITIONER

## 2025-02-07 PROCEDURE — G8399 PT W/DXA RESULTS DOCUMENT: HCPCS | Performed by: NURSE PRACTITIONER

## 2025-02-07 PROCEDURE — 3074F SYST BP LT 130 MM HG: CPT | Performed by: NURSE PRACTITIONER

## 2025-02-07 PROCEDURE — 1090F PRES/ABSN URINE INCON ASSESS: CPT | Performed by: NURSE PRACTITIONER

## 2025-02-07 PROCEDURE — 1159F MED LIST DOCD IN RCRD: CPT | Performed by: NURSE PRACTITIONER

## 2025-02-07 PROCEDURE — G8427 DOCREV CUR MEDS BY ELIG CLIN: HCPCS | Performed by: NURSE PRACTITIONER

## 2025-02-07 ASSESSMENT — ENCOUNTER SYMPTOMS
DIARRHEA: 0
BLOOD IN STOOL: 0
BACK PAIN: 1
SINUS PRESSURE: 0
CONSTIPATION: 0
SORE THROAT: 0
NAUSEA: 0
SHORTNESS OF BREATH: 0
TROUBLE SWALLOWING: 0
VOMITING: 0
ABDOMINAL PAIN: 1
COUGH: 0
WHEEZING: 0

## 2025-02-07 NOTE — PROGRESS NOTES
[facility]  [facility] PRIMARY CARE  [address] [place] [address]   [address]  [facility] [address]  Dept: [phone]  Dept Fax: [phone]    Mariya Melgar is a 69 y.o. female who presentstoday for her medical conditions/complaints as noted below.  Mariya Melgar is c/o of  Chief Complaint   Patient presents with    Diabetes    Hypertension         HPI:     History of Present Illness  The patient presents for follow-up  She completed CT abdomen in November due to an area of discomfort in the lower abdomen and also chronic postprandial pain.  Results indicated chronic pancreatitis which is not new.  She is currently not under the care of a gastroenterologist as her provider left the area but plans to schedule an appointment. She does not consume alcohol and has made dietary modifications to manage her condition.  She will also take Percocet as needed for the pain but uses the pain medication mainly for other areas of discomfort    She reports a sensation of blockage in her right ear, similar to the feeling experienced during air travel. Her left ear is also affected, but to a lesser extent. She does not have any sinus congestion.  She does not regularly take antihistamines or use Flonase nasal spray    CT abdomen in November also indicated possible issue with femoral artery.  She had subsequent follow-up with her vascular surgeon and completed an ultrasound today.  She has been on anticoagulant therapy with clopidogrel for over 4 years for chronic peripheral arterial disease.  She reports experiencing swelling in her foot, pain in the posterior aspect of her knee, and discomfort in her leg. She is scheduled for a follow-up visit with Dr. Medina on 02/14/2025.    She has been making efforts to improve her diet, particularly in light of her chronic pancreatitis diagnosis. She has reduced her intake of soda to half a can daily, consumed in the morning, and primarily drinks decaffeinated tea and

## 2025-02-09 PROCEDURE — 93971 EXTREMITY STUDY: CPT | Performed by: SURGERY

## 2025-02-10 DIAGNOSIS — I10 PRIMARY HYPERTENSION: ICD-10-CM

## 2025-02-10 RX ORDER — AMLODIPINE BESYLATE 5 MG/1
5 TABLET ORAL DAILY
Qty: 90 TABLET | Refills: 3 | Status: SHIPPED | OUTPATIENT
Start: 2025-02-10

## 2025-02-28 ENCOUNTER — OFFICE VISIT (OUTPATIENT)
Dept: VASCULAR SURGERY | Age: 70
End: 2025-02-28

## 2025-02-28 VITALS
HEART RATE: 94 BPM | OXYGEN SATURATION: 96 % | BODY MASS INDEX: 24.94 KG/M2 | HEIGHT: 60 IN | RESPIRATION RATE: 16 BRPM | SYSTOLIC BLOOD PRESSURE: 148 MMHG | DIASTOLIC BLOOD PRESSURE: 76 MMHG | WEIGHT: 127 LBS

## 2025-02-28 DIAGNOSIS — Z95.828 S/P AORTO-BIFEMORAL BYPASS SURGERY: Primary | ICD-10-CM

## 2025-02-28 DIAGNOSIS — I70.1 RENAL ARTERY STENOSIS: ICD-10-CM

## 2025-02-28 DIAGNOSIS — I70.213 ATHEROSCLER OF NATIVE ARTERY OF BOTH LEGS WITH INTERMIT CLAUDICATION: ICD-10-CM

## 2025-02-28 NOTE — PROGRESS NOTES
[facility], [facility]  [address]  [place] [address]  Dept: [phone]     Patient: Mariya Melgar  : 1955  MRN: [id number]  DOS: 2025            HPI:  Mariya Melgar is a 69 y.o. female who returns to the office regarding suspicion of lower extremity DVT.  Recall that she had no symptoms and this was picked up on duplex ultrasonography.  We repeated the ultrasound showing what seems to be chronic DVT.  She has not been on anticoagulation and I do not think she needs anticoagulation.    Review of Systems    Vitals:    25 1346   BP: (!) 148/76   Site: Right Upper Arm   Position: Sitting   Cuff Size: Medium Adult   Pulse: 94   Resp: 16   SpO2: 96%   Weight: 57.6 kg (127 lb)   Height: 1.524 m (5')          Physical Exam  On examination she has no lower extremity edema.  She has no lower extremity pain.  She has no claudication.    Assessment:  1. S/P aorto-bifemoral bypass surgery    2. Atheroscler of native artery of both legs with intermit claudication    3. Renal artery stenosis          Plan:  She has been the recipient of an aortobifemoral bypass which has been evaluated recently here in the office.  She was set up for  for her renal artery stent for renal artery stenosis.  We will see her in 6 months instead for renal artery duplex as well as CHRISTEL and PVR.  She understands and agrees.  I see no reason for anticoagulation with a chronic DVT that has not changed in symptomatology.  She understands and agrees with this as well.  We will see her and 6 months.    Electronically signed by:  Omar Medina MD

## 2025-03-03 ENCOUNTER — PATIENT MESSAGE (OUTPATIENT)
Dept: PRIMARY CARE CLINIC | Age: 70
End: 2025-03-03

## 2025-03-03 DIAGNOSIS — M25.511 CHRONIC RIGHT SHOULDER PAIN: ICD-10-CM

## 2025-03-03 DIAGNOSIS — R10.84 GENERALIZED POSTPRANDIAL ABDOMINAL PAIN: ICD-10-CM

## 2025-03-03 DIAGNOSIS — G89.29 CHRONIC RIGHT SHOULDER PAIN: ICD-10-CM

## 2025-03-03 RX ORDER — OXYCODONE AND ACETAMINOPHEN 5; 325 MG/1; MG/1
1 TABLET ORAL EVERY 8 HOURS PRN
Qty: 90 TABLET | Refills: 0 | Status: SHIPPED | OUTPATIENT
Start: 2025-03-03 | End: 2025-04-02

## 2025-03-31 DIAGNOSIS — M25.511 CHRONIC RIGHT SHOULDER PAIN: ICD-10-CM

## 2025-03-31 DIAGNOSIS — R10.84 GENERALIZED POSTPRANDIAL ABDOMINAL PAIN: ICD-10-CM

## 2025-03-31 DIAGNOSIS — G89.29 CHRONIC RIGHT SHOULDER PAIN: ICD-10-CM

## 2025-03-31 RX ORDER — OXYCODONE AND ACETAMINOPHEN 5; 325 MG/1; MG/1
1 TABLET ORAL EVERY 8 HOURS PRN
Qty: 90 TABLET | Refills: 0 | Status: SHIPPED | OUTPATIENT
Start: 2025-03-31 | End: 2025-04-30

## 2025-04-25 DIAGNOSIS — I70.1 STENOSIS OF LEFT RENAL ARTERY: ICD-10-CM

## 2025-04-25 DIAGNOSIS — Z95.828 STATUS POST AORTOBIFEMORAL BYPASS SURGERY: ICD-10-CM

## 2025-04-25 RX ORDER — CLOPIDOGREL BISULFATE 75 MG/1
75 TABLET ORAL DAILY
Qty: 90 TABLET | Refills: 0 | Status: SHIPPED | OUTPATIENT
Start: 2025-04-25

## 2025-04-29 DIAGNOSIS — M25.511 CHRONIC RIGHT SHOULDER PAIN: ICD-10-CM

## 2025-04-29 DIAGNOSIS — G89.29 CHRONIC RIGHT SHOULDER PAIN: ICD-10-CM

## 2025-04-29 DIAGNOSIS — R10.84 GENERALIZED POSTPRANDIAL ABDOMINAL PAIN: ICD-10-CM

## 2025-04-29 RX ORDER — OXYCODONE AND ACETAMINOPHEN 5; 325 MG/1; MG/1
1 TABLET ORAL EVERY 8 HOURS PRN
Qty: 90 TABLET | Refills: 0 | Status: SHIPPED | OUTPATIENT
Start: 2025-04-29 | End: 2025-05-29

## 2025-05-08 ENCOUNTER — OFFICE VISIT (OUTPATIENT)
Dept: PRIMARY CARE CLINIC | Age: 70
End: 2025-05-08
Payer: MEDICARE

## 2025-05-08 VITALS
HEART RATE: 79 BPM | BODY MASS INDEX: 25.31 KG/M2 | SYSTOLIC BLOOD PRESSURE: 136 MMHG | OXYGEN SATURATION: 96 % | WEIGHT: 129.6 LBS | DIASTOLIC BLOOD PRESSURE: 76 MMHG

## 2025-05-08 DIAGNOSIS — R10.84 GENERALIZED POSTPRANDIAL ABDOMINAL PAIN: ICD-10-CM

## 2025-05-08 DIAGNOSIS — Z13.0 SCREENING, ANEMIA, DEFICIENCY, IRON: ICD-10-CM

## 2025-05-08 DIAGNOSIS — I73.9 PAD (PERIPHERAL ARTERY DISEASE): ICD-10-CM

## 2025-05-08 DIAGNOSIS — I10 PRIMARY HYPERTENSION: ICD-10-CM

## 2025-05-08 DIAGNOSIS — M25.511 CHRONIC RIGHT SHOULDER PAIN: ICD-10-CM

## 2025-05-08 DIAGNOSIS — E11.9 TYPE 2 DIABETES MELLITUS WITHOUT COMPLICATION, WITHOUT LONG-TERM CURRENT USE OF INSULIN (HCC): Primary | ICD-10-CM

## 2025-05-08 DIAGNOSIS — I74.09 AORTOILIAC OCCLUSIVE DISEASE (HCC): ICD-10-CM

## 2025-05-08 DIAGNOSIS — G89.29 CHRONIC RIGHT SHOULDER PAIN: ICD-10-CM

## 2025-05-08 DIAGNOSIS — H53.9 CHANGE IN VISION: ICD-10-CM

## 2025-05-08 LAB — HBA1C MFR BLD: 6.5 %

## 2025-05-08 PROCEDURE — G8419 CALC BMI OUT NRM PARAM NOF/U: HCPCS | Performed by: NURSE PRACTITIONER

## 2025-05-08 PROCEDURE — 1090F PRES/ABSN URINE INCON ASSESS: CPT | Performed by: NURSE PRACTITIONER

## 2025-05-08 PROCEDURE — 3017F COLORECTAL CA SCREEN DOC REV: CPT | Performed by: NURSE PRACTITIONER

## 2025-05-08 PROCEDURE — 3078F DIAST BP <80 MM HG: CPT | Performed by: NURSE PRACTITIONER

## 2025-05-08 PROCEDURE — 1159F MED LIST DOCD IN RCRD: CPT | Performed by: NURSE PRACTITIONER

## 2025-05-08 PROCEDURE — 3075F SYST BP GE 130 - 139MM HG: CPT | Performed by: NURSE PRACTITIONER

## 2025-05-08 PROCEDURE — G8427 DOCREV CUR MEDS BY ELIG CLIN: HCPCS | Performed by: NURSE PRACTITIONER

## 2025-05-08 PROCEDURE — 83036 HEMOGLOBIN GLYCOSYLATED A1C: CPT | Performed by: NURSE PRACTITIONER

## 2025-05-08 PROCEDURE — 3044F HG A1C LEVEL LT 7.0%: CPT | Performed by: NURSE PRACTITIONER

## 2025-05-08 PROCEDURE — 4004F PT TOBACCO SCREEN RCVD TLK: CPT | Performed by: NURSE PRACTITIONER

## 2025-05-08 PROCEDURE — 1160F RVW MEDS BY RX/DR IN RCRD: CPT | Performed by: NURSE PRACTITIONER

## 2025-05-08 PROCEDURE — G8399 PT W/DXA RESULTS DOCUMENT: HCPCS | Performed by: NURSE PRACTITIONER

## 2025-05-08 PROCEDURE — 2022F DILAT RTA XM EVC RTNOPTHY: CPT | Performed by: NURSE PRACTITIONER

## 2025-05-08 PROCEDURE — 99214 OFFICE O/P EST MOD 30 MIN: CPT | Performed by: NURSE PRACTITIONER

## 2025-05-08 PROCEDURE — 1123F ACP DISCUSS/DSCN MKR DOCD: CPT | Performed by: NURSE PRACTITIONER

## 2025-05-08 ASSESSMENT — ENCOUNTER SYMPTOMS
WHEEZING: 0
CONSTIPATION: 0
COUGH: 0
NAUSEA: 0
BLOOD IN STOOL: 0
SORE THROAT: 0
TROUBLE SWALLOWING: 0
VOMITING: 0
ABDOMINAL PAIN: 1
DIARRHEA: 0
SINUS PRESSURE: 0
SHORTNESS OF BREATH: 0
BACK PAIN: 1

## 2025-05-08 NOTE — PROGRESS NOTES
[facility]  [facility]  [facility] PRIMARY CARE  [address] [place] [address]   [address]  [facility] [address]  Dept: [phone]  Dept Fax: [phone]    Mariya Melgar is a 69 y.o. female who presentstoday for her medical conditions/complaints as noted below.  Mariya Melgar is c/o of  Chief Complaint   Patient presents with    Back Pain    Diabetes         HPI:     History of Present Illness  The patient presents for follow-up  She has recently discovered that she has significant decrease in vision in her left eye. During a recent visit to [place] for an update on her reading glasses prescription, she was unable to read the chart due to her visual impairment. She has not yet sought the opinion of a specialist regarding this issue as she was not sure who to go to.    She had recent visit with her vascular specialist and is scheduled to undergo testing for bilateral leg pain in 08/2025, with a follow-up appointment with Dr. Medina on 08/28/2025. She continues to experience leg pain, which is occasionally accompanied by swelling, the severity of which appears to be activity-dependent.     She has been working on being mindful of diet and keeps physically active with house and yard work      Hemoglobin A1C (%)   Date Value   05/08/2025 6.5   02/07/2025 6.8   11/07/2024 6.9             ( goal A1C is < 7)   No components found for: \"LABMICR\"  No components found for: \"LDLCHOLESTEROL\", \"LDLCALC\"    (goal LDL is <100)   AST (U/L)   Date Value   12/29/2023 18     ALT (U/L)   Date Value   12/29/2023 14     BUN (mg/dL)   Date Value   12/29/2023 15     BP Readings from Last 3 Encounters:   05/08/25 136/76   02/28/25 (!) 148/76   02/07/25 114/66          ([id number])    Past Medical History:   Diagnosis Date    Anxiety     Breast mass, left     found on mammogram Nov 2023    Colon polyps     Depression     Diabetes mellitus (HCC)     oral meds    Glaucoma     Hearing loss     Hx of blood clots     right

## 2025-05-27 DIAGNOSIS — E11.9 TYPE 2 DIABETES MELLITUS WITHOUT COMPLICATION, WITHOUT LONG-TERM CURRENT USE OF INSULIN (HCC): ICD-10-CM

## 2025-05-27 RX ORDER — PIOGLITAZONE 15 MG/1
15 TABLET ORAL DAILY
Qty: 90 TABLET | Refills: 3 | Status: SHIPPED | OUTPATIENT
Start: 2025-05-27

## 2025-05-28 DIAGNOSIS — G89.29 CHRONIC RIGHT SHOULDER PAIN: ICD-10-CM

## 2025-05-28 DIAGNOSIS — M25.511 CHRONIC RIGHT SHOULDER PAIN: ICD-10-CM

## 2025-05-28 DIAGNOSIS — R10.84 GENERALIZED POSTPRANDIAL ABDOMINAL PAIN: ICD-10-CM

## 2025-05-28 RX ORDER — OXYCODONE AND ACETAMINOPHEN 5; 325 MG/1; MG/1
1 TABLET ORAL EVERY 8 HOURS PRN
Qty: 90 TABLET | Refills: 0 | Status: SHIPPED | OUTPATIENT
Start: 2025-05-28 | End: 2025-06-27

## 2025-05-31 ENCOUNTER — PATIENT MESSAGE (OUTPATIENT)
Dept: PRIMARY CARE CLINIC | Age: 70
End: 2025-05-31

## 2025-06-24 DIAGNOSIS — R10.84 GENERALIZED POSTPRANDIAL ABDOMINAL PAIN: ICD-10-CM

## 2025-06-24 DIAGNOSIS — M25.511 CHRONIC RIGHT SHOULDER PAIN: ICD-10-CM

## 2025-06-24 DIAGNOSIS — G89.29 CHRONIC RIGHT SHOULDER PAIN: ICD-10-CM

## 2025-06-24 RX ORDER — OXYCODONE AND ACETAMINOPHEN 5; 325 MG/1; MG/1
1 TABLET ORAL EVERY 8 HOURS PRN
Qty: 90 TABLET | Refills: 0 | Status: SHIPPED | OUTPATIENT
Start: 2025-06-24 | End: 2025-06-25 | Stop reason: SDUPTHER

## 2025-06-25 ENCOUNTER — PATIENT MESSAGE (OUTPATIENT)
Dept: PRIMARY CARE CLINIC | Age: 70
End: 2025-06-25

## 2025-06-25 DIAGNOSIS — M25.511 CHRONIC RIGHT SHOULDER PAIN: ICD-10-CM

## 2025-06-25 DIAGNOSIS — G89.29 CHRONIC RIGHT SHOULDER PAIN: ICD-10-CM

## 2025-06-25 DIAGNOSIS — R10.84 GENERALIZED POSTPRANDIAL ABDOMINAL PAIN: ICD-10-CM

## 2025-06-26 RX ORDER — OXYCODONE AND ACETAMINOPHEN 5; 325 MG/1; MG/1
1 TABLET ORAL EVERY 8 HOURS PRN
Qty: 90 TABLET | Refills: 0 | Status: SHIPPED | OUTPATIENT
Start: 2025-06-26 | End: 2025-06-27 | Stop reason: SDUPTHER

## 2025-06-27 DIAGNOSIS — G89.29 CHRONIC RIGHT SHOULDER PAIN: ICD-10-CM

## 2025-06-27 DIAGNOSIS — M25.511 CHRONIC RIGHT SHOULDER PAIN: ICD-10-CM

## 2025-06-27 DIAGNOSIS — R10.84 GENERALIZED POSTPRANDIAL ABDOMINAL PAIN: ICD-10-CM

## 2025-06-27 RX ORDER — OXYCODONE AND ACETAMINOPHEN 5; 325 MG/1; MG/1
1 TABLET ORAL EVERY 8 HOURS PRN
Qty: 90 TABLET | Refills: 0 | Status: SHIPPED | OUTPATIENT
Start: 2025-06-27 | End: 2025-07-27

## 2025-06-27 NOTE — TELEPHONE ENCOUNTER
Last Visit Date: 5/8/2025   Next Visit Date: 8/8/2025   Pharmacy:       Pt needs med sent to different pharmacy, pharmacy changed

## 2025-07-23 DIAGNOSIS — M25.511 CHRONIC RIGHT SHOULDER PAIN: ICD-10-CM

## 2025-07-23 DIAGNOSIS — G89.29 CHRONIC RIGHT SHOULDER PAIN: ICD-10-CM

## 2025-07-23 DIAGNOSIS — R10.84 GENERALIZED POSTPRANDIAL ABDOMINAL PAIN: ICD-10-CM

## 2025-07-23 RX ORDER — OXYCODONE AND ACETAMINOPHEN 5; 325 MG/1; MG/1
1 TABLET ORAL EVERY 8 HOURS PRN
Qty: 90 TABLET | Refills: 0 | Status: SHIPPED | OUTPATIENT
Start: 2025-07-23 | End: 2025-08-22

## 2025-07-26 DIAGNOSIS — I70.1 STENOSIS OF LEFT RENAL ARTERY: ICD-10-CM

## 2025-07-26 DIAGNOSIS — Z95.828 STATUS POST AORTOBIFEMORAL BYPASS SURGERY: ICD-10-CM

## 2025-07-28 RX ORDER — CLOPIDOGREL BISULFATE 75 MG/1
75 TABLET ORAL DAILY
Qty: 90 TABLET | Refills: 0 | Status: SHIPPED | OUTPATIENT
Start: 2025-07-28

## 2025-08-18 ENCOUNTER — TELEPHONE (OUTPATIENT)
Dept: VASCULAR SURGERY | Age: 70
End: 2025-08-18

## 2025-08-20 DIAGNOSIS — M25.511 CHRONIC RIGHT SHOULDER PAIN: ICD-10-CM

## 2025-08-20 DIAGNOSIS — R10.84 GENERALIZED POSTPRANDIAL ABDOMINAL PAIN: ICD-10-CM

## 2025-08-20 DIAGNOSIS — G89.29 CHRONIC RIGHT SHOULDER PAIN: ICD-10-CM

## 2025-08-20 RX ORDER — OXYCODONE AND ACETAMINOPHEN 5; 325 MG/1; MG/1
1 TABLET ORAL EVERY 8 HOURS PRN
Qty: 90 TABLET | Refills: 0 | Status: SHIPPED | OUTPATIENT
Start: 2025-08-20 | End: 2025-09-19

## 2025-08-23 SDOH — HEALTH STABILITY: PHYSICAL HEALTH: ON AVERAGE, HOW MANY DAYS PER WEEK DO YOU ENGAGE IN MODERATE TO STRENUOUS EXERCISE (LIKE A BRISK WALK)?: 0 DAYS

## 2025-08-23 SDOH — HEALTH STABILITY: PHYSICAL HEALTH: ON AVERAGE, HOW MANY MINUTES DO YOU ENGAGE IN EXERCISE AT THIS LEVEL?: 0 MIN

## 2025-08-23 ASSESSMENT — LIFESTYLE VARIABLES
HOW OFTEN DO YOU HAVE A DRINK CONTAINING ALCOHOL: 1
HOW MANY STANDARD DRINKS CONTAINING ALCOHOL DO YOU HAVE ON A TYPICAL DAY: 0
HOW OFTEN DO YOU HAVE SIX OR MORE DRINKS ON ONE OCCASION: 1
HOW OFTEN DO YOU HAVE A DRINK CONTAINING ALCOHOL: NEVER
HOW MANY STANDARD DRINKS CONTAINING ALCOHOL DO YOU HAVE ON A TYPICAL DAY: PATIENT DOES NOT DRINK

## 2025-08-23 ASSESSMENT — PATIENT HEALTH QUESTIONNAIRE - PHQ9
SUM OF ALL RESPONSES TO PHQ QUESTIONS 1-9: 2
2. FEELING DOWN, DEPRESSED OR HOPELESS: SEVERAL DAYS
SUM OF ALL RESPONSES TO PHQ QUESTIONS 1-9: 2
1. LITTLE INTEREST OR PLEASURE IN DOING THINGS: SEVERAL DAYS

## 2025-08-27 ENCOUNTER — HOSPITAL ENCOUNTER (OUTPATIENT)
Age: 70
Discharge: HOME | End: 2025-08-27
Attending: SURGERY
Payer: MEDICARE

## 2025-08-27 ENCOUNTER — HOSPITAL ENCOUNTER (OUTPATIENT)
Dept: VASCULAR LAB | Age: 70
Discharge: HOME | End: 2025-08-29
Attending: SURGERY
Payer: MEDICARE

## 2025-08-27 DIAGNOSIS — I70.213 ATHEROSCLER OF NATIVE ARTERY OF BOTH LEGS WITH INTERMIT CLAUDICATION: ICD-10-CM

## 2025-08-27 DIAGNOSIS — I70.1 RENAL ARTERY STENOSIS: ICD-10-CM

## 2025-08-27 DIAGNOSIS — I73.9 PAD (PERIPHERAL ARTERY DISEASE): ICD-10-CM

## 2025-08-27 DIAGNOSIS — I10 PRIMARY HYPERTENSION: ICD-10-CM

## 2025-08-27 DIAGNOSIS — Z95.828 S/P AORTO-BIFEMORAL BYPASS SURGERY: ICD-10-CM

## 2025-08-27 DIAGNOSIS — E11.9 TYPE 2 DIABETES MELLITUS WITHOUT COMPLICATION, WITHOUT LONG-TERM CURRENT USE OF INSULIN (HCC): ICD-10-CM

## 2025-08-27 DIAGNOSIS — Z13.0 SCREENING, ANEMIA, DEFICIENCY, IRON: ICD-10-CM

## 2025-08-27 LAB
ALBUMIN SERPL-MCNC: 4 G/DL (ref 3.5–5.2)
ALBUMIN/GLOB SERPL: 1.9 {RATIO} (ref 1–2.5)
ALP SERPL-CCNC: 83 U/L (ref 35–104)
ALT SERPL-CCNC: 6 U/L (ref 10–35)
ANION GAP SERPL CALCULATED.3IONS-SCNC: 10 MMOL/L (ref 9–16)
AST SERPL-CCNC: 13 U/L (ref 10–35)
BASOPHILS # BLD: 0.11 K/UL (ref 0–0.2)
BASOPHILS NFR BLD: 1 % (ref 0–2)
BILIRUB SERPL-MCNC: 0.3 MG/DL (ref 0–1.2)
BUN SERPL-MCNC: 17 MG/DL (ref 8–23)
CALCIUM SERPL-MCNC: 9.1 MG/DL (ref 8.6–10.4)
CHLORIDE SERPL-SCNC: 109 MMOL/L (ref 98–107)
CHOLEST SERPL-MCNC: 188 MG/DL (ref 0–199)
CHOLESTEROL/HDL RATIO: 4.8
CO2 SERPL-SCNC: 25 MMOL/L (ref 20–31)
CREAT SERPL-MCNC: 0.8 MG/DL (ref 0.6–0.9)
EOSINOPHIL # BLD: 0.18 K/UL (ref 0–0.44)
EOSINOPHILS RELATIVE PERCENT: 2 % (ref 1–4)
ERYTHROCYTE [DISTWIDTH] IN BLOOD BY AUTOMATED COUNT: 12.7 % (ref 11.8–14.4)
GFR, ESTIMATED: 79 ML/MIN/1.73M2
GLUCOSE SERPL-MCNC: 119 MG/DL (ref 74–99)
HCT VFR BLD AUTO: 37.8 % (ref 36.3–47.1)
HDLC SERPL-MCNC: 39 MG/DL
HGB BLD-MCNC: 12.8 G/DL (ref 11.9–15.1)
IMM GRANULOCYTES # BLD AUTO: <0.03 K/UL (ref 0–0.3)
IMM GRANULOCYTES NFR BLD: 0 %
LDLC SERPL CALC-MCNC: 101 MG/DL (ref 0–100)
LYMPHOCYTES NFR BLD: 1.98 K/UL (ref 1.1–3.7)
LYMPHOCYTES RELATIVE PERCENT: 26 % (ref 24–43)
MCH RBC QN AUTO: 34.2 PG (ref 25.2–33.5)
MCHC RBC AUTO-ENTMCNC: 33.9 G/DL (ref 28.4–34.8)
MCV RBC AUTO: 101.1 FL (ref 82.6–102.9)
MONOCYTES NFR BLD: 0.56 K/UL (ref 0.1–1.2)
MONOCYTES NFR BLD: 7 % (ref 3–12)
NEUTROPHILS NFR BLD: 64 % (ref 36–65)
NEUTS SEG NFR BLD: 4.81 K/UL (ref 1.5–8.1)
NRBC BLD-RTO: 0 PER 100 WBC
PLATELET # BLD AUTO: 255 K/UL (ref 138–453)
PMV BLD AUTO: 10.7 FL (ref 8.1–13.5)
POTASSIUM SERPL-SCNC: 4.2 MMOL/L (ref 3.7–5.3)
PROT SERPL-MCNC: 6.1 G/DL (ref 6.6–8.7)
RBC # BLD AUTO: 3.74 M/UL (ref 3.95–5.11)
SODIUM SERPL-SCNC: 144 MMOL/L (ref 136–145)
TRIGL SERPL-MCNC: 238 MG/DL
VLDLC SERPL CALC-MCNC: 48 MG/DL (ref 1–30)
WBC OTHER # BLD: 7.7 K/UL (ref 3.5–11.3)

## 2025-08-27 PROCEDURE — 36415 COLL VENOUS BLD VENIPUNCTURE: CPT

## 2025-08-27 PROCEDURE — 80053 COMPREHEN METABOLIC PANEL: CPT

## 2025-08-27 PROCEDURE — 93975 VASCULAR STUDY: CPT

## 2025-08-27 PROCEDURE — 93923 UPR/LXTR ART STDY 3+ LVLS: CPT

## 2025-08-27 PROCEDURE — 85025 COMPLETE CBC W/AUTO DIFF WBC: CPT

## 2025-08-27 PROCEDURE — 80061 LIPID PANEL: CPT

## 2025-08-28 LAB
VAS AORTA MID PSV: 109.4 CM/S
VAS AORTA PROX PSV: 109.4 CM/S
VAS L RENAL ORIG RI: 0.64
VAS LEFT ABI: 0.75
VAS LEFT ARM BP: 167 MMHG
VAS LEFT CALF PRESSURE: 110 MMHG
VAS LEFT DORSALIS PEDIS BP: 117 MMHG
VAS LEFT KIDNEY LENGTH: 9.93 CM
VAS LEFT LOW THIGH PRESSURE: 143 MMHG
VAS LEFT PTA BP: 125 MMHG
VAS LEFT RENAL DIST EDV: 31.6 CM/S
VAS LEFT RENAL DIST PSV: 88.6 CM/S
VAS LEFT RENAL DIST RAR: 0.81
VAS LEFT RENAL DIST RI: 0.64
VAS LEFT RENAL LOWER PARENCHYMA EDV: 6.3 CM/S
VAS LEFT RENAL LOWER PARENCHYMA PSV: 15.1 CM/S
VAS LEFT RENAL LOWER PARENCHYMA RI: 0.58
VAS LEFT RENAL MID EDV: 47.2 CM/S
VAS LEFT RENAL MID PSV: 127.5 CM/S
VAS LEFT RENAL MID RAR: 1.17
VAS LEFT RENAL MID RI: 0.63
VAS LEFT RENAL MIDDLE PARENCHYMA EDV: 8 CM/S
VAS LEFT RENAL MIDDLE PARENCHYMA PSV: 21.7 CM/S
VAS LEFT RENAL MIDDLE PARENCHYMA RI: 0.63
VAS LEFT RENAL ORIGIN EDV: 62.7 CM/S
VAS LEFT RENAL ORIGIN PSV: 174.1 CM/S
VAS LEFT RENAL ORIGIN RAR: 1.59
VAS LEFT RENAL PROX EDV: 57.5 CM/S
VAS LEFT RENAL PROX PSV: 169 CM/S
VAS LEFT RENAL PROX RAR: 1.54
VAS LEFT RENAL PROX RI: 0.66
VAS LEFT RENAL RAR: 1.59
VAS LEFT RENAL UPPER PARENCHYMA EDV: 6.3 CM/S
VAS LEFT RENAL UPPER PARENCHYMA PSV: 21.7 CM/S
VAS LEFT RENAL UPPER PARENCHYMA RI: 0.71
VAS LEFT TBI: 0.23
VAS LEFT TOE PRESSURE: 38 MMHG
VAS RIGHT ABI: 1.05
VAS RIGHT ARM BP: 163 MMHG
VAS RIGHT CALF PRESSURE: 155 MMHG
VAS RIGHT DORSALIS PEDIS BP: 176 MMHG
VAS RIGHT KIDNEY LENGTH: 9.87 CM
VAS RIGHT LOW THIGH PRESSURE: 185 MMHG
VAS RIGHT PTA BP: 171 MMHG
VAS RIGHT RENAL DIST EDV: 29 CM/S
VAS RIGHT RENAL DIST PSV: 104.2 CM/S
VAS RIGHT RENAL DIST RAR: 0.95
VAS RIGHT RENAL DIST RI: 0.72
VAS RIGHT RENAL LOWER PARENCHYMA EDV: 8.4 CM/S
VAS RIGHT RENAL LOWER PARENCHYMA PSV: 20.3 CM/S
VAS RIGHT RENAL LOWER PARENCHYMA RI: 0.59
VAS RIGHT RENAL MID EDV: 36.1 CM/S
VAS RIGHT RENAL MID PSV: 130.3 CM/S
VAS RIGHT RENAL MID RAR: 1.19
VAS RIGHT RENAL MID RI: 0.72
VAS RIGHT RENAL MIDDLE PARENCHYMA EDV: 7.5 CM/S
VAS RIGHT RENAL MIDDLE PARENCHYMA PSV: 22.2 CM/S
VAS RIGHT RENAL MIDDLE PARENCHYMA RI: 0.66
VAS RIGHT RENAL ORIGIN EDV: 82 CM/S
VAS RIGHT RENAL ORIGIN PSV: 282.1 CM/S
VAS RIGHT RENAL ORIGIN RAR: 2.58
VAS RIGHT RENAL ORIGIN RI: 0.71
VAS RIGHT RENAL PROX EDV: 51.8 CM/S
VAS RIGHT RENAL PROX PSV: 216.6 CM/S
VAS RIGHT RENAL PROX RAR: 1.98
VAS RIGHT RENAL PROX RI: 0.76
VAS RIGHT RENAL RAR: 2.58
VAS RIGHT RENAL UPPER PARENCHYMA EDV: 6.9 CM/S
VAS RIGHT RENAL UPPER PARENCHYMA PSV: 20.4 CM/S
VAS RIGHT RENAL UPPER PARENCHYMA RI: 0.66
VAS RIGHT TBI: 0.81
VAS RIGHT TOE PRESSURE: 136 MMHG

## 2025-08-28 PROCEDURE — 93923 UPR/LXTR ART STDY 3+ LVLS: CPT | Performed by: STUDENT IN AN ORGANIZED HEALTH CARE EDUCATION/TRAINING PROGRAM

## 2025-08-28 PROCEDURE — 93975 VASCULAR STUDY: CPT | Performed by: STUDENT IN AN ORGANIZED HEALTH CARE EDUCATION/TRAINING PROGRAM

## 2025-09-05 ENCOUNTER — OFFICE VISIT (OUTPATIENT)
Dept: PRIMARY CARE CLINIC | Age: 70
End: 2025-09-05
Payer: MEDICARE

## 2025-09-05 VITALS
OXYGEN SATURATION: 95 % | DIASTOLIC BLOOD PRESSURE: 104 MMHG | HEIGHT: 60 IN | HEART RATE: 93 BPM | SYSTOLIC BLOOD PRESSURE: 154 MMHG | WEIGHT: 125.6 LBS | BODY MASS INDEX: 24.66 KG/M2

## 2025-09-05 DIAGNOSIS — G89.29 CHRONIC RIGHT SHOULDER PAIN: ICD-10-CM

## 2025-09-05 DIAGNOSIS — M79.621 PAIN IN BOTH UPPER ARMS: ICD-10-CM

## 2025-09-05 DIAGNOSIS — E11.9 TYPE 2 DIABETES MELLITUS WITHOUT COMPLICATION, WITHOUT LONG-TERM CURRENT USE OF INSULIN (HCC): ICD-10-CM

## 2025-09-05 DIAGNOSIS — R10.84 GENERALIZED POSTPRANDIAL ABDOMINAL PAIN: ICD-10-CM

## 2025-09-05 DIAGNOSIS — M25.511 CHRONIC RIGHT SHOULDER PAIN: ICD-10-CM

## 2025-09-05 DIAGNOSIS — I10 PRIMARY HYPERTENSION: ICD-10-CM

## 2025-09-05 DIAGNOSIS — M79.622 PAIN IN BOTH UPPER ARMS: ICD-10-CM

## 2025-09-05 DIAGNOSIS — Z12.31 SCREENING MAMMOGRAM FOR BREAST CANCER: ICD-10-CM

## 2025-09-05 DIAGNOSIS — I70.1 RENAL ARTERY STENOSIS: ICD-10-CM

## 2025-09-05 DIAGNOSIS — Z00.00 MEDICARE ANNUAL WELLNESS VISIT, SUBSEQUENT: Primary | ICD-10-CM

## 2025-09-05 DIAGNOSIS — Z87.891 PERSONAL HISTORY OF TOBACCO USE: ICD-10-CM

## 2025-09-05 LAB — HBA1C MFR BLD: 6.4 %

## 2025-09-05 PROCEDURE — 1160F RVW MEDS BY RX/DR IN RCRD: CPT | Performed by: NURSE PRACTITIONER

## 2025-09-05 PROCEDURE — 3079F DIAST BP 80-89 MM HG: CPT | Performed by: NURSE PRACTITIONER

## 2025-09-05 PROCEDURE — 1123F ACP DISCUSS/DSCN MKR DOCD: CPT | Performed by: NURSE PRACTITIONER

## 2025-09-05 PROCEDURE — 3044F HG A1C LEVEL LT 7.0%: CPT | Performed by: NURSE PRACTITIONER

## 2025-09-05 PROCEDURE — 3017F COLORECTAL CA SCREEN DOC REV: CPT | Performed by: NURSE PRACTITIONER

## 2025-09-05 PROCEDURE — 3077F SYST BP >= 140 MM HG: CPT | Performed by: NURSE PRACTITIONER

## 2025-09-05 PROCEDURE — G0296 VISIT TO DETERM LDCT ELIG: HCPCS | Performed by: NURSE PRACTITIONER

## 2025-09-05 PROCEDURE — G0439 PPPS, SUBSEQ VISIT: HCPCS | Performed by: NURSE PRACTITIONER

## 2025-09-05 PROCEDURE — 1159F MED LIST DOCD IN RCRD: CPT | Performed by: NURSE PRACTITIONER

## 2025-09-05 PROCEDURE — 83036 HEMOGLOBIN GLYCOSYLATED A1C: CPT | Performed by: NURSE PRACTITIONER

## 2025-09-05 RX ORDER — AMLODIPINE BESYLATE 10 MG/1
10 TABLET ORAL DAILY
Qty: 90 TABLET | Refills: 3 | Status: SHIPPED | OUTPATIENT
Start: 2025-09-05

## (undated) DEVICE — SUTURE PROL SZ 5-0 L36IN NONABSORBABLE BLU L13MM C-1 3/8 8720H

## (undated) DEVICE — DRAPE,REIN 53X77,STERILE: Brand: MEDLINE

## (undated) DEVICE — 3M™ IOBAN™ 2 ANTIMICROBIAL INCISE DRAPE 6651EZ: Brand: IOBAN™ 2

## (undated) DEVICE — GLOVE SURG SZ 75 CRM LTX FREE POLYISOPRENE POLYMER BEAD ANTI

## (undated) DEVICE — GARMENT,MEDLINE,DVT,INT,CALF,MED, GEN2: Brand: MEDLINE

## (undated) DEVICE — GEL US 20GM NONIRRITATING OVERWRAPPED FILE PCH TRNSMIT

## (undated) DEVICE — TUBING, SUCTION, 1/4" X 12', STRAIGHT: Brand: MEDLINE

## (undated) DEVICE — DRAPE THER FLUID WARMING 66X44 IN FLAT SLUSH DBL DISC ORS

## (undated) DEVICE — PLEDGET SURG W3.5XL7MM THK1.5MM WHT PTFE RECT SFT TFE

## (undated) DEVICE — POUCH INSTR W6.75XL11.5IN FRST 2 PKT ADH FOR ORTH AND

## (undated) DEVICE — GLOVE SURG SZ 7 CRM LTX FREE POLYISOPRENE POLYMER BEAD ANTI

## (undated) DEVICE — DECANTER BAG 9": Brand: MEDLINE INDUSTRIES, INC.

## (undated) DEVICE — Z INACTIVE USE 2535480 CLIP LIG M BLU TI HRT SHP WIRE HORZ 180 PER BX

## (undated) DEVICE — BLADE ES ELASTOMERIC COAT INSUL DURABLE BEND UPTO 90DEG

## (undated) DEVICE — ESOPHAGEAL/PYLORIC/BILIARY WIREGUIDED BALLOON DILATATION CATHETER: Brand: CRE™ PRO

## (undated) DEVICE — ELEVIEW SUBMUCOSAL INJECTABLE COMPOSITION 10ML

## (undated) DEVICE — TUBING SUCT 12FR MAL ALUM SHFT FN CAP VENT UNIV CONN W/ OBT

## (undated) DEVICE — DRAPE,UTILITY,XL,4/PK,STERILE: Brand: MEDLINE

## (undated) DEVICE — SYRINGE INFL 60ML DISP ALLIANCE II

## (undated) DEVICE — CATHETER DIAG L65CM DIA5FR 0.035IN 4 PRT BRAID PERFORMA

## (undated) DEVICE — BITEBLOCK 54FR W/ DENT RIM BLOX

## (undated) DEVICE — PAD,ABDOMINAL,5"X9",ST,LF,25/BX: Brand: MEDLINE INDUSTRIES, INC.

## (undated) DEVICE — SUTURE ABSORBABLE MONOFILAMENT 0 TP1 60 IN VIO PDS + PDP991G

## (undated) DEVICE — ELECTRODE PT RET AD L9FT HI MOIST COND ADH HYDRGEL CORDED

## (undated) DEVICE — SUTURE MCRYL SZ 5-0 L18IN ABSRB UD PC-3 L16MM 3/8 CIR Y844G

## (undated) DEVICE — TUBING AMB

## (undated) DEVICE — NAVICROSS SUPPORT CATHETER: Brand: NAVICROSS

## (undated) DEVICE — SUTURE PERMA-HAND SZ 2 L60IN NONABSORBABLE BLK SILK BRAID SA8H

## (undated) DEVICE — BINDER ABD UNISX 9IN 45IN SM AND M UNIV

## (undated) DEVICE — SUTURE PERMAHAND SZ 3-0 L30IN NONABSORBABLE BLK SH L26MM K832H

## (undated) DEVICE — TRAP SPEC RETRV CLR PLAS POLYP IN LN SUCT QUIK CTCH

## (undated) DEVICE — SUTURE NONABSORBABLE MONOFILAMENT 6-0 C-1 1X30 IN PROLENE 8706H

## (undated) DEVICE — SUTURE PERMAHAND SZ 2-0 L18IN NONABSORBABLE BLK L26MM FS 685G

## (undated) DEVICE — GOWN,SIRUS,POLYRNF,XLN/3XL,18/CS: Brand: MEDLINE

## (undated) DEVICE — Device: Brand: DEFENDO VALVE AND CONNECTOR KIT

## (undated) DEVICE — FOGARTY - HYDRAGRIP SURGICAL - CLAMP INSERTS: Brand: FOGARTY HYDRAJAW

## (undated) DEVICE — SUTURE PERMAHAND SZ 3-0 L30IN NONABSORBABLE BLK SILK BRAID A304H

## (undated) DEVICE — FLEXOR, CHECK-FLO, INTRODUCER ANSEL MODIFICATION: Brand: FLEXOR

## (undated) DEVICE — APPLICATOR MEDICATED 26 CC SOLUTION HI LT ORNG CHLORAPREP

## (undated) DEVICE — SIZER GRFT DIA28-38MM SURG MEAS

## (undated) DEVICE — 3M™ STERI-STRIP™ COMPOUND BENZOIN TINCTURE 40 BAGS/CARTON 4 CARTONS/CASE C1544: Brand: 3M™ STERI-STRIP™

## (undated) DEVICE — Device: Brand: SPOT EX ENDOSCOPIC TATTOO

## (undated) DEVICE — SOLUTION IV IRRIG POUR BRL 0.9% SODIUM CHL 2F7124

## (undated) DEVICE — SYRINGE IRRIG 60ML SFT PLIABLE BLB EZ TO GRP 1 HND USE W/

## (undated) DEVICE — SUTURE VCRL + SZ 3-0 L27IN ABSRB UD L26MM SH 1/2 CIR VCP416H

## (undated) DEVICE — PACK SURG ABD SVMMC

## (undated) DEVICE — RADIFOCUS GLIDECATH: Brand: GLIDECATH

## (undated) DEVICE — GLOVE SURG SZ 65 L12IN FNGR THK79MIL GRN LTX FREE

## (undated) DEVICE — SYRINGE MED 10ML TRNSLUC BRL PLUNG BLK MRK POLYPR CTRL

## (undated) DEVICE — THE CARR-LOCKE INJECTION NEEDLE IS A SINGLE USE, DISPOSABLE, FLEXIBLE SHEATH INJECTION NEEDLE USED FOR THE INJECTION OF VARIOUS TYPES OF MEDIA THROUGH FLEXIBLE ENDOSCOPES.

## (undated) DEVICE — KIT MICRO INTRO 4FR STIFF 40CM NIGHTENALL TUNGSTEN 7SMT

## (undated) DEVICE — STRIP SKIN CLSR W1XL5IN NYL REINF CURAD

## (undated) DEVICE — FORCEPS BX L240CM WRK CHN 2.8MM STD CAP W/ NDL MIC MESH

## (undated) DEVICE — SYSTEM BX 25GA FN NDL SIX DST CUT EDG SHARKCORE

## (undated) DEVICE — DRAPE IRRIG FLD WRM W44XL66IN W/ AORN STD PRTBL INTRATEMP

## (undated) DEVICE — COVER,LIGHT HANDLE,FLX,2/PK: Brand: MEDLINE INDUSTRIES, INC.

## (undated) DEVICE — SURGICAL PROCEDURE KIT BASIN MAJ SET UP

## (undated) DEVICE — Device

## (undated) DEVICE — Z INACTIVE USE 2641838 CLIP INT L ORNG TI TRNSVRS GRV CHEVRON SHP W/ PRECIS TIP TO

## (undated) DEVICE — SUTURE VCRL + SZ 2 0 L27IN ABSRB UD CT 1 L36MM 1 2 CIR TAPR VCPP42D

## (undated) DEVICE — DISPOSABLE DISTAL ATTACHMENT: Brand: DISPOSABLE DISTAL ATTACHMENT

## (undated) DEVICE — NEEDLE SCLERO 25GA L240CM OD0.51MM ID0.24MM EXTN L4MM SHTH

## (undated) DEVICE — INTENDED FOR TISSUE SEPARATION, AND OTHER PROCEDURES THAT REQUIRE A SHARP SURGICAL BLADE TO PUNCTURE OR CUT.: Brand: BARD-PARKER ® CARBON RIB-BACK BLADES

## (undated) DEVICE — SPONGE LAP W18XL18IN WHT COT 4 PLY FLD STRUNG RADPQ DISP ST

## (undated) DEVICE — ADAPTER CLEANING PORPOISE CLEANING

## (undated) DEVICE — SUTURE PROL SZ 3-0 L36IN NONABSORBABLE BLU L26MM SH 1/2 CIR 8522H

## (undated) DEVICE — GUIDEWIRE VASC L260CM DIA0.035IN L4CM DIA1.5MM J TIP PTFE S

## (undated) DEVICE — Z DISCONTINUED LONG TERM BACKORDER GRAFT VASC SZR SM 5-24 MM MP

## (undated) DEVICE — APPLICATOR MEDICATED 10.5 CC SOLUTION HI LT ORNG CHLORAPREP

## (undated) DEVICE — GLOVE SURG SZ 7.5 L11.73IN FNGR THK9.8MIL STRW LTX POLYMER

## (undated) DEVICE — MARKER,SKIN,WI/RULER AND LABELS: Brand: MEDLINE

## (undated) DEVICE — SUTURE PROL SZ 4-0 L36IN NONABSORBABLE BLU L26MM SH 1/2 CIR 8521H

## (undated) DEVICE — GOWN,AURORA,NONREINFORCED,LARGE: Brand: MEDLINE

## (undated) DEVICE — SUTURE PROL SZ 3-0 L48IN NONABSORBABLE BLU SH L26MM 1/2 CIR 8534H

## (undated) DEVICE — SUTURE VCRL + SZ 2-0 L27IN ABSRB CLR CT-1 1/2 CIR TAPERCUT VCP259H

## (undated) DEVICE — GLOVE SURG SZ 8 CRM LTX FREE POLYISOPRENE POLYMER BEAD ANTI

## (undated) DEVICE — FOGARTY - HYDRAGRIP SURGICAL - CLAMP INSERTS: Brand: FOGARTY SOFTJAW

## (undated) DEVICE — NEEDLE ANGIO 18GA L6.98CM ART ENTRY W/O STYL 1 PART PERC

## (undated) DEVICE — CONTAINER,SPECIMEN,4OZ,OR STRL: Brand: MEDLINE

## (undated) DEVICE — TOWEL,OR,DSP,ST,BLUE,DLX,XR,4/PK,20PK/CS: Brand: MEDLINE

## (undated) DEVICE — GAUZE,SPONGE,FLUFF,6"X6.75",STRL,5/TRAY: Brand: MEDLINE

## (undated) DEVICE — PENROSE DRAIN 18 X .5" SILICONE: Brand: MEDLINE

## (undated) DEVICE — ZINACTIVE USE 2539609 APPLICATOR MEDICATED 10.5 CC SOLUTION HI LT ORNG CHLORAPREP

## (undated) DEVICE — BLADE ES L6IN ELASTOMERIC COAT EXT DURABLE BEND UPTO 90DEG

## (undated) DEVICE — SUTURE PERMAHAND SZ 2-0 L30IN NONABSORBABLE BLK SILK W/O A305H

## (undated) DEVICE — RADIFOCUS GLIDEWIRE ADVANTAGE GUIDEWIRE: Brand: GLIDEWIRE ADVANTAGE

## (undated) DEVICE — CLIP INT M L GRN TI TRNSVRS GRV CHEVRON SHP W/ PRECIS TIP

## (undated) DEVICE — SVMMC VASC MAJ PK

## (undated) DEVICE — SUTURE PERMAHAND SZ 4-0 L18IN NONABSORBABLE BLK SILK BRAID A183H

## (undated) DEVICE — FORCEPS BX L240CM JAW DIA2.8MM L CAP W/ NDL MIC MESH TOOTH

## (undated) DEVICE — ANGIO-SEAL VIP VASCULAR CLOSURE DEVICE
Type: IMPLANTABLE DEVICE | Status: NON-FUNCTIONAL
Brand: ANGIO-SEAL
Removed: 2022-08-09

## (undated) DEVICE — SYRINGE, LUER LOCK, 10ML: Brand: MEDLINE

## (undated) DEVICE — YANKAUER,FLEXIBLE HANDLE,REGLR CAPACITY: Brand: MEDLINE INDUSTRIES, INC.

## (undated) DEVICE — Z INACTIVE USE 2853468 SURG PROCEDURE TRAY CRD CATH SVMMC

## (undated) DEVICE — PERRYSBURG ENDO PACK: Brand: MEDLINE INDUSTRIES, INC.

## (undated) DEVICE — ESOPHAGEAL BALLOON DILATATION CATHETER: Brand: CRE FIXED WIRE

## (undated) DEVICE — CLIPPING DEVICE: Brand: RESOLUTION CLIP

## (undated) DEVICE — PENCIL ES L3M BTTN SWCH HOLSTER W/ BLDE ELECTRD EDGE

## (undated) DEVICE — CATHETER ETER IV 18GA L125IN POLYUR STR RADPQ INTROCAN SFTY

## (undated) DEVICE — INTRODUCER SHTH 6FR L11CM 0.038IN STD SIDEPRT EXTN 3 W

## (undated) DEVICE — SINGLE-USE POLYPECTOMY SNARE: Brand: CAPTIVATOR

## (undated) DEVICE — TOTAL TRAY, 16FR 10ML SIL FOLEY, URN: Brand: MEDLINE

## (undated) DEVICE — PROVE COVER: Brand: UNBRANDED

## (undated) DEVICE — TAPE UMB 72X7/32 IN

## (undated) DEVICE — PTA BALLOON DILATATION CATHETER: Brand: MUSTANG™

## (undated) DEVICE — GOWN,SIRUS,NONRNF,SETINSLV,XL,20/CS: Brand: MEDLINE

## (undated) DEVICE — CONTAINER SPEC 33OZ URIN COLL BIODEGRADABLE PAPER DISP

## (undated) DEVICE — SAVARY-GILLIARD WIRE GUIDE: Brand: SAVARY-GILLIARD

## (undated) DEVICE — SUTURE MCRYL + SZ 4 0 L18IN ABSRB UD PC 3 L16MM 3 8 CIR PRIM MCP845G

## (undated) DEVICE — E-Z CLEAN, NON-STICK, PTFE COATED, ELECTROSURGICAL BLADE ELECTRODE, MODIFIED EXTENDED INSULATION, 2.5 INCH (6.35 CM): Brand: MEGADYNE